# Patient Record
Sex: FEMALE | Race: WHITE | Employment: PART TIME | URBAN - NONMETROPOLITAN AREA
[De-identification: names, ages, dates, MRNs, and addresses within clinical notes are randomized per-mention and may not be internally consistent; named-entity substitution may affect disease eponyms.]

---

## 2017-08-01 ENCOUNTER — HOSPITAL ENCOUNTER (OUTPATIENT)
Dept: WOMENS IMAGING | Age: 82
Discharge: HOME | End: 2017-08-01
Payer: MEDICARE

## 2017-08-01 DIAGNOSIS — Z12.31 ENCOUNTER FOR SCREENING MAMMOGRAM FOR HIGH-RISK PATIENT: ICD-10-CM

## 2017-08-01 PROCEDURE — 77063 BREAST TOMOSYNTHESIS BI: CPT

## 2017-08-15 ENCOUNTER — HOSPITAL ENCOUNTER (OUTPATIENT)
Dept: WOMENS IMAGING | Age: 82
Discharge: HOME | End: 2017-08-15
Payer: MEDICARE

## 2017-08-15 DIAGNOSIS — R92.8 ABNORMAL MAMMOGRAM: ICD-10-CM

## 2017-08-15 PROCEDURE — G0279 TOMOSYNTHESIS, MAMMO: HCPCS

## 2018-01-30 ENCOUNTER — TRANSCRIBE ORDERS (OUTPATIENT)
Dept: ADMINISTRATIVE | Facility: HOSPITAL | Age: 83
End: 2018-01-30

## 2018-01-30 DIAGNOSIS — Z78.0 ASYMPTOMATIC MENOPAUSAL STATE: Primary | ICD-10-CM

## 2018-03-26 ENCOUNTER — HOSPITAL ENCOUNTER (OUTPATIENT)
Dept: BONE DENSITY | Facility: HOSPITAL | Age: 83
Discharge: HOME | End: 2018-03-26
Admitting: PHYSICIAN ASSISTANT

## 2018-03-26 DIAGNOSIS — Z78.0 ASYMPTOMATIC MENOPAUSAL STATE: ICD-10-CM

## 2018-03-26 PROCEDURE — 77080 DXA BONE DENSITY AXIAL: CPT

## 2018-04-27 ENCOUNTER — TRANSCRIBE ORDERS (OUTPATIENT)
Dept: ADMINISTRATIVE | Facility: HOSPITAL | Age: 83
End: 2018-04-27

## 2018-04-27 DIAGNOSIS — R22.1 LOCALIZED SWELLING, MASS AND LUMP, NECK: Primary | ICD-10-CM

## 2018-05-07 ENCOUNTER — HOSPITAL ENCOUNTER (OUTPATIENT)
Dept: CT IMAGING | Facility: HOSPITAL | Age: 83
Discharge: HOME | End: 2018-05-07
Attending: INTERNAL MEDICINE | Admitting: INTERNAL MEDICINE

## 2018-05-07 DIAGNOSIS — R22.1 LOCALIZED SWELLING, MASS AND LUMP, NECK: ICD-10-CM

## 2018-05-07 LAB — CREAT BLDA-MCNC: 0.9 MG/DL (ref 0.6–1.3)

## 2018-05-07 PROCEDURE — 70491 CT SOFT TISSUE NECK W/DYE: CPT

## 2018-05-07 PROCEDURE — 25010000002 IOPAMIDOL 61 % SOLUTION: Performed by: INTERNAL MEDICINE

## 2018-05-07 PROCEDURE — 82565 ASSAY OF CREATININE: CPT

## 2018-05-07 RX ADMIN — IOPAMIDOL 100 ML: 612 INJECTION, SOLUTION INTRAVENOUS at 11:30

## 2018-05-24 NOTE — PROGRESS NOTES
Mirza Felix MD     Chief Complaint   Patient presents with   • Neck Mass     right side of neck       HPI   Kami Villanueva is a  84 y.o. female who complains of a right inferior neck mass and right neck pain. The patient has had tolerable symptoms. The symptoms have been on and off for the last several months. There have been no identified factors that aggravate the symptoms. There have been no factors that have improved the symptoms. She has had left jaw pain that she attributes to dental problems. She feels there is a difference when she puts her finger back in the mouth/ throat on the left and right side. She has shoulder pain intermittently. 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 She is s/p a medialization procedure in [place].     Review of Systems:  Reviewed per patient intake note    Past History:  Past Medical History:   Diagnosis Date   • Allergic rhinitis    • Disease of thyroid gland    • History of breast cancer    • Hypertension      Past Surgical History:   Procedure Laterality Date   • BREAST SURGERY Right     mastectomy   • COLON RESECTION     • HYSTERECTOMY     • THROAT SURGERY      vocal cord implant   • THYROID SURGERY       Family History   Problem Relation Age of Onset   • No Known Problems Mother    • No Known Problems Father      Social History   Substance Use Topics   • Smoking status: Never Smoker   • Smokeless tobacco: Never Used   • Alcohol use No     Outpatient Prescriptions Marked as Taking for the 5/30/18 encounter (Office Visit) with Mirza Felix MD   Medication Sig Dispense Refill   • amLODIPine (NORVASC) 5 MG tablet Take 5 mg by mouth Daily.     • Coenzyme Q10 (COQ-10 PO) qd     • Cyanocobalamin (VITAMIN B-12) 5000 MCG lozenge Take  by mouth.     • estradiol (YUVAFEM) 10 MCG tablet vaginal tablet Yuvafem 10 mcg vaginal tablet     • fluticasone (FLONASE) 50 MCG/ACT nasal spray 2 sprays into each nostril Daily.     •  Fluticasone Furoate (ARNUITY ELLIPTA) 200 MCG/ACT aerosol powder  Inhale Daily.     • GuaiFENesin (MUCINEX PO) Take  by mouth.     • hydrochlorothiazide (HYDRODIURIL) 25 MG tablet hydrochlorothiazide 25 mg tablet     • levothyroxine (SYNTHROID) 125 MCG tablet TAKE ONE TABLET BY MOUTH ONCE DAILY     • Magnesium 100 MG capsule qd     • Mirabegron (MYRBETRIQ PO) Take  by mouth.     • Multiple Vitamins-Minerals (MULTIVITAMIN ADULT PO) 1 tablet daily     • Probiotic Product (PROBIOTIC-10 PO) bid     • simethicone (GAS-X) 80 MG chewable tablet tid     • [DISCONTINUED] HYDROCHLOROTHIAZIDE PO Take 25 mg by mouth Daily. Half a tab daily       Allergies:  Alendronate; Lisinopril; Statins; Sulfa antibiotics; and Teriparatide (recombinant)    Vital Signs:   Temp:  [97.6 °F (36.4 °C)] 97.6 °F (36.4 °C)  BP: (152)/(86) 152/86    Physical Exam   CONSTITUTIONAL: well nourished, well-developed, alert, oriented, in no acute distress   COMMUNICATION AND VOICE: able to communicate normally, normal voice quality  HEAD: normocephalic, no lesions, atraumatic, no tenderness, no masses   FACE: appearance normal, no lesions, no tenderness, no deformities, facial motion symmetric  SALIVARY GLANDS: parotid glands with no tenderness, no swelling, no masses, submandibular glands with normal size, nontender  EYES: ocular motility normal, eyelids normal, orbits normal, no proptosis, conjunctiva normal , pupils equal, round  HEARING: response to conversational voice normal bilaterally   EXTERNAL EARS: auricles without lesions  EXTERNAL EAR CANALS: normal ear canals without stenosis or significant cerumen  TYMPANIC MEMBRANES: tympanic membrane appearance normal, no lesions, no perforation, normal mobility, no fluid  EXTERNAL NOSE: structure normal, no tenderness on palpation, no nasal discharge, no lesions, no evidence of trauma, nostrils patent  INTRANASAL EXAM: nasal mucosa normal, vestibule within normal limits, inferior turbinate normal,  nasal  septum without overt anterior deviation  NASOPHARYNX: nasopharyngeal mucosa, adenoids within normal limits  LIPS: structure normal, no tenderness on palpation, no lesions, no evidence of trauma  TEETH: dentition within normal limits for age  GUMS: gingivae healthy  ORAL MUCOSA: oral mucosa normal, no mucosal lesions  FLOOR OF MOUTH: Warthin's duct patent, mucosa normal  TONGUE: lingual mucosa normal without lesions, normal tongue mobility  PALATE: soft and hard palates with normal mucosa and structure  OROPHARYNX: mucosa normal, tonsil fossa normal, 1+ bilateral tonsils no structural abnormality  HYPOPHARYNX: hypopharyngeal mucosa normal  LARYNX: epiglottis and arytenoid cartilage within normal limits, vocal cord mucosa normal with right true vocal cord paralysis  NECK: There is right> left clavicular asymmetry consistent with arthritic changes, no neck mass is noted  THYROID: well healed scar without mass.   LYMPH NODES: no lymphadenopathy  CHEST/RESPIRATORY: respiratory effort normal, normal breath sounds  CARDIOVASCULAR: rate and rhythm normal, extremities without cyanosis or edema, no overt jugulovenous distension present  NEUROLOGIC/PSYCHIATRIC: oriented appropriately for age, mood normal, affect appropriate, cranial nerves intact grossly unless specifically mentioned above     RESULTS REVIEW:    I have personally reviewed the patient's ct scan of the neck with contrast images.       Assessment   1. Degenerative joint disease of sternoclavicular joint, right    2. Complete paralysis of vocal cord        Plan    The calcific area on cat scan scanning most likely represents the vocal cord medialization material.  Conservative management.    Return if symptoms worsen or fail to improve.    Mirza Felix MD  05/30/18  6:06 PM

## 2018-05-30 ENCOUNTER — OFFICE VISIT (OUTPATIENT)
Dept: OTOLARYNGOLOGY | Facility: CLINIC | Age: 83
End: 2018-05-30

## 2018-05-30 VITALS
DIASTOLIC BLOOD PRESSURE: 86 MMHG | WEIGHT: 168 LBS | BODY MASS INDEX: 26.37 KG/M2 | HEIGHT: 67 IN | SYSTOLIC BLOOD PRESSURE: 152 MMHG | TEMPERATURE: 97.6 F

## 2018-05-30 DIAGNOSIS — J38.00 COMPLETE PARALYSIS OF VOCAL CORD: ICD-10-CM

## 2018-05-30 DIAGNOSIS — M19.011 DEGENERATIVE JOINT DISEASE OF STERNOCLAVICULAR JOINT, RIGHT: Primary | ICD-10-CM

## 2018-05-30 PROCEDURE — 99202 OFFICE O/P NEW SF 15 MIN: CPT | Performed by: OTOLARYNGOLOGY

## 2018-05-30 RX ORDER — HYDROCHLOROTHIAZIDE 25 MG/1
TABLET ORAL
Status: ON HOLD | COMMUNITY
End: 2018-10-22

## 2018-05-30 NOTE — PROGRESS NOTES
Yahaira Gibbons   Patient Intake Note    Review of Systems  Review of Systems   Constitutional: Negative for chills, fatigue and fever.   HENT:        See HPI   Respiratory: Positive for choking and shortness of breath. Negative for cough and wheezing.    Cardiovascular: Negative.    Gastrointestinal: Negative for constipation, diarrhea, nausea and vomiting.   Allergic/Immunologic: Positive for environmental allergies. Negative for food allergies.   Neurological: Negative for dizziness, light-headedness and headaches.   Hematological: Does not bruise/bleed easily.   Psychiatric/Behavioral: Negative for sleep disturbance.       QUALITY MEASURES    Body Mass Index Screening and Follow-Up Plan  Body mass index is 26.31 kg/m².  Patient's Body mass index is 26.31 kg/m². BMI is above normal parameters. Recommendations include: exercise counseling.    Tobacco Use: Screening and Cessation Intervention  Smoking status: Never Smoker                                                              Smokeless tobacco: Never Used                            Yahaira Gibbons  5/30/2018  2:06 PM

## 2018-08-16 ENCOUNTER — HOSPITAL ENCOUNTER (OUTPATIENT)
Dept: GENERAL RADIOLOGY | Facility: HOSPITAL | Age: 83
Discharge: HOME | End: 2018-08-16
Admitting: NURSE PRACTITIONER

## 2018-08-16 ENCOUNTER — HOSPITAL ENCOUNTER (OUTPATIENT)
Dept: CT IMAGING | Facility: HOSPITAL | Age: 83
Discharge: HOME | End: 2018-08-16

## 2018-08-16 LAB — CREAT BLDA-MCNC: 0.9 MG/DL (ref 0.6–1.3)

## 2018-08-16 PROCEDURE — 25010000002 IOPAMIDOL 61 % SOLUTION: Performed by: NURSE PRACTITIONER

## 2018-08-16 PROCEDURE — 82565 ASSAY OF CREATININE: CPT

## 2018-08-16 PROCEDURE — 70150 X-RAY EXAM OF FACIAL BONES: CPT

## 2018-08-16 PROCEDURE — 70488 CT MAXILLOFACIAL W/O & W/DYE: CPT

## 2018-08-16 RX ADMIN — IOPAMIDOL 100 ML: 612 INJECTION, SOLUTION INTRAVENOUS at 14:25

## 2018-08-17 ENCOUNTER — HOSPITAL ENCOUNTER (OUTPATIENT)
Dept: WOMENS IMAGING | Age: 83
Discharge: HOME | End: 2018-08-17
Payer: MEDICARE

## 2018-08-17 ENCOUNTER — TELEPHONE (OUTPATIENT)
Dept: URGENT CARE | Facility: CLINIC | Age: 83
End: 2018-08-17

## 2018-08-17 DIAGNOSIS — Z12.31 ENCOUNTER FOR SCREENING MAMMOGRAM FOR BREAST CANCER: ICD-10-CM

## 2018-08-17 PROCEDURE — 77063 BREAST TOMOSYNTHESIS BI: CPT

## 2018-08-17 NOTE — TELEPHONE ENCOUNTER
Notified patient of ct results. Advised to start antibiotics. Advised of appt with ENT. Patient stated she could go to this appt. Patient also stated she took blood pressure medication today and blood pressure is doing much better. Advised patient to notify clinic if anything else is needed, and we will defer care to ENT and Dr. Strong (PCP)  from here. We will fax results to [facility].

## 2018-08-20 ENCOUNTER — OFFICE VISIT (OUTPATIENT)
Dept: OTOLARYNGOLOGY | Facility: CLINIC | Age: 83
End: 2018-08-20

## 2018-08-20 VITALS
TEMPERATURE: 97.6 F | WEIGHT: 164 LBS | HEIGHT: 67 IN | DIASTOLIC BLOOD PRESSURE: 80 MMHG | BODY MASS INDEX: 25.74 KG/M2 | SYSTOLIC BLOOD PRESSURE: 140 MMHG

## 2018-08-20 DIAGNOSIS — J34.89 MAXILLARY SINUS MASS: ICD-10-CM

## 2018-08-20 DIAGNOSIS — D49.9 NEOPLASM: ICD-10-CM

## 2018-08-20 DIAGNOSIS — R22.0 RIGHT FACIAL SWELLING: Primary | ICD-10-CM

## 2018-08-20 DIAGNOSIS — J32.0 CHRONIC MAXILLARY SINUSITIS: ICD-10-CM

## 2018-08-20 DIAGNOSIS — J38.00 COMPLETE PARALYSIS OF VOCAL CORD: ICD-10-CM

## 2018-08-20 DIAGNOSIS — K08.409 HISTORY OF TOOTH EXTRACTION, UNSPECIFIED EDENTULISM CLASS: ICD-10-CM

## 2018-08-20 PROCEDURE — 99214 OFFICE O/P EST MOD 30 MIN: CPT | Performed by: PHYSICIAN ASSISTANT

## 2018-08-20 RX ORDER — OXYMETAZOLINE HYDROCHLORIDE 0.05 G/100ML
2 SPRAY NASAL
Status: CANCELLED | OUTPATIENT
Start: 2018-08-20 | End: 2018-08-20

## 2018-08-20 NOTE — PROGRESS NOTES
YOB: 1934  Location: [facility]  Location Address: [address] [place] [address], [facility], [address] [place] [address]  Location Phone: [phone]    Chief Complaint   Patient presents with   • Abnormal CT       History of Present Illness  Kami Villanueva is a 84 y.o. female.  Kami Villanueva is here for follow up of ENT complaints. The patient has had problems with right facial swelling, right jaw pain, right maxilla ridge/swelling, abnormality in right maxillary sinus on CT with interval change noted.  The symptoms are localized to the right side. The patient has had moderate symptoms. The symptoms have been present for the last several months The symptoms are aggravated by  no identifiable factors. The symptoms are improved by no identifiable factors.    The patient has been on 5 rounds of antibiotics since 2018, she has had a right upper tooth extraction and reports no improvement in symptoms. Radiology report show interval change between CT in May and CT in August. Concern for infection vs neoplastic process.    Patient has a know right vocal cord paralysis with history of medialization surgery.           Past Medical History:   Diagnosis Date   • Allergic rhinitis    • Disease of thyroid gland    • History of breast cancer    • Hypertension        Past Surgical History:   Procedure Laterality Date   • BREAST SURGERY Right     mastectomy   • COLON RESECTION     • HYSTERECTOMY     • THROAT SURGERY      vocal cord implant   • THYROID SURGERY         Outpatient Prescriptions Marked as Taking for the 18 encounter (Office Visit) with Kenny Mar PA   Medication Sig Dispense Refill   • amLODIPine (NORVASC) 5 MG tablet Take 5 mg by mouth Daily.     • amoxicillin-clavulanate (AUGMENTIN) 875-125 MG per tablet Take 1 tablet by mouth 2 (Two) Times a Day for 10 days. 20 tablet 0   • Coenzyme Q10 (COQ-10 PO) qd     • Cyanocobalamin (VITAMIN B-12) 5000 MCG lozenge Take  by mouth.     • estradiol  (YUVAFEM) 10 MCG tablet vaginal tablet Yuvafem 10 mcg vaginal tablet     • fluticasone (FLONASE) 50 MCG/ACT nasal spray 2 sprays into each nostril Daily.     • Fluticasone Furoate (ARNUITY ELLIPTA) 200 MCG/ACT aerosol powder  Inhale Daily.     • GuaiFENesin (MUCINEX PO) Take  by mouth.     • hydrochlorothiazide (HYDRODIURIL) 25 MG tablet hydrochlorothiazide 25 mg tablet     • levothyroxine (SYNTHROID) 125 MCG tablet TAKE ONE TABLET BY MOUTH ONCE DAILY     • Magnesium 100 MG capsule qd     • Mirabegron (MYRBETRIQ PO) Take  by mouth.     • Multiple Vitamins-Minerals (MULTIVITAMIN ADULT PO) 1 tablet daily     • Probiotic Product (PROBIOTIC-10 PO) bid     • simethicone (GAS-X) 80 MG chewable tablet tid         Alendronate; Lisinopril; Statins; Sulfa antibiotics; and Teriparatide (recombinant)    Family History   Problem Relation Age of Onset   • No Known Problems Mother    • No Known Problems Father        Social History     Social History   • Marital status:      Spouse name: N/A   • Number of children: N/A   • Years of education: N/A     Occupational History   • Not on file.     Social History Main Topics   • Smoking status: Never Smoker   • Smokeless tobacco: Never Used   • Alcohol use No   • Drug use: No   • Sexual activity: Not on file     Other Topics Concern   • Not on file     Social History Narrative   • No narrative on file       Review of Systems   Constitutional: Negative for activity change, appetite change, chills, diaphoresis, fatigue, fever and unexpected weight change.   HENT: Positive for facial swelling and voice change (stable). Negative for congestion, dental problem, drooling, ear discharge, ear pain, hearing loss, mouth sores, nosebleeds, postnasal drip, rhinorrhea, sinus pressure, sneezing, sore throat, tinnitus and trouble swallowing.         Right jaw pain   Eyes: Negative.    Respiratory: Negative.    Cardiovascular: Negative.    Gastrointestinal: Negative.    Endocrine: Negative.     Skin: Negative.    Allergic/Immunologic: Negative for environmental allergies, food allergies and immunocompromised state.   Neurological: Negative.    Hematological: Negative.    Psychiatric/Behavioral: Negative.        Vitals:    08/20/18 1508   BP: 140/80   Temp: 97.6 °F (36.4 °C)       Body mass index is 25.69 kg/m².    Objective     Physical Exam  CONSTITUTIONAL: well nourished, alert, oriented, in no acute distress     COMMUNICATION AND VOICE: able to communicate normally, normal voice quality    HEAD: normocephalic, no lesions, atraumatic, right TMJ tenderness with mild trismus and maxillary tenderness, no masses     FACE: right facial swelling in the maxillary area moderate, no lesions, no tenderness, no deformities, facial motion symmetric    SALIVARY GLANDS: parotid glands with no tenderness, no swelling, no masses, submandibular glands with normal size, nontender    EYES: ocular motility normal, eyelids normal, orbits normal, no proptosis, conjunctiva normal , pupils equal, round     EARS:  Hearing: response to conversational voice normal bilaterally   External Ears: auricles without lesions  Otoscopic: tympanic membrane appearance normal, no lesions, no perforation, normal mobility, no fluid    NOSE:  External Nose: structure normal, no tenderness on palpation, no nasal discharge, no lesions, no evidence of trauma, nostrils patent   Intranasal Exam: nasal mucosa edema and erythema, vestibule within normal limits, inferior turbinate normal, nasal septum midline   Nasopharynx:     ORAL:  Lips: upper and lower lips without lesion   Teeth: dentition within normal limits for age   Gums: gingivae healthy   Oral Mucosa: palpable ridge along the right maxilla  Floor of Mouth: Warthin’s duct patent, mucosa normal  Tongue: lingual mucosa normal without lesions, normal tongue mobility   Palate: soft and hard palates with normal mucosa and structure  Oropharynx: oropharyngeal mucosa normal    NECK: neck appearance  normal, no mass,  noted without erythema or tenderness    THYROID: no overt thyromegaly, no tenderness, nodules or mass present on palpation, position midline     LYMPH NODES: no lymphadenopathy    CHEST/RESPIRATORY: respiratory effort normal, normal breath sounds     CARDIOVASCULAR: rate and rhythm normal, extremities without cyanosis or edema      NEUROLOGIC/PSYCHIATRIC: oriented to time, place and person, mood normal, affect appropriate, CN II-XII intact grossly    Assessment/Plan   Problems Addressed this Visit        Respiratory    Complete paralysis of vocal cord    Chronic maxillary sinusitis    Relevant Orders    Case Request (Completed)    Comprehensive Metabolic Panel    CBC (No Diff)    Protime-INR    APTT    ECG 12 Lead    XR Chest 2 View    Maxillary sinus mass    Relevant Orders    Case Request (Completed)    Comprehensive Metabolic Panel    CBC (No Diff)    Protime-INR    APTT    ECG 12 Lead    XR Chest 2 View       Other    History of tooth extraction    Relevant Orders    Case Request (Completed)    Comprehensive Metabolic Panel    CBC (No Diff)    Protime-INR    APTT    ECG 12 Lead    XR Chest 2 View    Right facial swelling - Primary    Relevant Orders    Case Request (Completed)    Comprehensive Metabolic Panel    CBC (No Diff)    Protime-INR    APTT    ECG 12 Lead    XR Chest 2 View    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    Relevant Orders    Protime-INR    APTT        ENDOSCOPIC FUNCTIONAL SINUS SURGERY W TRACKING OF RIGHT MAXILLARY SINUS WITH BIOPSY (Right)  Orders Placed This Encounter   Procedures   • XR Chest 2 View     Standing Status:   Future     Standing Expiration Date:   8/20/2019     Order Specific Question:   Reason for Exam:     Answer:   ENDOSCOPIC FUNCTIONAL SINUS SURGERY W TRACKING OF RIGHT MAXILLARY SINUS WITH BIOPSY   • Comprehensive Metabolic Panel     Standing Status:   Future     Standing Expiration Date:   8/20/2019   • CBC (No Diff)     Standing  Status:   Future     Standing Expiration Date:   8/20/2019   • Protime-INR     Standing Status:   Future     Standing Expiration Date:   8/20/2019   • APTT     Standing Status:   Future     Standing Expiration Date:   8/20/2019   • Follow Anesthesia Guidelines / Standing Orders     Standing Status:   Future   • Obtain Informed Consent     Order Specific Question:   Informed Consent Given For     Answer:   ENDOSCOPIC FUNCTIONAL SINUS SURGERY W TRACKING OF RIGHT MAXILLARY SINUS WITH BIOPSY   • Provide Patient With Instructions on NPO Status     Standing Status:   Future   • ECG 12 Lead     Standing Status:   Future     Standing Expiration Date:   8/20/2019     Order Specific Question:   Reason for Exam:     Answer:   ENDOSCOPIC FUNCTIONAL SINUS SURGERY W TRACKING OF RIGHT MAXILLARY SINUS WITH BIOPSY     Return for Follow-up post-operatively as directed.       Patient Instructions   FUNCTIONAL ENDOSCOPIC SINUS SURGERY WITH BIOPSY OF RIGHT MAXILLARY SINUS LESION: A functional endoscopic sinus surgery was recommended. The risks and benefits were explained including but not limited to pain, bleeding (with the possible need for nasal packing), infection, risks of the general anesthesia, orbital injury with blurred vision or visual loss, cerebrospinal fluid leak, persistent disease, scarring, synichiae and the possibility for the need of reoperation. Possibilities of additional sinus work or less sinus work depending on the status of the nose at the time of the operation was discussed. Alternatives were discussed. No guarantees were made or implied. Questions were asked appropriately answered.      ###### BMI  #####   MyPlate from USDA  The general, healthful diet is based on the 2010 Dietary Guidelines for Americans. The amount of food you need to eat from each food group depends on your age, sex, and level of physical activity and can be individualized by a dietitian. Go to ChooseMyPlate.gov for more information.  What do  I need to know about the MyPlate plan?  · Enjoy your food, but eat less.  · Avoid oversized portions.  ? ½ of your plate should include fruits and vegetables.  ? ¼ of your plate should be grains.  ? ¼ of your plate should be protein.  Grains  · Make at least half of your grains whole grains.  · For a 2,000 calorie daily food plan, eat 6 oz every day.  · 1 oz is about 1 slice bread, 1 cup cereal, or ½ cup cooked rice, cereal, or pasta.  Vegetables  · Make half your plate fruits and vegetables.  · For a 2,000 calorie daily food plan, eat 2½ cups every day.  · 1 cup is about 1 cup raw or cooked vegetables or vegetable juice or 2 cups raw leafy greens.  Fruits  · Make half your plate fruits and vegetables.  · For a 2,000 calorie daily food plan, eat 2 cups every day.  · 1 cup is about 1 cup fruit or 100% fruit juice or ½ cup dried fruit.  Protein  · For a 2,000 calorie daily food plan, eat 5½ oz every day.  · 1 oz is about 1 oz meat, poultry, or fish, ¼ cup cooked beans, 1 egg, 1 Tbsp peanut butter, or ½ oz nuts or seeds.  Dairy  · Switch to fat-free or low-fat (1%) milk.  · For a 2,000 calorie daily food plan, eat 3 cups every day.  · 1 cup is about 1 cup milk or yogurt or soy milk (soy beverage), 1½ oz natural cheese, or 2 oz processed cheese.  Fats, Oils, and Empty Calories  · Only small amounts of oils are recommended.  · Empty calories are calories from solid fats or added sugars.  · Compare sodium in foods like soup, bread, and frozen meals. Choose the foods with lower numbers.  · Drink water instead of sugary drinks.  What foods can I eat?  Grains  Whole grains such as whole wheat, quinoa, millet, and bulgur. Bread, rolls, and pasta made from whole grains. Brown or wild rice. Hot or cold cereals made from whole grains and without added sugar.  Vegetables  All fresh vegetables, especially fresh red, dark green, or orange vegetables. Peas and beans. Low-sodium frozen or canned vegetables prepared without added  salt. Low-sodium vegetable juices.  Fruits  All fresh, frozen, and dried fruits. Canned fruit packed in water or fruit juice without added sugar. Fruit juices without added sugar.  Meats and Other Protein Sources  Boiled, baked, or grilled lean meat trimmed of fat. Skinless poultry. Fresh seafood and shellfish. Canned seafood packed in water. Unsalted nuts and unsalted nut butters. Tofu. Dried beans and pea. Eggs.  Dairy  Low-fat or fat-free milk, yogurt, and cheeses.  Sweets and Desserts  Frozen desserts made from low-fat milk.  Fats and Oils  Olive, peanut, and canola oils and margarine. Salad dressing and mayonnaise made from these oils.  Other  Soups and casseroles made from allowed ingredients and without added fat or salt.  The items listed above may not be a complete list of recommended foods or beverages. Contact your dietitian for more options.  What foods are not recommended?  Grains  Sweetened, low-fiber cereals. Packaged baked goods. Snack crackers and chips. Cheese crackers, butter crackers, and biscuits. Frozen waffles, sweet breads, doughnuts, pastries, packaged baking mixes, pancakes, cakes, and cookies.  Vegetables  Regular canned or frozen vegetables or vegetables prepared with salt. Canned tomatoes. Canned tomato sauce. Fried vegetables. Vegetables in cream sauce or cheese sauce.  Fruits  Fruits packed in syrup or made with added sugar.  Meats and Other Protein Sources  Marbled or fatty meats such as ribs. Poultry with skin. Fried meats, poultry, eggs, or fish. Sausages, hot dogs, and deli meats such as pastrami, bologna, or salami.  Dairy  Whole milk, cream, cheeses made from whole milk, sour cream. Ice cream or yogurt made from whole milk or with added sugar.  Beverages  For adults, no more than one alcoholic drink per day. Regular soft drinks or other sugary beverages. Juice drinks.  Sweets and Desserts  Sugary or fatty desserts, candy, and other sweets.  Fats and Oils  Solid shortening or  partially hydrogenated oils. Solid margarine. Margarine that contains trans fats. Butter.  The items listed above may not be a complete list of foods and beverages to avoid. Contact your dietitian for more information.  This information is not intended to replace advice given to you by your health care provider. Make sure you discuss any questions you have with your health care provider.  Document Released: 01/06/2009 Document Revised: 05/25/2017 Document Reviewed: 11/26/2014  [employer] Patient Education © 2018 [employer]     Calorie Counting for Weight Loss  Calories are units of energy. Your body needs a certain amount of calories from food to keep you going throughout the day. When you eat more calories than your body needs, your body stores the extra calories as fat. When you eat fewer calories than your body needs, your body burns fat to get the energy it needs.  Calorie counting means keeping track of how many calories you eat and drink each day. Calorie counting can be helpful if you need to lose weight. If you make sure to eat fewer calories than your body needs, you should lose weight. Ask your health care provider what a healthy weight is for you.  For calorie counting to work, you will need to eat the right number of calories in a day in order to lose a healthy amount of weight per week. A dietitian can help you determine how many calories you need in a day and will give you suggestions on how to reach your calorie goal.  · A healthy amount of weight to lose per week is usually 1-2 lb (0.5-0.9 kg). This usually means that your daily calorie intake should be reduced by 500-750 calories.  · Eating 1,200 - 1,500 calories per day can help most women lose weight.  · Eating 1,500 - 1,800 calories per day can help most men lose weight.    What do I need to know about calorie counting?  In order to meet your daily calorie goal, you will need to:  · Find out how many calories are in each food you  would like to eat. Try to do this before you eat.  · Decide how much of the food you plan to eat.  · Write down what you ate and how many calories it had. Doing this is called keeping a food log.    To successfully lose weight, it is important to balance calorie counting with a healthy lifestyle that includes regular activity. Aim for 150 minutes of moderate exercise (such as walking) or 75 minutes of vigorous exercise (such as running) each week.  Where do I find calorie information?    The number of calories in a food can be found on a Nutrition Facts label. If a food does not have a Nutrition Facts label, try to look up the calories online or ask your dietitian for help.  Remember that calories are listed per serving. If you choose to have more than one serving of a food, you will have to multiply the calories per serving by the amount of servings you plan to eat. For example, the label on a package of bread might say that a serving size is 1 slice and that there are 90 calories in a serving. If you eat 1 slice, you will have eaten 90 calories. If you eat 2 slices, you will have eaten 180 calories.  How do I keep a food log?  Immediately after each meal, record the following information in your food log:  · What you ate. Don't forget to include toppings, sauces, and other extras on the food.  · How much you ate. This can be measured in cups, ounces, or number of items.  · How many calories each food and drink had.  · The total number of calories in the meal.    Keep your food log near you, such as in a small notebook in your pocket, or use a mobile avril or website. Some programs will calculate calories for you and show you how many calories you have left for the day to meet your goal.  What are some calorie counting tips?  · Use your calories on foods and drinks that will fill you up and not leave you hungry:  ? Some examples of foods that fill you up are nuts and nut butters, vegetables, lean proteins, and  "high-fiber foods like whole grains. High-fiber foods are foods with more than 5 g fiber per serving.  ? Drinks such as sodas, specialty coffee drinks, alcohol, and juices have a lot of calories, yet do not fill you up.  · Eat nutritious foods and avoid empty calories. Empty calories are calories you get from foods or beverages that do not have many vitamins or protein, such as candy, sweets, and soda. It is better to have a nutritious high-calorie food (such as an avocado) than a food with few nutrients (such as a bag of chips).  · Know how many calories are in the foods you eat most often. This will help you calculate calorie counts faster.  · Pay attention to calories in drinks. Low-calorie drinks include water and unsweetened drinks.  · Pay attention to nutrition labels for \"low fat\" or \"fat free\" foods. These foods sometimes have the same amount of calories or more calories than the full fat versions. They also often have added sugar, starch, or salt, to make up for flavor that was removed with the fat.  · Find a way of tracking calories that works for you. Get creative. Try different apps or programs if writing down calories does not work for you.  What are some portion control tips?  · Know how many calories are in a serving. This will help you know how many servings of a certain food you can have.  · Use a measuring cup to measure serving sizes. You could also try weighing out portions on a kitchen scale. With time, you will be able to estimate serving sizes for some foods.  · Take some time to put servings of different foods on your favorite plates, bowls, and cups so you know what a serving looks like.  · Try not to eat straight from a bag or box. Doing this can lead to overeating. Put the amount you would like to eat in a cup or on a plate to make sure you are eating the right portion.  · Use smaller plates, glasses, and bowls to prevent overeating.  · Try not to multitask (for example, watch TV or use " your computer) while eating. If it is time to eat, sit down at a table and enjoy your food. This will help you to know when you are full. It will also help you to be aware of what you are eating and how much you are eating.  What are tips for following this plan?  Reading food labels  · Check the calorie count compared to the serving size. The serving size may be smaller than what you are used to eating.  · Check the source of the calories. Make sure the food you are eating is high in vitamins and protein and low in saturated and trans fats.  Shopping  · Read nutrition labels while you shop. This will help you make healthy decisions before you decide to purchase your food.  · Make a grocery list and stick to it.  Cooking  · Try to cook your favorite foods in a healthier way. For example, try baking instead of frying.  · Use low-fat dairy products.  Meal planning  · Use more fruits and vegetables. Half of your plate should be fruits and vegetables.  · Include lean proteins like poultry and fish.  How do I count calories when eating out?  · Ask for smaller portion sizes.  · Consider sharing an entree and sides instead of getting your own entree.  · If you get your own entree, eat only half. Ask for a box at the beginning of your meal and put the rest of your entree in it so you are not tempted to eat it.  · If calories are listed on the menu, choose the lower calorie options.  · Choose dishes that include vegetables, fruits, whole grains, low-fat dairy products, and lean protein.  · Choose items that are boiled, broiled, grilled, or steamed. Stay away from items that are buttered, battered, fried, or served with cream sauce. Items labeled “crispy” are usually fried, unless stated otherwise.  · Choose water, low-fat milk, unsweetened iced tea, or other drinks without added sugar. If you want an alcoholic beverage, choose a lower calorie option such as a glass of wine or light beer.  · Ask for dressings, sauces, and  syrups on the side. These are usually high in calories, so you should limit the amount you eat.  · If you want a salad, choose a garden salad and ask for grilled meats. Avoid extra toppings like velasquez, cheese, or fried items. Ask for the dressing on the side, or ask for olive oil and vinegar or lemon to use as dressing.  · Estimate how many servings of a food you are given. For example, a serving of cooked rice is ½ cup or about the size of half a baseball. Knowing serving sizes will help you be aware of how much food you are eating at restaurants. The list below tells you how big or small some common portion sizes are based on everyday objects:  ? 1 oz--4 stacked dice.  ? 3 oz--1 deck of cards.  ? 1 tsp--1 die.  ? 1 Tbsp--½ a ping-pong ball.  ? 2 Tbsp--1 ping-pong ball.  ? ½ cup--½ baseball.  ? 1 cup--1 baseball.  Summary  · Calorie counting means keeping track of how many calories you eat and drink each day. If you eat fewer calories than your body needs, you should lose weight.  · A healthy amount of weight to lose per week is usually 1-2 lb (0.5-0.9 kg). This usually means reducing your daily calorie intake by 500-750 calories.  · The number of calories in a food can be found on a Nutrition Facts label. If a food does not have a Nutrition Facts label, try to look up the calories online or ask your dietitian for help.  · Use your calories on foods and drinks that will fill you up, and not on foods and drinks that will leave you hungry.  · Use smaller plates, glasses, and bowls to prevent overeating.  This information is not intended to replace advice given to you by your health care provider. Make sure you discuss any questions you have with your health care provider.  Document Released: 12/18/2006 Document Revised: 11/17/2017 Document Reviewed: 11/17/2017  [employer] Patient Education © 2018 [employer]     Exercising to Lose Weight  Exercising can help you to lose weight. In order to lose weight  through exercise, you need to do vigorous-intensity exercise. You can tell that you are exercising with vigorous intensity if you are breathing very hard and fast and cannot hold a conversation while exercising.  Moderate-intensity exercise helps to maintain your current weight. You can tell that you are exercising at a moderate level if you have a higher heart rate and faster breathing, but you are still able to hold a conversation.  How often should I exercise?  Choose an activity that you enjoy and set realistic goals. Your health care provider can help you to make an activity plan that works for you. Exercise regularly as directed by your health care provider. This may include:  · Doing resistance training twice each week, such as:  ? Push-ups.  ? Sit-ups.  ? Lifting weights.  ? Using resistance bands.  · Doing a given intensity of exercise for a given amount of time. Choose from these options:  ? 150 minutes of moderate-intensity exercise every week.  ? 75 minutes of vigorous-intensity exercise every week.  ? A mix of moderate-intensity and vigorous-intensity exercise every week.    Children, pregnant women, people who are out of shape, people who are overweight, and older adults may need to consult a health care provider for individual recommendations. If you have any sort of medical condition, be sure to consult your health care provider before starting a new exercise program.  What are some activities that can help me to lose weight?  · Walking at a rate of at least 4.5 miles an hour.  · Jogging or running at a rate of 5 miles per hour.  · Biking at a rate of at least 10 miles per hour.  · Lap swimming.  · Roller-skating or in-line skating.  · Cross-country skiing.  · Vigorous competitive sports, such as football, basketball, and soccer.  · Jumping rope.  · Aerobic dancing.  How can I be more active in my day-to-day activities?  · Use the stairs instead of the elevator.  · Take a walk during your lunch  break.  · If you drive, park your car farther away from work or school.  · If you take public transportation, get off one stop early and walk the rest of the way.  · Make all of your phone calls while standing up and walking around.  · Get up, stretch, and walk around every 30 minutes throughout the day.  What guidelines should I follow while exercising?  · Do not exercise so much that you hurt yourself, feel dizzy, or get very short of breath.  · Consult your health care provider prior to starting a new exercise program.  · Wear comfortable clothes and shoes with good support.  · Drink plenty of water while you exercise to prevent dehydration or heat stroke. Body water is lost during exercise and must be replaced.  · Work out until you breathe faster and your heart beats faster.  This information is not intended to replace advice given to you by your health care provider. Make sure you discuss any questions you have with your health care provider.  Document Released: 01/20/2012 Document Revised: 05/25/2017 Document Reviewed: 05/21/2015  [employer] Interactive Patient Education © 2018 [employer]

## 2018-08-20 NOTE — PATIENT INSTRUCTIONS
FUNCTIONAL ENDOSCOPIC SINUS SURGERY WITH BIOPSY OF RIGHT MAXILLARY SINUS LESION: A functional endoscopic sinus surgery was recommended. The risks and benefits were explained including but not limited to pain, bleeding (with the possible need for nasal packing), infection, risks of the general anesthesia, orbital injury with blurred vision or visual loss, cerebrospinal fluid leak, persistent disease, scarring, synichiae and the possibility for the need of reoperation. Possibilities of additional sinus work or less sinus work depending on the status of the nose at the time of the operation was discussed. Alternatives were discussed. No guarantees were made or implied. Questions were asked appropriately answered.      ###### BMI  #####   MyPlate from [employer]  The general, healthful diet is based on the 2010 Dietary Guidelines for Americans. The amount of food you need to eat from each food group depends on your age, sex, and level of physical activity and can be individualized by a dietitian. Go to ChooseMyPlate.gov for more information.  What do I need to know about the MyPlate plan?  · Enjoy your food, but eat less.  · Avoid oversized portions.  ? ½ of your plate should include fruits and vegetables.  ? ¼ of your plate should be grains.  ? ¼ of your plate should be protein.  Grains  · Make at least half of your grains whole grains.  · For a 2,000 calorie daily food plan, eat 6 oz every day.  · 1 oz is about 1 slice bread, 1 cup cereal, or ½ cup cooked rice, cereal, or pasta.  Vegetables  · Make half your plate fruits and vegetables.  · For a 2,000 calorie daily food plan, eat 2½ cups every day.  · 1 cup is about 1 cup raw or cooked vegetables or vegetable juice or 2 cups raw leafy greens.  Fruits  · Make half your plate fruits and vegetables.  · For a 2,000 calorie daily food plan, eat 2 cups every day.  · 1 cup is about 1 cup fruit or 100% fruit juice or ½ cup dried fruit.  Protein  · For a 2,000 calorie daily food  plan, eat 5½ oz every day.  · 1 oz is about 1 oz meat, poultry, or fish, ¼ cup cooked beans, 1 egg, 1 Tbsp peanut butter, or ½ oz nuts or seeds.  Dairy  · Switch to fat-free or low-fat (1%) milk.  · For a 2,000 calorie daily food plan, eat 3 cups every day.  · 1 cup is about 1 cup milk or yogurt or soy milk (soy beverage), 1½ oz natural cheese, or 2 oz processed cheese.  Fats, Oils, and Empty Calories  · Only small amounts of oils are recommended.  · Empty calories are calories from solid fats or added sugars.  · Compare sodium in foods like soup, bread, and frozen meals. Choose the foods with lower numbers.  · Drink water instead of sugary drinks.  What foods can I eat?  Grains  Whole grains such as whole wheat, quinoa, millet, and bulgur. Bread, rolls, and pasta made from whole grains. Brown or wild rice. Hot or cold cereals made from whole grains and without added sugar.  Vegetables  All fresh vegetables, especially fresh red, dark green, or orange vegetables. Peas and beans. Low-sodium frozen or canned vegetables prepared without added salt. Low-sodium vegetable juices.  Fruits  All fresh, frozen, and dried fruits. Canned fruit packed in water or fruit juice without added sugar. Fruit juices without added sugar.  Meats and Other Protein Sources  Boiled, baked, or grilled lean meat trimmed of fat. Skinless poultry. Fresh seafood and shellfish. Canned seafood packed in water. Unsalted nuts and unsalted nut butters. Tofu. Dried beans and pea. Eggs.  Dairy  Low-fat or fat-free milk, yogurt, and cheeses.  Sweets and Desserts  Frozen desserts made from low-fat milk.  Fats and Oils  Olive, peanut, and canola oils and margarine. Salad dressing and mayonnaise made from these oils.  Other  Soups and casseroles made from allowed ingredients and without added fat or salt.  The items listed above may not be a complete list of recommended foods or beverages. Contact your dietitian for more options.  What foods are not  recommended?  Grains  Sweetened, low-fiber cereals. Packaged baked goods. Snack crackers and chips. Cheese crackers, butter crackers, and biscuits. Frozen waffles, sweet breads, doughnuts, pastries, packaged baking mixes, pancakes, cakes, and cookies.  Vegetables  Regular canned or frozen vegetables or vegetables prepared with salt. Canned tomatoes. Canned tomato sauce. Fried vegetables. Vegetables in cream sauce or cheese sauce.  Fruits  Fruits packed in syrup or made with added sugar.  Meats and Other Protein Sources  Marbled or fatty meats such as ribs. Poultry with skin. Fried meats, poultry, eggs, or fish. Sausages, hot dogs, and deli meats such as pastrami, bologna, or salami.  Dairy  Whole milk, cream, cheeses made from whole milk, sour cream. Ice cream or yogurt made from whole milk or with added sugar.  Beverages  For adults, no more than one alcoholic drink per day. Regular soft drinks or other sugary beverages. Juice drinks.  Sweets and Desserts  Sugary or fatty desserts, candy, and other sweets.  Fats and Oils  Solid shortening or partially hydrogenated oils. Solid margarine. Margarine that contains trans fats. Butter.  The items listed above may not be a complete list of foods and beverages to avoid. Contact your dietitian for more information.  This information is not intended to replace advice given to you by your health care provider. Make sure you discuss any questions you have with your health care provider.  Document Released: 01/06/2009 Document Revised: 05/25/2017 Document Reviewed: 11/26/2014  [employer] Interactive Patient Education © 2018 [employer]     Calorie Counting for Weight Loss  Calories are units of energy. Your body needs a certain amount of calories from food to keep you going throughout the day. When you eat more calories than your body needs, your body stores the extra calories as fat. When you eat fewer calories than your body needs, your body burns fat to get the energy it  needs.  Calorie counting means keeping track of how many calories you eat and drink each day. Calorie counting can be helpful if you need to lose weight. If you make sure to eat fewer calories than your body needs, you should lose weight. Ask your health care provider what a healthy weight is for you.  For calorie counting to work, you will need to eat the right number of calories in a day in order to lose a healthy amount of weight per week. A dietitian can help you determine how many calories you need in a day and will give you suggestions on how to reach your calorie goal.  · A healthy amount of weight to lose per week is usually 1-2 lb (0.5-0.9 kg). This usually means that your daily calorie intake should be reduced by 500-750 calories.  · Eating 1,200 - 1,500 calories per day can help most women lose weight.  · Eating 1,500 - 1,800 calories per day can help most men lose weight.    What do I need to know about calorie counting?  In order to meet your daily calorie goal, you will need to:  · Find out how many calories are in each food you would like to eat. Try to do this before you eat.  · Decide how much of the food you plan to eat.  · Write down what you ate and how many calories it had. Doing this is called keeping a food log.    To successfully lose weight, it is important to balance calorie counting with a healthy lifestyle that includes regular activity. Aim for 150 minutes of moderate exercise (such as walking) or 75 minutes of vigorous exercise (such as running) each week.  Where do I find calorie information?    The number of calories in a food can be found on a Nutrition Facts label. If a food does not have a Nutrition Facts label, try to look up the calories online or ask your dietitian for help.  Remember that calories are listed per serving. If you choose to have more than one serving of a food, you will have to multiply the calories per serving by the amount of servings you plan to eat. For  "example, the label on a package of bread might say that a serving size is 1 slice and that there are 90 calories in a serving. If you eat 1 slice, you will have eaten 90 calories. If you eat 2 slices, you will have eaten 180 calories.  How do I keep a food log?  Immediately after each meal, record the following information in your food log:  · What you ate. Don't forget to include toppings, sauces, and other extras on the food.  · How much you ate. This can be measured in cups, ounces, or number of items.  · How many calories each food and drink had.  · The total number of calories in the meal.    Keep your food log near you, such as in a small notebook in your pocket, or use a mobile avril or website. Some programs will calculate calories for you and show you how many calories you have left for the day to meet your goal.  What are some calorie counting tips?  · Use your calories on foods and drinks that will fill you up and not leave you hungry:  ? Some examples of foods that fill you up are nuts and nut butters, vegetables, lean proteins, and high-fiber foods like whole grains. High-fiber foods are foods with more than 5 g fiber per serving.  ? Drinks such as sodas, specialty coffee drinks, alcohol, and juices have a lot of calories, yet do not fill you up.  · Eat nutritious foods and avoid empty calories. Empty calories are calories you get from foods or beverages that do not have many vitamins or protein, such as candy, sweets, and soda. It is better to have a nutritious high-calorie food (such as an avocado) than a food with few nutrients (such as a bag of chips).  · Know how many calories are in the foods you eat most often. This will help you calculate calorie counts faster.  · Pay attention to calories in drinks. Low-calorie drinks include water and unsweetened drinks.  · Pay attention to nutrition labels for \"low fat\" or \"fat free\" foods. These foods sometimes have the same amount of calories or more calories " than the full fat versions. They also often have added sugar, starch, or salt, to make up for flavor that was removed with the fat.  · Find a way of tracking calories that works for you. Get creative. Try different apps or programs if writing down calories does not work for you.  What are some portion control tips?  · Know how many calories are in a serving. This will help you know how many servings of a certain food you can have.  · Use a measuring cup to measure serving sizes. You could also try weighing out portions on a kitchen scale. With time, you will be able to estimate serving sizes for some foods.  · Take some time to put servings of different foods on your favorite plates, bowls, and cups so you know what a serving looks like.  · Try not to eat straight from a bag or box. Doing this can lead to overeating. Put the amount you would like to eat in a cup or on a plate to make sure you are eating the right portion.  · Use smaller plates, glasses, and bowls to prevent overeating.  · Try not to multitask (for example, watch TV or use your computer) while eating. If it is time to eat, sit down at a table and enjoy your food. This will help you to know when you are full. It will also help you to be aware of what you are eating and how much you are eating.  What are tips for following this plan?  Reading food labels  · Check the calorie count compared to the serving size. The serving size may be smaller than what you are used to eating.  · Check the source of the calories. Make sure the food you are eating is high in vitamins and protein and low in saturated and trans fats.  Shopping  · Read nutrition labels while you shop. This will help you make healthy decisions before you decide to purchase your food.  · Make a grocery list and stick to it.  Cooking  · Try to cook your favorite foods in a healthier way. For example, try baking instead of frying.  · Use low-fat dairy products.  Meal planning  · Use more fruits  and vegetables. Half of your plate should be fruits and vegetables.  · Include lean proteins like poultry and fish.  How do I count calories when eating out?  · Ask for smaller portion sizes.  · Consider sharing an entree and sides instead of getting your own entree.  · If you get your own entree, eat only half. Ask for a box at the beginning of your meal and put the rest of your entree in it so you are not tempted to eat it.  · If calories are listed on the menu, choose the lower calorie options.  · Choose dishes that include vegetables, fruits, whole grains, low-fat dairy products, and lean protein.  · Choose items that are boiled, broiled, grilled, or steamed. Stay away from items that are buttered, battered, fried, or served with cream sauce. Items labeled “crispy” are usually fried, unless stated otherwise.  · Choose water, low-fat milk, unsweetened iced tea, or other drinks without added sugar. If you want an alcoholic beverage, choose a lower calorie option such as a glass of wine or light beer.  · Ask for dressings, sauces, and syrups on the side. These are usually high in calories, so you should limit the amount you eat.  · If you want a salad, choose a garden salad and ask for grilled meats. Avoid extra toppings like velasquez, cheese, or fried items. Ask for the dressing on the side, or ask for olive oil and vinegar or lemon to use as dressing.  · Estimate how many servings of a food you are given. For example, a serving of cooked rice is ½ cup or about the size of half a baseball. Knowing serving sizes will help you be aware of how much food you are eating at restaurants. The list below tells you how big or small some common portion sizes are based on everyday objects:  ? 1 oz--4 stacked dice.  ? 3 oz--1 deck of cards.  ? 1 tsp--1 die.  ? 1 Tbsp--½ a ping-pong ball.  ? 2 Tbsp--1 ping-pong ball.  ? ½ cup--½ baseball.  ? 1 cup--1 baseball.  Summary  · Calorie counting means keeping track of how many calories  you eat and drink each day. If you eat fewer calories than your body needs, you should lose weight.  · A healthy amount of weight to lose per week is usually 1-2 lb (0.5-0.9 kg). This usually means reducing your daily calorie intake by 500-750 calories.  · The number of calories in a food can be found on a Nutrition Facts label. If a food does not have a Nutrition Facts label, try to look up the calories online or ask your dietitian for help.  · Use your calories on foods and drinks that will fill you up, and not on foods and drinks that will leave you hungry.  · Use smaller plates, glasses, and bowls to prevent overeating.  This information is not intended to replace advice given to you by your health care provider. Make sure you discuss any questions you have with your health care provider.  Document Released: 12/18/2006 Document Revised: 11/17/2017 Document Reviewed: 11/17/2017  [employer] Interactive Patient Education © 2018 [employer]     Exercising to Lose Weight  Exercising can help you to lose weight. In order to lose weight through exercise, you need to do vigorous-intensity exercise. You can tell that you are exercising with vigorous intensity if you are breathing very hard and fast and cannot hold a conversation while exercising.  Moderate-intensity exercise helps to maintain your current weight. You can tell that you are exercising at a moderate level if you have a higher heart rate and faster breathing, but you are still able to hold a conversation.  How often should I exercise?  Choose an activity that you enjoy and set realistic goals. Your health care provider can help you to make an activity plan that works for you. Exercise regularly as directed by your health care provider. This may include:  · Doing resistance training twice each week, such as:  ? Push-ups.  ? Sit-ups.  ? Lifting weights.  ? Using resistance bands.  · Doing a given intensity of exercise for a given amount of time. Choose from  these options:  ? 150 minutes of moderate-intensity exercise every week.  ? 75 minutes of vigorous-intensity exercise every week.  ? A mix of moderate-intensity and vigorous-intensity exercise every week.    Children, pregnant women, people who are out of shape, people who are overweight, and older adults may need to consult a health care provider for individual recommendations. If you have any sort of medical condition, be sure to consult your health care provider before starting a new exercise program.  What are some activities that can help me to lose weight?  · Walking at a rate of at least 4.5 miles an hour.  · Jogging or running at a rate of 5 miles per hour.  · Biking at a rate of at least 10 miles per hour.  · Lap swimming.  · Roller-skating or in-line skating.  · Cross-country skiing.  · Vigorous competitive sports, such as football, basketball, and soccer.  · Jumping rope.  · Aerobic dancing.  How can I be more active in my day-to-day activities?  · Use the stairs instead of the elevator.  · Take a walk during your lunch break.  · If you drive, park your car farther away from work or school.  · If you take public transportation, get off one stop early and walk the rest of the way.  · Make all of your phone calls while standing up and walking around.  · Get up, stretch, and walk around every 30 minutes throughout the day.  What guidelines should I follow while exercising?  · Do not exercise so much that you hurt yourself, feel dizzy, or get very short of breath.  · Consult your health care provider prior to starting a new exercise program.  · Wear comfortable clothes and shoes with good support.  · Drink plenty of water while you exercise to prevent dehydration or heat stroke. Body water is lost during exercise and must be replaced.  · Work out until you breathe faster and your heart beats faster.  This information is not intended to replace advice given to you by your health care provider. Make sure you  discuss any questions you have with your health care provider.  Document Released: 01/20/2012 Document Revised: 05/25/2017 Document Reviewed: 05/21/2015  [employer] Interactive Patient Education © 2018 [employer]

## 2018-08-22 ENCOUNTER — PREP FOR SURGERY (OUTPATIENT)
Dept: OTHER | Facility: HOSPITAL | Age: 83
End: 2018-08-22

## 2018-09-10 ENCOUNTER — HOSPITAL ENCOUNTER (OUTPATIENT)
Dept: GENERAL RADIOLOGY | Facility: HOSPITAL | Age: 83
Discharge: HOME | End: 2018-09-10
Admitting: PHYSICIAN ASSISTANT

## 2018-09-10 ENCOUNTER — APPOINTMENT (OUTPATIENT)
Dept: PREADMISSION TESTING | Facility: HOSPITAL | Age: 83
End: 2018-09-10

## 2018-09-10 VITALS
SYSTOLIC BLOOD PRESSURE: 159 MMHG | DIASTOLIC BLOOD PRESSURE: 89 MMHG | BODY MASS INDEX: 25.43 KG/M2 | OXYGEN SATURATION: 98 % | RESPIRATION RATE: 18 BRPM | HEART RATE: 76 BPM | WEIGHT: 162.04 LBS | HEIGHT: 67 IN

## 2018-09-10 DIAGNOSIS — J32.0 CHRONIC MAXILLARY SINUSITIS: ICD-10-CM

## 2018-09-10 DIAGNOSIS — D49.9 NEOPLASM: ICD-10-CM

## 2018-09-10 DIAGNOSIS — J34.89 MAXILLARY SINUS MASS: ICD-10-CM

## 2018-09-10 DIAGNOSIS — K08.409 HISTORY OF TOOTH EXTRACTION, UNSPECIFIED EDENTULISM CLASS: ICD-10-CM

## 2018-09-10 DIAGNOSIS — R22.0 RIGHT FACIAL SWELLING: ICD-10-CM

## 2018-09-10 LAB
ALBUMIN SERPL-MCNC: 4.3 G/DL (ref 3.5–5)
ALBUMIN/GLOB SERPL: 1.3 G/DL (ref 1.1–2.5)
ALP SERPL-CCNC: 62 U/L (ref 24–120)
ALT SERPL W P-5'-P-CCNC: 26 U/L (ref 0–54)
ANION GAP SERPL CALCULATED.3IONS-SCNC: 8 MMOL/L (ref 4–13)
APTT PPP: 29.7 SECONDS (ref 24.1–34.8)
AST SERPL-CCNC: 32 U/L (ref 7–45)
BILIRUB SERPL-MCNC: 0.8 MG/DL (ref 0.1–1)
BUN BLD-MCNC: 26 MG/DL (ref 5–21)
BUN/CREAT SERPL: 30.6 (ref 7–25)
CALCIUM SPEC-SCNC: 9.5 MG/DL (ref 8.4–10.4)
CHLORIDE SERPL-SCNC: 99 MMOL/L (ref 98–110)
CO2 SERPL-SCNC: 32 MMOL/L (ref 24–31)
CREAT BLD-MCNC: 0.85 MG/DL (ref 0.5–1.4)
DEPRECATED RDW RBC AUTO: 42.2 FL (ref 40–54)
ERYTHROCYTE [DISTWIDTH] IN BLOOD BY AUTOMATED COUNT: 13.8 % (ref 12–15)
GFR SERPL CREATININE-BSD FRML MDRD: 64 ML/MIN/1.73
GLOBULIN UR ELPH-MCNC: 3.2 GM/DL
GLUCOSE BLD-MCNC: 97 MG/DL (ref 70–100)
HCT VFR BLD AUTO: 43 % (ref 37–47)
HGB BLD-MCNC: 14.4 G/DL (ref 12–16)
INR PPP: 0.97 (ref 0.91–1.09)
MCH RBC QN AUTO: 28.1 PG (ref 28–32)
MCHC RBC AUTO-ENTMCNC: 33.5 G/DL (ref 33–36)
MCV RBC AUTO: 83.8 FL (ref 82–98)
PLATELET # BLD AUTO: 213 10*3/MM3 (ref 130–400)
PMV BLD AUTO: 11.3 FL (ref 6–12)
POTASSIUM BLD-SCNC: 3.5 MMOL/L (ref 3.5–5.3)
PROT SERPL-MCNC: 7.5 G/DL (ref 6.3–8.7)
PROTHROMBIN TIME: 13.2 SECONDS (ref 11.9–14.6)
RBC # BLD AUTO: 5.13 10*6/MM3 (ref 4.2–5.4)
SODIUM BLD-SCNC: 139 MMOL/L (ref 135–145)
WBC NRBC COR # BLD: 7.35 10*3/MM3 (ref 4.8–10.8)

## 2018-09-10 PROCEDURE — 85730 THROMBOPLASTIN TIME PARTIAL: CPT | Performed by: PHYSICIAN ASSISTANT

## 2018-09-10 PROCEDURE — 85610 PROTHROMBIN TIME: CPT | Performed by: PHYSICIAN ASSISTANT

## 2018-09-10 PROCEDURE — 93005 ELECTROCARDIOGRAM TRACING: CPT

## 2018-09-10 PROCEDURE — 36415 COLL VENOUS BLD VENIPUNCTURE: CPT

## 2018-09-10 PROCEDURE — 85027 COMPLETE CBC AUTOMATED: CPT | Performed by: PHYSICIAN ASSISTANT

## 2018-09-10 PROCEDURE — 80053 COMPREHEN METABOLIC PANEL: CPT | Performed by: PHYSICIAN ASSISTANT

## 2018-09-10 PROCEDURE — 71046 X-RAY EXAM CHEST 2 VIEWS: CPT

## 2018-09-10 PROCEDURE — 93010 ELECTROCARDIOGRAM REPORT: CPT | Performed by: INTERNAL MEDICINE

## 2018-09-10 NOTE — DISCHARGE INSTRUCTIONS
DAY OF SURGERY INSTRUCTIONS        YOUR SURGEON: dr aponte    PROCEDURE: ***ENDOSCOPIC FUNCTIONAL SINUS SURGERY W TRACKING OF RIGHT MAXILLARY SINUS WITH BIOPSY    DATE OF SURGERY: ***9/17/2018    ARRIVAL TIME: AS DIRECTED BY OFFICE    DAY OF SURGERY TAKE ONLY THESE MEDICATIONS UNLESS OTHERWISE INSTRUCTED BY YOUR PHYSICIAN: ***none          MANAGING PAIN AFTER SURGERY    We know you are probably wondering what your pain will be like after surgery.  Following surgery it is unrealistic to expect you will not have pain.   Pain is how our bodies let us know that something is wrong or cautions us to be careful.  That said, our goal is to make your pain tolerable.    Methods we may use to treat your pain include (oral or IV medications, PCAs, epidurals, nerve blocks, etc.)   While some procedures require IV pain medications for a short time after surgery, transitioning to pain medications by mouth allows for better management of pain.   Your nurse will encourage you to take oral pain medications whenever possible.  IV medications work almost immediately, but only last a short while.  Taking medications by mouth allows for a more constant level of medication in your blood stream for a longer period of time.      Once your pain is out of control it is harder to get back under control.  It is important you are aware when your next dose of pain medication is due.  If you are admitted, your nurse may write the time of your next dose on the white board in your room to help you remember.      We are interested in your pain and encourage you to inform us about aggravating factors during your visit.   Many times a simple repositioning every few hours can make a big difference.    If your physician says it is okay, do not let your pain prevent you from getting out of bed. Be sure to call your nurse for assistance prior to getting up so you do not fall.      Before surgery, please decide your tolerable pain goal.  These faces help  describe the pain ratings we use on a 0-10 scale.   Be prepared to tell us your goal and whether or not you take pain or anxiety medications at home.            BEFORE YOU COME TO THE HOSPITAL  (Pre-op instructions)  • Do not eat, drink, smoke or chew gum after midnight the night before surgery.  This also includes no mints.  • Morning of surgery take only the medicines you have been instructed with a sip of water unless otherwise instructed  by your physician.  • Do not shave, wear makeup or dark nail polish.  • Remove all jewelry including rings.  • Leave anything you consider valuable at home.  • Leave your suitcase in the car until after your surgery.  • Bring the following with you if applicable:  o Picture ID and insurance, Medicare or Medicaid cards  o Co-pay/deductible required by insurance (cash, check, credit card)  o Copy of advance directive, living will or power-of- documents if not brought to PAT  o CPAP or BIPAP mask and tubing  o Relaxation aids (MP3 player, book, magazine)  • On the day of surgery check in at registration located at the main entrance of the hospital.       Outpatient Surgery Guidelines, Adult  Outpatient procedures are those for which the person having the procedure is allowed to go home the same day as the procedure. Various procedures are done on an outpatient basis. You should follow some general guidelines if you will be having an outpatient procedure.  LET YOUR HEALTH CARE PROVIDER KNOW ABOUT:  · Any allergies you have.  · All medicines you are taking, including vitamins, herbs, eye drops, creams, and over-the-counter medicines.  · Previous problems you or members of your family have had with the use of anesthetics.  · Any blood disorders you have.  · Previous surgeries you have had.  · Medical conditions you have.  RISKS AND COMPLICATIONS  Your health care provider will discuss possible risks and complications with you before surgery. Common risks and complications  include:    · Problems due to the use of anesthetics.  · Blood loss and replacement (does not apply to minor surgical procedures).  · Temporary increase in pain due to surgery.  · Uncorrected pain or problems that the surgery was meant to correct.  · Infection.  · New damage.  BEFORE THE PROCEDURE  · Ask your health care provider about changing or stopping your regular medicines. You may need to stop taking certain medicines in the days or weeks before the procedure.  · Stop smoking at least 2 weeks before surgery. This lowers your risk for complications during and after surgery. Ask your health care provider for help with this if needed.  · Eat your usual meals and a light supper the day before surgery. Continue fluid intake. Do not drink alcohol.  · Do not eat or drink after midnight the night before your surgery.   · Arrange for someone to take you home and to stay with you for 24 hours after the procedure. Medicine given for your procedure may affect your ability to drive or to care for yourself.  · Call your health care provider's office if you develop an illness or problem that may prevent you from safely having your procedure.  AFTER THE PROCEDURE  After surgery, you will be taken to a recovery area, where your progress will be monitored. If there are no complications, you will be allowed to go home when you are awake, stable, and taking fluids well. You may have numbness around the surgical site. Healing will take some time. You will have tenderness at the surgical site and may have some swelling and bruising. You may also have some nausea.  HOME CARE INSTRUCTIONS  · Do not drive for 24 hours, or as directed by your health care provider. Do not drive while taking prescription pain medicines.  · Do not drink alcohol for 24 hours.  · Do not make important decisions or sign legal documents for 24 hours.  · You may resume a normal diet and activities as directed.  · Do not lift anything heavier than 10 pounds  (4.5 kg) or play contact sports until your health care provider says it is okay.  · Change your bandages (dressings) as directed.  · Only take over-the-counter or prescription medicines as directed by your health care provider.  · Follow up with your health care provider as directed.  SEEK MEDICAL CARE IF:  · You have increased bleeding (more than a small spot) from the surgical site.  · You have redness, swelling, or increasing pain in the wound.  · You see pus coming from the wound.  · You have a fever.  · You notice a bad smell coming from the wound or dressing.  · You feel lightheaded or faint.  · You develop a rash.  · You have trouble breathing.  · You develop allergies.  MAKE SURE YOU:  · Understand these instructions.  · Will watch your condition.  · Will get help right away if you are not doing well or get worse.     This information is not intended to replace advice given to you by your health care provider. Make sure you discuss any questions you have with your health care provider.     Document Released: 09/12/2002 Document Revised: 05/03/2016 Document Reviewed: 05/22/2014  [employer] Interactive Patient Education ©2016 [employer]       Fall Prevention in Hospitals, Adult  As a hospital patient, your condition and the treatments you receive can increase your risk for falls. Some additional risk factors for falls in a hospital include:  · Being in an unfamiliar environment.  · Being on bed rest.  · Your surgery.  · Taking certain medicines.  · Your tubing requirements, such as intravenous (IV) therapy or catheters.  It is important that you learn how to decrease fall risks while at the hospital. Below are important tips that can help prevent falls.  SAFETY TIPS FOR PREVENTING FALLS  Talk about your risk of falling.  · Ask your health care provider why you are at risk for falling. Is it your medicine, illness, tubing placement, or something else?  · Make a plan with your health care provider to keep you  safe from falls.  · Ask your health care provider or pharmacist about side effects of your medicines. Some medicines can make you dizzy or affect your coordination.  Ask for help.  · Ask for help before getting out of bed. You may need to press your call button.  · Ask for assistance in getting safely to the toilet.  · Ask for a walker or cane to be put at your bedside. Ask that most of the side rails on your bed be placed up before your health care provider leaves the room.  · Ask family or friends to sit with you.  · Ask for things that are out of your reach, such as your glasses, hearing aids, telephone, bedside table, or call button.  Follow these tips to avoid falling:  · Stay lying or seated, rather than standing, while waiting for help.  · Wear rubber-soled slippers or shoes whenever you walk in the hospital.  · Avoid quick, sudden movements.  ¨ Change positions slowly.  ¨ Sit on the side of your bed before standing.  ¨ Stand up slowly and wait before you start to walk.  · Let your health care provider know if there is a spill on the floor.  · Pay careful attention to the medical equipment, electrical cords, and tubes around you.  · When you need help, use your call button by your bed or in the bathroom. Wait for one of your health care providers to help you.  · If you feel dizzy or unsure of your footing, return to bed and wait for assistance.  · Avoid being distracted by the TV, telephone, or another person in your room.  · Do not lean or support yourself on rolling objects, such as IV poles or bedside tables.     This information is not intended to replace advice given to you by your health care provider. Make sure you discuss any questions you have with your health care provider.     Document Released: 12/15/2001 Document Revised: 01/08/2016 Document Reviewed: 08/25/2013  [employer] Interactive Patient Education ©2016 [employer]       Surgical Site Infections FAQs  What is a Surgical Site Infection  (SSI)?  A surgical site infection is an infection that occurs after surgery in the part of the body where the surgery took place. Most patients who have surgery do not develop an infection. However, infections develop in about 1 to 3 out of every 100 patients who have surgery.  Some of the common symptoms of a surgical site infection are:  · Redness and pain around the area where you had surgery  · Drainage of cloudy fluid from your surgical wound  · Fever  Can SSIs be treated?  Yes. Most surgical site infections can be treated with antibiotics. The antibiotic given to you depends on the bacteria (germs) causing the infection. Sometimes patients with SSIs also need another surgery to treat the infection.  What are some of the things that hospitals are doing to prevent SSIs?  To prevent SSIs, doctors, nurses, and other healthcare providers:  · Clean their hands and arms up to their elbows with an antiseptic agent just before the surgery.  · Clean their hands with soap and water or an alcohol-based hand rub before and after caring for each patient.  · May remove some of your hair immediately before your surgery using electric clippers if the hair is in the same area where the procedure will occur. They should not shave you with a razor.  · Wear special hair covers, masks, gowns, and gloves during surgery to keep the surgery area clean.  · Give you antibiotics before your surgery starts. In most cases, you should get antibiotics within 60 minutes before the surgery starts and the antibiotics should be stopped within 24 hours after surgery.  · Clean the skin at the site of your surgery with a special soap that kills germs.  What can I do to help prevent SSIs?  Before your surgery:  · Tell your doctor about other medical problems you may have. Health problems such as allergies, diabetes, and obesity could affect your surgery and your treatment.  · Quit smoking. Patients who smoke get more infections. Talk to your doctor  about how you can quit before your surgery.  · Do not shave near where you will have surgery. Shaving with a razor can irritate your skin and make it easier to develop an infection.  At the time of your surgery:  · Speak up if someone tries to shave you with a razor before surgery. Ask why you need to be shaved and talk with your surgeon if you have any concerns.  · Ask if you will get antibiotics before surgery.  After your surgery:  · Make sure that your healthcare providers clean their hands before examining you, either with soap and water or an alcohol-based hand rub.  · If you do not see your providers clean their hands, please ask them to do so.  · Family and friends who visit you should not touch the surgical wound or dressings.  · Family and friends should clean their hands with soap and water or an alcohol-based hand rub before and after visiting you. If you do not see them clean their hands, ask them to clean their hands.  What do I need to do when I go home from the hospital?  · Before you go home, your doctor or nurse should explain everything you need to know about taking care of your wound. Make sure you understand how to care for your wound before you leave the hospital.  · Always clean your hands before and after caring for your wound.  · Before you go home, make sure you know who to contact if you have questions or problems after you get home.  · If you have any symptoms of an infection, such as redness and pain at the surgery site, drainage, or fever, call your doctor immediately.  If you have additional questions, please ask your doctor or nurse.  Developed and co-sponsored by The Society for Healthcare Epidemiology of [place] (SHEA); Infectious Diseases Society of [place] (IDSA); American Hospital Association; Association for Professionals in Infection Control and Epidemiology (APIC); Centers for Disease Control and Prevention (CDC); and The Joint Commission.     This information is not intended  to replace advice given to you by your health care provider. Make sure you discuss any questions you have with your health care provider.     Document Released: 12/23/2014 Document Revised: 01/08/2016 Document Reviewed: 03/02/2016  [employer] Interactive Patient Education ©2016 [employer]     PATIENT/FAMILY/RESPONSIBLE PARTY VERBALIZES UNDERSTANDING OF ABOVE EDUCATION.  COPY OF PAIN SCALE GIVEN AND REVIEWED WITH VERBALIZED UNDERSTANDING.

## 2018-09-17 ENCOUNTER — ANESTHESIA (OUTPATIENT)
Dept: PERIOP | Facility: HOSPITAL | Age: 83
End: 2018-09-17

## 2018-09-17 ENCOUNTER — HOSPITAL ENCOUNTER (OUTPATIENT)
Facility: HOSPITAL | Age: 83
Setting detail: HOSPITAL OUTPATIENT SURGERY
Discharge: HOME | End: 2018-09-17
Attending: OTOLARYNGOLOGY | Admitting: OTOLARYNGOLOGY

## 2018-09-17 ENCOUNTER — ANESTHESIA EVENT (OUTPATIENT)
Dept: PERIOP | Facility: HOSPITAL | Age: 83
End: 2018-09-17

## 2018-09-17 VITALS
TEMPERATURE: 99 F | DIASTOLIC BLOOD PRESSURE: 62 MMHG | HEART RATE: 80 BPM | OXYGEN SATURATION: 94 % | SYSTOLIC BLOOD PRESSURE: 129 MMHG | RESPIRATION RATE: 16 BRPM

## 2018-09-17 DIAGNOSIS — R22.0 RIGHT FACIAL SWELLING: ICD-10-CM

## 2018-09-17 DIAGNOSIS — J34.89 MAXILLARY SINUS MASS: ICD-10-CM

## 2018-09-17 DIAGNOSIS — K08.409 HISTORY OF TOOTH EXTRACTION, UNSPECIFIED EDENTULISM CLASS: ICD-10-CM

## 2018-09-17 DIAGNOSIS — J32.0 CHRONIC MAXILLARY SINUSITIS: ICD-10-CM

## 2018-09-17 PROCEDURE — 25010000002 PROPOFOL 10 MG/ML EMULSION: Performed by: NURSE ANESTHETIST, CERTIFIED REGISTERED

## 2018-09-17 PROCEDURE — 25010000002 DEXAMETHASONE PER 1 MG: Performed by: NURSE ANESTHETIST, CERTIFIED REGISTERED

## 2018-09-17 PROCEDURE — 25010000002 SUCCINYLCHOLINE PER 20 MG: Performed by: NURSE ANESTHETIST, CERTIFIED REGISTERED

## 2018-09-17 PROCEDURE — 25010000002 FENTANYL CITRATE (PF) 250 MCG/5ML SOLUTION: Performed by: NURSE ANESTHETIST, CERTIFIED REGISTERED

## 2018-09-17 PROCEDURE — 88341 IMHCHEM/IMCYTCHM EA ADD ANTB: CPT

## 2018-09-17 PROCEDURE — 88185 FLOWCYTOMETRY/TC ADD-ON: CPT

## 2018-09-17 PROCEDURE — 31237 NSL/SINS NDSC SURG BX POLYPC: CPT | Performed by: OTOLARYNGOLOGY

## 2018-09-17 PROCEDURE — 40808 BIOPSY OF MOUTH LESION: CPT | Performed by: OTOLARYNGOLOGY

## 2018-09-17 PROCEDURE — 25010000002 ONDANSETRON PER 1 MG: Performed by: NURSE ANESTHETIST, CERTIFIED REGISTERED

## 2018-09-17 PROCEDURE — S0260 H&P FOR SURGERY: HCPCS | Performed by: OTOLARYNGOLOGY

## 2018-09-17 PROCEDURE — 25010000002 DEXAMETHASONE PER 1 MG: Performed by: ANESTHESIOLOGY

## 2018-09-17 PROCEDURE — 88312 SPECIAL STAINS GROUP 1: CPT | Performed by: OTOLARYNGOLOGY

## 2018-09-17 PROCEDURE — 88184 FLOWCYTOMETRY/ TC 1 MARKER: CPT | Performed by: OTOLARYNGOLOGY

## 2018-09-17 PROCEDURE — 88331 PATH CONSLTJ SURG 1 BLK 1SPC: CPT | Performed by: PATHOLOGY

## 2018-09-17 PROCEDURE — 88185 FLOWCYTOMETRY/TC ADD-ON: CPT | Performed by: OTOLARYNGOLOGY

## 2018-09-17 PROCEDURE — 25010000002 MIDAZOLAM PER 1 MG: Performed by: ANESTHESIOLOGY

## 2018-09-17 PROCEDURE — 88342 IMHCHEM/IMCYTCHM 1ST ANTB: CPT

## 2018-09-17 PROCEDURE — 88305 TISSUE EXAM BY PATHOLOGIST: CPT | Performed by: OTOLARYNGOLOGY

## 2018-09-17 RX ORDER — HYDROCODONE BITARTRATE AND ACETAMINOPHEN 5; 325 MG/1; MG/1
1 TABLET ORAL ONCE AS NEEDED
Status: DISCONTINUED | OUTPATIENT
Start: 2018-09-17 | End: 2018-09-17 | Stop reason: HOSPADM

## 2018-09-17 RX ORDER — PHENYLEPHRINE HCL IN 0.9% NACL 0.8MG/10ML
SYRINGE (ML) INTRAVENOUS AS NEEDED
Status: DISCONTINUED | OUTPATIENT
Start: 2018-09-17 | End: 2018-09-17 | Stop reason: SURG

## 2018-09-17 RX ORDER — MIDAZOLAM HYDROCHLORIDE 1 MG/ML
2 INJECTION INTRAMUSCULAR; INTRAVENOUS
Status: DISCONTINUED | OUTPATIENT
Start: 2018-09-17 | End: 2018-09-17 | Stop reason: HOSPADM

## 2018-09-17 RX ORDER — ACETAMINOPHEN 500 MG
1000 TABLET ORAL ONCE
Status: COMPLETED | OUTPATIENT
Start: 2018-09-17 | End: 2018-09-17

## 2018-09-17 RX ORDER — OXYMETAZOLINE HYDROCHLORIDE 0.05 G/100ML
2 SPRAY NASAL
Status: COMPLETED | OUTPATIENT
Start: 2018-09-17 | End: 2018-09-17

## 2018-09-17 RX ORDER — SODIUM CHLORIDE, SODIUM LACTATE, POTASSIUM CHLORIDE, CALCIUM CHLORIDE 600; 310; 30; 20 MG/100ML; MG/100ML; MG/100ML; MG/100ML
1000 INJECTION, SOLUTION INTRAVENOUS CONTINUOUS
Status: DISCONTINUED | OUTPATIENT
Start: 2018-09-17 | End: 2018-09-17 | Stop reason: HOSPADM

## 2018-09-17 RX ORDER — SODIUM CHLORIDE 0.9 % (FLUSH) 0.9 %
3 SYRINGE (ML) INJECTION AS NEEDED
Status: DISCONTINUED | OUTPATIENT
Start: 2018-09-17 | End: 2018-09-17 | Stop reason: HOSPADM

## 2018-09-17 RX ORDER — MIDAZOLAM HYDROCHLORIDE 1 MG/ML
1 INJECTION INTRAMUSCULAR; INTRAVENOUS
Status: DISCONTINUED | OUTPATIENT
Start: 2018-09-17 | End: 2018-09-17 | Stop reason: HOSPADM

## 2018-09-17 RX ORDER — FENTANYL CITRATE 50 UG/ML
25 INJECTION, SOLUTION INTRAMUSCULAR; INTRAVENOUS AS NEEDED
Status: DISCONTINUED | OUTPATIENT
Start: 2018-09-17 | End: 2018-09-17 | Stop reason: HOSPADM

## 2018-09-17 RX ORDER — DEXAMETHASONE SODIUM PHOSPHATE 4 MG/ML
4 INJECTION, SOLUTION INTRA-ARTICULAR; INTRALESIONAL; INTRAMUSCULAR; INTRAVENOUS; SOFT TISSUE ONCE AS NEEDED
Status: COMPLETED | OUTPATIENT
Start: 2018-09-17 | End: 2018-09-17

## 2018-09-17 RX ORDER — LIDOCAINE HYDROCHLORIDE 40 MG/ML
SOLUTION TOPICAL AS NEEDED
Status: DISCONTINUED | OUTPATIENT
Start: 2018-09-17 | End: 2018-09-17 | Stop reason: SURG

## 2018-09-17 RX ORDER — NALOXONE HCL 0.4 MG/ML
0.4 VIAL (ML) INJECTION AS NEEDED
Status: DISCONTINUED | OUTPATIENT
Start: 2018-09-17 | End: 2018-09-17 | Stop reason: HOSPADM

## 2018-09-17 RX ORDER — LIDOCAINE HYDROCHLORIDE 20 MG/ML
INJECTION, SOLUTION INFILTRATION; PERINEURAL AS NEEDED
Status: DISCONTINUED | OUTPATIENT
Start: 2018-09-17 | End: 2018-09-17 | Stop reason: SURG

## 2018-09-17 RX ORDER — DEXAMETHASONE SODIUM PHOSPHATE 4 MG/ML
INJECTION, SOLUTION INTRA-ARTICULAR; INTRALESIONAL; INTRAMUSCULAR; INTRAVENOUS; SOFT TISSUE AS NEEDED
Status: DISCONTINUED | OUTPATIENT
Start: 2018-09-17 | End: 2018-09-17 | Stop reason: SURG

## 2018-09-17 RX ORDER — OXYCODONE AND ACETAMINOPHEN 10; 325 MG/1; MG/1
1 TABLET ORAL ONCE AS NEEDED
Status: DISCONTINUED | OUTPATIENT
Start: 2018-09-17 | End: 2018-09-17 | Stop reason: HOSPADM

## 2018-09-17 RX ORDER — ONDANSETRON 2 MG/ML
4 INJECTION INTRAMUSCULAR; INTRAVENOUS ONCE AS NEEDED
Status: DISCONTINUED | OUTPATIENT
Start: 2018-09-17 | End: 2018-09-17 | Stop reason: HOSPADM

## 2018-09-17 RX ORDER — FENTANYL CITRATE 50 UG/ML
INJECTION, SOLUTION INTRAMUSCULAR; INTRAVENOUS AS NEEDED
Status: DISCONTINUED | OUTPATIENT
Start: 2018-09-17 | End: 2018-09-17 | Stop reason: SURG

## 2018-09-17 RX ORDER — IPRATROPIUM BROMIDE AND ALBUTEROL SULFATE 2.5; .5 MG/3ML; MG/3ML
3 SOLUTION RESPIRATORY (INHALATION) ONCE AS NEEDED
Status: DISCONTINUED | OUTPATIENT
Start: 2018-09-17 | End: 2018-09-17 | Stop reason: HOSPADM

## 2018-09-17 RX ORDER — METOCLOPRAMIDE HYDROCHLORIDE 5 MG/ML
5 INJECTION INTRAMUSCULAR; INTRAVENOUS
Status: DISCONTINUED | OUTPATIENT
Start: 2018-09-17 | End: 2018-09-17 | Stop reason: HOSPADM

## 2018-09-17 RX ORDER — ONDANSETRON 2 MG/ML
INJECTION INTRAMUSCULAR; INTRAVENOUS AS NEEDED
Status: DISCONTINUED | OUTPATIENT
Start: 2018-09-17 | End: 2018-09-17 | Stop reason: SURG

## 2018-09-17 RX ORDER — LIDOCAINE HYDROCHLORIDE AND EPINEPHRINE 10; 10 MG/ML; UG/ML
INJECTION, SOLUTION INFILTRATION; PERINEURAL AS NEEDED
Status: DISCONTINUED | OUTPATIENT
Start: 2018-09-17 | End: 2018-09-17 | Stop reason: HOSPADM

## 2018-09-17 RX ORDER — SODIUM CHLORIDE 0.9 % (FLUSH) 0.9 %
1-10 SYRINGE (ML) INJECTION AS NEEDED
Status: DISCONTINUED | OUTPATIENT
Start: 2018-09-17 | End: 2018-09-17 | Stop reason: HOSPADM

## 2018-09-17 RX ORDER — PROPOFOL 10 MG/ML
VIAL (ML) INTRAVENOUS AS NEEDED
Status: DISCONTINUED | OUTPATIENT
Start: 2018-09-17 | End: 2018-09-17 | Stop reason: SURG

## 2018-09-17 RX ORDER — SODIUM CHLORIDE, SODIUM LACTATE, POTASSIUM CHLORIDE, CALCIUM CHLORIDE 600; 310; 30; 20 MG/100ML; MG/100ML; MG/100ML; MG/100ML
100 INJECTION, SOLUTION INTRAVENOUS CONTINUOUS
Status: DISCONTINUED | OUTPATIENT
Start: 2018-09-17 | End: 2018-09-17 | Stop reason: HOSPADM

## 2018-09-17 RX ORDER — LABETALOL HYDROCHLORIDE 5 MG/ML
5 INJECTION, SOLUTION INTRAVENOUS
Status: DISCONTINUED | OUTPATIENT
Start: 2018-09-17 | End: 2018-09-17 | Stop reason: HOSPADM

## 2018-09-17 RX ORDER — MEPERIDINE HYDROCHLORIDE 25 MG/ML
12.5 INJECTION INTRAMUSCULAR; INTRAVENOUS; SUBCUTANEOUS
Status: DISCONTINUED | OUTPATIENT
Start: 2018-09-17 | End: 2018-09-17 | Stop reason: HOSPADM

## 2018-09-17 RX ORDER — OXYMETAZOLINE HYDROCHLORIDE 0.05 G/100ML
SPRAY NASAL AS NEEDED
Status: DISCONTINUED | OUTPATIENT
Start: 2018-09-17 | End: 2018-09-17 | Stop reason: HOSPADM

## 2018-09-17 RX ORDER — ROCURONIUM BROMIDE 10 MG/ML
INJECTION, SOLUTION INTRAVENOUS AS NEEDED
Status: DISCONTINUED | OUTPATIENT
Start: 2018-09-17 | End: 2018-09-17 | Stop reason: SURG

## 2018-09-17 RX ORDER — CHLORHEXIDINE GLUCONATE 0.12 MG/ML
15 RINSE ORAL
Qty: 473 ML | Refills: 0 | Status: ON HOLD | OUTPATIENT
Start: 2018-09-17 | End: 2018-10-22

## 2018-09-17 RX ORDER — HYDROCODONE BITARTRATE AND ACETAMINOPHEN 5; 325 MG/1; MG/1
1-2 TABLET ORAL EVERY 4 HOURS PRN
Qty: 15 TABLET | Refills: 0 | Status: SHIPPED | OUTPATIENT
Start: 2018-09-17 | End: 2018-09-24

## 2018-09-17 RX ORDER — OXYCODONE AND ACETAMINOPHEN 7.5; 325 MG/1; MG/1
2 TABLET ORAL ONCE AS NEEDED
Status: DISCONTINUED | OUTPATIENT
Start: 2018-09-17 | End: 2018-09-17 | Stop reason: HOSPADM

## 2018-09-17 RX ORDER — IBUPROFEN 600 MG/1
600 TABLET ORAL ONCE AS NEEDED
Status: DISCONTINUED | OUTPATIENT
Start: 2018-09-17 | End: 2018-09-17 | Stop reason: HOSPADM

## 2018-09-17 RX ORDER — SUCCINYLCHOLINE CHLORIDE 20 MG/ML
INJECTION INTRAMUSCULAR; INTRAVENOUS AS NEEDED
Status: DISCONTINUED | OUTPATIENT
Start: 2018-09-17 | End: 2018-09-17 | Stop reason: SURG

## 2018-09-17 RX ADMIN — ACETAMINOPHEN 1000 MG: 500 TABLET, FILM COATED ORAL at 08:37

## 2018-09-17 RX ADMIN — SUCCINYLCHOLINE CHLORIDE 120 MG: 20 INJECTION, SOLUTION INTRAMUSCULAR; INTRAVENOUS at 09:30

## 2018-09-17 RX ADMIN — Medication 80 MCG: at 09:38

## 2018-09-17 RX ADMIN — OXYMETAZOLINE HYDROCHLORIDE 2 SPRAY: 5 SPRAY NASAL at 08:22

## 2018-09-17 RX ADMIN — ROCURONIUM BROMIDE 5 MG: 10 INJECTION INTRAVENOUS at 09:30

## 2018-09-17 RX ADMIN — Medication 80 MCG: at 10:17

## 2018-09-17 RX ADMIN — LIDOCAINE HYDROCHLORIDE 0.5 ML: 10 INJECTION, SOLUTION EPIDURAL; INFILTRATION; INTRACAUDAL; PERINEURAL at 07:15

## 2018-09-17 RX ADMIN — DEXAMETHASONE SODIUM PHOSPHATE 4 MG: 4 INJECTION, SOLUTION INTRA-ARTICULAR; INTRALESIONAL; INTRAMUSCULAR; INTRAVENOUS; SOFT TISSUE at 08:37

## 2018-09-17 RX ADMIN — LIDOCAINE HYDROCHLORIDE 60 MG: 20 INJECTION, SOLUTION INFILTRATION; PERINEURAL at 09:30

## 2018-09-17 RX ADMIN — OXYMETAZOLINE HYDROCHLORIDE 2 SPRAY: 5 SPRAY NASAL at 08:12

## 2018-09-17 RX ADMIN — Medication 80 MCG: at 09:46

## 2018-09-17 RX ADMIN — LIDOCAINE HYDROCHLORIDE 1 EACH: 40 SOLUTION TOPICAL at 09:30

## 2018-09-17 RX ADMIN — PROPOFOL 140 MG: 10 INJECTION, EMULSION INTRAVENOUS at 09:30

## 2018-09-17 RX ADMIN — MIDAZOLAM HYDROCHLORIDE 1 MG: 1 INJECTION, SOLUTION INTRAMUSCULAR; INTRAVENOUS at 08:37

## 2018-09-17 RX ADMIN — FENTANYL CITRATE 100 MCG: 50 INJECTION INTRAMUSCULAR; INTRAVENOUS at 09:30

## 2018-09-17 RX ADMIN — SODIUM CHLORIDE, POTASSIUM CHLORIDE, SODIUM LACTATE AND CALCIUM CHLORIDE 1000 ML: 600; 310; 30; 20 INJECTION, SOLUTION INTRAVENOUS at 07:15

## 2018-09-17 RX ADMIN — DEXAMETHASONE SODIUM PHOSPHATE 4 MG: 4 INJECTION, SOLUTION INTRAMUSCULAR; INTRAVENOUS at 09:53

## 2018-09-17 RX ADMIN — OXYMETAZOLINE HYDROCHLORIDE 2 SPRAY: 5 SPRAY NASAL at 08:17

## 2018-09-17 RX ADMIN — ONDANSETRON HYDROCHLORIDE 4 MG: 2 SOLUTION INTRAMUSCULAR; INTRAVENOUS at 09:54

## 2018-09-17 NOTE — ANESTHESIA PREPROCEDURE EVALUATION
" Anesthesia Evaluation     Patient summary reviewed   history of anesthetic complications (patient explains was told \"to use a smaller tube\" secondary to vocal cord paralysis, limited opening): difficult airway               Airway   Mallampati: II  TM distance: >3 FB  Neck ROM: full  Comment: Opening limited 2/2 pain. 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      "

## 2018-09-17 NOTE — ANESTHESIA PROCEDURE NOTES
Airway  Urgency: elective    Date/Time: 9/17/2018 9:34 AM  Airway not difficult    General Information and Staff    Patient location during procedure: OR  CRNA: PRINCE HOLM    Indications and Patient Condition  Indications for airway management: airway protection    Preoxygenated: yes  Mask difficulty assessment: 1 - vent by mask    Final Airway Details  Final airway type: endotracheal airway      Successful airway: ETT    Successful intubation technique: video laryngoscopy  Facilitating devices/methods: intubating stylet  Endotracheal tube insertion site: oral  Blade: Sedrick  Blade size: 3  ETT size: 6.5 mm  Cormack-Lehane Classification: grade I - full view of glottis  Placement verified by: chest auscultation and capnometry   Measured from: lips  ETT to lips (cm): 19  Number of attempts at approach: 1

## 2018-09-17 NOTE — OP NOTE
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  Nasal endoscopy with biopsy of right maxillary sinus mass    Surgeon(s):  Mirza Felix MD    Anesthesia: General    Staff:   Circulator: Briseida Shoemaker RN; Davide Reed RN  Scrub Person: Mirza Onofre; Thi Galeano; Fredy Nicole    Estimated Blood Loss:   minimal    Specimens:                ID Type Source Tests Collected by Time   A : Right PreMaxillary Mass Tissue Oral Cavity TISSUE PATHOLOGY EXAM Mirza Felix MD 9/17/2018 0942   B : Right Maxillary Roof Tissue Oral Cavity TISSUE PATHOLOGY EXAM Mirza Felix MD 9/17/2018 0947         Drains:   none    Findings:   Large submucosal mass in premaxillary/ superior gingivobuccal sulcus.  Intraorally, this mass was focused around the Stensen's duct area and extended anteriorly to the lateral aspect of the gingivobuccal sulcus area submucosally.  Submucosal mass along roof of maxillary sinus    Complications:   none    Reason for the Operation:  Kami Villanueva is a 84 y.o. female with a history of a maxillary sinus mass. She has had growth of a premaxillary soft tissue mass over the last few months. . It was felt the patient would benefit from biopsy. After a discussion of the risks, benefits and alternatives, she  consented to the procedure.     Procedure Description:  The patient was taken back to the operating room, placed supine on the operating table and placed under anesthesia by the anesthesia staff. Once this was done a time out was performed to confirm the patient and the proper procedure. The nose was prepared topical oxymetazoline soaked on neural pledgets. The right gingivobuccal sulcus was exposed and injected with 1% lidocaine with 1:100,000 epinephrine.  The mass appeared to be fairly large and submucosal in nature.  I palpated the parotid and saw flow of saliva from Stensen's duct which appeared to be surrounded by the mass.  For my incisional biopsy, I went more anterior to stay away from the duct.  I made an incision with a 15 blade and carried it to the submucosal tissue.  Bipolar cautery was used for hemostasis.  I then used a Amado forceps to biopsy the mass.  This was sent as a frozen section to ensure good tissue biopsy.  I then went into the nose with the 0° and 30° endoscope.  The patient had had previous functional endoscopic sinus surgery.  I gently medialized the middle turbinate and use a 30° scope to visualize the maxillary sinus.  The antrostomy was wide open.  There is no polyps.  There was a submucosal mass along the roof of the maxillary sinus.  This was biopsied with a forcep and sent for permanent section.  The frozen section showed inflammatory type changes but not an obvious neoplasm.  Therefore, I reopened the wound transorally into the deeper bites of the tissue this time using a scissors and a forcep to try to prevent crush injury.  No significant bleeding was noted.  I reclose the wound with 3-0 chromic sutures.  The patient was then turned over to the anesthesia team and allowed to wake from anesthesia. The patient was transported to the recovery room in a stable condition.       Mirza Felix MD     Date: 9/17/2018  Time: 1:37 PM

## 2018-09-17 NOTE — H&P
PRIMARY CARE PROVIDER: Kvng Strong MD  REFERRING PROVIDER: Mirza Felix MD    CHIEF COMPLAINT:  Preoperative evaluation for surgery    Subjective   History of Present Illness:  Kami Villanueva is a  84 y.o.  female who who is here for follow up. She is scheduled for ENDOSCOPIC FUNCTIONAL SINUS SURGERY W TRACKING OF RIGHT MAXILLARY SINUS WITH BIOPSY (Right).  She has had a history of progressive right-sided facial swelling focused at the premaxillary area of her cheek.  She reports over the last several months she has had an increasing fullness in the superior gingival buccal sulcus.  She had a lot of initial soreness in the nasolabial area on the right-hand side but this has resolved.  She denies overt numbness in theV2 area but does have a strange sensation on her upper lip.  She has had no visual complaints.    Review of Systems:  CONSTITUTIONAL: no fever or chills  PULMONARY: no cough or shortness of breath  GI: no nausea or vomiting    Past History:  Past Medical History:   Diagnosis Date   • Allergic rhinitis    • Disease of thyroid gland    • Diverticulitis    • Frequent urination    • Hard to intubate     vocal cord injury,  padded vocal cord   • Heartburn    • History of breast cancer    • Hypertension    • Incontinence      Past Surgical History:   Procedure Laterality Date   • BREAST SURGERY Right     mastectomy   • CATARACT EXTRACTION WITH INTRAOCULAR LENS IMPLANT     • COLON RESECTION     • HYSTERECTOMY     • THROAT SURGERY      vocal cord implant   • THYROID SURGERY       Family History   Problem Relation Age of Onset   • No Known Problems Mother    • No Known Problems Father      Social History   Substance Use Topics   • Smoking status: Never Smoker   • Smokeless tobacco: Never Used   • Alcohol use No       Current Facility-Administered Medications:   •  buffered lidocaine syringe 0.5 mL, 0.5 mL, Injection, Once PRN, Gianna Jarrett MD  •  lactated ringers infusion 1,000  mL, 1,000 mL, Intravenous, Continuous, Mirza Felix MD, Last Rate: 25 mL/hr at 09/17/18 0715, 1,000 mL at 09/17/18 0715  •  lactated ringers infusion, 100 mL/hr, Intravenous, Continuous, Gianna Jarrett MD  •  lidocaine-EPINEPHrine (XYLOCAINE W/EPI) 1 %-1:297199 injection, , , PRN, Mirza Felix MD, 30 mL at 09/17/18 0835  •  midazolam (VERSED) injection 1 mg, 1 mg, Intravenous, Q5 Min PRN, 1 mg at 09/17/18 0837 **OR** midazolam (VERSED) injection 2 mg, 2 mg, Intravenous, Q5 Min PRN, Gianna Jarrett MD  •  oxymetazoline (AFRIN) nasal spray, , , PRN, Mirza Felix MD, 2 spray at 09/17/18 0841  •  sodium chloride 0.9 % flush 1-10 mL, 1-10 mL, Intravenous, PRN, Gianna Jarrett MD  •  sodium chloride 0.9 % flush 3 mL, 3 mL, Intravenous, PRN, Mirza Felix MD  •  sodium chloride 1,000 mL with bacitracin 50,000 Units irrigation, , , PRN, Mirza Felix MD, 1,000 mL at 09/17/18 0836  Allergies:  Lisinopril; Statins; Alendronate; Sulfa antibiotics; and Teriparatide (recombinant)    Objective     Vital Signs:  Temp:  [98 °F (36.7 °C)] 98 °F (36.7 °C)  Heart Rate:  [79-83] 83  Resp:  [16] 16  BP: (186)/(85) 186/85    Physical Exam:  CONSTITUTIONAL: well nourished, well-developed, alert, oriented, in no acute distress   COMMUNICATION AND VOICE: able to communicate normally, normal voice quality  HEAD: normocephalic, no lesions, atraumatic, no tenderness, no masses   FACE: There is fullness of the right cheek suggestive of a mass in the premaxillary area  EYES: ocular motility normal, eyelids normal, orbits normal, no proptosis, conjunctiva normal , pupils equal, round   EARS:  Hearing: hearing to conversational voice intact bilaterally   External Ears: normal bilaterally, no lesions  NOSE:  External Nose: external nasal structure normal, no tenderness on palpation, no nasal discharge, no lesions, no evidence of trauma, nostrils patent   ORAL: There is a mass in  the right premaxilla palpated in the gingivobuccal sulcus on the right-hand side.  Lips: upper and lower lips without lesion  NECK:  Inspection and Palpation: neck appearance normal, no masses or tenderness  CHEST/RESPIRATORY: normal respiratory effort   CARDIOVASCULAR: no cyanosis or edema   NEUROLOGICAL/PSYCHIATRIC: oriented to time, place and person, mood normal, affect appropriate, CN II-XII intact grossly      Assessment   ASSESSMENT:  Right maxillary sinus and premaxillary mass    Plan   PLAN:  She was initially scheduled for endoscopic sinus surgery to biopsy what was seen on the scan in August and the superior aspect of the maxillary sinus.  Now, I can palpate the mass in the gingivobuccal sulcus and will do an open biopsy through a transoral route first.  I discussed with her that I still may need to do the endoscopic sinus for biopsy if this fails to yield a result.  The risks and benefits have been re-discussed and questions answered    Mirza Felix MD  09/17/18  9:10 AM

## 2018-09-17 NOTE — DISCHARGE INSTRUCTIONS

## 2018-09-24 LAB
CYTO UR: NORMAL
LAB AP CASE REPORT: NORMAL
Lab: NORMAL
PATH REPORT.FINAL DX SPEC: NORMAL
PATH REPORT.GROSS SPEC: NORMAL

## 2018-09-26 ENCOUNTER — OFFICE VISIT (OUTPATIENT)
Dept: OTOLARYNGOLOGY | Facility: CLINIC | Age: 83
End: 2018-09-26

## 2018-09-26 VITALS
BODY MASS INDEX: 25.24 KG/M2 | SYSTOLIC BLOOD PRESSURE: 128 MMHG | HEIGHT: 67 IN | DIASTOLIC BLOOD PRESSURE: 84 MMHG | TEMPERATURE: 97.5 F | WEIGHT: 160.8 LBS

## 2018-09-26 DIAGNOSIS — D49.9 NEOPLASM: Primary | ICD-10-CM

## 2018-09-26 DIAGNOSIS — C85.91 LYMPHOMA OF LYMPH NODES OF HEAD, UNSPECIFIED LYMPHOMA TYPE (HCC): Primary | ICD-10-CM

## 2018-09-26 PROBLEM — C85.90 LYMPHOMA (HCC): Status: ACTIVE | Noted: 2018-09-26

## 2018-09-26 PROCEDURE — 99024 POSTOP FOLLOW-UP VISIT: CPT | Performed by: OTOLARYNGOLOGY

## 2018-09-26 NOTE — PROGRESS NOTES
Caren Whitney MA   Patient Intake Note    Review of Systems  Review of Systems   Constitutional: Negative for chills and fever.   HENT:        See hpi   Respiratory: Negative for cough, choking, shortness of breath and wheezing.    Gastrointestinal: Negative for nausea and vomiting.   Neurological: Positive for headaches. Negative for dizziness and light-headedness.   Hematological: Does not bruise/bleed easily.   Psychiatric/Behavioral: Positive for sleep disturbance.   All other systems reviewed and are negative.      QUALITY MEASURES    Body Mass Index Screening and Follow-Up Plan  Body mass index is 25.18 kg/m².  Patient's Body mass index is 25.18 kg/m². BMI is above normal parameters. Recommendations include: referral to primary care.    Tobacco Use: Screening and Cessation Intervention  Smoking status: Never Smoker                                                              Smokeless tobacco: Never Used                            Caren Whitney MA  9/26/2018  9:45 AM

## 2018-09-26 NOTE — PROGRESS NOTES
Mirza Felix MD     Chief Complaint   Patient presents with   • Follow-up       HPI   Kami Villanueva is a  84 y.o.  female who presents for follow up s/p biopsy of right maxillary sinus and premaxillary mass approximately 1 week ago. The patient has had a relatively normal postoperative course and currently has no related complaints.    Review of Systems   As per nursing intake note    Past History:  Past medical and surgical history, family history and social history reviewed and updated when appropriate.  Current medications and allergies reviewed and updated when appropriate.  Allergies:  Lisinopril; Statins; Alendronate; Sulfa antibiotics; and Teriparatide (recombinant)    Vital Signs:  Temp:  [97.5 °F (36.4 °C)] 97.5 °F (36.4 °C)  BP: (128)/(84) 128/84    Physical Exam    CONSTITUTIONAL: well nourished, well-developed, alert, oriented, in no acute distress   COMMUNICATION AND VOICE: able to communicate normally, normal voice quality  HEAD: normocephalic, no lesions, atraumatic, no tenderness, no masses   FACE: appearance normal, no lesions, no tenderness, no deformities, facial motion symmetric  EYES: ocular motility normal, eyelids normal, orbits normal, no proptosis, conjunctiva normal , pupils equal, round  HEARING: response to conversational voice normal bilaterally   EXTERNAL EARS: auricles without lesions  EXTERNAL NOSE: structure normal, no tenderness on palpation, no nasal discharge, no lesions, no evidence of trauma, nostrils patent  LIPS: structure normal, no tenderness on palpation, no lesions, no evidence of trauma  ORAL MUCOSA: There is a right-sided submucosal premaxillary mass in the right gingival buccal sulcus over towards the Stensen's duct.  There is a going incision site.  NECK: neck appearance normal  LYMPH NODES: no lymphadenopathy  CHEST/RESPIRATORY: respiratory effort normal  CARDIOVASCULAR: extremities without cyanosis or edema, no overt jugulovenous distension  present  NEUROLOGIC/PSYCHIATRIC: oriented appropriately for age, mood normal, affect appropriate, cranial nerves intact grossly unless specifically mentioned above     RESULTS REVIEW:    Lab Results   Component Value Date    FINALDX  09/17/2018     1-3.  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  Consultant's diagnosis, see attached report for full details.         Assessment   1. Lymphoma of lymph nodes of head, unspecified lymphoma type (CMS/HCC)        Plan   Referral to Dr. Dmitriy Osborn who is treated her for breast cancer in the past.    Return in about 2 months (around 11/26/2018).    Mirza Felix MD  09/26/18  10:29 AM

## 2018-10-03 ENCOUNTER — HOSPITAL ENCOUNTER (OUTPATIENT)
Dept: CT IMAGING | Facility: HOSPITAL | Age: 83
Discharge: HOME | End: 2018-10-03
Attending: OTOLARYNGOLOGY | Admitting: OTOLARYNGOLOGY

## 2018-10-03 DIAGNOSIS — D49.9 NEOPLASM: ICD-10-CM

## 2018-10-03 PROCEDURE — 0 IOHEXOL 300 MG/ML SOLUTION: Performed by: OTOLARYNGOLOGY

## 2018-10-03 PROCEDURE — 71270 CT THORAX DX C-/C+: CPT

## 2018-10-03 PROCEDURE — 25010000002 IOPAMIDOL 61 % SOLUTION: Performed by: OTOLARYNGOLOGY

## 2018-10-03 PROCEDURE — 82565 ASSAY OF CREATININE: CPT

## 2018-10-03 PROCEDURE — 70491 CT SOFT TISSUE NECK W/DYE: CPT

## 2018-10-03 PROCEDURE — 74177 CT ABD & PELVIS W/CONTRAST: CPT

## 2018-10-03 RX ADMIN — IOHEXOL 50 ML: 300 INJECTION, SOLUTION INTRAVENOUS at 09:30

## 2018-10-03 RX ADMIN — IOPAMIDOL 100 ML: 612 INJECTION, SOLUTION INTRAVENOUS at 09:30

## 2018-10-04 LAB — CREAT BLDA-MCNC: 0.9 MG/DL (ref 0.6–1.3)

## 2018-10-10 ENCOUNTER — TRANSCRIBE ORDERS (OUTPATIENT)
Dept: ADMINISTRATIVE | Facility: HOSPITAL | Age: 83
End: 2018-10-10

## 2018-10-10 DIAGNOSIS — C83.31 DIFFUSE LARGE B-CELL LYMPHOMA, LYMPH NODES OF HEAD, FACE, AND NECK (HCC): Primary | ICD-10-CM

## 2018-10-10 DIAGNOSIS — J98.4 CHEMOTHERAPY INDUCED PULMONARY TOXICITY: Primary | ICD-10-CM

## 2018-10-10 DIAGNOSIS — T45.1X5A CHEMOTHERAPY INDUCED PULMONARY TOXICITY: Primary | ICD-10-CM

## 2018-10-11 ENCOUNTER — HOSPITAL ENCOUNTER (OUTPATIENT)
Dept: MRI IMAGING | Facility: HOSPITAL | Age: 83
Discharge: HOME | End: 2018-10-11
Attending: INTERNAL MEDICINE | Admitting: INTERNAL MEDICINE

## 2018-10-11 ENCOUNTER — HOSPITAL ENCOUNTER (OUTPATIENT)
Dept: MRI IMAGING | Facility: HOSPITAL | Age: 83
Discharge: HOME | End: 2018-10-11
Attending: INTERNAL MEDICINE

## 2018-10-11 DIAGNOSIS — C83.31 DIFFUSE LARGE B-CELL LYMPHOMA, LYMPH NODES OF HEAD, FACE, AND NECK (HCC): ICD-10-CM

## 2018-10-11 PROCEDURE — A9577 INJ MULTIHANCE: HCPCS | Performed by: INTERNAL MEDICINE

## 2018-10-11 PROCEDURE — 70553 MRI BRAIN STEM W/O & W/DYE: CPT

## 2018-10-11 PROCEDURE — 0 GADOBENATE DIMEGLUMINE 529 MG/ML SOLUTION: Performed by: INTERNAL MEDICINE

## 2018-10-11 RX ADMIN — GADOBENATE DIMEGLUMINE 10 ML: 529 INJECTION, SOLUTION INTRAVENOUS at 14:04

## 2018-10-12 ENCOUNTER — HOSPITAL ENCOUNTER (OUTPATIENT)
Dept: CARDIOLOGY | Facility: HOSPITAL | Age: 83
Discharge: HOME | End: 2018-10-12
Attending: INTERNAL MEDICINE

## 2018-10-12 ENCOUNTER — HOSPITAL ENCOUNTER (OUTPATIENT)
Dept: CT IMAGING | Facility: HOSPITAL | Age: 83
Discharge: HOME | End: 2018-10-12
Attending: INTERNAL MEDICINE | Admitting: INTERNAL MEDICINE

## 2018-10-12 ENCOUNTER — HOSPITAL ENCOUNTER (OUTPATIENT)
Dept: CT IMAGING | Facility: HOSPITAL | Age: 83
Discharge: HOME | End: 2018-10-12
Attending: INTERNAL MEDICINE

## 2018-10-12 VITALS
BODY MASS INDEX: 25.11 KG/M2 | SYSTOLIC BLOOD PRESSURE: 122 MMHG | WEIGHT: 160 LBS | DIASTOLIC BLOOD PRESSURE: 70 MMHG | HEIGHT: 67 IN

## 2018-10-12 DIAGNOSIS — T45.1X5A CHEMOTHERAPY INDUCED PULMONARY TOXICITY: ICD-10-CM

## 2018-10-12 DIAGNOSIS — J98.4 CHEMOTHERAPY INDUCED PULMONARY TOXICITY: ICD-10-CM

## 2018-10-12 DIAGNOSIS — C83.31 DIFFUSE LARGE B-CELL LYMPHOMA, LYMPH NODES OF HEAD, FACE, AND NECK (HCC): ICD-10-CM

## 2018-10-12 PROCEDURE — 93306 TTE W/DOPPLER COMPLETE: CPT

## 2018-10-12 PROCEDURE — A9552 F18 FDG: HCPCS | Performed by: INTERNAL MEDICINE

## 2018-10-12 PROCEDURE — 93306 TTE W/DOPPLER COMPLETE: CPT | Performed by: INTERNAL MEDICINE

## 2018-10-12 PROCEDURE — 0 FLUDEOXYGLUCOSE F18 SOLUTION: Performed by: INTERNAL MEDICINE

## 2018-10-12 PROCEDURE — 78815 PET IMAGE W/CT SKULL-THIGH: CPT

## 2018-10-12 PROCEDURE — 25010000002 PERFLUTREN 6.52 MG/ML SUSPENSION: Performed by: INTERNAL MEDICINE

## 2018-10-12 RX ADMIN — FLUDEOXYGLUCOSE F18 1 DOSE: 300 INJECTION INTRAVENOUS at 11:19

## 2018-10-12 RX ADMIN — PERFLUTREN 9.78 MG: 6.52 INJECTION, SUSPENSION INTRAVENOUS at 14:12

## 2018-10-13 LAB
BH CV ECHO MEAS - AO MAX PG (FULL): 1.4 MMHG
BH CV ECHO MEAS - AO MAX PG: 7.6 MMHG
BH CV ECHO MEAS - AO MEAN PG (FULL): 1 MMHG
BH CV ECHO MEAS - AO MEAN PG: 5 MMHG
BH CV ECHO MEAS - AO ROOT AREA (BSA CORRECTED): 1.6
BH CV ECHO MEAS - AO ROOT AREA: 7.1 CM^2
BH CV ECHO MEAS - AO ROOT DIAM: 3 CM
BH CV ECHO MEAS - AO V2 MAX: 138 CM/SEC
BH CV ECHO MEAS - AO V2 MEAN: 112 CM/SEC
BH CV ECHO MEAS - AO V2 VTI: 31.9 CM
BH CV ECHO MEAS - AVA(I,A): 3 CM^2
BH CV ECHO MEAS - AVA(I,D): 3 CM^2
BH CV ECHO MEAS - AVA(V,A): 2.8 CM^2
BH CV ECHO MEAS - AVA(V,D): 2.8 CM^2
BH CV ECHO MEAS - BSA(HAYCOCK): 1.9 M^2
BH CV ECHO MEAS - BSA: 1.8 M^2
BH CV ECHO MEAS - BZI_BMI: 25.1 KILOGRAMS/M^2
BH CV ECHO MEAS - BZI_METRIC_HEIGHT: 170.2 CM
BH CV ECHO MEAS - BZI_METRIC_WEIGHT: 72.6 KG
BH CV ECHO MEAS - EDV(CUBED): 54.9 ML
BH CV ECHO MEAS - EDV(MOD-SP4): 80.2 ML
BH CV ECHO MEAS - EDV(TEICH): 62 ML
BH CV ECHO MEAS - EF(CUBED): 80.3 %
BH CV ECHO MEAS - EF(MOD-SP4): 58.6 %
BH CV ECHO MEAS - EF(TEICH): 73.5 %
BH CV ECHO MEAS - ESV(CUBED): 10.8 ML
BH CV ECHO MEAS - ESV(MOD-SP4): 33.2 ML
BH CV ECHO MEAS - ESV(TEICH): 16.4 ML
BH CV ECHO MEAS - FS: 41.8 %
BH CV ECHO MEAS - IVS/LVPW: 1.2
BH CV ECHO MEAS - IVSD: 1.2 CM
BH CV ECHO MEAS - LA DIMENSION: 3.1 CM
BH CV ECHO MEAS - LA/AO: 1
BH CV ECHO MEAS - LAT PEAK E' VEL: 4.7 CM/SEC
BH CV ECHO MEAS - LV DIASTOLIC VOL/BSA (35-75): 43.6 ML/M^2
BH CV ECHO MEAS - LV MASS(C)D: 133.5 GRAMS
BH CV ECHO MEAS - LV MASS(C)DI: 72.6 GRAMS/M^2
BH CV ECHO MEAS - LV MAX PG: 6.3 MMHG
BH CV ECHO MEAS - LV MEAN PG: 4 MMHG
BH CV ECHO MEAS - LV SYSTOLIC VOL/BSA (12-30): 18.1 ML/M^2
BH CV ECHO MEAS - LV V1 MAX: 125 CM/SEC
BH CV ECHO MEAS - LV V1 MEAN: 97.9 CM/SEC
BH CV ECHO MEAS - LV V1 VTI: 30.2 CM
BH CV ECHO MEAS - LVIDD: 3.8 CM
BH CV ECHO MEAS - LVIDS: 2.2 CM
BH CV ECHO MEAS - LVLD AP4: 8.1 CM
BH CV ECHO MEAS - LVLS AP4: 6.2 CM
BH CV ECHO MEAS - LVOT AREA (M): 3.1 CM^2
BH CV ECHO MEAS - LVOT AREA: 3.1 CM^2
BH CV ECHO MEAS - LVOT DIAM: 2 CM
BH CV ECHO MEAS - LVPWD: 0.98 CM
BH CV ECHO MEAS - MED PEAK E' VEL: 3.7 CM/SEC
BH CV ECHO MEAS - MV A MAX VEL: 132 CM/SEC
BH CV ECHO MEAS - MV DEC TIME: 0.23 SEC
BH CV ECHO MEAS - MV E MAX VEL: 85.4 CM/SEC
BH CV ECHO MEAS - MV E/A: 0.65
BH CV ECHO MEAS - PA MAX PG: 3.1 MMHG
BH CV ECHO MEAS - PA V2 MAX: 87.8 CM/SEC
BH CV ECHO MEAS - RAP SYSTOLE: 5 MMHG
BH CV ECHO MEAS - RVSP: 36.6 MMHG
BH CV ECHO MEAS - SI(AO): 122.6 ML/M^2
BH CV ECHO MEAS - SI(CUBED): 24 ML/M^2
BH CV ECHO MEAS - SI(LVOT): 51.6 ML/M^2
BH CV ECHO MEAS - SI(MOD-SP4): 25.6 ML/M^2
BH CV ECHO MEAS - SI(TEICH): 24.8 ML/M^2
BH CV ECHO MEAS - SV(AO): 225.5 ML
BH CV ECHO MEAS - SV(CUBED): 44.1 ML
BH CV ECHO MEAS - SV(LVOT): 94.9 ML
BH CV ECHO MEAS - SV(MOD-SP4): 47 ML
BH CV ECHO MEAS - SV(TEICH): 45.6 ML
BH CV ECHO MEAS - TR MAX VEL: 281 CM/SEC
BH CV ECHO MEASUREMENTS AVERAGE E/E' RATIO: 20.33
LEFT ATRIUM VOLUME INDEX: 15.5 ML/M2
LEFT ATRIUM VOLUME: 28.6 CM3
LV EF 2D ECHO EST: 60 %
MAXIMAL PREDICTED HEART RATE: 136 BPM
STRESS TARGET HR: 116 BPM

## 2018-10-16 ENCOUNTER — APPOINTMENT (OUTPATIENT)
Dept: CARDIOLOGY | Facility: HOSPITAL | Age: 83
End: 2018-10-16
Attending: INTERNAL MEDICINE

## 2018-10-16 ENCOUNTER — HOSPITAL ENCOUNTER (OUTPATIENT)
Dept: NON INVASIVE DIAGNOSTICS | Age: 83
Discharge: HOME | End: 2018-10-16
Payer: MEDICARE

## 2018-10-16 ENCOUNTER — HOSPITAL ENCOUNTER (OUTPATIENT)
Dept: PREADMISSION TESTING | Age: 83
Setting detail: OUTPATIENT SURGERY
Discharge: HOME | End: 2018-10-20

## 2018-10-16 ENCOUNTER — HOSPITAL ENCOUNTER (OUTPATIENT)
Dept: LAB | Age: 83
Discharge: HOME | End: 2018-10-16
Payer: MEDICARE

## 2018-10-16 ENCOUNTER — HOSPITAL ENCOUNTER (OUTPATIENT)
Dept: GENERAL RADIOLOGY | Age: 83
Discharge: HOME | End: 2018-10-16
Payer: MEDICARE

## 2018-10-16 ENCOUNTER — ANESTHESIA EVENT (OUTPATIENT)
Dept: OPERATING ROOM | Age: 83
End: 2018-10-16

## 2018-10-16 VITALS — WEIGHT: 157.8 LBS | HEIGHT: 67 IN | BODY MASS INDEX: 24.77 KG/M2

## 2018-10-16 LAB
ALBUMIN SERPL-MCNC: 4.3 G/DL (ref 3.5–5.2)
ALP BLD-CCNC: 64 U/L (ref 35–104)
ALT SERPL-CCNC: 19 U/L (ref 5–33)
ANION GAP SERPL CALCULATED.3IONS-SCNC: 14 MMOL/L (ref 7–19)
AST SERPL-CCNC: 25 U/L (ref 5–32)
BASOPHILS ABSOLUTE: 0 K/UL (ref 0–0.2)
BASOPHILS RELATIVE PERCENT: 0.6 % (ref 0–1)
BILIRUB SERPL-MCNC: 0.4 MG/DL (ref 0.2–1.2)
BUN BLDV-MCNC: 27 MG/DL (ref 8–23)
CALCIUM SERPL-MCNC: 10.3 MG/DL (ref 8.8–10.2)
CHLORIDE BLD-SCNC: 100 MMOL/L (ref 98–111)
CO2: 28 MMOL/L (ref 22–29)
CREAT SERPL-MCNC: 0.7 MG/DL (ref 0.5–0.9)
EOSINOPHILS ABSOLUTE: 0.1 K/UL (ref 0–0.6)
EOSINOPHILS RELATIVE PERCENT: 1.3 % (ref 0–5)
GFR NON-AFRICAN AMERICAN: >60
GLUCOSE BLD-MCNC: 93 MG/DL (ref 74–109)
HCT VFR BLD CALC: 43.7 % (ref 37–47)
HEMOGLOBIN: 13.5 G/DL (ref 12–16)
LYMPHOCYTES ABSOLUTE: 1.7 K/UL (ref 1.1–4.5)
LYMPHOCYTES RELATIVE PERCENT: 26.6 % (ref 20–40)
MCH RBC QN AUTO: 27.3 PG (ref 27–31)
MCHC RBC AUTO-ENTMCNC: 30.9 G/DL (ref 33–37)
MCV RBC AUTO: 88.3 FL (ref 81–99)
MONOCYTES ABSOLUTE: 0.7 K/UL (ref 0–0.9)
MONOCYTES RELATIVE PERCENT: 11 % (ref 0–10)
NEUTROPHILS ABSOLUTE: 3.8 K/UL (ref 1.5–7.5)
NEUTROPHILS RELATIVE PERCENT: 59.9 % (ref 50–65)
PDW BLD-RTO: 13.6 % (ref 11.5–14.5)
PLATELET # BLD: 208 K/UL (ref 130–400)
PMV BLD AUTO: 11.3 FL (ref 9.4–12.3)
POTASSIUM SERPL-SCNC: 3.7 MMOL/L (ref 3.5–5)
RBC # BLD: 4.95 M/UL (ref 4.2–5.4)
SODIUM BLD-SCNC: 142 MMOL/L (ref 136–145)
TOTAL PROTEIN: 7.2 G/DL (ref 6.6–8.7)
WBC # BLD: 6.3 K/UL (ref 4.8–10.8)

## 2018-10-16 PROCEDURE — 93005 ELECTROCARDIOGRAM TRACING: CPT

## 2018-10-16 PROCEDURE — 71045 X-RAY EXAM CHEST 1 VIEW: CPT

## 2018-10-16 RX ORDER — LEVOTHYROXINE SODIUM 0.1 MG/1
125 TABLET ORAL DAILY
COMMUNITY

## 2018-10-16 RX ORDER — AMLODIPINE BESYLATE 5 MG/1
5 TABLET ORAL
COMMUNITY
End: 2020-03-11 | Stop reason: ALTCHOICE

## 2018-10-16 RX ORDER — CHLORHEXIDINE GLUCONATE 0.12 MG/ML
15 RINSE ORAL
COMMUNITY
Start: 2018-09-17 | End: 2018-10-16

## 2018-10-16 RX ORDER — M-VIT,TX,IRON,MINS/CALC/FOLIC 27MG-0.4MG
1 TABLET ORAL DAILY
COMMUNITY

## 2018-10-16 RX ORDER — UBIDECARENONE 10 MG
CAPSULE ORAL
COMMUNITY

## 2018-10-16 RX ORDER — HYDROCHLOROTHIAZIDE 25 MG/1
TABLET ORAL
COMMUNITY
End: 2020-07-08

## 2018-10-16 RX ORDER — ESTRADIOL 10 UG/1
INSERT VAGINAL
COMMUNITY
End: 2020-03-11 | Stop reason: ALTCHOICE

## 2018-10-16 RX ORDER — MAGNESIUM 30 MG
150 TABLET ORAL 2 TIMES DAILY
COMMUNITY

## 2018-10-16 RX ORDER — OMEPRAZOLE 20 MG/1
20 CAPSULE, DELAYED RELEASE ORAL DAILY
COMMUNITY

## 2018-10-16 RX ORDER — GUAIFENESIN 400 MG/1
400 TABLET ORAL 4 TIMES DAILY PRN
COMMUNITY
End: 2020-03-11 | Stop reason: ALTCHOICE

## 2018-10-17 LAB
EKG P AXIS: 38 DEGREES
EKG P-R INTERVAL: 142 MS
EKG Q-T INTERVAL: 436 MS
EKG QRS DURATION: 72 MS
EKG QTC CALCULATION (BAZETT): 457 MS
EKG T AXIS: 61 DEGREES

## 2018-10-18 ENCOUNTER — OFFICE VISIT (OUTPATIENT)
Dept: NEUROSURGERY | Facility: CLINIC | Age: 83
End: 2018-10-18

## 2018-10-18 ENCOUNTER — ANESTHESIA (OUTPATIENT)
Dept: OPERATING ROOM | Age: 83
End: 2018-10-18

## 2018-10-18 ENCOUNTER — HOSPITAL ENCOUNTER (OUTPATIENT)
Dept: GENERAL RADIOLOGY | Age: 83
Discharge: HOME | End: 2018-10-18
Payer: MEDICARE

## 2018-10-18 ENCOUNTER — HOSPITAL ENCOUNTER (OUTPATIENT)
Age: 83
Setting detail: OUTPATIENT SURGERY
Discharge: HOME | End: 2018-10-18
Attending: SPECIALIST | Admitting: SPECIALIST

## 2018-10-18 VITALS
BODY MASS INDEX: 24.1 KG/M2 | HEIGHT: 68 IN | TEMPERATURE: 98.4 F | SYSTOLIC BLOOD PRESSURE: 151 MMHG | OXYGEN SATURATION: 98 % | HEART RATE: 82 BPM | RESPIRATION RATE: 20 BRPM | DIASTOLIC BLOOD PRESSURE: 99 MMHG | WEIGHT: 159 LBS

## 2018-10-18 VITALS — DIASTOLIC BLOOD PRESSURE: 77 MMHG | SYSTOLIC BLOOD PRESSURE: 134 MMHG | OXYGEN SATURATION: 98 %

## 2018-10-18 VITALS
DIASTOLIC BLOOD PRESSURE: 88 MMHG | WEIGHT: 158.6 LBS | SYSTOLIC BLOOD PRESSURE: 158 MMHG | BODY MASS INDEX: 24.89 KG/M2 | HEIGHT: 67 IN

## 2018-10-18 DIAGNOSIS — C83.31 DIFFUSE LARGE B-CELL LYMPHOMA OF LYMPH NODES OF HEAD (HCC): Primary | ICD-10-CM

## 2018-10-18 DIAGNOSIS — C76.0 HEAD AND NECK CANCER (HCC): Primary | ICD-10-CM

## 2018-10-18 DIAGNOSIS — C76.0 HEAD AND NECK CANCER (HCC): ICD-10-CM

## 2018-10-18 DIAGNOSIS — R22.0 RIGHT FACIAL SWELLING: ICD-10-CM

## 2018-10-18 DIAGNOSIS — Z78.9 NON-SMOKER: ICD-10-CM

## 2018-10-18 PROCEDURE — C1788 PORT, INDWELLING, IMP: HCPCS | Performed by: SPECIALIST

## 2018-10-18 PROCEDURE — 71045 X-RAY EXAM CHEST 1 VIEW: CPT

## 2018-10-18 PROCEDURE — G8907 PT DOC NO EVENTS ON DISCHARG: HCPCS

## 2018-10-18 PROCEDURE — 99204 OFFICE O/P NEW MOD 45 MIN: CPT | Performed by: NEUROLOGICAL SURGERY

## 2018-10-18 PROCEDURE — 36561 INSERT TUNNELED CV CATH: CPT

## 2018-10-18 PROCEDURE — G8916 PT W IV AB GIVEN ON TIME: HCPCS

## 2018-10-18 DEVICE — IMPLANTABLE DEVICE: Type: IMPLANTABLE DEVICE | Site: CHEST | Status: FUNCTIONAL

## 2018-10-18 RX ORDER — FENTANYL CITRATE 50 UG/ML
INJECTION, SOLUTION INTRAMUSCULAR; INTRAVENOUS PRN
Status: DISCONTINUED | OUTPATIENT
Start: 2018-10-18 | End: 2018-10-18 | Stop reason: SDUPTHER

## 2018-10-18 RX ORDER — PROPOFOL 10 MG/ML
INJECTION, EMULSION INTRAVENOUS PRN
Status: DISCONTINUED | OUTPATIENT
Start: 2018-10-18 | End: 2018-10-18 | Stop reason: SDUPTHER

## 2018-10-18 RX ORDER — SODIUM CHLORIDE, SODIUM LACTATE, POTASSIUM CHLORIDE, CALCIUM CHLORIDE 600; 310; 30; 20 MG/100ML; MG/100ML; MG/100ML; MG/100ML
INJECTION, SOLUTION INTRAVENOUS CONTINUOUS
Status: DISCONTINUED | OUTPATIENT
Start: 2018-10-18 | End: 2018-10-18 | Stop reason: HOSPADM

## 2018-10-18 RX ORDER — LIDOCAINE HYDROCHLORIDE 10 MG/ML
1 INJECTION, SOLUTION EPIDURAL; INFILTRATION; INTRACAUDAL; PERINEURAL
Status: COMPLETED | OUTPATIENT
Start: 2018-10-18 | End: 2018-10-18

## 2018-10-18 RX ORDER — CEFAZOLIN SODIUM 1 G/3ML
INJECTION, POWDER, FOR SOLUTION INTRAMUSCULAR; INTRAVENOUS PRN
Status: DISCONTINUED | OUTPATIENT
Start: 2018-10-18 | End: 2018-10-18 | Stop reason: SDUPTHER

## 2018-10-18 RX ADMIN — SODIUM CHLORIDE, SODIUM LACTATE, POTASSIUM CHLORIDE, CALCIUM CHLORIDE: 600; 310; 30; 20 INJECTION, SOLUTION INTRAVENOUS at 13:03

## 2018-10-18 RX ADMIN — PROPOFOL 150 MG: 10 INJECTION, EMULSION INTRAVENOUS at 14:16

## 2018-10-18 RX ADMIN — CEFAZOLIN SODIUM 1000 MG: 1 INJECTION, POWDER, FOR SOLUTION INTRAMUSCULAR; INTRAVENOUS at 14:12

## 2018-10-18 RX ADMIN — FENTANYL CITRATE 50 MCG: 50 INJECTION, SOLUTION INTRAMUSCULAR; INTRAVENOUS at 14:14

## 2018-10-18 ASSESSMENT — PAIN - FUNCTIONAL ASSESSMENT: PAIN_FUNCTIONAL_ASSESSMENT: 0-10

## 2018-10-18 NOTE — PATIENT INSTRUCTIONS
PATIENT TO CONTINUE TO FOLLOW UP WITH HER PRIMARY CARE PROVIDER FOR YEARLY PHYSICAL EXAMS TO ENSURE COMPLETE HEALTH MAINTENANCE

## 2018-10-18 NOTE — H&P
Patient: Kami Villanueva  : 1934    Primary Care Provider: Kvng Strong MD    Requesting Provider: No ref. provider found        History    Chief Complaint: Lymphoma  Chief Complaint   Patient presents with   • Lymphoma     patient is here to discuss having an Omaya reservoir placed for treatment (requested by Dr Osborn).  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  Her oncologist is requesting Ommaya reservoir placement prior to initiating chemotherapy.  She has difficulty with severe facial swelling.  She denies any headaches nausea or vomiting.    Review of Systems   Neurological: Positive for facial asymmetry.   All other systems reviewed and are negative.      Past Medical History:   Past Medical History:   Diagnosis Date   • Allergic rhinitis    • Cancer (CMS/HCC)     breast ca and B cell lymphoma   • Disease of thyroid gland    • Diverticulitis    • Frequent urination    • Hard to intubate     vocal cord injury,  padded vocal cord   • Heartburn    • History of breast cancer    • Hypertension    • Incontinence        Past Surgical History:   Past Surgical History:   Procedure Laterality Date   • BREAST SURGERY Right     mastectomy   • CATARACT EXTRACTION WITH INTRAOCULAR LENS IMPLANT     • COLON RESECTION     • ENDOSCOPIC FUNCTIONAL SINUS SURGERY (FESS) Right 2018    Procedure: ENDOSCOPIC FUNCTIONAL SINUS SURGERY W TRACKING OF RIGHT MAXILLARY SINUS WITH BIOPSY.;  Surgeon: Mirza Felix MD;  Location: [facility] OR;  Service: ENT   • HYSTERECTOMY     • THROAT SURGERY      vocal cord implant   • THYROID SURGERY         Family History: family history includes No Known Problems in her father and mother.    Social History:  reports that she has never smoked. She has never used smokeless tobacco. She reports that she does not drink alcohol or use  drugs.    Medications:    (Not in a hospital admission)    Allergies:  Lisinopril; Statins; Alendronate; Sulfa antibiotics; and Teriparatide (recombinant)    Physical Exam:     Physical Exam   Constitutional: She is oriented to person, place, and time. She appears well-developed and well-nourished.   Eyes: Pupils are equal, round, and reactive to light. EOM are normal.   Cardiovascular: Normal rate and regular rhythm.    Pulmonary/Chest: Effort normal. No respiratory distress. She has no wheezes.   Abdominal: Soft. She exhibits no distension. There is no tenderness.   Neurological: She is oriented to person, place, and time. She has normal strength. She has a normal Finger-Nose-Finger Test. Gait normal.   Psychiatric: Her speech is normal.       Neurologic Exam     Mental Status   Oriented to person, place, and time.   Attention: normal.   Speech: speech is normal   Level of consciousness: alert  Knowledge: good.     Cranial Nerves     CN II   Visual fields full to confrontation.     CN III, IV, VI   Pupils are equal, round, and reactive to light.  Extraocular motions are normal.     CN V   Facial sensation intact.     CN VII   Right facial weakness: peripheral    CN VIII   CN VIII normal.     CN IX, X   CN IX normal.   CN X normal.     CN XI   CN XI normal.     CN XII   CN XII normal.     Motor Exam   Muscle bulk: normal  Overall muscle tone: normal  Right arm pronator drift: absent  Left arm pronator drift: absent    Strength   Strength 5/5 throughout.     Sensory Exam   Light touch normal.   Pinprick normal.     Gait, Coordination, and Reflexes     Gait  Gait: normal    Coordination   Finger to nose coordination: normal    Tremor   Resting tremor: absent  Intention tremor: absent  Action tremor: absent    Reflexes   Reflexes 2+ except as noted.         Independent Review of Radiographic Studies:   MRI of the brain with and without contrast shows an extensive amount of tumor involvement of the right facial  structures muscles and sinuses.  There is no obvious intracranial abnormality or tumor that I can appreciate.    ASSESSMENT/PLAN: The patient requires Ommaya reservoir to optimize treatment of her lymphoma.  I think this is reasonable.  We are going to get her preop and scheduled for a right Ommaya reservoir placement with stereotactic navigation.  The risks and benefits of the procedure were discussed at length which included but were not limited to infection, bleeding, paralysis, speech and memory problems, stroke, coma, and death.  She acknowledged understanding this.  Her questions concerns were addressed.  1. 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 Right facial swelling    3. BMI 24.0-24.9, adult    4. Non-smoker          Return for postoperatively.      Graham Greer MD

## 2018-10-19 ENCOUNTER — ANESTHESIA EVENT (OUTPATIENT)
Dept: PERIOP | Facility: HOSPITAL | Age: 83
End: 2018-10-19

## 2018-10-19 RX ORDER — OMEPRAZOLE 20 MG/1
20 CAPSULE, DELAYED RELEASE ORAL DAILY
Status: ON HOLD | COMMUNITY
End: 2019-02-28

## 2018-10-19 NOTE — PAT
Was noted on pt's medical Hx, & w/ PAT call w/ pt's daughter Teresa Menendez, that due to past surgery on pt's vocal cords she is a difficult intubation.  Dr. Rush Edwards, Anesthesia was notified of above 10/19/18 @ 2802.  He requested pt's name & DOS.  Waleska Alfaro RN

## 2018-10-22 ENCOUNTER — ANESTHESIA (OUTPATIENT)
Dept: PERIOP | Facility: HOSPITAL | Age: 83
End: 2018-10-22

## 2018-10-22 ENCOUNTER — HOSPITAL ENCOUNTER (INPATIENT)
Facility: HOSPITAL | Age: 83
LOS: 1 days | Discharge: HOME | End: 2018-10-22
Attending: NEUROLOGICAL SURGERY | Admitting: NEUROLOGICAL SURGERY

## 2018-10-22 ENCOUNTER — APPOINTMENT (OUTPATIENT)
Dept: CT IMAGING | Facility: HOSPITAL | Age: 83
End: 2018-10-22

## 2018-10-22 VITALS
DIASTOLIC BLOOD PRESSURE: 67 MMHG | TEMPERATURE: 97 F | SYSTOLIC BLOOD PRESSURE: 147 MMHG | OXYGEN SATURATION: 93 % | HEIGHT: 66 IN | RESPIRATION RATE: 20 BRPM | BODY MASS INDEX: 25.19 KG/M2 | HEART RATE: 93 BPM | WEIGHT: 156.75 LBS

## 2018-10-22 DIAGNOSIS — C83.31 DIFFUSE LARGE B-CELL LYMPHOMA OF LYMPH NODES OF HEAD (HCC): ICD-10-CM

## 2018-10-22 LAB
ABO GROUP BLD: NORMAL
APPEARANCE CSF: CLEAR
BLD GP AB SCN SERPL QL: NEGATIVE
COLOR CSF: COLORLESS
GLUCOSE CSF-MCNC: 70 MG/DL (ref 40–70)
METHOD: ABNORMAL
NUC CELL # CSF MANUAL: 3 /MM3 (ref 0–8)
PROT CSF-MCNC: 15 MG/DL (ref 12–60)
RBC # CSF MANUAL: 69.3 /MM3 (ref 0–0)
RH BLD: POSITIVE
SPECIMEN VOL CSF: 4.8 ML
T&S EXPIRATION DATE: NORMAL
TUBE # CSF: 1

## 2018-10-22 PROCEDURE — 82945 GLUCOSE OTHER FLUID: CPT | Performed by: NEUROLOGICAL SURGERY

## 2018-10-22 PROCEDURE — 87205 SMEAR GRAM STAIN: CPT | Performed by: NEUROLOGICAL SURGERY

## 2018-10-22 PROCEDURE — 25010000002 SUCCINYLCHOLINE PER 20 MG: Performed by: NURSE ANESTHETIST, CERTIFIED REGISTERED

## 2018-10-22 PROCEDURE — 86850 RBC ANTIBODY SCREEN: CPT | Performed by: NEUROLOGICAL SURGERY

## 2018-10-22 PROCEDURE — 25010000003 CEFAZOLIN PER 500 MG: Performed by: NURSE ANESTHETIST, CERTIFIED REGISTERED

## 2018-10-22 PROCEDURE — 61215 INS SUBQ RSVR PMP/NFS SYS: CPT | Performed by: NEUROLOGICAL SURGERY

## 2018-10-22 PROCEDURE — C1729 CATH, DRAINAGE: HCPCS | Performed by: NEUROLOGICAL SURGERY

## 2018-10-22 PROCEDURE — 86900 BLOOD TYPING SEROLOGIC ABO: CPT | Performed by: NEUROLOGICAL SURGERY

## 2018-10-22 PROCEDURE — 70450 CT HEAD/BRAIN W/O DYE: CPT

## 2018-10-22 PROCEDURE — 25010000002 FENTANYL CITRATE (PF) 100 MCG/2ML SOLUTION: Performed by: ANESTHESIOLOGY

## 2018-10-22 PROCEDURE — 25010000002 PROPOFOL 10 MG/ML EMULSION: Performed by: NURSE ANESTHETIST, CERTIFIED REGISTERED

## 2018-10-22 PROCEDURE — 25010000002 DEXAMETHASONE SODIUM PHOSPHATE 10 MG/ML SOLUTION: Performed by: ANESTHESIOLOGY

## 2018-10-22 PROCEDURE — 86901 BLOOD TYPING SEROLOGIC RH(D): CPT | Performed by: NEUROLOGICAL SURGERY

## 2018-10-22 PROCEDURE — 87070 CULTURE OTHR SPECIMN AEROBIC: CPT | Performed by: NEUROLOGICAL SURGERY

## 2018-10-22 PROCEDURE — 89050 BODY FLUID CELL COUNT: CPT | Performed by: NEUROLOGICAL SURGERY

## 2018-10-22 PROCEDURE — 84157 ASSAY OF PROTEIN OTHER: CPT | Performed by: NEUROLOGICAL SURGERY

## 2018-10-22 PROCEDURE — 25010000002 NEOSTIGMINE PER 0.5 MG: Performed by: NURSE ANESTHETIST, CERTIFIED REGISTERED

## 2018-10-22 PROCEDURE — 0WH133Z INSERTION OF INFUSION DEVICE INTO CRANIAL CAVITY, PERCUTANEOUS APPROACH: ICD-10-PCS | Performed by: NEUROLOGICAL SURGERY

## 2018-10-22 PROCEDURE — 87015 SPECIMEN INFECT AGNT CONCNTJ: CPT | Performed by: NEUROLOGICAL SURGERY

## 2018-10-22 DEVICE — HEMO ABS GELFOAM SPNG PORCN SZ100: Type: IMPLANTABLE DEVICE | Status: FUNCTIONAL

## 2018-10-22 DEVICE — RESERVOIR 44104 VENT. INLET BOTTOM: Type: IMPLANTABLE DEVICE | Status: FUNCTIONAL

## 2018-10-22 DEVICE — HEMO ABS GELFOAM PWDR PORCN 1GM: Type: IMPLANTABLE DEVICE | Status: FUNCTIONAL

## 2018-10-22 RX ORDER — DEXAMETHASONE SODIUM PHOSPHATE 10 MG/ML
8 INJECTION, SOLUTION INTRAMUSCULAR; INTRAVENOUS ONCE AS NEEDED
Status: COMPLETED | OUTPATIENT
Start: 2018-10-22 | End: 2018-10-22

## 2018-10-22 RX ORDER — LIDOCAINE HYDROCHLORIDE 20 MG/ML
INJECTION, SOLUTION INFILTRATION; PERINEURAL AS NEEDED
Status: DISCONTINUED | OUTPATIENT
Start: 2018-10-22 | End: 2018-10-22 | Stop reason: SURG

## 2018-10-22 RX ORDER — GLYCOPYRROLATE 0.2 MG/ML
INJECTION INTRAMUSCULAR; INTRAVENOUS AS NEEDED
Status: DISCONTINUED | OUTPATIENT
Start: 2018-10-22 | End: 2018-10-22 | Stop reason: SURG

## 2018-10-22 RX ORDER — MIDAZOLAM HYDROCHLORIDE 1 MG/ML
1 INJECTION INTRAMUSCULAR; INTRAVENOUS
Status: DISCONTINUED | OUTPATIENT
Start: 2018-10-22 | End: 2018-10-22 | Stop reason: HOSPADM

## 2018-10-22 RX ORDER — HYDROCHLOROTHIAZIDE 12.5 MG/1
12.5 TABLET ORAL DAILY
COMMUNITY
End: 2018-12-24 | Stop reason: HOSPADM

## 2018-10-22 RX ORDER — BUPIVACAINE HCL/0.9 % NACL/PF 0.1 %
2 PLASTIC BAG, INJECTION (ML) EPIDURAL ONCE
Status: DISCONTINUED | OUTPATIENT
Start: 2018-10-22 | End: 2018-10-22 | Stop reason: HOSPADM

## 2018-10-22 RX ORDER — SUFENTANIL CITRATE 50 UG/ML
INJECTION EPIDURAL; INTRAVENOUS AS NEEDED
Status: DISCONTINUED | OUTPATIENT
Start: 2018-10-22 | End: 2018-10-22 | Stop reason: SURG

## 2018-10-22 RX ORDER — SODIUM CHLORIDE, SODIUM LACTATE, POTASSIUM CHLORIDE, CALCIUM CHLORIDE 600; 310; 30; 20 MG/100ML; MG/100ML; MG/100ML; MG/100ML
30 INJECTION, SOLUTION INTRAVENOUS CONTINUOUS
Status: DISCONTINUED | OUTPATIENT
Start: 2018-10-22 | End: 2018-10-22 | Stop reason: HOSPADM

## 2018-10-22 RX ORDER — SODIUM CHLORIDE, SODIUM LACTATE, POTASSIUM CHLORIDE, CALCIUM CHLORIDE 600; 310; 30; 20 MG/100ML; MG/100ML; MG/100ML; MG/100ML
INJECTION, SOLUTION INTRAVENOUS CONTINUOUS PRN
Status: DISCONTINUED | OUTPATIENT
Start: 2018-10-22 | End: 2018-10-22 | Stop reason: SURG

## 2018-10-22 RX ORDER — FENTANYL CITRATE 50 UG/ML
25 INJECTION, SOLUTION INTRAMUSCULAR; INTRAVENOUS
Status: DISCONTINUED | OUTPATIENT
Start: 2018-10-22 | End: 2018-10-22 | Stop reason: HOSPADM

## 2018-10-22 RX ORDER — METOCLOPRAMIDE HYDROCHLORIDE 5 MG/ML
5 INJECTION INTRAMUSCULAR; INTRAVENOUS
Status: DISCONTINUED | OUTPATIENT
Start: 2018-10-22 | End: 2018-10-22 | Stop reason: HOSPADM

## 2018-10-22 RX ORDER — VASOPRESSIN 20 U/ML
INJECTION PARENTERAL AS NEEDED
Status: DISCONTINUED | OUTPATIENT
Start: 2018-10-22 | End: 2018-10-22 | Stop reason: SURG

## 2018-10-22 RX ORDER — PROPOFOL 10 MG/ML
VIAL (ML) INTRAVENOUS AS NEEDED
Status: DISCONTINUED | OUTPATIENT
Start: 2018-10-22 | End: 2018-10-22 | Stop reason: SURG

## 2018-10-22 RX ORDER — IPRATROPIUM BROMIDE AND ALBUTEROL SULFATE 2.5; .5 MG/3ML; MG/3ML
3 SOLUTION RESPIRATORY (INHALATION) ONCE AS NEEDED
Status: DISCONTINUED | OUTPATIENT
Start: 2018-10-22 | End: 2018-10-22 | Stop reason: HOSPADM

## 2018-10-22 RX ORDER — ONDANSETRON 2 MG/ML
4 INJECTION INTRAMUSCULAR; INTRAVENOUS ONCE AS NEEDED
Status: DISCONTINUED | OUTPATIENT
Start: 2018-10-22 | End: 2018-10-22 | Stop reason: HOSPADM

## 2018-10-22 RX ORDER — SUCCINYLCHOLINE CHLORIDE 20 MG/ML
INJECTION INTRAMUSCULAR; INTRAVENOUS AS NEEDED
Status: DISCONTINUED | OUTPATIENT
Start: 2018-10-22 | End: 2018-10-22 | Stop reason: SURG

## 2018-10-22 RX ORDER — LIDOCAINE HYDROCHLORIDE 40 MG/ML
SOLUTION TOPICAL AS NEEDED
Status: DISCONTINUED | OUTPATIENT
Start: 2018-10-22 | End: 2018-10-22 | Stop reason: SURG

## 2018-10-22 RX ORDER — IBUPROFEN 600 MG/1
600 TABLET ORAL ONCE AS NEEDED
Status: DISCONTINUED | OUTPATIENT
Start: 2018-10-22 | End: 2018-10-22 | Stop reason: HOSPADM

## 2018-10-22 RX ORDER — DEXTROSE MONOHYDRATE 25 G/50ML
12.5 INJECTION, SOLUTION INTRAVENOUS AS NEEDED
Status: DISCONTINUED | OUTPATIENT
Start: 2018-10-22 | End: 2018-10-22 | Stop reason: HOSPADM

## 2018-10-22 RX ORDER — NALOXONE HCL 0.4 MG/ML
0.4 VIAL (ML) INJECTION AS NEEDED
Status: DISCONTINUED | OUTPATIENT
Start: 2018-10-22 | End: 2018-10-22 | Stop reason: HOSPADM

## 2018-10-22 RX ORDER — SODIUM CHLORIDE 9 MG/ML
INJECTION, SOLUTION INTRAVENOUS AS NEEDED
Status: DISCONTINUED | OUTPATIENT
Start: 2018-10-22 | End: 2018-10-22 | Stop reason: HOSPADM

## 2018-10-22 RX ORDER — LABETALOL HYDROCHLORIDE 5 MG/ML
5 INJECTION, SOLUTION INTRAVENOUS
Status: DISCONTINUED | OUTPATIENT
Start: 2018-10-22 | End: 2018-10-22 | Stop reason: HOSPADM

## 2018-10-22 RX ORDER — OXYCODONE AND ACETAMINOPHEN 7.5; 325 MG/1; MG/1
2 TABLET ORAL ONCE AS NEEDED
Status: DISCONTINUED | OUTPATIENT
Start: 2018-10-22 | End: 2018-10-22 | Stop reason: HOSPADM

## 2018-10-22 RX ORDER — SODIUM CHLORIDE 0.9 % (FLUSH) 0.9 %
3-10 SYRINGE (ML) INJECTION AS NEEDED
Status: DISCONTINUED | OUTPATIENT
Start: 2018-10-22 | End: 2018-10-22 | Stop reason: HOSPADM

## 2018-10-22 RX ORDER — FENTANYL CITRATE 50 UG/ML
25 INJECTION, SOLUTION INTRAMUSCULAR; INTRAVENOUS AS NEEDED
Status: DISCONTINUED | OUTPATIENT
Start: 2018-10-22 | End: 2018-10-22 | Stop reason: HOSPADM

## 2018-10-22 RX ORDER — ONDANSETRON HYDROCHLORIDE 8 MG/1
8 TABLET, FILM COATED ORAL EVERY 8 HOURS PRN
COMMUNITY
End: 2019-08-01

## 2018-10-22 RX ORDER — MIDAZOLAM HYDROCHLORIDE 1 MG/ML
2 INJECTION INTRAMUSCULAR; INTRAVENOUS
Status: DISCONTINUED | OUTPATIENT
Start: 2018-10-22 | End: 2018-10-22 | Stop reason: HOSPADM

## 2018-10-22 RX ORDER — MEPERIDINE HYDROCHLORIDE 25 MG/ML
12.5 INJECTION INTRAMUSCULAR; INTRAVENOUS; SUBCUTANEOUS
Status: DISCONTINUED | OUTPATIENT
Start: 2018-10-22 | End: 2018-10-22 | Stop reason: HOSPADM

## 2018-10-22 RX ORDER — SODIUM CHLORIDE, SODIUM LACTATE, POTASSIUM CHLORIDE, CALCIUM CHLORIDE 600; 310; 30; 20 MG/100ML; MG/100ML; MG/100ML; MG/100ML
9 INJECTION, SOLUTION INTRAVENOUS CONTINUOUS
Status: DISCONTINUED | OUTPATIENT
Start: 2018-10-22 | End: 2018-10-22 | Stop reason: HOSPADM

## 2018-10-22 RX ORDER — SODIUM CHLORIDE 0.9 % (FLUSH) 0.9 %
1-10 SYRINGE (ML) INJECTION AS NEEDED
Status: DISCONTINUED | OUTPATIENT
Start: 2018-10-22 | End: 2018-10-22 | Stop reason: HOSPADM

## 2018-10-22 RX ORDER — SODIUM CHLORIDE 0.9 % (FLUSH) 0.9 %
3 SYRINGE (ML) INJECTION EVERY 12 HOURS SCHEDULED
Status: DISCONTINUED | OUTPATIENT
Start: 2018-10-22 | End: 2018-10-22 | Stop reason: HOSPADM

## 2018-10-22 RX ORDER — ROCURONIUM BROMIDE 10 MG/ML
INJECTION, SOLUTION INTRAVENOUS AS NEEDED
Status: DISCONTINUED | OUTPATIENT
Start: 2018-10-22 | End: 2018-10-22 | Stop reason: SURG

## 2018-10-22 RX ORDER — CEFAZOLIN SODIUM 1 G/3ML
INJECTION, POWDER, FOR SOLUTION INTRAMUSCULAR; INTRAVENOUS AS NEEDED
Status: DISCONTINUED | OUTPATIENT
Start: 2018-10-22 | End: 2018-10-22 | Stop reason: SURG

## 2018-10-22 RX ORDER — MAGNESIUM HYDROXIDE 1200 MG/15ML
LIQUID ORAL AS NEEDED
Status: DISCONTINUED | OUTPATIENT
Start: 2018-10-22 | End: 2018-10-22 | Stop reason: HOSPADM

## 2018-10-22 RX ORDER — OXYCODONE AND ACETAMINOPHEN 10; 325 MG/1; MG/1
1 TABLET ORAL ONCE AS NEEDED
Status: COMPLETED | OUTPATIENT
Start: 2018-10-22 | End: 2018-10-22

## 2018-10-22 RX ADMIN — CEFAZOLIN 2 G: 1 INJECTION, POWDER, FOR SOLUTION INTRAVENOUS at 14:34

## 2018-10-22 RX ADMIN — FENTANYL CITRATE 25 MCG: 50 INJECTION, SOLUTION INTRAMUSCULAR; INTRAVENOUS at 16:07

## 2018-10-22 RX ADMIN — EPHEDRINE SULFATE 20 MG: 50 INJECTION INTRAMUSCULAR; INTRAVENOUS; SUBCUTANEOUS at 14:53

## 2018-10-22 RX ADMIN — FENTANYL CITRATE 25 MCG: 50 INJECTION, SOLUTION INTRAMUSCULAR; INTRAVENOUS at 16:10

## 2018-10-22 RX ADMIN — SODIUM CHLORIDE, POTASSIUM CHLORIDE, SODIUM LACTATE AND CALCIUM CHLORIDE: 600; 310; 30; 20 INJECTION, SOLUTION INTRAVENOUS at 14:21

## 2018-10-22 RX ADMIN — LIDOCAINE HYDROCHLORIDE 1 EACH: 40 SOLUTION TOPICAL at 14:27

## 2018-10-22 RX ADMIN — GLYCOPYRROLATE 0.2 MG: 0.2 INJECTION, SOLUTION INTRAMUSCULAR; INTRAVENOUS at 15:18

## 2018-10-22 RX ADMIN — Medication 3 MG: at 15:25

## 2018-10-22 RX ADMIN — SUCCINYLCHOLINE CHLORIDE 100 MG: 20 INJECTION, SOLUTION INTRAMUSCULAR; INTRAVENOUS at 14:27

## 2018-10-22 RX ADMIN — SUFENTANIL CITRATE 20 MCG: 50 INJECTION, SOLUTION EPIDURAL; INTRAVENOUS at 14:35

## 2018-10-22 RX ADMIN — ROCURONIUM BROMIDE 15 MG: 10 INJECTION INTRAVENOUS at 14:35

## 2018-10-22 RX ADMIN — SODIUM CHLORIDE, POTASSIUM CHLORIDE, SODIUM LACTATE AND CALCIUM CHLORIDE 30 ML/HR: 600; 310; 30; 20 INJECTION, SOLUTION INTRAVENOUS at 11:05

## 2018-10-22 RX ADMIN — LIDOCAINE HYDROCHLORIDE 0.5 ML: 10 INJECTION, SOLUTION EPIDURAL; INFILTRATION; INTRACAUDAL; PERINEURAL at 11:03

## 2018-10-22 RX ADMIN — PROPOFOL 100 MG: 10 INJECTION, EMULSION INTRAVENOUS at 14:27

## 2018-10-22 RX ADMIN — LIDOCAINE HYDROCHLORIDE 100 MG: 20 INJECTION, SOLUTION INFILTRATION; PERINEURAL at 14:27

## 2018-10-22 RX ADMIN — VASOPRESSIN 1 UNITS: 20 INJECTION INTRAVENOUS at 14:43

## 2018-10-22 RX ADMIN — ROCURONIUM BROMIDE 5 MG: 10 INJECTION INTRAVENOUS at 14:27

## 2018-10-22 RX ADMIN — EPHEDRINE SULFATE 10 MG: 50 INJECTION INTRAMUSCULAR; INTRAVENOUS; SUBCUTANEOUS at 14:59

## 2018-10-22 RX ADMIN — OXYCODONE HYDROCHLORIDE AND ACETAMINOPHEN 1 TABLET: 10; 325 TABLET ORAL at 16:03

## 2018-10-22 RX ADMIN — DEXAMETHASONE SODIUM PHOSPHATE 8 MG: 10 INJECTION, SOLUTION INTRAMUSCULAR; INTRAVENOUS at 13:39

## 2018-10-22 RX ADMIN — SUFENTANIL CITRATE 10 MCG: 50 INJECTION, SOLUTION EPIDURAL; INTRAVENOUS at 14:27

## 2018-10-22 RX ADMIN — GLYCOPYRROLATE 0.2 MG: 0.2 INJECTION, SOLUTION INTRAMUSCULAR; INTRAVENOUS at 15:25

## 2018-10-22 NOTE — H&P (VIEW-ONLY)
Patient: Kami Villanueva  : 1934    Primary Care Provider: Kvng Strong MD    Requesting Provider: No ref. provider found        History    Chief Complaint: Lymphoma  Chief Complaint   Patient presents with   • Lymphoma     patient is here to discuss having an Omaya reservoir placed for treatment (requested by Dr Osborn).  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  Her oncologist is requesting Ommaya reservoir placement prior to initiating chemotherapy.  She has difficulty with severe facial swelling.  She denies any headaches nausea or vomiting.    Review of Systems   Neurological: Positive for facial asymmetry.   All other systems reviewed and are negative.      Past Medical History:   Past Medical History:   Diagnosis Date   • Allergic rhinitis    • Cancer (CMS/HCC)     breast ca and B cell lymphoma   • Disease of thyroid gland    • Diverticulitis    • Frequent urination    • Hard to intubate     vocal cord injury,  padded vocal cord   • Heartburn    • History of breast cancer    • Hypertension    • Incontinence        Past Surgical History:   Past Surgical History:   Procedure Laterality Date   • BREAST SURGERY Right     mastectomy   • CATARACT EXTRACTION WITH INTRAOCULAR LENS IMPLANT     • COLON RESECTION     • ENDOSCOPIC FUNCTIONAL SINUS SURGERY (FESS) Right 2018    Procedure: ENDOSCOPIC FUNCTIONAL SINUS SURGERY W TRACKING OF RIGHT MAXILLARY SINUS WITH BIOPSY.;  Surgeon: Mirza Felix MD;  Location: [facility] OR;  Service: ENT   • HYSTERECTOMY     • THROAT SURGERY      vocal cord implant   • THYROID SURGERY         Family History: family history includes No Known Problems in her father and mother.    Social History:  reports that she has never smoked. She has never used smokeless tobacco. She reports that she does not drink alcohol or use  drugs.    Medications:    (Not in a hospital admission)    Allergies:  Lisinopril; Statins; Alendronate; Sulfa antibiotics; and Teriparatide (recombinant)    Physical Exam:     Physical Exam   Constitutional: She is oriented to person, place, and time. She appears well-developed and well-nourished.   Eyes: Pupils are equal, round, and reactive to light. EOM are normal.   Cardiovascular: Normal rate and regular rhythm.    Pulmonary/Chest: Effort normal. No respiratory distress. She has no wheezes.   Abdominal: Soft. She exhibits no distension. There is no tenderness.   Neurological: She is oriented to person, place, and time. She has normal strength. She has a normal Finger-Nose-Finger Test. Gait normal.   Psychiatric: Her speech is normal.       Neurologic Exam     Mental Status   Oriented to person, place, and time.   Attention: normal.   Speech: speech is normal   Level of consciousness: alert  Knowledge: good.     Cranial Nerves     CN II   Visual fields full to confrontation.     CN III, IV, VI   Pupils are equal, round, and reactive to light.  Extraocular motions are normal.     CN V   Facial sensation intact.     CN VII   Right facial weakness: peripheral    CN VIII   CN VIII normal.     CN IX, X   CN IX normal.   CN X normal.     CN XI   CN XI normal.     CN XII   CN XII normal.     Motor Exam   Muscle bulk: normal  Overall muscle tone: normal  Right arm pronator drift: absent  Left arm pronator drift: absent    Strength   Strength 5/5 throughout.     Sensory Exam   Light touch normal.   Pinprick normal.     Gait, Coordination, and Reflexes     Gait  Gait: normal    Coordination   Finger to nose coordination: normal    Tremor   Resting tremor: absent  Intention tremor: absent  Action tremor: absent    Reflexes   Reflexes 2+ except as noted.         Independent Review of Radiographic Studies:   MRI of the brain with and without contrast shows an extensive amount of tumor involvement of the right facial  structures muscles and sinuses.  There is no obvious intracranial abnormality or tumor that I can appreciate.    ASSESSMENT/PLAN: The patient requires Ommaya reservoir to optimize treatment of her lymphoma.  I think this is reasonable.  We are going to get her preop and scheduled for a right Ommaya reservoir placement with stereotactic navigation.  The risks and benefits of the procedure were discussed at length which included but were not limited to infection, bleeding, paralysis, speech and memory problems, stroke, coma, and death.  She acknowledged understanding this.  Her questions concerns were addressed.  1. 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 Right facial swelling    3. BMI 24.0-24.9, adult    4. Non-smoker          Return for postoperatively.      Graham Greer MD

## 2018-10-22 NOTE — OP NOTE
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 The patient now presents for Ommaya reservoir placement after discussing therapeutic alternatives.          Surgeon: Graham Greer MD     Assistants: None    Anesthesia: General endotracheal anesthesia    ASA Class: 3    Procedure Details   After obtaining informed consent, having the risks and benefits of the procedure explained including but not limited to infection, bleeding, paralysis, speech and memory problems, stroke, coma, and death.  The patient was brought to the operating.  She was given general anesthesia via an endotracheal tube.  She was laid supine on operating room table.  Her head was placed in 3 point Tovar head clamp and turned to the left.  The patient was then registered with stereotactic navigation.  A preplanned incision at Surya's point was marked with indelible marker.  A small amount of hair was removed using electric clippers.  The patient then prepped and draped in the standard sterile fashion.  A horseshoe-shaped incision was then marked with indelible marker.  The preplanned incision was infiltrated Marcaine and epinephrine.  A 10 blade scalpel used to make an incision to varus epidermis.  Bovie cautery was used to extend the incision down to the subjacent soft tissues to level the pericranium.  A small self-retaining retractor was placed in the wound.  A acorn bit was then used to drill a anthony hole.  The dura was then bipolared for coagulation.  It was incised using 11 blade scalpel.  The dural edges were then bipolared.  The surface of the alecia was then incised using 11 blade scalpel .   The surface of the alecia was then bipolared.  Then using image guided navigation a ventricular catheter was placed in the right frontal lateral ventricle.  There is a spontaneous flow spinal fluid.  Several cc were collected for pathologic analysis.  The ventricular catheter was then trimmed it was attached to an Ommaya reservoir and held in place using a 2-0 silk suture.  The wound was then copiously irrigated with antibiotic solution.  It was inspected for hemostasis.  The Ommaya reservoir was tapped with good flow spinal fluid.  The galea was reapproximated using a series of inverted interrupted 2-0 Vicryl sutures.  The skin was closed using a running 4-0 Monocryl.  All sponge needle and instrument counts were correct at the end of the procedure.  The patient was extubated in stable condition returned recovery with about 5 mL of blood loss.    Findings:  CNS lymphoma]    Estimated Blood Loss:  5           Drains: None           Total IV Fluids: ml           Specimens:   ID Type Source Tests Collected by Time   1 : flow cytometry for cns lymphoma Cerebrospinal Fluid Brain GLUCOSE, CSF, PROTEIN, CSF, CELL COUNT WITH DIFFERENTIAL, CSF, GRAM STAIN, CULTURE, CSF Graham Greer MD 10/22/2018 1456              Implants:   Implant Name Type Inv. Item Serial No.  Lot No. LRB No. Used   RESVR VENT CFS 28MM - [id number] Implant RESVR VENT CFS 28MM  [employer] [id number] Right 1   GELFOAM PWDR 1GM - [id number] Implant GELFOAM PWDR 1GM  [employer]  Right 1   SPNG HEMO GELFOAM COLLGN  - [id number] Implant SPNG HEMO GELFOAM COLLGN    [employer]   Right 1              Complications:  None           Disposition: PACU - hemodynamically stable.           Condition: stable        Graham Greer MD

## 2018-10-22 NOTE — ANESTHESIA POSTPROCEDURE EVALUATION
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 (CMS/HCC) [C83.31])    Surgeon:  Graham Greer MD Provider:  Gregory Turcios CRNA    Anesthesia Type:  general ASA Status:  3          Anesthesia Type: general  Last vitals  BP   145/67 (10/22/18 1700)   Temp   97 °F (36.1 °C) (10/22/18 1630)   Pulse   90 (10/22/18 1700)   Resp   16 (10/22/18 1700)     SpO2   93 % (10/22/18 1700)     Post Anesthesia Care and Evaluation    Patient location during evaluation: PACU  Patient participation: complete - patient participated  Level of consciousness: awake and alert  Pain management: adequate  Airway patency: patent  Anesthetic complications: No anesthetic complications  PONV Status: none  Cardiovascular status: acceptable  Respiratory status: acceptable  Hydration status: acceptable    Comments: Blood pressure 145/67, pulse 90, temperature 97 °F (36.1 °C), temperature source Temporal Artery , resp. rate 16, height 167 cm (65.75\"), weight 71.1 kg (156 lb 12 oz), SpO2 93 %.    Pt discharged from PACU based on ramakrishna score >8  No anesthesia care post op    "

## 2018-10-22 NOTE — DISCHARGE INSTRUCTIONS

## 2018-10-22 NOTE — ANESTHESIA PREPROCEDURE EVALUATION
Anesthesia Evaluation     Patient summary reviewed   history of anesthetic complications (Video DL 9/2018 without difficulty): difficult airway  NPO Solid Status: > 8 hours             Airway   TM distance: >3 FB  Neck ROM: limited  Small opening  Dental      Pulmonary    (+) asthma,   (-) sleep apnea, not a smoker  Cardiovascular     ECG reviewed    (+) hypertension,   (-) pacemaker, past MI, angina, cardiac stents      Neuro/Psych  (-) seizures, TIA, CVA  GI/Hepatic/Renal/Endo    (+)  GERD,  hypothyroidism,   (-) liver disease, no renal disease, diabetes    Musculoskeletal     Abdominal    Substance History      OB/GYN          Other      history of cancer    ROS/Med Hx Other: Vocal cord paralysis                  Anesthesia Plan    ASA 3     general     intravenous induction   Anesthetic plan, all risks, benefits, and alternatives have been provided, discussed and informed consent has been obtained with: patient.

## 2018-10-22 NOTE — NURSING NOTE
Spoke with Dr Greer by phone. He has viewed CT and stated that pt can go to [facility] and be discharged. Awaiting orders to be placed in computer.Pt in [facility] 17 with PACU nurse until orders received.

## 2018-10-23 ENCOUNTER — HOSPITAL ENCOUNTER (EMERGENCY)
Facility: HOSPITAL | Age: 83
Discharge: HOME | End: 2018-10-23
Attending: NEUROLOGICAL SURGERY | Admitting: NEUROLOGICAL SURGERY

## 2018-10-23 ENCOUNTER — OFFICE VISIT (OUTPATIENT)
Dept: NEUROSURGERY | Facility: CLINIC | Age: 83
End: 2018-10-23

## 2018-10-23 VITALS
DIASTOLIC BLOOD PRESSURE: 61 MMHG | HEIGHT: 66 IN | HEART RATE: 77 BPM | OXYGEN SATURATION: 95 % | WEIGHT: 151.3 LBS | RESPIRATION RATE: 16 BRPM | TEMPERATURE: 98 F | BODY MASS INDEX: 24.32 KG/M2 | SYSTOLIC BLOOD PRESSURE: 145 MMHG

## 2018-10-23 DIAGNOSIS — Z78.9 NON-SMOKER: ICD-10-CM

## 2018-10-23 DIAGNOSIS — C83.30 DIFFUSE LARGE B-CELL LYMPHOMA, UNSPECIFIED BODY REGION (HCC): Primary | ICD-10-CM

## 2018-10-23 DIAGNOSIS — C83.31 DIFFUSE LARGE B-CELL LYMPHOMA OF LYMPH NODES OF HEAD (HCC): Primary | ICD-10-CM

## 2018-10-23 PROCEDURE — 99024 POSTOP FOLLOW-UP VISIT: CPT | Performed by: NEUROLOGICAL SURGERY

## 2018-10-23 PROCEDURE — 99283 EMERGENCY DEPT VISIT LOW MDM: CPT

## 2018-10-23 NOTE — CONSULTS
"Reason for consult  wound    Chief Complaint   Patient presents with   • BAHMAN TO SEE         HPI: 84-year-old female that had an Ommaya reservoir placed yesterday.  She presented to the office with the significant amount of bleeding from her wound.  She is sent to the ER for evaluation and more careful examination of the wound.    Review of Systems     Past Medical History:  has a past medical history of Allergic rhinitis; Arthritis; Cancer (CMS/HCC); Disease of thyroid gland; Diverticulitis; Frequent urination; Hard to intubate; Heartburn; History of breast cancer; Hypertension; Incontinence; and Osteoporosis.    Past Surgical History:  has a past surgical history that includes Thyroid surgery; Hysterectomy; Colectomy; Throat surgery; Breast surgery (Right); Cataract extraction w/  intraocular lens implant; Functional Endoscopic Sinus Surgery (FESS) (Right, 9/17/2018); and Omaya [place] (Right, 10/22/2018).    Family History: family history includes No Known Problems in her father and mother.    Social History:  reports that she has never smoked. She has never used smokeless tobacco. She reports that she does not drink alcohol or use drugs.    Allergies: Alendronate; Lisinopril; Statins; Sulfa antibiotics; and Teriparatide (recombinant)    Medications: Scheduled Meds:  Continuous Infusions:  No current facility-administered medications for this encounter.   PRN Meds:.     Objective:  Vital signs: (most recent): Blood pressure 155/60, pulse 78, temperature 98 °F (36.7 °C), resp. rate 16, height 167.6 cm (66\"), weight 68.6 kg (151 lb 4.8 oz), SpO2 96 %.    Lungs:  Normal effort.  She is not in respiratory distress.    Heart: Normal rate.  Regular rhythm.    Abdomen: Abdomen is soft and non-distended.        Neurologic Exam     Mental Status   Oriented to person, place, and time.   Attention: normal.   Speech: speech is normal   Level of consciousness: alert  Knowledge: good.     Cranial Nerves   Cranial nerves II " through XII intact.     Motor Exam   Muscle bulk: normal  Overall muscle tone: normal    Strength   Strength 5/5 throughout.     Sensory Exam   Light touch normal.   Pinprick normal.       Vital Signs  Temp:  [97 °F (36.1 °C)-98 °F (36.7 °C)] 98 °F (36.7 °C)  Heart Rate:  [73-98] 78  Resp:  [12-20] 16  BP: (142-176)/(60-94) 155/60    Physical Exam   Constitutional: She is oriented to person, place, and time. She appears well-developed and well-nourished.   Cardiovascular: Normal rate, regular rhythm and normal heart sounds.    Pulmonary/Chest: Effort normal. No respiratory distress.   Abdominal: Soft. She exhibits no distension.   Neurological: She is oriented to person, place, and time. She has normal strength.   Psychiatric: Her speech is normal.    wound with upper brisk bleeding from the medial edge.    Results Review:   I reviewed the patient's new clinical results.  I reviewed the patient's new imaging results and agree with the interpretation.  I reviewed the patient's other test results and agree with the interpretation          Lab Results (last 24 hours)     ** No results found for the last 24 hours. **          * No active hospital problems. *      Assessment/Plan:   There was a active area of bleeding from the edge of the wound.  A single figure-of-eight stitch was thrown with 4-0 nylon.  The bleeding stopped.  The wound was cleaned prepped and redressed.    I discussed the patients findings and my recommendations with patient, family and nursing staff    Graham Greer MD  10/23/18  3:43 PM    Time: More than 50% of time spent in counseling and coordination of care:  Total face-to-face/floor time 45 min.  Time spent in counseling 30 min. Counseling included the following topics: Pascale condition treatment and plan

## 2018-10-23 NOTE — PROGRESS NOTES
SUBJECTIVE:  Patient ID: Kami Villanueva is a 84 y.o. female is here today for follow-up.    Chief Complaint: Bleeding from a postoperative wound  Chief Complaint   Patient presents with   • Lymphoma     patient had Omaya reservoir placement yesterday @ [facility]; today she presents with bleeding from the surgical site.  Patient states she is complaining of N/V which is worse than it had been.       HPI  84-year-old female that underwent an Ommaya reservoir placement last night.  She was discharged home effort normal head CT.  She says that in the light she has had trouble vomiting and developed profuse bleeding from her scalp since then.  She denies any headaches no nausea no vomiting currently this morning    The following portions of the patient's history were reviewed and updated as appropriate: allergies, current medications, past family history, past medical history, past social history, past surgical history and problem list.    OBJECTIVE:    Review of Systems   All other systems reviewed and are negative.         Physical Exam   Constitutional: She is oriented to person, place, and time. She appears well-developed and well-nourished.   HENT:   Head: Normocephalic and atraumatic.   Right Ear: Hearing normal.   Left Ear: Hearing normal.   Eyes: Pupils are equal, round, and reactive to light. EOM are normal.   Neck: Normal range of motion.   Neurological: She is alert and oriented to person, place, and time. She has normal strength and normal reflexes. No cranial nerve deficit or sensory deficit. She displays a negative Romberg sign. GCS eye subscore is 4. GCS verbal subscore is 5. GCS motor subscore is 6. She displays no Babinski's sign on the right side. She displays no Babinski's sign on the left side.   Psychiatric: Her speech is normal. Judgment normal. Cognition and memory are normal.       Neurologic Exam     Mental Status   Oriented to person, place, and time.   Speech: speech is normal     Cranial Nerves      CN III, IV, VI   Pupils are equal, round, and reactive to light.  Extraocular motions are normal.     Motor Exam     Strength   Strength 5/5 throughout.    the Ommaya wound is surrounded by coagulated and matted hair I cannot inspect the wound and evaluated.    Independent Review of Radiographic Studies:       ASSESSMENT/PLAN:  Were going to send Kami to the emergency room so we can clean her scalp up and get a better look at this wound and decide how best to treat the bleeding she is having from it.      1. 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 Non-smoker    3. BMI 24.0-24.9, adult            No Follow-up on file.      Graham Greer MD

## 2018-10-24 ENCOUNTER — OFFICE VISIT (OUTPATIENT)
Dept: NEUROSURGERY | Facility: CLINIC | Age: 83
End: 2018-10-24

## 2018-10-24 ENCOUNTER — LAB REQUISITION (OUTPATIENT)
Dept: LAB | Facility: HOSPITAL | Age: 83
End: 2018-10-24

## 2018-10-24 DIAGNOSIS — C83.31 DIFFUSE LARGE B-CELL LYMPHOMA OF LYMPH NODES OF HEAD (HCC): Primary | ICD-10-CM

## 2018-10-24 DIAGNOSIS — Z00.00 ENCOUNTER FOR GENERAL ADULT MEDICAL EXAMINATION WITHOUT ABNORMAL FINDINGS: ICD-10-CM

## 2018-10-24 DIAGNOSIS — Z78.9 NON-SMOKER: ICD-10-CM

## 2018-10-24 PROCEDURE — 99024 POSTOP FOLLOW-UP VISIT: CPT | Performed by: NEUROLOGICAL SURGERY

## 2018-10-24 PROCEDURE — 88108 CYTOPATH CONCENTRATE TECH: CPT | Performed by: INTERNAL MEDICINE

## 2018-10-24 PROCEDURE — 88185 FLOWCYTOMETRY/TC ADD-ON: CPT | Performed by: INTERNAL MEDICINE

## 2018-10-24 PROCEDURE — 88184 FLOWCYTOMETRY/ TC 1 MARKER: CPT | Performed by: INTERNAL MEDICINE

## 2018-10-25 NOTE — PROGRESS NOTES
SUBJECTIVE:  Patient ID: Kami Villanueva is a 84 y.o. female is here today for follow-up.    Chief Complaint: Lymphoma  Chief Complaint   Patient presents with   • Wound Check     patient here for a wound check        HPI  84-year-old female with a Ommaya reservoir placed a few days ago.  She is here for wound check after she was seen in the emergency room for some bleeding from the wound edge.  She has no complaints.  Her dressing overnight remained intact.  She says there is no drainage that she can detect.    The following portions of the patient's history were reviewed and updated as appropriate: allergies, current medications, past family history, past medical history, past social history, past surgical history and problem list.    OBJECTIVE:    Review of Systems       Physical Exam   Constitutional: She is oriented to person, place, and time. She appears well-developed and well-nourished.   HENT:   Head: Normocephalic and atraumatic.   Right Ear: Hearing normal.   Left Ear: Hearing normal.   Eyes: Pupils are equal, round, and reactive to light. EOM are normal.   Neck: Normal range of motion.   Neurological: She is alert and oriented to person, place, and time. She has normal strength and normal reflexes. No cranial nerve deficit or sensory deficit. She displays a negative Romberg sign. GCS eye subscore is 4. GCS verbal subscore is 5. GCS motor subscore is 6. She displays no Babinski's sign on the right side. She displays no Babinski's sign on the left side.   Psychiatric: Her speech is normal. Judgment normal. Cognition and memory are normal.       Neurologic Exam     Mental Status   Oriented to person, place, and time.   Speech: speech is normal     Cranial Nerves     CN III, IV, VI   Pupils are equal, round, and reactive to light.  Extraocular motions are normal.     Motor Exam     Strength   Strength 5/5 throughout.       Independent Review of Radiographic Studies:       ASSESSMENT/PLAN:  84-year-old  female status post wound check her wound demonstrates no drainage.  It were a take the dressing off today.  She can wash her hair tomorrow.  We will see her in follow-up in 3 weeks      1. 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 Non-smoker    3. BMI 24.0-24.9, adult            Return in about 4 weeks (around 11/21/2018) for follow up w/KAYE.      Graham Greer MD

## 2018-10-26 LAB
CYTO UR: NORMAL
LAB AP CASE REPORT: NORMAL
PATH REPORT.FINAL DX SPEC: NORMAL
PATH REPORT.GROSS SPEC: NORMAL

## 2018-10-27 LAB
BACTERIA SPEC AEROBE CULT: NORMAL
GRAM STN SPEC: NORMAL

## 2018-12-05 ENCOUNTER — TRANSCRIBE ORDERS (OUTPATIENT)
Dept: ADMINISTRATIVE | Facility: HOSPITAL | Age: 83
End: 2018-12-05

## 2018-12-05 ENCOUNTER — HOSPITAL ENCOUNTER (OUTPATIENT)
Dept: CARDIOLOGY | Facility: HOSPITAL | Age: 83
Discharge: HOME | End: 2018-12-05
Attending: INTERNAL MEDICINE | Admitting: INTERNAL MEDICINE

## 2018-12-05 VITALS
WEIGHT: 150 LBS | SYSTOLIC BLOOD PRESSURE: 120 MMHG | DIASTOLIC BLOOD PRESSURE: 80 MMHG | BODY MASS INDEX: 24.11 KG/M2 | HEIGHT: 66 IN

## 2018-12-05 DIAGNOSIS — D05.11 INTRADUCTAL CARCINOMA IN SITU OF RIGHT BREAST: ICD-10-CM

## 2018-12-05 DIAGNOSIS — C83.31 DIFFUSE LARGE B-CELL LYMPHOMA, LYMPH NODES OF HEAD, FACE, AND NECK (HCC): ICD-10-CM

## 2018-12-05 DIAGNOSIS — R60.1 GENERALIZED EDEMA: Primary | ICD-10-CM

## 2018-12-05 DIAGNOSIS — R60.1 GENERALIZED EDEMA: ICD-10-CM

## 2018-12-05 LAB
BH CV ECHO MEAS - AO MAX PG (FULL): 1.1 MMHG
BH CV ECHO MEAS - AO MAX PG: 13.1 MMHG
BH CV ECHO MEAS - AO MEAN PG (FULL): 1 MMHG
BH CV ECHO MEAS - AO MEAN PG: 9 MMHG
BH CV ECHO MEAS - AO ROOT AREA (BSA CORRECTED): 1.7
BH CV ECHO MEAS - AO ROOT AREA: 7.5 CM^2
BH CV ECHO MEAS - AO ROOT DIAM: 3.1 CM
BH CV ECHO MEAS - AO V2 MAX: 181 CM/SEC
BH CV ECHO MEAS - AO V2 MEAN: 142 CM/SEC
BH CV ECHO MEAS - AO V2 VTI: 42.1 CM
BH CV ECHO MEAS - AVA(I,A): 3.6 CM^2
BH CV ECHO MEAS - AVA(I,D): 3.6 CM^2
BH CV ECHO MEAS - AVA(V,A): 3.6 CM^2
BH CV ECHO MEAS - AVA(V,D): 3.6 CM^2
BH CV ECHO MEAS - BSA(HAYCOCK): 1.8 M^2
BH CV ECHO MEAS - BSA: 1.8 M^2
BH CV ECHO MEAS - BZI_BMI: 23.5 KILOGRAMS/M^2
BH CV ECHO MEAS - BZI_METRIC_HEIGHT: 170.2 CM
BH CV ECHO MEAS - BZI_METRIC_WEIGHT: 68 KG
BH CV ECHO MEAS - EDV(CUBED): 61.6 ML
BH CV ECHO MEAS - EDV(MOD-SP4): 78.4 ML
BH CV ECHO MEAS - EDV(TEICH): 67.9 ML
BH CV ECHO MEAS - EF(CUBED): 64 %
BH CV ECHO MEAS - EF(MOD-SP4): 56.5 %
BH CV ECHO MEAS - EF(TEICH): 56.1 %
BH CV ECHO MEAS - ESV(CUBED): 22.2 ML
BH CV ECHO MEAS - ESV(MOD-SP4): 34.1 ML
BH CV ECHO MEAS - ESV(TEICH): 29.8 ML
BH CV ECHO MEAS - FS: 28.9 %
BH CV ECHO MEAS - IVS/LVPW: 0.89
BH CV ECHO MEAS - IVSD: 0.89 CM
BH CV ECHO MEAS - LA DIMENSION: 2.6 CM
BH CV ECHO MEAS - LA/AO: 0.84
BH CV ECHO MEAS - LAT PEAK E' VEL: 7.8 CM/SEC
BH CV ECHO MEAS - LV DIASTOLIC VOL/BSA (35-75): 43.8 ML/M^2
BH CV ECHO MEAS - LV MASS(C)D: 114.6 GRAMS
BH CV ECHO MEAS - LV MASS(C)DI: 64.1 GRAMS/M^2
BH CV ECHO MEAS - LV MAX PG: 12 MMHG
BH CV ECHO MEAS - LV MEAN PG: 8 MMHG
BH CV ECHO MEAS - LV SYSTOLIC VOL/BSA (12-30): 19.1 ML/M^2
BH CV ECHO MEAS - LV V1 MAX: 173 CM/SEC
BH CV ECHO MEAS - LV V1 MEAN: 136 CM/SEC
BH CV ECHO MEAS - LV V1 VTI: 39.6 CM
BH CV ECHO MEAS - LVIDD: 4 CM
BH CV ECHO MEAS - LVIDS: 2.8 CM
BH CV ECHO MEAS - LVLD AP4: 7.6 CM
BH CV ECHO MEAS - LVLS AP4: 6.5 CM
BH CV ECHO MEAS - LVOT AREA (M): 3.8 CM^2
BH CV ECHO MEAS - LVOT AREA: 3.8 CM^2
BH CV ECHO MEAS - LVOT DIAM: 2.2 CM
BH CV ECHO MEAS - LVPWD: 1 CM
BH CV ECHO MEAS - MED PEAK E' VEL: 6.6 CM/SEC
BH CV ECHO MEAS - MV A MAX VEL: 154 CM/SEC
BH CV ECHO MEAS - MV DEC TIME: 0.2 SEC
BH CV ECHO MEAS - MV E MAX VEL: 96.1 CM/SEC
BH CV ECHO MEAS - MV E/A: 0.62
BH CV ECHO MEAS - RAP SYSTOLE: 10 MMHG
BH CV ECHO MEAS - RVSP: 35.4 MMHG
BH CV ECHO MEAS - SI(AO): 177.6 ML/M^2
BH CV ECHO MEAS - SI(CUBED): 22 ML/M^2
BH CV ECHO MEAS - SI(LVOT): 84.1 ML/M^2
BH CV ECHO MEAS - SI(MOD-SP4): 24.8 ML/M^2
BH CV ECHO MEAS - SI(TEICH): 21.3 ML/M^2
BH CV ECHO MEAS - SV(AO): 317.8 ML
BH CV ECHO MEAS - SV(CUBED): 39.4 ML
BH CV ECHO MEAS - SV(LVOT): 150.5 ML
BH CV ECHO MEAS - SV(MOD-SP4): 44.3 ML
BH CV ECHO MEAS - SV(TEICH): 38.1 ML
BH CV ECHO MEAS - TR MAX VEL: 252 CM/SEC
BH CV ECHO MEASUREMENTS AVERAGE E/E' RATIO: 13.35
LEFT ATRIUM VOLUME INDEX: 22.9 ML/M2
LEFT ATRIUM VOLUME: 41 CM3
MAXIMAL PREDICTED HEART RATE: 136 BPM
STRESS TARGET HR: 116 BPM

## 2018-12-05 PROCEDURE — 93306 TTE W/DOPPLER COMPLETE: CPT | Performed by: INTERNAL MEDICINE

## 2018-12-05 PROCEDURE — 25010000002 PERFLUTREN 6.52 MG/ML SUSPENSION: Performed by: INTERNAL MEDICINE

## 2018-12-05 PROCEDURE — 93306 TTE W/DOPPLER COMPLETE: CPT

## 2018-12-05 RX ADMIN — PERFLUTREN 8.48 MG: 6.52 INJECTION, SUSPENSION INTRAVENOUS at 14:05

## 2018-12-19 ENCOUNTER — TELEPHONE (OUTPATIENT)
Dept: NEUROSURGERY | Facility: CLINIC | Age: 83
End: 2018-12-19

## 2018-12-19 NOTE — TELEPHONE ENCOUNTER
The family of Kami has called the office asking if the appointment for a follow up on 12/27/18 could any way be worked in on 12/26/18, she will be in [place] at Dr. Osborn's office for a chemo treatment on the 26th around 1:00, they live an hour and a half away from [place] and they cannot bring her to [place] 2 days in a row because she is very sick.  I checked the schedule and there is nothing.... But I understand.  Also they said if this could not be worked out could they just put this follow up out for some other time, they were just not sure how important this follow up is.    Please call the daughter of Kami at  [phone] to let her know.

## 2018-12-20 NOTE — TELEPHONE ENCOUNTER
Spoke with patient's daughter and explained we will cancel her return appointment, but they can call with any problems or issues and we will be glad to see her again.  They understood, so appointment will be canceled.

## 2018-12-21 ENCOUNTER — HOSPITAL ENCOUNTER (INPATIENT)
Facility: HOSPITAL | Age: 83
LOS: 3 days | Discharge: HOME | End: 2018-12-24
Attending: EMERGENCY MEDICINE | Admitting: INTERNAL MEDICINE

## 2018-12-21 ENCOUNTER — APPOINTMENT (OUTPATIENT)
Dept: GENERAL RADIOLOGY | Facility: HOSPITAL | Age: 83
End: 2018-12-21

## 2018-12-21 DIAGNOSIS — R79.89 BLOOD CULTURE POSITIVE FOR MICROORGANISM: Primary | ICD-10-CM

## 2018-12-21 PROBLEM — T45.1X5A PANCYTOPENIA DUE TO ANTINEOPLASTIC CHEMOTHERAPY (HCC): Status: ACTIVE | Noted: 2018-12-21

## 2018-12-21 PROBLEM — D61.810 PANCYTOPENIA DUE TO ANTINEOPLASTIC CHEMOTHERAPY: Status: ACTIVE | Noted: 2018-12-21

## 2018-12-21 PROBLEM — D70.9 NEUTROPENIA (HCC): Status: ACTIVE | Noted: 2018-12-21

## 2018-12-21 PROBLEM — R78.81 GRAM-NEGATIVE BACTEREMIA: Status: ACTIVE | Noted: 2018-12-21

## 2018-12-21 PROBLEM — E87.6 HYPOKALEMIA: Status: ACTIVE | Noted: 2018-12-21

## 2018-12-21 LAB
ABO GROUP BLD: NORMAL
ALBUMIN SERPL-MCNC: 3.3 G/DL (ref 3.5–5)
ALBUMIN/GLOB SERPL: 1.3 G/DL (ref 1.1–2.5)
ALP SERPL-CCNC: 50 U/L (ref 24–120)
ALT SERPL W P-5'-P-CCNC: 46 U/L (ref 0–54)
ANION GAP SERPL CALCULATED.3IONS-SCNC: 8 MMOL/L (ref 4–13)
ANISOCYTOSIS BLD QL: ABNORMAL
AST SERPL-CCNC: 23 U/L (ref 7–45)
BACTERIA UR QL AUTO: ABNORMAL /HPF
BASOPHILS # BLD MANUAL: 0.02 10*3/MM3 (ref 0–0.2)
BASOPHILS NFR BLD AUTO: 3.2 % (ref 0–2)
BILIRUB SERPL-MCNC: 1 MG/DL (ref 0.1–1)
BILIRUB UR QL STRIP: NEGATIVE
BLD GP AB SCN SERPL QL: NEGATIVE
BUN BLD-MCNC: 19 MG/DL (ref 5–21)
BUN/CREAT SERPL: 31.7 (ref 7–25)
CALCIUM SPEC-SCNC: 8.7 MG/DL (ref 8.4–10.4)
CHLORIDE SERPL-SCNC: 89 MMOL/L (ref 98–110)
CLARITY UR: ABNORMAL
CO2 SERPL-SCNC: 36 MMOL/L (ref 24–31)
COLOR UR: ABNORMAL
CREAT BLD-MCNC: 0.6 MG/DL (ref 0.5–1.4)
D-LACTATE SERPL-SCNC: 1.1 MMOL/L (ref 0.5–2)
DEPRECATED RDW RBC AUTO: 45.3 FL (ref 40–54)
EOSINOPHIL # BLD MANUAL: 0.08 10*3/MM3 (ref 0–0.7)
EOSINOPHIL NFR BLD MANUAL: 14 % (ref 0–4)
ERYTHROCYTE [DISTWIDTH] IN BLOOD BY AUTOMATED COUNT: 15.7 % (ref 12–15)
GFR SERPL CREATININE-BSD FRML MDRD: 95 ML/MIN/1.73
GIANT PLATELETS: ABNORMAL
GLOBULIN UR ELPH-MCNC: 2.5 GM/DL
GLUCOSE BLD-MCNC: 106 MG/DL (ref 70–100)
GLUCOSE UR STRIP-MCNC: NEGATIVE MG/DL
HCT VFR BLD AUTO: 23.1 % (ref 37–47)
HGB BLD-MCNC: 7.8 G/DL (ref 12–16)
HGB UR QL STRIP.AUTO: NEGATIVE
HYALINE CASTS UR QL AUTO: ABNORMAL /LPF
KETONES UR QL STRIP: NEGATIVE
LEUKOCYTE ESTERASE UR QL STRIP.AUTO: ABNORMAL
LYMPHOCYTES # BLD MANUAL: 0.16 10*3/MM3 (ref 0.8–7)
LYMPHOCYTES NFR BLD MANUAL: 11.8 % (ref 4–12)
LYMPHOCYTES NFR BLD MANUAL: 29 % (ref 15–45)
MCH RBC QN AUTO: 27 PG (ref 28–32)
MCHC RBC AUTO-ENTMCNC: 33.8 G/DL (ref 33–36)
MCV RBC AUTO: 79.9 FL (ref 82–98)
MONOCYTES # BLD AUTO: 0.06 10*3/MM3 (ref 0–1)
NEUTROPHILS # BLD AUTO: 0.19 10*3/MM3 (ref 1–11)
NEUTROPHILS NFR BLD MANUAL: 33.3 % (ref 39–78)
NEUTS BAND NFR BLD MANUAL: 2.2 % (ref 0–10)
NITRITE UR QL STRIP: POSITIVE
PH UR STRIP.AUTO: 6.5 [PH] (ref 5–8)
PLATELET # BLD AUTO: 54 10*3/MM3 (ref 130–400)
PMV BLD AUTO: 12.3 FL (ref 6–12)
POIKILOCYTOSIS BLD QL SMEAR: ABNORMAL
POTASSIUM BLD-SCNC: 2.5 MMOL/L (ref 3.5–5.3)
PROT SERPL-MCNC: 5.8 G/DL (ref 6.3–8.7)
PROT UR QL STRIP: ABNORMAL
RBC # BLD AUTO: 2.89 10*6/MM3 (ref 4.2–5.4)
RBC # UR: ABNORMAL /HPF
REF LAB TEST METHOD: ABNORMAL
RH BLD: POSITIVE
SMALL PLATELETS BLD QL SMEAR: ABNORMAL
SODIUM BLD-SCNC: 133 MMOL/L (ref 135–145)
SP GR UR STRIP: 1.01 (ref 1–1.03)
SQUAMOUS #/AREA URNS HPF: ABNORMAL /HPF
T&S EXPIRATION DATE: NORMAL
TOXIC GRANULATION: ABNORMAL
UROBILINOGEN UR QL STRIP: ABNORMAL
VARIANT LYMPHS NFR BLD MANUAL: 6.5 % (ref 0–5)
WBC NRBC COR # BLD: 0.54 10*3/MM3 (ref 4.8–10.8)
WBC UR QL AUTO: ABNORMAL /HPF

## 2018-12-21 PROCEDURE — 85007 BL SMEAR W/DIFF WBC COUNT: CPT | Performed by: EMERGENCY MEDICINE

## 2018-12-21 PROCEDURE — 25010000002 LEVOFLOXACIN PER 250 MG: Performed by: INTERNAL MEDICINE

## 2018-12-21 PROCEDURE — 25010000002 HYDROCORTISONE SODIUM SUCCINATE 100 MG RECONSTITUTED SOLUTION: Performed by: PHYSICIAN ASSISTANT

## 2018-12-21 PROCEDURE — 36430 TRANSFUSION BLD/BLD COMPNT: CPT

## 2018-12-21 PROCEDURE — 25010000002 FILGRASTIM PER 480 MCG: Performed by: PHYSICIAN ASSISTANT

## 2018-12-21 PROCEDURE — 87040 BLOOD CULTURE FOR BACTERIA: CPT | Performed by: EMERGENCY MEDICINE

## 2018-12-21 PROCEDURE — 83605 ASSAY OF LACTIC ACID: CPT | Performed by: EMERGENCY MEDICINE

## 2018-12-21 PROCEDURE — 81001 URINALYSIS AUTO W/SCOPE: CPT | Performed by: EMERGENCY MEDICINE

## 2018-12-21 PROCEDURE — 86900 BLOOD TYPING SEROLOGIC ABO: CPT | Performed by: PHYSICIAN ASSISTANT

## 2018-12-21 PROCEDURE — P9016 RBC LEUKOCYTES REDUCED: HCPCS

## 2018-12-21 PROCEDURE — 87186 SC STD MICRODIL/AGAR DIL: CPT | Performed by: EMERGENCY MEDICINE

## 2018-12-21 PROCEDURE — 85025 COMPLETE CBC W/AUTO DIFF WBC: CPT | Performed by: EMERGENCY MEDICINE

## 2018-12-21 PROCEDURE — 87086 URINE CULTURE/COLONY COUNT: CPT | Performed by: EMERGENCY MEDICINE

## 2018-12-21 PROCEDURE — 71045 X-RAY EXAM CHEST 1 VIEW: CPT

## 2018-12-21 PROCEDURE — 63710000001 DIPHENHYDRAMINE PER 50 MG: Performed by: PHYSICIAN ASSISTANT

## 2018-12-21 PROCEDURE — 87077 CULTURE AEROBIC IDENTIFY: CPT | Performed by: EMERGENCY MEDICINE

## 2018-12-21 PROCEDURE — 25010000002 POTASSIUM CHLORIDE PER 2 MEQ: Performed by: EMERGENCY MEDICINE

## 2018-12-21 PROCEDURE — 86901 BLOOD TYPING SEROLOGIC RH(D): CPT | Performed by: PHYSICIAN ASSISTANT

## 2018-12-21 PROCEDURE — 86923 COMPATIBILITY TEST ELECTRIC: CPT

## 2018-12-21 PROCEDURE — 99284 EMERGENCY DEPT VISIT MOD MDM: CPT

## 2018-12-21 PROCEDURE — 25010000002 FUROSEMIDE PER 20 MG: Performed by: PHYSICIAN ASSISTANT

## 2018-12-21 PROCEDURE — 25810000003 SODIUM CHLORIDE 0.9 % WITH KCL 20 MEQ 20-0.9 MEQ/L-% SOLUTION: Performed by: INTERNAL MEDICINE

## 2018-12-21 PROCEDURE — 86900 BLOOD TYPING SEROLOGIC ABO: CPT

## 2018-12-21 PROCEDURE — 86850 RBC ANTIBODY SCREEN: CPT | Performed by: PHYSICIAN ASSISTANT

## 2018-12-21 PROCEDURE — 25010000002 PIPERACILLIN SOD-TAZOBACTAM PER 1 G: Performed by: PHYSICIAN ASSISTANT

## 2018-12-21 PROCEDURE — 80053 COMPREHEN METABOLIC PANEL: CPT | Performed by: EMERGENCY MEDICINE

## 2018-12-21 RX ORDER — ONDANSETRON 2 MG/ML
4 INJECTION INTRAMUSCULAR; INTRAVENOUS EVERY 6 HOURS PRN
Status: DISCONTINUED | OUTPATIENT
Start: 2018-12-21 | End: 2018-12-24 | Stop reason: HOSPADM

## 2018-12-21 RX ORDER — ACETAMINOPHEN 325 MG/1
650 TABLET ORAL ONCE
Status: COMPLETED | OUTPATIENT
Start: 2018-12-21 | End: 2018-12-21

## 2018-12-21 RX ORDER — POTASSIUM CHLORIDE 14.9 MG/ML
20 INJECTION INTRAVENOUS ONCE
Status: COMPLETED | OUTPATIENT
Start: 2018-12-21 | End: 2018-12-21

## 2018-12-21 RX ORDER — LEVOTHYROXINE SODIUM 0.12 MG/1
125 TABLET ORAL
Status: DISCONTINUED | OUTPATIENT
Start: 2018-12-22 | End: 2018-12-24 | Stop reason: HOSPADM

## 2018-12-21 RX ORDER — PANTOPRAZOLE SODIUM 40 MG/1
40 TABLET, DELAYED RELEASE ORAL EVERY MORNING
Status: DISCONTINUED | OUTPATIENT
Start: 2018-12-22 | End: 2018-12-24 | Stop reason: HOSPADM

## 2018-12-21 RX ORDER — FUROSEMIDE 10 MG/ML
20 INJECTION INTRAMUSCULAR; INTRAVENOUS ONCE
Status: COMPLETED | OUTPATIENT
Start: 2018-12-21 | End: 2018-12-21

## 2018-12-21 RX ORDER — SODIUM CHLORIDE 0.9 % (FLUSH) 0.9 %
10 SYRINGE (ML) INJECTION AS NEEDED
Status: DISCONTINUED | OUTPATIENT
Start: 2018-12-21 | End: 2018-12-24 | Stop reason: HOSPADM

## 2018-12-21 RX ORDER — MULTIVIT,CALC,MINS/IRON/FOLIC 9MG-400MCG
1 TABLET ORAL DAILY
Status: DISCONTINUED | OUTPATIENT
Start: 2018-12-21 | End: 2018-12-24 | Stop reason: HOSPADM

## 2018-12-21 RX ORDER — DIPHENHYDRAMINE HCL 25 MG
25 CAPSULE ORAL ONCE
Status: COMPLETED | OUTPATIENT
Start: 2018-12-21 | End: 2018-12-21

## 2018-12-21 RX ORDER — ONDANSETRON 4 MG/1
8 TABLET, FILM COATED ORAL AS NEEDED
Status: DISCONTINUED | OUTPATIENT
Start: 2018-12-21 | End: 2018-12-24 | Stop reason: HOSPADM

## 2018-12-21 RX ORDER — SODIUM CHLORIDE AND POTASSIUM CHLORIDE 150; 900 MG/100ML; MG/100ML
75 INJECTION, SOLUTION INTRAVENOUS CONTINUOUS
Status: DISCONTINUED | OUTPATIENT
Start: 2018-12-21 | End: 2018-12-24 | Stop reason: HOSPADM

## 2018-12-21 RX ORDER — LEVOFLOXACIN 5 MG/ML
500 INJECTION, SOLUTION INTRAVENOUS EVERY 24 HOURS
Status: DISCONTINUED | OUTPATIENT
Start: 2018-12-21 | End: 2018-12-23

## 2018-12-21 RX ORDER — POTASSIUM CHLORIDE 750 MG/1
40 CAPSULE, EXTENDED RELEASE ORAL
Status: COMPLETED | OUTPATIENT
Start: 2018-12-21 | End: 2018-12-21

## 2018-12-21 RX ORDER — LEVOFLOXACIN 5 MG/ML
500 INJECTION, SOLUTION INTRAVENOUS ONCE
Status: DISCONTINUED | OUTPATIENT
Start: 2018-12-21 | End: 2018-12-21 | Stop reason: ALTCHOICE

## 2018-12-21 RX ADMIN — FUROSEMIDE 20 MG: 10 INJECTION, SOLUTION INTRAVENOUS at 23:12

## 2018-12-21 RX ADMIN — POTASSIUM CHLORIDE 40 MEQ: 750 CAPSULE, EXTENDED RELEASE ORAL at 17:37

## 2018-12-21 RX ADMIN — POTASSIUM CHLORIDE 40 MEQ: 750 CAPSULE, EXTENDED RELEASE ORAL at 23:12

## 2018-12-21 RX ADMIN — ACETAMINOPHEN 650 MG: 325 TABLET, FILM COATED ORAL at 20:01

## 2018-12-21 RX ADMIN — POTASSIUM CHLORIDE 20 MEQ: 14.9 INJECTION, SOLUTION INTRAVENOUS at 16:45

## 2018-12-21 RX ADMIN — DIPHENHYDRAMINE HYDROCHLORIDE 25 MG: 25 CAPSULE ORAL at 20:01

## 2018-12-21 RX ADMIN — LEVOFLOXACIN 500 MG: 5 INJECTION, SOLUTION INTRAVENOUS at 17:33

## 2018-12-21 RX ADMIN — Medication 1 TABLET: at 17:34

## 2018-12-21 RX ADMIN — TAZOBACTAM SODIUM AND PIPERACILLIN SODIUM 3.38 G: 375; 3 INJECTION, SOLUTION INTRAVENOUS at 23:13

## 2018-12-21 RX ADMIN — POTASSIUM CHLORIDE AND SODIUM CHLORIDE 75 ML/HR: 900; 150 INJECTION, SOLUTION INTRAVENOUS at 17:33

## 2018-12-21 RX ADMIN — TAZOBACTAM SODIUM AND PIPERACILLIN SODIUM 3.38 G: 375; 3 INJECTION, SOLUTION INTRAVENOUS at 16:44

## 2018-12-21 RX ADMIN — FILGRASTIM 480 MCG: 480 INJECTION, SOLUTION INTRAVENOUS; SUBCUTANEOUS at 16:45

## 2018-12-21 RX ADMIN — HYDROCORTISONE SODIUM SUCCINATE 100 MG: 100 INJECTION, POWDER, FOR SOLUTION INTRAMUSCULAR; INTRAVENOUS at 20:01

## 2018-12-22 LAB
ALBUMIN SERPL-MCNC: 2.8 G/DL (ref 3.5–5)
ALBUMIN/GLOB SERPL: 1.3 G/DL (ref 1.1–2.5)
ALP SERPL-CCNC: 45 U/L (ref 24–120)
ALT SERPL W P-5'-P-CCNC: 37 U/L (ref 0–54)
ANION GAP SERPL CALCULATED.3IONS-SCNC: 8 MMOL/L (ref 4–13)
ANISOCYTOSIS BLD QL: ABNORMAL
AST SERPL-CCNC: 15 U/L (ref 7–45)
BILIRUB SERPL-MCNC: 1.1 MG/DL (ref 0.1–1)
BUN BLD-MCNC: 16 MG/DL (ref 5–21)
BUN/CREAT SERPL: 23.9 (ref 7–25)
CALCIUM SPEC-SCNC: 8.2 MG/DL (ref 8.4–10.4)
CHLORIDE SERPL-SCNC: 93 MMOL/L (ref 98–110)
CO2 SERPL-SCNC: 34 MMOL/L (ref 24–31)
CREAT BLD-MCNC: 0.67 MG/DL (ref 0.5–1.4)
DEPRECATED RDW RBC AUTO: 44.2 FL (ref 40–54)
EOSINOPHIL # BLD MANUAL: 0.07 10*3/MM3 (ref 0–0.7)
EOSINOPHIL NFR BLD MANUAL: 3 % (ref 0–4)
ERYTHROCYTE [DISTWIDTH] IN BLOOD BY AUTOMATED COUNT: 14.9 % (ref 12–15)
GFR SERPL CREATININE-BSD FRML MDRD: 84 ML/MIN/1.73
GIANT PLATELETS: ABNORMAL
GLOBULIN UR ELPH-MCNC: 2.1 GM/DL
GLUCOSE BLD-MCNC: 112 MG/DL (ref 70–100)
HCT VFR BLD AUTO: 29.2 % (ref 37–47)
HGB BLD-MCNC: 10.2 G/DL (ref 12–16)
HYPOCHROMIA BLD QL: ABNORMAL
LYMPHOCYTES # BLD MANUAL: 0.15 10*3/MM3 (ref 0.72–4.86)
LYMPHOCYTES NFR BLD MANUAL: 7 % (ref 15–45)
LYMPHOCYTES NFR BLD MANUAL: 8 % (ref 4–12)
MAGNESIUM SERPL-MCNC: 1.6 MG/DL (ref 1.4–2.2)
MCH RBC QN AUTO: 28.8 PG (ref 28–32)
MCHC RBC AUTO-ENTMCNC: 34.9 G/DL (ref 33–36)
MCV RBC AUTO: 82.5 FL (ref 82–98)
MONOCYTES # BLD AUTO: 0.18 10*3/MM3 (ref 0.19–1.3)
NEUTROPHILS # BLD AUTO: 1.78 10*3/MM3 (ref 1.87–8.4)
NEUTROPHILS NFR BLD MANUAL: 64 % (ref 39–78)
NEUTS BAND NFR BLD MANUAL: 17 % (ref 0–10)
PLATELET # BLD AUTO: 49 10*3/MM3 (ref 130–400)
PMV BLD AUTO: 12.1 FL (ref 6–12)
POIKILOCYTOSIS BLD QL SMEAR: ABNORMAL
POTASSIUM BLD-SCNC: 3.3 MMOL/L (ref 3.5–5.3)
PROT SERPL-MCNC: 4.9 G/DL (ref 6.3–8.7)
RBC # BLD AUTO: 3.54 10*6/MM3 (ref 4.2–5.4)
SMALL PLATELETS BLD QL SMEAR: ABNORMAL
SODIUM BLD-SCNC: 135 MMOL/L (ref 135–145)
VARIANT LYMPHS NFR BLD MANUAL: 1 % (ref 0–5)
WBC MORPH BLD: NORMAL
WBC NRBC COR # BLD: 2.2 10*3/MM3 (ref 4.8–10.8)

## 2018-12-22 PROCEDURE — 36430 TRANSFUSION BLD/BLD COMPNT: CPT

## 2018-12-22 PROCEDURE — 99232 SBSQ HOSP IP/OBS MODERATE 35: CPT | Performed by: INTERNAL MEDICINE

## 2018-12-22 PROCEDURE — 85025 COMPLETE CBC W/AUTO DIFF WBC: CPT | Performed by: PHYSICIAN ASSISTANT

## 2018-12-22 PROCEDURE — 25010000002 PIPERACILLIN SOD-TAZOBACTAM PER 1 G: Performed by: PHYSICIAN ASSISTANT

## 2018-12-22 PROCEDURE — 80053 COMPREHEN METABOLIC PANEL: CPT | Performed by: INTERNAL MEDICINE

## 2018-12-22 PROCEDURE — P9016 RBC LEUKOCYTES REDUCED: HCPCS

## 2018-12-22 PROCEDURE — 25010000002 LEVOFLOXACIN PER 250 MG: Performed by: INTERNAL MEDICINE

## 2018-12-22 PROCEDURE — 25810000003 SODIUM CHLORIDE 0.9 % WITH KCL 20 MEQ 20-0.9 MEQ/L-% SOLUTION: Performed by: INTERNAL MEDICINE

## 2018-12-22 PROCEDURE — 86900 BLOOD TYPING SEROLOGIC ABO: CPT

## 2018-12-22 PROCEDURE — 25010000002 FILGRASTIM PER 480 MCG: Performed by: PHYSICIAN ASSISTANT

## 2018-12-22 PROCEDURE — 83735 ASSAY OF MAGNESIUM: CPT | Performed by: INTERNAL MEDICINE

## 2018-12-22 PROCEDURE — 85007 BL SMEAR W/DIFF WBC COUNT: CPT | Performed by: PHYSICIAN ASSISTANT

## 2018-12-22 RX ORDER — POTASSIUM CHLORIDE 750 MG/1
20 CAPSULE, EXTENDED RELEASE ORAL 2 TIMES DAILY WITH MEALS
Status: DISCONTINUED | OUTPATIENT
Start: 2018-12-22 | End: 2018-12-22

## 2018-12-22 RX ORDER — POTASSIUM CHLORIDE 750 MG/1
20 CAPSULE, EXTENDED RELEASE ORAL 2 TIMES DAILY WITH MEALS
Status: DISCONTINUED | OUTPATIENT
Start: 2018-12-22 | End: 2018-12-24 | Stop reason: HOSPADM

## 2018-12-22 RX ADMIN — TAZOBACTAM SODIUM AND PIPERACILLIN SODIUM 3.38 G: 375; 3 INJECTION, SOLUTION INTRAVENOUS at 05:59

## 2018-12-22 RX ADMIN — PANTOPRAZOLE SODIUM 40 MG: 40 TABLET, DELAYED RELEASE ORAL at 06:07

## 2018-12-22 RX ADMIN — FILGRASTIM 480 MCG: 480 INJECTION, SOLUTION INTRAVENOUS; SUBCUTANEOUS at 08:58

## 2018-12-22 RX ADMIN — LEVOTHYROXINE SODIUM 125 MCG: 0.12 TABLET ORAL at 05:59

## 2018-12-22 RX ADMIN — TAZOBACTAM SODIUM AND PIPERACILLIN SODIUM 3.38 G: 375; 3 INJECTION, SOLUTION INTRAVENOUS at 23:11

## 2018-12-22 RX ADMIN — TAZOBACTAM SODIUM AND PIPERACILLIN SODIUM 3.38 G: 375; 3 INJECTION, SOLUTION INTRAVENOUS at 13:48

## 2018-12-22 RX ADMIN — POTASSIUM CHLORIDE 20 MEQ: 750 CAPSULE, EXTENDED RELEASE ORAL at 14:34

## 2018-12-22 RX ADMIN — LEVOFLOXACIN 500 MG: 5 INJECTION, SOLUTION INTRAVENOUS at 16:48

## 2018-12-22 RX ADMIN — POTASSIUM CHLORIDE AND SODIUM CHLORIDE 75 ML/HR: 900; 150 INJECTION, SOLUTION INTRAVENOUS at 16:47

## 2018-12-23 LAB
ABO + RH BLD: NORMAL
ABO + RH BLD: NORMAL
ANION GAP SERPL CALCULATED.3IONS-SCNC: 9 MMOL/L (ref 4–13)
ANISOCYTOSIS BLD QL: ABNORMAL
BACTERIA SPEC AEROBE CULT: ABNORMAL
BH BB BLOOD EXPIRATION DATE: NORMAL
BH BB BLOOD EXPIRATION DATE: NORMAL
BH BB BLOOD TYPE BARCODE: 6200
BH BB BLOOD TYPE BARCODE: 6200
BH BB DISPENSE STATUS: NORMAL
BH BB DISPENSE STATUS: NORMAL
BH BB PRODUCT CODE: NORMAL
BH BB PRODUCT CODE: NORMAL
BH BB UNIT NUMBER: NORMAL
BH BB UNIT NUMBER: NORMAL
BUN BLD-MCNC: 12 MG/DL (ref 5–21)
BUN/CREAT SERPL: 18.8 (ref 7–25)
CALCIUM SPEC-SCNC: 8 MG/DL (ref 8.4–10.4)
CHLORIDE SERPL-SCNC: 101 MMOL/L (ref 98–110)
CO2 SERPL-SCNC: 27 MMOL/L (ref 24–31)
CREAT BLD-MCNC: 0.64 MG/DL (ref 0.5–1.4)
DEPRECATED RDW RBC AUTO: 48.1 FL (ref 40–54)
EOSINOPHIL # BLD MANUAL: 0.29 10*3/MM3 (ref 0–0.7)
EOSINOPHIL NFR BLD MANUAL: 3 % (ref 0–4)
ERYTHROCYTE [DISTWIDTH] IN BLOOD BY AUTOMATED COUNT: 16.2 % (ref 12–15)
GFR SERPL CREATININE-BSD FRML MDRD: 88 ML/MIN/1.73
GIANT PLATELETS: ABNORMAL
GLUCOSE BLD-MCNC: 94 MG/DL (ref 70–100)
HCT VFR BLD AUTO: 32.7 % (ref 37–47)
HGB BLD-MCNC: 10.9 G/DL (ref 12–16)
LYMPHOCYTES # BLD MANUAL: 0.2 10*3/MM3 (ref 0.72–4.86)
LYMPHOCYTES NFR BLD MANUAL: 2 % (ref 15–45)
LYMPHOCYTES NFR BLD MANUAL: 6.1 % (ref 4–12)
MAGNESIUM SERPL-MCNC: 1.4 MG/DL (ref 1.4–2.2)
MCH RBC QN AUTO: 28.4 PG (ref 28–32)
MCHC RBC AUTO-ENTMCNC: 33.3 G/DL (ref 33–36)
MCV RBC AUTO: 85.2 FL (ref 82–98)
METAMYELOCYTES NFR BLD MANUAL: 3 % (ref 0–0)
MONOCYTES # BLD AUTO: 0.6 10*3/MM3 (ref 0.19–1.3)
MYELOCYTES NFR BLD MANUAL: 2 % (ref 0–0)
NEUTROPHILS # BLD AUTO: 7.92 10*3/MM3 (ref 1.87–8.4)
NEUTROPHILS NFR BLD MANUAL: 66.7 % (ref 39–78)
NEUTS BAND NFR BLD MANUAL: 14.1 % (ref 0–10)
PLATELET # BLD AUTO: 68 10*3/MM3 (ref 130–400)
PMV BLD AUTO: 12.2 FL (ref 6–12)
POIKILOCYTOSIS BLD QL SMEAR: ABNORMAL
POLYCHROMASIA BLD QL SMEAR: ABNORMAL
POTASSIUM BLD-SCNC: 4.1 MMOL/L (ref 3.5–5.3)
PROMYELOCYTES NFR BLD MANUAL: 3 % (ref 0–0)
RBC # BLD AUTO: 3.84 10*6/MM3 (ref 4.2–5.4)
SMALL PLATELETS BLD QL SMEAR: ABNORMAL
SODIUM BLD-SCNC: 137 MMOL/L (ref 135–145)
UNIT  ABO: NORMAL
UNIT  ABO: NORMAL
UNIT  RH: NORMAL
UNIT  RH: NORMAL
WBC MORPH BLD: NORMAL
WBC NRBC COR # BLD: 9.8 10*3/MM3 (ref 4.8–10.8)

## 2018-12-23 PROCEDURE — 85025 COMPLETE CBC W/AUTO DIFF WBC: CPT | Performed by: PHYSICIAN ASSISTANT

## 2018-12-23 PROCEDURE — 99232 SBSQ HOSP IP/OBS MODERATE 35: CPT | Performed by: INTERNAL MEDICINE

## 2018-12-23 PROCEDURE — 25810000003 SODIUM CHLORIDE 0.9 % WITH KCL 20 MEQ 20-0.9 MEQ/L-% SOLUTION: Performed by: INTERNAL MEDICINE

## 2018-12-23 PROCEDURE — 25010000002 CEFTRIAXONE PER 250 MG: Performed by: FAMILY MEDICINE

## 2018-12-23 PROCEDURE — 25010000002 PIPERACILLIN SOD-TAZOBACTAM PER 1 G: Performed by: PHYSICIAN ASSISTANT

## 2018-12-23 PROCEDURE — 85007 BL SMEAR W/DIFF WBC COUNT: CPT | Performed by: PHYSICIAN ASSISTANT

## 2018-12-23 PROCEDURE — 87040 BLOOD CULTURE FOR BACTERIA: CPT | Performed by: INTERNAL MEDICINE

## 2018-12-23 PROCEDURE — 83735 ASSAY OF MAGNESIUM: CPT | Performed by: FAMILY MEDICINE

## 2018-12-23 PROCEDURE — 99223 1ST HOSP IP/OBS HIGH 75: CPT | Performed by: INTERNAL MEDICINE

## 2018-12-23 PROCEDURE — 80048 BASIC METABOLIC PNL TOTAL CA: CPT | Performed by: FAMILY MEDICINE

## 2018-12-23 RX ORDER — NITROFURANTOIN 25; 75 MG/1; MG/1
100 CAPSULE ORAL 2 TIMES DAILY
Qty: 14 CAPSULE | Refills: 0 | Status: ON HOLD | OUTPATIENT
Start: 2018-12-23 | End: 2019-02-28

## 2018-12-23 RX ORDER — CARVEDILOL 3.12 MG/1
3.12 TABLET ORAL 2 TIMES DAILY
Qty: 60 TABLET | Refills: 11 | Status: SHIPPED | OUTPATIENT
Start: 2018-12-23

## 2018-12-23 RX ADMIN — TAZOBACTAM SODIUM AND PIPERACILLIN SODIUM 3.38 G: 375; 3 INJECTION, SOLUTION INTRAVENOUS at 06:48

## 2018-12-23 RX ADMIN — POTASSIUM CHLORIDE 20 MEQ: 750 CAPSULE, EXTENDED RELEASE ORAL at 09:33

## 2018-12-23 RX ADMIN — Medication 1 TABLET: at 09:33

## 2018-12-23 RX ADMIN — LEVOTHYROXINE SODIUM 125 MCG: 0.12 TABLET ORAL at 06:48

## 2018-12-23 RX ADMIN — POTASSIUM CHLORIDE 20 MEQ: 750 CAPSULE, EXTENDED RELEASE ORAL at 17:25

## 2018-12-23 RX ADMIN — CEFTRIAXONE SODIUM 1 G: 1 INJECTION, POWDER, FOR SOLUTION INTRAMUSCULAR; INTRAVENOUS at 14:17

## 2018-12-23 RX ADMIN — PANTOPRAZOLE SODIUM 40 MG: 40 TABLET, DELAYED RELEASE ORAL at 06:48

## 2018-12-23 RX ADMIN — POTASSIUM CHLORIDE AND SODIUM CHLORIDE 75 ML/HR: 900; 150 INJECTION, SOLUTION INTRAVENOUS at 12:17

## 2018-12-24 VITALS
TEMPERATURE: 97.8 F | WEIGHT: 150 LBS | OXYGEN SATURATION: 96 % | BODY MASS INDEX: 23.54 KG/M2 | RESPIRATION RATE: 18 BRPM | DIASTOLIC BLOOD PRESSURE: 74 MMHG | SYSTOLIC BLOOD PRESSURE: 148 MMHG | HEART RATE: 87 BPM | HEIGHT: 67 IN

## 2018-12-24 LAB
DEPRECATED RDW RBC AUTO: 50.8 FL (ref 40–54)
EOSINOPHIL # BLD MANUAL: 0.17 10*3/MM3 (ref 0–0.7)
EOSINOPHIL NFR BLD MANUAL: 1 % (ref 0–4)
ERYTHROCYTE [DISTWIDTH] IN BLOOD BY AUTOMATED COUNT: 16.9 % (ref 12–15)
HCT VFR BLD AUTO: 32.9 % (ref 37–47)
HGB BLD-MCNC: 10.9 G/DL (ref 12–16)
LYMPHOCYTES # BLD MANUAL: 0.84 10*3/MM3 (ref 0.72–4.86)
LYMPHOCYTES NFR BLD MANUAL: 5 % (ref 15–45)
LYMPHOCYTES NFR BLD MANUAL: 9 % (ref 4–12)
MCH RBC QN AUTO: 28.4 PG (ref 28–32)
MCHC RBC AUTO-ENTMCNC: 33.1 G/DL (ref 33–36)
MCV RBC AUTO: 85.7 FL (ref 82–98)
MONOCYTES # BLD AUTO: 1.51 10*3/MM3 (ref 0.19–1.3)
MYELOCYTES NFR BLD MANUAL: 2 % (ref 0–0)
NEUTROPHILS # BLD AUTO: 13.4 10*3/MM3 (ref 1.87–8.4)
NEUTROPHILS NFR BLD MANUAL: 77 % (ref 39–78)
NEUTS BAND NFR BLD MANUAL: 3 % (ref 0–10)
PLATELET # BLD AUTO: 94 10*3/MM3 (ref 130–400)
PMV BLD AUTO: 12.1 FL (ref 6–12)
POIKILOCYTOSIS BLD QL SMEAR: ABNORMAL
RBC # BLD AUTO: 3.84 10*6/MM3 (ref 4.2–5.4)
SMALL PLATELETS BLD QL SMEAR: ABNORMAL
VARIANT LYMPHS NFR BLD MANUAL: 3 % (ref 0–5)
WBC MORPH BLD: NORMAL
WBC NRBC COR # BLD: 16.75 10*3/MM3 (ref 4.8–10.8)

## 2018-12-24 PROCEDURE — 25810000003 SODIUM CHLORIDE 0.9 % WITH KCL 20 MEQ 20-0.9 MEQ/L-% SOLUTION: Performed by: INTERNAL MEDICINE

## 2018-12-24 PROCEDURE — 85007 BL SMEAR W/DIFF WBC COUNT: CPT | Performed by: PHYSICIAN ASSISTANT

## 2018-12-24 PROCEDURE — 85025 COMPLETE CBC W/AUTO DIFF WBC: CPT | Performed by: PHYSICIAN ASSISTANT

## 2018-12-24 PROCEDURE — 25010000002 CEFTRIAXONE PER 250 MG: Performed by: FAMILY MEDICINE

## 2018-12-24 RX ADMIN — POTASSIUM CHLORIDE AND SODIUM CHLORIDE 75 ML/HR: 900; 150 INJECTION, SOLUTION INTRAVENOUS at 00:50

## 2018-12-24 RX ADMIN — LEVOTHYROXINE SODIUM 125 MCG: 0.12 TABLET ORAL at 06:47

## 2018-12-24 RX ADMIN — POTASSIUM CHLORIDE 20 MEQ: 750 CAPSULE, EXTENDED RELEASE ORAL at 08:33

## 2018-12-24 RX ADMIN — CEFTRIAXONE SODIUM 1 G: 1 INJECTION, POWDER, FOR SOLUTION INTRAMUSCULAR; INTRAVENOUS at 11:15

## 2018-12-24 RX ADMIN — PANTOPRAZOLE SODIUM 40 MG: 40 TABLET, DELAYED RELEASE ORAL at 06:47

## 2018-12-24 RX ADMIN — Medication 1 TABLET: at 08:33

## 2018-12-25 ENCOUNTER — READMISSION MANAGEMENT (OUTPATIENT)
Dept: CALL CENTER | Facility: HOSPITAL | Age: 83
End: 2018-12-25

## 2018-12-26 LAB
BACTERIA SPEC AEROBE CULT: NORMAL
BACTERIA SPEC AEROBE CULT: NORMAL

## 2018-12-28 ENCOUNTER — READMISSION MANAGEMENT (OUTPATIENT)
Dept: CALL CENTER | Facility: HOSPITAL | Age: 83
End: 2018-12-28

## 2018-12-28 LAB — BACTERIA SPEC AEROBE CULT: NORMAL

## 2018-12-28 NOTE — OUTREACH NOTE
Medical Week 1 Survey      Responses   Facility patient discharged from?  [facility]   Does the patient have one of the following disease processes/diagnoses(primary or secondary)?  Other   Is there a successful TCM telephone encounter documented?  No   Week 1 attempt successful?  Yes   Call start time  1428   Call end time  1433   Discharge diagnosis  positive blood cultures,   pancytopenic, neutropenic, and have severe hypokalemia.   Is patient permission given to speak with other caregiver?  Yes   Person spoke with today (if not patient) and relationship  Spouse/Patient   Meds reviewed with patient/caregiver?  Yes   Is the patient having any side effects they believe may be caused by any medication additions or changes?  No   Does the patient have all medications ordered at discharge?  Yes   Is the patient taking all medications as directed (includes completed medication regime)?  Yes   Does the patient have a primary care provider?   Yes   Does the patient have an appointment with their PCP within 7 days of discharge?  Yes   Has the patient kept scheduled appointments due by today?  Yes   Comments  [facility] @ 1115  and Dr Day at 155.   Has home health visited the patient within 72 hours of discharge?  N/A   Psychosocial issues?  No   Did the patient receive a copy of their discharge instructions?  Yes   Nursing interventions  Reviewed instructions with patient   What is the patient's perception of their health status since discharge?  Improving   Is the patient/caregiver able to teach back signs and symptoms related to disease process for when to call PCP?  Yes   Is the patient/caregiver able to teach back signs and symptoms related to disease process for when to call 911?  Yes   Is the patient/caregiver able to teach back the hierarchy of who to call/visit for symptoms/problems? PCP, Specialist, Home health nurse, Urgent Care, ED, 911  Yes   Additional teach back comments  She is doing a bit better every day,  walking much better.   Week 1 call completed?  Yes          Yissel Caal RN

## 2019-01-04 ENCOUNTER — READMISSION MANAGEMENT (OUTPATIENT)
Dept: CALL CENTER | Facility: HOSPITAL | Age: 84
End: 2019-01-04

## 2019-01-04 NOTE — OUTREACH NOTE
Medical Week 2 Survey      Responses   Facility patient discharged from?  [facility]   Does the patient have one of the following disease processes/diagnoses(primary or secondary)?  Other   Week 2 attempt successful?  No   Unsuccessful attempts  Attempt 1   Call end time  1530   Meds reviewed with patient/caregiver?  Yes   Is the patient having any side effects they believe may be caused by any medication additions or changes?  Yes   Is the patient taking all medications as directed (includes completed medication regime)?  Yes   Comments regarding appointments  office to call   Does the patient have a primary care provider?   Yes   Does the patient have an appointment with their PCP within 7 days of discharge?  Yes   Has the patient kept scheduled appointments due by today?  Yes   Comments  [facility] @ 1115  and Dr Day at 155.   Did the patient receive a copy of their discharge instructions?  Yes   Nursing interventions  Reviewed instructions with patient   What is the patient's perception of their health status since discharge?  Improving   Is the patient/caregiver able to teach back signs and symptoms related to disease process for when to call PCP?  Yes   Is the patient/caregiver able to teach back the hierarchy of who to call/visit for symptoms/problems? PCP, Specialist, Home health nurse, Urgent Care, ED, 911  Yes   Additional teach back comments  She is doing a bit better every day, walking much better.   Week 2 Call Completed?  Yes   Graduated  Yes [She says she has all she needs, adviised to call [facility] if need assist. ]   Did the patient feel the follow up calls were helpful during their recovery period?  Yes   Was the number of calls appropriate?  Yes   Does the patient have an Advance Directive or Living Will?  Yes   Is the patient/caregiver familiar with Advance Care Planning?  No   Would the patient like more information on Advance Care Planning?  No          Noreen Balbuena RN

## 2019-02-13 ENCOUNTER — HOSPITAL ENCOUNTER (OUTPATIENT)
Dept: GENERAL RADIOLOGY | Age: 84
Discharge: HOME | End: 2019-02-13
Payer: MEDICARE

## 2019-02-13 DIAGNOSIS — M25.551 RIGHT HIP PAIN: ICD-10-CM

## 2019-02-13 DIAGNOSIS — M79.89 SWELLING OF LIMB: ICD-10-CM

## 2019-02-13 PROCEDURE — 73552 X-RAY EXAM OF FEMUR 2/>: CPT

## 2019-02-13 PROCEDURE — 73502 X-RAY EXAM HIP UNI 2-3 VIEWS: CPT

## 2019-02-27 ENCOUNTER — LAB REQUISITION (OUTPATIENT)
Dept: LAB | Facility: HOSPITAL | Age: 84
End: 2019-02-27

## 2019-02-27 ENCOUNTER — APPOINTMENT (OUTPATIENT)
Dept: GENERAL RADIOLOGY | Facility: HOSPITAL | Age: 84
End: 2019-02-27

## 2019-02-27 ENCOUNTER — HOSPITAL ENCOUNTER (INPATIENT)
Facility: HOSPITAL | Age: 84
LOS: 13 days | Discharge: NURSING, REHAB OR LONG-TERM CARE | End: 2019-03-12
Attending: INTERNAL MEDICINE | Admitting: INTERNAL MEDICINE

## 2019-02-27 DIAGNOSIS — Z74.09 IMPAIRED MOBILITY: Primary | ICD-10-CM

## 2019-02-27 DIAGNOSIS — Z00.00 ENCOUNTER FOR GENERAL ADULT MEDICAL EXAMINATION WITHOUT ABNORMAL FINDINGS: ICD-10-CM

## 2019-02-27 PROBLEM — R50.81 NEUTROPENIC FEVER (HCC): Status: ACTIVE | Noted: 2019-02-27

## 2019-02-27 PROBLEM — D70.9 NEUTROPENIC FEVER (HCC): Status: ACTIVE | Noted: 2019-02-27

## 2019-02-27 LAB
ALBUMIN SERPL-MCNC: 3.1 G/DL (ref 3.5–5)
ALBUMIN SERPL-MCNC: 3.6 G/DL (ref 3.5–5)
ALBUMIN/GLOB SERPL: 1.5 G/DL (ref 1.1–2.5)
ALBUMIN/GLOB SERPL: 1.5 G/DL (ref 1.1–2.5)
ALP SERPL-CCNC: 53 U/L (ref 24–120)
ALP SERPL-CCNC: 56 U/L (ref 24–120)
ALT SERPL W P-5'-P-CCNC: 24 U/L (ref 0–54)
ALT SERPL W P-5'-P-CCNC: 26 U/L (ref 0–54)
ANION GAP SERPL CALCULATED.3IONS-SCNC: 10 MMOL/L (ref 4–13)
ANION GAP SERPL CALCULATED.3IONS-SCNC: 5 MMOL/L (ref 4–13)
ANISOCYTOSIS BLD QL: ABNORMAL
APPEARANCE CSF: CLEAR
AST SERPL-CCNC: 21 U/L (ref 7–45)
AST SERPL-CCNC: 25 U/L (ref 7–45)
BACTERIA UR QL AUTO: ABNORMAL /HPF
BILIRUB SERPL-MCNC: 0.4 MG/DL (ref 0.1–1)
BILIRUB SERPL-MCNC: 0.4 MG/DL (ref 0.1–1)
BILIRUB UR QL STRIP: NEGATIVE
BUN BLD-MCNC: 19 MG/DL (ref 5–21)
BUN BLD-MCNC: 19 MG/DL (ref 5–21)
BUN/CREAT SERPL: 38 (ref 7–25)
BUN/CREAT SERPL: 41.3 (ref 7–25)
CALCIUM SPEC-SCNC: 8.1 MG/DL (ref 8.4–10.4)
CALCIUM SPEC-SCNC: 8.5 MG/DL (ref 8.4–10.4)
CHLORIDE SERPL-SCNC: 90 MMOL/L (ref 98–110)
CHLORIDE SERPL-SCNC: 91 MMOL/L (ref 98–110)
CLARITY UR: ABNORMAL
CO2 SERPL-SCNC: 30 MMOL/L (ref 24–31)
CO2 SERPL-SCNC: 32 MMOL/L (ref 24–31)
COLOR CSF: COLORLESS
COLOR SPUN CSF: COLORLESS
COLOR UR: YELLOW
CREAT BLD-MCNC: 0.46 MG/DL (ref 0.5–1.4)
CREAT BLD-MCNC: 0.5 MG/DL (ref 0.5–1.4)
DEPRECATED RDW RBC AUTO: 52.5 FL (ref 40–54)
EOSINOPHIL # BLD MANUAL: 0.02 10*3/MM3 (ref 0–0.7)
EOSINOPHIL NFR BLD MANUAL: 10 % (ref 0–4)
ERYTHROCYTE [DISTWIDTH] IN BLOOD BY AUTOMATED COUNT: 16.3 % (ref 12–15)
GFR SERPL CREATININE-BSD FRML MDRD: 118 ML/MIN/1.73
GFR SERPL CREATININE-BSD FRML MDRD: 129 ML/MIN/1.73
GLOBULIN UR ELPH-MCNC: 2.1 GM/DL
GLOBULIN UR ELPH-MCNC: 2.4 GM/DL
GLUCOSE BLD-MCNC: 106 MG/DL (ref 70–100)
GLUCOSE BLD-MCNC: 115 MG/DL (ref 70–100)
GLUCOSE CSF-MCNC: 75 MG/DL (ref 40–70)
GLUCOSE UR STRIP-MCNC: NEGATIVE MG/DL
HCT VFR BLD AUTO: 27.8 % (ref 37–47)
HGB BLD-MCNC: 9.2 G/DL (ref 12–16)
HGB UR QL STRIP.AUTO: NEGATIVE
HYALINE CASTS UR QL AUTO: ABNORMAL /LPF
HYPOCHROMIA BLD QL: ABNORMAL
KETONES UR QL STRIP: NEGATIVE
LARGE PLATELETS: ABNORMAL
LEUKOCYTE ESTERASE UR QL STRIP.AUTO: ABNORMAL
LYMPHOCYTES # BLD MANUAL: 0.11 10*3/MM3 (ref 0.72–4.86)
LYMPHOCYTES NFR BLD MANUAL: 65 % (ref 15–45)
LYMPHOCYTES NFR BLD MANUAL: 8 % (ref 4–12)
MCH RBC QN AUTO: 29.4 PG (ref 28–32)
MCHC RBC AUTO-ENTMCNC: 33.1 G/DL (ref 33–36)
MCV RBC AUTO: 88.8 FL (ref 82–98)
METHOD: NORMAL
MONOCYTES # BLD AUTO: 0.01 10*3/MM3 (ref 0.19–1.3)
NEUTROPHILS # BLD AUTO: 0.02 10*3/MM3 (ref 1.87–8.4)
NEUTROPHILS NFR BLD MANUAL: 10 % (ref 39–78)
NITRITE UR QL STRIP: NEGATIVE
NRBC BLD AUTO-RTO: 0 /100 WBC (ref 0–0)
NUC CELL # CSF MANUAL: 0 /MM3 (ref 0–8)
PH UR STRIP.AUTO: 6.5 [PH] (ref 5–8)
PLATELET # BLD AUTO: 115 10*3/MM3 (ref 130–400)
PMV BLD AUTO: 11.3 FL (ref 6–12)
POIKILOCYTOSIS BLD QL SMEAR: ABNORMAL
POTASSIUM BLD-SCNC: 3.3 MMOL/L (ref 3.5–5.3)
POTASSIUM BLD-SCNC: 3.3 MMOL/L (ref 3.5–5.3)
PROT CSF-MCNC: 16 MG/DL (ref 12–60)
PROT SERPL-MCNC: 5.2 G/DL (ref 6.3–8.7)
PROT SERPL-MCNC: 6 G/DL (ref 6.3–8.7)
PROT UR QL STRIP: ABNORMAL
RBC # BLD AUTO: 3.13 10*6/MM3 (ref 4.2–5.4)
RBC # CSF MANUAL: 0 /MM3 (ref 0–0)
RBC # UR: ABNORMAL /HPF
REF LAB TEST METHOD: ABNORMAL
SMALL PLATELETS BLD QL SMEAR: ADEQUATE
SODIUM BLD-SCNC: 128 MMOL/L (ref 135–145)
SODIUM BLD-SCNC: 130 MMOL/L (ref 135–145)
SP GR UR STRIP: 1.02 (ref 1–1.03)
SPECIMEN VOL CSF: 14 ML
SQUAMOUS #/AREA URNS HPF: ABNORMAL /HPF
TUBE # CSF: 4
UROBILINOGEN UR QL STRIP: ABNORMAL
VARIANT LYMPHS NFR BLD MANUAL: 7 % (ref 0–5)
WBC MORPH BLD: NORMAL
WBC NRBC COR # BLD: 0.17 10*3/MM3 (ref 4.8–10.8)
WBC UR QL AUTO: ABNORMAL /HPF

## 2019-02-27 PROCEDURE — 87077 CULTURE AEROBIC IDENTIFY: CPT | Performed by: INTERNAL MEDICINE

## 2019-02-27 PROCEDURE — 82945 GLUCOSE OTHER FLUID: CPT | Performed by: INTERNAL MEDICINE

## 2019-02-27 PROCEDURE — 71046 X-RAY EXAM CHEST 2 VIEWS: CPT

## 2019-02-27 PROCEDURE — 85025 COMPLETE CBC W/AUTO DIFF WBC: CPT | Performed by: PHYSICIAN ASSISTANT

## 2019-02-27 PROCEDURE — 81001 URINALYSIS AUTO W/SCOPE: CPT | Performed by: INTERNAL MEDICINE

## 2019-02-27 PROCEDURE — 25010000002 PIPERACILLIN SOD-TAZOBACTAM PER 1 G: Performed by: INTERNAL MEDICINE

## 2019-02-27 PROCEDURE — 94760 N-INVAS EAR/PLS OXIMETRY 1: CPT

## 2019-02-27 PROCEDURE — 89050 BODY FLUID CELL COUNT: CPT | Performed by: INTERNAL MEDICINE

## 2019-02-27 PROCEDURE — 80053 COMPREHEN METABOLIC PANEL: CPT | Performed by: INTERNAL MEDICINE

## 2019-02-27 PROCEDURE — 87186 SC STD MICRODIL/AGAR DIL: CPT | Performed by: INTERNAL MEDICINE

## 2019-02-27 PROCEDURE — 25010000002 ENOXAPARIN PER 10 MG: Performed by: PHYSICIAN ASSISTANT

## 2019-02-27 PROCEDURE — 85009 MANUAL DIFF WBC COUNT B-COAT: CPT | Performed by: PHYSICIAN ASSISTANT

## 2019-02-27 PROCEDURE — 87086 URINE CULTURE/COLONY COUNT: CPT | Performed by: INTERNAL MEDICINE

## 2019-02-27 PROCEDURE — 87040 BLOOD CULTURE FOR BACTERIA: CPT | Performed by: INTERNAL MEDICINE

## 2019-02-27 PROCEDURE — 87070 CULTURE OTHR SPECIMN AEROBIC: CPT | Performed by: INTERNAL MEDICINE

## 2019-02-27 PROCEDURE — 85007 BL SMEAR W/DIFF WBC COUNT: CPT | Performed by: PHYSICIAN ASSISTANT

## 2019-02-27 PROCEDURE — 87015 SPECIMEN INFECT AGNT CONCNTJ: CPT | Performed by: INTERNAL MEDICINE

## 2019-02-27 PROCEDURE — 84157 ASSAY OF PROTEIN OTHER: CPT | Performed by: INTERNAL MEDICINE

## 2019-02-27 PROCEDURE — 87040 BLOOD CULTURE FOR BACTERIA: CPT | Performed by: PHYSICIAN ASSISTANT

## 2019-02-27 PROCEDURE — 94799 UNLISTED PULMONARY SVC/PX: CPT

## 2019-02-27 PROCEDURE — 25010000002 FILGRASTIM PER 480 MCG: Performed by: PHYSICIAN ASSISTANT

## 2019-02-27 PROCEDURE — 87205 SMEAR GRAM STAIN: CPT | Performed by: INTERNAL MEDICINE

## 2019-02-27 PROCEDURE — 80053 COMPREHEN METABOLIC PANEL: CPT | Performed by: PHYSICIAN ASSISTANT

## 2019-02-27 RX ORDER — SODIUM CHLORIDE 0.9 % (FLUSH) 0.9 %
3 SYRINGE (ML) INJECTION EVERY 12 HOURS SCHEDULED
Status: DISCONTINUED | OUTPATIENT
Start: 2019-02-27 | End: 2019-03-12 | Stop reason: HOSPADM

## 2019-02-27 RX ORDER — SODIUM CHLORIDE 0.9 % (FLUSH) 0.9 %
3-10 SYRINGE (ML) INJECTION AS NEEDED
Status: DISCONTINUED | OUTPATIENT
Start: 2019-02-27 | End: 2019-03-12 | Stop reason: HOSPADM

## 2019-02-27 RX ORDER — ONDANSETRON 4 MG/1
8 TABLET, FILM COATED ORAL EVERY 8 HOURS PRN
Status: DISCONTINUED | OUTPATIENT
Start: 2019-02-27 | End: 2019-03-12 | Stop reason: HOSPADM

## 2019-02-27 RX ORDER — UREA 10 %
27 LOTION (ML) TOPICAL DAILY
Status: DISCONTINUED | OUTPATIENT
Start: 2019-02-27 | End: 2019-02-28

## 2019-02-27 RX ORDER — PANTOPRAZOLE SODIUM 40 MG/1
40 TABLET, DELAYED RELEASE ORAL
Status: DISCONTINUED | OUTPATIENT
Start: 2019-02-28 | End: 2019-02-28

## 2019-02-27 RX ORDER — LEVOTHYROXINE SODIUM 0.12 MG/1
125 TABLET ORAL
Status: DISCONTINUED | OUTPATIENT
Start: 2019-02-28 | End: 2019-03-12 | Stop reason: HOSPADM

## 2019-02-27 RX ORDER — ACETAMINOPHEN 325 MG/1
650 TABLET ORAL EVERY 6 HOURS PRN
Status: DISCONTINUED | OUTPATIENT
Start: 2019-02-27 | End: 2019-03-12 | Stop reason: HOSPADM

## 2019-02-27 RX ORDER — OXYBUTYNIN CHLORIDE 5 MG/1
5 TABLET, EXTENDED RELEASE ORAL DAILY
Status: DISCONTINUED | OUTPATIENT
Start: 2019-02-27 | End: 2019-03-12 | Stop reason: HOSPADM

## 2019-02-27 RX ORDER — CARVEDILOL 3.12 MG/1
3.12 TABLET ORAL 2 TIMES DAILY WITH MEALS
Status: DISCONTINUED | OUTPATIENT
Start: 2019-02-27 | End: 2019-03-12 | Stop reason: HOSPADM

## 2019-02-27 RX ORDER — SODIUM CHLORIDE 9 MG/ML
85 INJECTION, SOLUTION INTRAVENOUS CONTINUOUS
Status: DISCONTINUED | OUTPATIENT
Start: 2019-02-27 | End: 2019-02-28

## 2019-02-27 RX ORDER — GUAIFENESIN 600 MG/1
600 TABLET, EXTENDED RELEASE ORAL 2 TIMES DAILY PRN
Status: DISCONTINUED | OUTPATIENT
Start: 2019-02-27 | End: 2019-02-28

## 2019-02-27 RX ADMIN — ENOXAPARIN SODIUM 40 MG: 40 INJECTION SUBCUTANEOUS at 15:39

## 2019-02-27 RX ADMIN — FILGRASTIM 480 MCG: 480 INJECTION, SOLUTION INTRAVENOUS; SUBCUTANEOUS at 19:01

## 2019-02-27 RX ADMIN — SODIUM CHLORIDE 85 ML/HR: 9 INJECTION, SOLUTION INTRAVENOUS at 15:39

## 2019-02-27 RX ADMIN — CARVEDILOL 3.12 MG: 3.12 TABLET, FILM COATED ORAL at 19:00

## 2019-02-27 RX ADMIN — ACETAMINOPHEN 650 MG: 325 TABLET, FILM COATED ORAL at 21:43

## 2019-02-27 RX ADMIN — TAZOBACTAM SODIUM AND PIPERACILLIN SODIUM 4.5 G: 500; 4 INJECTION, SOLUTION INTRAVENOUS at 15:39

## 2019-02-27 RX ADMIN — SODIUM CHLORIDE, PRESERVATIVE FREE 3 ML: 5 INJECTION INTRAVENOUS at 15:40

## 2019-02-27 RX ADMIN — Medication 27 MG: at 19:01

## 2019-02-28 LAB
ABO GROUP BLD: NORMAL
BLD GP AB SCN SERPL QL: NEGATIVE
DEPRECATED RDW RBC AUTO: 50 FL (ref 40–54)
ERYTHROCYTE [DISTWIDTH] IN BLOOD BY AUTOMATED COUNT: 16.1 % (ref 12–15)
HCT VFR BLD AUTO: 22.7 % (ref 37–47)
HGB BLD-MCNC: 7.6 G/DL (ref 12–16)
MCH RBC QN AUTO: 28.8 PG (ref 28–32)
MCHC RBC AUTO-ENTMCNC: 33.5 G/DL (ref 33–36)
MCV RBC AUTO: 86 FL (ref 82–98)
PLATELET # BLD AUTO: 81 10*3/MM3 (ref 130–400)
PMV BLD AUTO: 10.7 FL (ref 6–12)
RBC # BLD AUTO: 2.64 10*6/MM3 (ref 4.2–5.4)
RH BLD: POSITIVE
T&S EXPIRATION DATE: NORMAL
WBC NRBC COR # BLD: 0.22 10*3/MM3 (ref 4.8–10.8)

## 2019-02-28 PROCEDURE — 25010000002 DIPHENHYDRAMINE PER 50 MG: Performed by: PHYSICIAN ASSISTANT

## 2019-02-28 PROCEDURE — P9016 RBC LEUKOCYTES REDUCED: HCPCS

## 2019-02-28 PROCEDURE — 86901 BLOOD TYPING SEROLOGIC RH(D): CPT | Performed by: PHYSICIAN ASSISTANT

## 2019-02-28 PROCEDURE — 25810000003 SODIUM CHLORIDE 0.9 % WITH KCL 20 MEQ 20-0.9 MEQ/L-% SOLUTION: Performed by: PHYSICIAN ASSISTANT

## 2019-02-28 PROCEDURE — 36430 TRANSFUSION BLD/BLD COMPNT: CPT

## 2019-02-28 PROCEDURE — 94760 N-INVAS EAR/PLS OXIMETRY 1: CPT

## 2019-02-28 PROCEDURE — 25010000002 PIPERACILLIN SOD-TAZOBACTAM PER 1 G: Performed by: INTERNAL MEDICINE

## 2019-02-28 PROCEDURE — 25010000002 LEVOFLOXACIN PER 250 MG: Performed by: PHYSICIAN ASSISTANT

## 2019-02-28 PROCEDURE — 86900 BLOOD TYPING SEROLOGIC ABO: CPT

## 2019-02-28 PROCEDURE — 86923 COMPATIBILITY TEST ELECTRIC: CPT

## 2019-02-28 PROCEDURE — 86900 BLOOD TYPING SEROLOGIC ABO: CPT | Performed by: PHYSICIAN ASSISTANT

## 2019-02-28 PROCEDURE — 86850 RBC ANTIBODY SCREEN: CPT | Performed by: PHYSICIAN ASSISTANT

## 2019-02-28 PROCEDURE — 85027 COMPLETE CBC AUTOMATED: CPT | Performed by: PHYSICIAN ASSISTANT

## 2019-02-28 PROCEDURE — 25010000002 FUROSEMIDE PER 20 MG: Performed by: PHYSICIAN ASSISTANT

## 2019-02-28 PROCEDURE — 25010000002 HYDROCORTISONE SODIUM SUCCINATE 100 MG RECONSTITUTED SOLUTION: Performed by: PHYSICIAN ASSISTANT

## 2019-02-28 PROCEDURE — 94799 UNLISTED PULMONARY SVC/PX: CPT

## 2019-02-28 PROCEDURE — 25010000002 FILGRASTIM PER 480 MCG: Performed by: PHYSICIAN ASSISTANT

## 2019-02-28 PROCEDURE — 25010000002 ENOXAPARIN PER 10 MG: Performed by: PHYSICIAN ASSISTANT

## 2019-02-28 RX ORDER — FUROSEMIDE 10 MG/ML
20 INJECTION INTRAMUSCULAR; INTRAVENOUS EVERY 4 HOURS
Status: DISPENSED | OUTPATIENT
Start: 2019-02-28 | End: 2019-02-28

## 2019-02-28 RX ORDER — DIPHENHYDRAMINE HYDROCHLORIDE 50 MG/ML
25 INJECTION INTRAMUSCULAR; INTRAVENOUS ONCE
Status: COMPLETED | OUTPATIENT
Start: 2019-02-28 | End: 2019-02-28

## 2019-02-28 RX ORDER — ACETAMINOPHEN 325 MG/1
650 TABLET ORAL ONCE
Status: COMPLETED | OUTPATIENT
Start: 2019-02-28 | End: 2019-02-28

## 2019-02-28 RX ORDER — LEVOFLOXACIN 5 MG/ML
500 INJECTION, SOLUTION INTRAVENOUS EVERY 24 HOURS
Status: DISCONTINUED | OUTPATIENT
Start: 2019-02-28 | End: 2019-03-01

## 2019-02-28 RX ORDER — LEVOTHYROXINE SODIUM 0.12 MG/1
125 TABLET ORAL DAILY
COMMUNITY
End: 2019-05-08 | Stop reason: SDUPTHER

## 2019-02-28 RX ORDER — SODIUM CHLORIDE AND POTASSIUM CHLORIDE 150; 900 MG/100ML; MG/100ML
40 INJECTION, SOLUTION INTRAVENOUS CONTINUOUS
Status: DISCONTINUED | OUTPATIENT
Start: 2019-02-28 | End: 2019-03-12 | Stop reason: HOSPADM

## 2019-02-28 RX ADMIN — SODIUM CHLORIDE, PRESERVATIVE FREE 3 ML: 5 INJECTION INTRAVENOUS at 10:21

## 2019-02-28 RX ADMIN — FILGRASTIM 480 MCG: 480 INJECTION, SOLUTION INTRAVENOUS; SUBCUTANEOUS at 18:03

## 2019-02-28 RX ADMIN — DIPHENHYDRAMINE HYDROCHLORIDE 25 MG: 50 INJECTION, SOLUTION INTRAMUSCULAR; INTRAVENOUS at 11:38

## 2019-02-28 RX ADMIN — CARVEDILOL 3.12 MG: 3.12 TABLET, FILM COATED ORAL at 18:03

## 2019-02-28 RX ADMIN — PANTOPRAZOLE SODIUM 40 MG: 40 TABLET, DELAYED RELEASE ORAL at 06:23

## 2019-02-28 RX ADMIN — POTASSIUM CHLORIDE AND SODIUM CHLORIDE 50 ML/HR: 900; 150 INJECTION, SOLUTION INTRAVENOUS at 10:11

## 2019-02-28 RX ADMIN — ENOXAPARIN SODIUM 40 MG: 40 INJECTION SUBCUTANEOUS at 18:03

## 2019-02-28 RX ADMIN — OXYBUTYNIN CHLORIDE 5 MG: 5 TABLET, EXTENDED RELEASE ORAL at 10:06

## 2019-02-28 RX ADMIN — Medication 27 MG: at 10:06

## 2019-02-28 RX ADMIN — TAZOBACTAM SODIUM AND PIPERACILLIN SODIUM 4.5 G: 500; 4 INJECTION, SOLUTION INTRAVENOUS at 21:59

## 2019-02-28 RX ADMIN — TAZOBACTAM SODIUM AND PIPERACILLIN SODIUM 4.5 G: 500; 4 INJECTION, SOLUTION INTRAVENOUS at 16:04

## 2019-02-28 RX ADMIN — TAZOBACTAM SODIUM AND PIPERACILLIN SODIUM 4.5 G: 500; 4 INJECTION, SOLUTION INTRAVENOUS at 00:16

## 2019-02-28 RX ADMIN — ACETAMINOPHEN 650 MG: 325 TABLET, FILM COATED ORAL at 11:39

## 2019-02-28 RX ADMIN — FUROSEMIDE 20 MG: 10 INJECTION, SOLUTION INTRAMUSCULAR; INTRAVENOUS at 16:04

## 2019-02-28 RX ADMIN — HYDROCORTISONE SODIUM SUCCINATE 100 MG: 100 INJECTION, POWDER, FOR SOLUTION INTRAMUSCULAR; INTRAVENOUS at 11:38

## 2019-02-28 RX ADMIN — CARVEDILOL 3.12 MG: 3.12 TABLET, FILM COATED ORAL at 10:06

## 2019-02-28 RX ADMIN — LEVOTHYROXINE SODIUM 125 MCG: 125 TABLET ORAL at 06:23

## 2019-02-28 RX ADMIN — LEVOFLOXACIN 500 MG: 5 INJECTION, SOLUTION INTRAVENOUS at 10:11

## 2019-03-01 LAB
ABO + RH BLD: NORMAL
ANION GAP SERPL CALCULATED.3IONS-SCNC: 3 MMOL/L (ref 4–13)
BACTERIA SPEC AEROBE CULT: ABNORMAL
BH BB BLOOD EXPIRATION DATE: NORMAL
BH BB BLOOD TYPE BARCODE: 6200
BH BB DISPENSE STATUS: NORMAL
BH BB PRODUCT CODE: NORMAL
BH BB UNIT NUMBER: NORMAL
BUN BLD-MCNC: 14 MG/DL (ref 5–21)
BUN/CREAT SERPL: 26.4 (ref 7–25)
CALCIUM SPEC-SCNC: 7.9 MG/DL (ref 8.4–10.4)
CHLORIDE SERPL-SCNC: 96 MMOL/L (ref 98–110)
CO2 SERPL-SCNC: 32 MMOL/L (ref 24–31)
CREAT BLD-MCNC: 0.53 MG/DL (ref 0.5–1.4)
DEPRECATED RDW RBC AUTO: 46.6 FL (ref 40–54)
ERYTHROCYTE [DISTWIDTH] IN BLOOD BY AUTOMATED COUNT: 15.2 % (ref 12–15)
GFR SERPL CREATININE-BSD FRML MDRD: 110 ML/MIN/1.73
GLUCOSE BLD-MCNC: 76 MG/DL (ref 70–100)
HCT VFR BLD AUTO: 24.4 % (ref 37–47)
HGB BLD-MCNC: 8.1 G/DL (ref 12–16)
MCH RBC QN AUTO: 28.1 PG (ref 28–32)
MCHC RBC AUTO-ENTMCNC: 33.2 G/DL (ref 33–36)
MCV RBC AUTO: 84.7 FL (ref 82–98)
PLATELET # BLD AUTO: 71 10*3/MM3 (ref 130–400)
PMV BLD AUTO: 11.3 FL (ref 6–12)
POTASSIUM BLD-SCNC: 2.5 MMOL/L (ref 3.5–5.3)
POTASSIUM BLD-SCNC: 3.5 MMOL/L (ref 3.5–5.3)
RBC # BLD AUTO: 2.88 10*6/MM3 (ref 4.2–5.4)
SODIUM BLD-SCNC: 131 MMOL/L (ref 135–145)
UNIT  ABO: NORMAL
UNIT  RH: NORMAL
WBC NRBC COR # BLD: 0.42 10*3/MM3 (ref 4.8–10.8)

## 2019-03-01 PROCEDURE — 94799 UNLISTED PULMONARY SVC/PX: CPT

## 2019-03-01 PROCEDURE — 25010000002 FILGRASTIM PER 480 MCG: Performed by: PHYSICIAN ASSISTANT

## 2019-03-01 PROCEDURE — 25010000002 PIPERACILLIN SOD-TAZOBACTAM PER 1 G: Performed by: INTERNAL MEDICINE

## 2019-03-01 PROCEDURE — 94760 N-INVAS EAR/PLS OXIMETRY 1: CPT

## 2019-03-01 PROCEDURE — 63710000001 DIPHENHYDRAMINE PER 50 MG: Performed by: PHYSICIAN ASSISTANT

## 2019-03-01 PROCEDURE — 80048 BASIC METABOLIC PNL TOTAL CA: CPT | Performed by: PHYSICIAN ASSISTANT

## 2019-03-01 PROCEDURE — 84132 ASSAY OF SERUM POTASSIUM: CPT | Performed by: INTERNAL MEDICINE

## 2019-03-01 PROCEDURE — 25010000002 ENOXAPARIN PER 10 MG: Performed by: PHYSICIAN ASSISTANT

## 2019-03-01 PROCEDURE — 85027 COMPLETE CBC AUTOMATED: CPT | Performed by: PHYSICIAN ASSISTANT

## 2019-03-01 PROCEDURE — 25010000002 LEVOFLOXACIN PER 250 MG: Performed by: PHYSICIAN ASSISTANT

## 2019-03-01 PROCEDURE — 25810000003 SODIUM CHLORIDE 0.9 % WITH KCL 20 MEQ 20-0.9 MEQ/L-% SOLUTION: Performed by: PHYSICIAN ASSISTANT

## 2019-03-01 PROCEDURE — 25010000003 POTASSIUM CHLORIDE PER 2 MEQ: Performed by: INTERNAL MEDICINE

## 2019-03-01 RX ORDER — VALACYCLOVIR HYDROCHLORIDE 500 MG/1
500 TABLET, FILM COATED ORAL EVERY 8 HOURS SCHEDULED
Status: COMPLETED | OUTPATIENT
Start: 2019-03-01 | End: 2019-03-08

## 2019-03-01 RX ORDER — POTASSIUM CHLORIDE 29.8 MG/ML
20 INJECTION INTRAVENOUS
Status: COMPLETED | OUTPATIENT
Start: 2019-03-01 | End: 2019-03-01

## 2019-03-01 RX ORDER — DIPHENHYDRAMINE HCL 25 MG
25 CAPSULE ORAL ONCE
Status: COMPLETED | OUTPATIENT
Start: 2019-03-01 | End: 2019-03-02

## 2019-03-01 RX ORDER — CLOTRIMAZOLE 10 MG/1
10 LOZENGE ORAL; TOPICAL
Status: DISCONTINUED | OUTPATIENT
Start: 2019-03-01 | End: 2019-03-12 | Stop reason: HOSPADM

## 2019-03-01 RX ORDER — POTASSIUM CHLORIDE 29.8 MG/ML
20 INJECTION INTRAVENOUS
Status: DISCONTINUED | OUTPATIENT
Start: 2019-03-01 | End: 2019-03-12 | Stop reason: HOSPADM

## 2019-03-01 RX ORDER — DIPHENHYDRAMINE HCL 25 MG
25 CAPSULE ORAL NIGHTLY PRN
Status: DISCONTINUED | OUTPATIENT
Start: 2019-03-01 | End: 2019-03-12 | Stop reason: HOSPADM

## 2019-03-01 RX ADMIN — POTASSIUM CHLORIDE 20 MEQ: 29.8 INJECTION, SOLUTION INTRAVENOUS at 09:59

## 2019-03-01 RX ADMIN — POTASSIUM CHLORIDE 20 MEQ: 29.8 INJECTION, SOLUTION INTRAVENOUS at 12:14

## 2019-03-01 RX ADMIN — CARVEDILOL 3.12 MG: 3.12 TABLET, FILM COATED ORAL at 17:15

## 2019-03-01 RX ADMIN — VALACYCLOVIR 500 MG: 500 TABLET, FILM COATED ORAL at 21:15

## 2019-03-01 RX ADMIN — LEVOFLOXACIN 500 MG: 5 INJECTION, SOLUTION INTRAVENOUS at 07:45

## 2019-03-01 RX ADMIN — VALACYCLOVIR 500 MG: 500 TABLET, FILM COATED ORAL at 15:46

## 2019-03-01 RX ADMIN — CLOTRIMAZOLE 10 MG: 10 LOZENGE ORAL; TOPICAL at 17:15

## 2019-03-01 RX ADMIN — CARVEDILOL 3.12 MG: 3.12 TABLET, FILM COATED ORAL at 07:45

## 2019-03-01 RX ADMIN — FILGRASTIM 480 MCG: 480 INJECTION, SOLUTION INTRAVENOUS; SUBCUTANEOUS at 18:41

## 2019-03-01 RX ADMIN — TAZOBACTAM SODIUM AND PIPERACILLIN SODIUM 4.5 G: 500; 4 INJECTION, SOLUTION INTRAVENOUS at 21:12

## 2019-03-01 RX ADMIN — LEVOTHYROXINE SODIUM 125 MCG: 125 TABLET ORAL at 05:21

## 2019-03-01 RX ADMIN — TAZOBACTAM SODIUM AND PIPERACILLIN SODIUM 4.5 G: 500; 4 INJECTION, SOLUTION INTRAVENOUS at 14:24

## 2019-03-01 RX ADMIN — TAZOBACTAM SODIUM AND PIPERACILLIN SODIUM 4.5 G: 500; 4 INJECTION, SOLUTION INTRAVENOUS at 05:22

## 2019-03-01 RX ADMIN — ENOXAPARIN SODIUM 40 MG: 40 INJECTION SUBCUTANEOUS at 14:24

## 2019-03-01 RX ADMIN — SODIUM CHLORIDE, PRESERVATIVE FREE 3 ML: 5 INJECTION INTRAVENOUS at 21:15

## 2019-03-01 RX ADMIN — POTASSIUM CHLORIDE 20 MEQ: 29.8 INJECTION, SOLUTION INTRAVENOUS at 11:07

## 2019-03-01 RX ADMIN — DIPHENHYDRAMINE HYDROCHLORIDE 25 MG: 25 CAPSULE ORAL at 22:01

## 2019-03-01 RX ADMIN — OXYBUTYNIN CHLORIDE 5 MG: 5 TABLET, EXTENDED RELEASE ORAL at 09:59

## 2019-03-01 RX ADMIN — CLOTRIMAZOLE 10 MG: 10 LOZENGE ORAL; TOPICAL at 21:15

## 2019-03-01 RX ADMIN — POTASSIUM CHLORIDE AND SODIUM CHLORIDE 50 ML/HR: 900; 150 INJECTION, SOLUTION INTRAVENOUS at 17:15

## 2019-03-01 RX ADMIN — CLOTRIMAZOLE 10 MG: 10 LOZENGE ORAL; TOPICAL at 15:46

## 2019-03-01 NOTE — PLAN OF CARE
Problem: Patient Care Overview  Goal: Plan of Care Review  Outcome: Ongoing (interventions implemented as appropriate)   02/28/19 0226   Coping/Psychosocial   Plan of Care Reviewed With patient;daughter   Plan of Care Review   Progress no change       Problem: Fall Risk (Adult)  Goal: Absence of Fall  Outcome: Ongoing (interventions implemented as appropriate)      Problem: Infection, Risk/Actual (Adult)  Goal: Infection Prevention/Resolution  Outcome: Ongoing (interventions implemented as appropriate)

## 2019-03-01 NOTE — PROGRESS NOTES
"INFECTIOUS DISEASES PROGRESS NOTE    Patient:  Kami Villanueva  YOB: 1934  MRN: [id number]   Admit date: 2/27/2019   Admitting Physician: Alexander Osborn*  Primary Care Physician: Kvng Strong MD    Chief Complaint: \"Sore mouth\"        Interval History: The patient reports that her mouth is sore today.  She reports she is getting some miracle mouthwash and use it after she ate.  She reports she is having some difficulty eating.    When asked directly if she is having trouble swallowing pills she did state that she was a little bit however the family member in the room stated she swallowed her pills without difficulty does not have that many.    The patient denies any dysuria but does have urinary urgency.    She denies any diarrhea.    Potassium was quite low today and she is received IV runs of potassium.    Allergies:   Allergies   Allergen Reactions   • Alendronate Other (See Comments)     Pt does not know  FOSAMAX   • Lisinopril Cough   • Statins Other (See Comments) and Myalgia     Other     • Sulfa Antibiotics Other (See Comments)     Pt does not remember   • Teriparatide (Recombinant) Other (See Comments)     Pt does not know  FORTEO       Current Meds:     Current Facility-Administered Medications:   •  acetaminophen (TYLENOL) tablet 650 mg, 650 mg, Oral, Q6H PRN, Prashanth Chambers PA, 650 mg at 02/27/19 2143  •  carvedilol (COREG) tablet 3.125 mg, 3.125 mg, Oral, BID With Meals, Prashanth Chambers PA, 3.125 mg at 03/01/19 0745  •  enoxaparin (LOVENOX) syringe 40 mg, 40 mg, Subcutaneous, Q24H, Prashanth Chambers PA, 40 mg at 02/28/19 1803  •  filgrastim (NEUPOGEN) injection 480 mcg, 480 mcg, Subcutaneous, Daily, Prashanth Chambers PA, 480 mcg at 02/28/19 1803  •  levoFLOXacin (LEVAQUIN) 500 mg/100 mL D5W (premix) (LEVAQUIN) 500 mg, 500 mg, Intravenous, Q24H, Prashanth Chambers PA, 500 mg at 03/01/19 0745  •  levothyroxine (SYNTHROID, LEVOTHROID) tablet 125 mcg, 125 mcg, Oral, Q AM, Reyes, " "ELLA Alford, 125 mcg at 19 0521  •  lidocaine-Maalox-diphenhydramine (MIRACLE MOUTHWASH) oral suspension 30 mL, 30 mL, Oral, 4x Daily PRN, Alexander Osborn MD  •  ondansetron (ZOFRAN) tablet 8 mg, 8 mg, Oral, Q8H PRN, Prashanth Chambers PA  •  oxybutynin XL (DITROPAN-XL) 24 hr tablet 5 mg, 5 mg, Oral, Daily, Prashanth Chambers PA, 5 mg at 19 0959  •  piperacillin-tazobactam (ZOSYN) 4.5 g in iso-osmotic dextrose 100 mL IVPB (premix), 4.5 g, Intravenous, Q8H, Alexander Osborn MD, Last Rate: 0 mL/hr at 19 1020, 4.5 g at 19 0522  •  potassium chloride 20 mEq in 50 mL IVPB, 20 mEq, Intravenous, Q1H PRN, Prashanth Chambers PA, Stopped at 19 1322  •  sodium chloride 0.9 % flush 3 mL, 3 mL, Intravenous, Q12H, Prashanth Chambers PA, 3 mL at 19 1021  •  sodium chloride 0.9 % flush 3-10 mL, 3-10 mL, Intravenous, PRN, Prashanth Chambers PA  •  sodium chloride 0.9 % with KCl 20 mEq/L infusion, 50 mL/hr, Intravenous, Continuous, Prashanth Chambers PA, Last Rate: 50 mL/hr at 19 2353, 50 mL/hr at 19 2353      Review of Systems   Constitutional: Positive for fever (Low-grade). Negative for chills.   HENT: Positive for mouth sores.    Eyes: Negative for redness.   Respiratory: Negative for shortness of breath.    Cardiovascular: Negative for chest pain.   Gastrointestinal: Negative for diarrhea.   Genitourinary: Positive for urgency.   Skin: Negative for rash.   Allergic/Immunologic: Positive for immunocompromised state.   Neurological: Positive for weakness.            Objective     Vital Signs:  Temp (24hrs), Av.2 °F (36.8 °C), Min:97.5 °F (36.4 °C), Max:99.3 °F (37.4 °C)      /57 (BP Location: Left arm, Patient Position: Lying)   Pulse 78   Temp 98 °F (36.7 °C) (Oral)   Resp 16   Ht 170.2 cm (67.01\")   Wt 62.8 kg (138 lb 8 oz)   LMP  (LMP Unknown)   SpO2 95%   BMI 21.69 kg/m²         Physical Exam   General: The patient is elderly lying in bed in no acute distress  HEENT: " Sclera anicteric and noninjected, oropharynx no white plaque lesions.  She has some buccal mucosal erosions bilaterally and lip lesions with superficial vesicles.  Neck: Supple  Heart: S1-S2 rate is regular  Lungs: Faint crackles bilateral bases  Abdomen: Soft, nontender, nondistended, bowel sounds are positive  Extremities: No pitting edema  Psych: She is pleasant cooperative  Neuro: She appears to be somewhat hard of hearing.  She is able to answer questions and follow commands without much difficulty.      Line/IV site: CCsubclavian left, condition patent and no redness    Results Review:    I reviewed the patient's new clinical results.    Lab Results:  CBC:   Lab Results   Lab 02/27/19  1325 02/28/19  0500 03/01/19  0434   WBC 0.17* 0.22* 0.42*   HEMOGLOBIN 9.2* 7.6* 8.1*   HEMATOCRIT 27.8* 22.7* 24.4*   PLATELETS 115* 81* 71*   LYMPHOCYTE % 65.0*  --   --    MONOCYTES % 8.0  --   --          CMP:   Lab Results   Lab 02/27/19  1325 02/27/19  1622 03/01/19  0434   SODIUM 130* 128* 131*   POTASSIUM 3.3* 3.3* 2.5*   CHLORIDE 90* 91* 96*   CO2 30.0 32.0* 32.0*   BUN 19 19 14   CREATININE 0.50 0.46* 0.53   CALCIUM 8.5 8.1* 7.9*   BILIRUBIN 0.4 0.4  --    ALK PHOS 56 53  --    ALT (SGPT) 24 26  --    AST (SGOT) 25 21  --    GLUCOSE 115* 106* 76         Culture Results:    Blood Culture   Date Value Ref Range Status   02/27/2019 No growth at 24 hours  Preliminary   02/27/2019 No growth at 24 hours  Preliminary   02/27/2019 No growth at 2 days  Preliminary   02/27/2019 No growth at 2 days  Preliminary     Urine Culture   Date Value Ref Range Status   02/27/2019 >100,000 CFU/mL Escherichia coli (A)  Final       Microbiology Results Abnormal     Procedure Component Value - Date/Time    Blood Culture With REBECA - Blood, Arm, Left [[id number]] Collected:  02/27/19 2141    Lab Status:  Preliminary result Specimen:  Blood from Arm, Left Updated:  02/28/19 2200     Blood Culture No growth at 24 hours    Blood Culture With REBECA -  Blood, Blood, Venous Line [[id number]] Collected:  02/27/19 2142    Lab Status:  Preliminary result Specimen:  Blood, Venous Line Updated:  02/28/19 2200     Blood Culture No growth at 24 hours        02/27/2019 0900  03/01/2019 0849  Urine Culture - Urine, Urine, Clean Catch [[id number]]    (Abnormal)   Urine, Clean Catch     Final result  Urine Culture >100,000 CFU/mL Escherichia coli Abnormal        Susceptibility      Escherichia coli     ALFREDO     Ampicillin >=32  Resistant     Ampicillin + Sulbactam >=32  Resistant     Cefazolin <=4  Susceptible1     Cefepime <=1  Susceptible     Ceftriaxone <=1  Susceptible     Ertapenem <=0.5  Susceptible     ESBL Confirmation Test NEG  Negative     Gentamicin >=16  Resistant     Levofloxacin 1  Susceptible     Meropenem <=0.25  Susceptible     Nitrofurantoin 32  Susceptible     Piperacillin + Tazobactam <=4  Susceptible     Tobramycin 8  Intermediate     Trimethoprim + Sulfamethoxazole >=320  Resistant           1 Cefazolin results may be used to predict the potential effectiveness of oral cephalosporins for treating uncomplicated urinary tract infections.       Linear View                   Radiology:   Imaging Results (last 72 hours)     Procedure Component Value Units Date/Time    XR Chest PA & Lateral [[id number]] Collected:  02/27/19 1426     Updated:  02/27/19 1433    Narrative:       EXAM: XR CHEST PA AND LATERAL- - 2/27/2019 1:32 PM CST     HISTORY: neutropenic fever       COMPARISON: 12/21/2018.      TECHNIQUE:  2 images.  Frontal and lateral views of the chest.     FINDINGS:    Portable overlies the left chest with intact catheter, tip projects at  the lower SVC.     No pneumothorax. Diffuse interstitial opacities. No focal consolidation.  Cardiac and mediastinal silhouette within normal limits. Calcified  aortic atherosclerosis. No acute bony findings.          Impression:       1. Is interstitial opacities, could represent atypical infection or  edema.  This report  was finalized on 02/27/2019 14:30 by Dr Valentina Feldman MD.          Assessment/Plan     Active Hospital Problems    Diagnosis   • **Neutropenic fever (CMS/HCC)       IMPRESSION:  1. Neutropenic fever-patient hemodynamically stable  Review of systems fairly unremarkable with the exception of now she is complaining of some urinary frequency and sore mouth..  She does have E. coli in her urine which certainly may be a source of infection.  She does have some chronic cough associated with eating however this is not worsening.  Overall fever curve improved.  2. 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 Hypokalemia- replaced with IV rounds this morning  4. Oral mucositis with lip lesions possibly consistent with herpes simplex               RECOMMENDATION:   · Discontinue levofloxacin  · Continue Zosyn  · Continue to monitor closely and reculture with temperature spikes  · Consider broadening antibiotic therapy if there is any change in her clinical status  · Add valacyclovir  · Add nystatin  · Continue miracle mouthwash.  Discussed with the patient that she may benefit from utilizing it right before eating        Sarah Robles MD  03/01/19  1:58 PM

## 2019-03-01 NOTE — PLAN OF CARE
Problem: Patient Care Overview  Goal: Plan of Care Review  Outcome: Ongoing (interventions implemented as appropriate)   03/01/19 9090   Coping/Psychosocial   Plan of Care Reviewed With patient   Plan of Care Review   Progress improving   OTHER   Outcome Summary neutropenic precautions continue. VSS. No c/o pain. IV antibiotics continue. Miracle mouthwash/ nyastatin mouthwash for pt's c/o sore mouth and lesions present. Up with assistance to BSC. 3 runs of Potassium today per protocol.        Problem: Infection, Risk/Actual (Adult)  Goal: Infection Prevention/Resolution  Outcome: Ongoing (interventions implemented as appropriate)

## 2019-03-01 NOTE — PLAN OF CARE
Problem: Patient Care Overview  Goal: Plan of Care Review  Outcome: Ongoing (interventions implemented as appropriate)    Goal: Discharge Needs Assessment   02/28/19 1133   Discharge Needs Assessment   Patient/Family Anticipates Transition to home with family   Transportation Concerns car, none   Transportation Anticipated family or friend will provide   Anticipated Changes Related to Illness none   Equipment Needed After Discharge wheelchair, manual   Disability   Equipment Currently Used at Home walker, rolling;shower chair;wheelchair       Problem: Fall Risk (Adult)  Goal: Absence of Fall  Outcome: Outcome(s) achieved Date Met: 03/01/19 03/01/19 0320   Fall Risk (Adult)   Absence of Fall achieves outcome       Problem: Infection, Risk/Actual (Adult)  Goal: Infection Prevention/Resolution  Outcome: Ongoing (interventions implemented as appropriate)   03/01/19 0320   Infection, Risk/Actual (Adult)   Infection Prevention/Resolution making progress toward outcome

## 2019-03-01 NOTE — PROGRESS NOTES
Continued Stay Note   [facility]     Patient Name: Kami Villanueva  MRN: [id number]  Today's Date: 3/1/2019    Admit Date: 2/27/2019    Discharge Plan     Row Name 03/01/19 0836       Plan    Plan  Home    Patient/Family in Agreement with Plan  yes    Plan Comments   Pt will return home with spouse as before. Dtr says patient gets extremely weak after chemo but on up swing again. Pt does have RX coverage. They do not feel HH care needed. They would benefit from a light weight W/C being ordered for home use, hers is very old/heavy from past family members. Will follow.           Discharge Codes    No documentation.             AARON Rebolledo

## 2019-03-02 LAB
ANION GAP SERPL CALCULATED.3IONS-SCNC: 5 MMOL/L (ref 4–13)
ANISOCYTOSIS BLD QL: ABNORMAL
BUN BLD-MCNC: 11 MG/DL (ref 5–21)
BUN/CREAT SERPL: 18 (ref 7–25)
CALCIUM SPEC-SCNC: 7.9 MG/DL (ref 8.4–10.4)
CHLORIDE SERPL-SCNC: 96 MMOL/L (ref 98–110)
CO2 SERPL-SCNC: 28 MMOL/L (ref 24–31)
CREAT BLD-MCNC: 0.61 MG/DL (ref 0.5–1.4)
DEPRECATED RDW RBC AUTO: 49.4 FL (ref 40–54)
ERYTHROCYTE [DISTWIDTH] IN BLOOD BY AUTOMATED COUNT: 15.9 % (ref 12–15)
GFR SERPL CREATININE-BSD FRML MDRD: 93 ML/MIN/1.73
GLUCOSE BLD-MCNC: 73 MG/DL (ref 70–100)
HCT VFR BLD AUTO: 25.9 % (ref 37–47)
HGB BLD-MCNC: 8.5 G/DL (ref 12–16)
LYMPHOCYTES # BLD MANUAL: 0.19 10*3/MM3 (ref 0.72–4.86)
LYMPHOCYTES NFR BLD MANUAL: 6 % (ref 15–45)
LYMPHOCYTES NFR BLD MANUAL: 9 % (ref 4–12)
MCH RBC QN AUTO: 28.1 PG (ref 28–32)
MCHC RBC AUTO-ENTMCNC: 32.8 G/DL (ref 33–36)
MCV RBC AUTO: 85.8 FL (ref 82–98)
MONOCYTES # BLD AUTO: 0.29 10*3/MM3 (ref 0.19–1.3)
MYELOCYTES NFR BLD MANUAL: 2 % (ref 0–0)
NEUTROPHILS # BLD AUTO: 2.64 10*3/MM3 (ref 1.87–8.4)
NEUTROPHILS NFR BLD MANUAL: 77 % (ref 39–78)
NEUTS BAND NFR BLD MANUAL: 5 % (ref 0–10)
PLATELET # BLD AUTO: 74 10*3/MM3 (ref 130–400)
PMV BLD AUTO: 12.1 FL (ref 6–12)
POIKILOCYTOSIS BLD QL SMEAR: ABNORMAL
POTASSIUM BLD-SCNC: 3.4 MMOL/L (ref 3.5–5.3)
RBC # BLD AUTO: 3.02 10*6/MM3 (ref 4.2–5.4)
SMALL PLATELETS BLD QL SMEAR: ABNORMAL
SODIUM BLD-SCNC: 129 MMOL/L (ref 135–145)
VARIANT LYMPHS NFR BLD MANUAL: 1 % (ref 0–5)
WBC MORPH BLD: NORMAL
WBC NRBC COR # BLD: 3.22 10*3/MM3 (ref 4.8–10.8)

## 2019-03-02 PROCEDURE — 80048 BASIC METABOLIC PNL TOTAL CA: CPT | Performed by: PHYSICIAN ASSISTANT

## 2019-03-02 PROCEDURE — 94799 UNLISTED PULMONARY SVC/PX: CPT

## 2019-03-02 PROCEDURE — 25010000003 CEFAZOLIN 1-4 GM/50ML-% SOLUTION: Performed by: INTERNAL MEDICINE

## 2019-03-02 PROCEDURE — 63710000001 DIPHENHYDRAMINE PER 50 MG: Performed by: PHYSICIAN ASSISTANT

## 2019-03-02 PROCEDURE — 85007 BL SMEAR W/DIFF WBC COUNT: CPT | Performed by: PHYSICIAN ASSISTANT

## 2019-03-02 PROCEDURE — 25010000002 PIPERACILLIN SOD-TAZOBACTAM PER 1 G: Performed by: INTERNAL MEDICINE

## 2019-03-02 PROCEDURE — 94760 N-INVAS EAR/PLS OXIMETRY 1: CPT

## 2019-03-02 PROCEDURE — 25010000003 POTASSIUM CHLORIDE PER 2 MEQ: Performed by: PHYSICIAN ASSISTANT

## 2019-03-02 PROCEDURE — 25810000003 SODIUM CHLORIDE 0.9 % WITH KCL 20 MEQ 20-0.9 MEQ/L-% SOLUTION: Performed by: PHYSICIAN ASSISTANT

## 2019-03-02 PROCEDURE — 85025 COMPLETE CBC W/AUTO DIFF WBC: CPT | Performed by: PHYSICIAN ASSISTANT

## 2019-03-02 PROCEDURE — 25010000002 ENOXAPARIN PER 10 MG: Performed by: PHYSICIAN ASSISTANT

## 2019-03-02 PROCEDURE — 25010000002 FILGRASTIM PER 480 MCG: Performed by: PHYSICIAN ASSISTANT

## 2019-03-02 RX ORDER — DIPHENHYDRAMINE HCL 25 MG
50 CAPSULE ORAL NIGHTLY PRN
Status: DISCONTINUED | OUTPATIENT
Start: 2019-03-02 | End: 2019-03-12 | Stop reason: HOSPADM

## 2019-03-02 RX ORDER — CEFAZOLIN SODIUM 1 G/50ML
1 INJECTION, SOLUTION INTRAVENOUS EVERY 8 HOURS
Status: DISCONTINUED | OUTPATIENT
Start: 2019-03-02 | End: 2019-03-03

## 2019-03-02 RX ADMIN — LIDOCAINE HYDROCHLORIDE 30 ML: 20 SOLUTION ORAL; TOPICAL at 03:04

## 2019-03-02 RX ADMIN — VALACYCLOVIR 500 MG: 500 TABLET, FILM COATED ORAL at 13:14

## 2019-03-02 RX ADMIN — CEFAZOLIN SODIUM 1 G: 1 INJECTION, SOLUTION INTRAVENOUS at 22:04

## 2019-03-02 RX ADMIN — CLOTRIMAZOLE 10 MG: 10 LOZENGE ORAL; TOPICAL at 10:28

## 2019-03-02 RX ADMIN — ENOXAPARIN SODIUM 40 MG: 40 INJECTION SUBCUTANEOUS at 16:05

## 2019-03-02 RX ADMIN — CEFAZOLIN SODIUM 1 G: 1 INJECTION, SOLUTION INTRAVENOUS at 13:13

## 2019-03-02 RX ADMIN — DIPHENHYDRAMINE HYDROCHLORIDE 50 MG: 25 CAPSULE ORAL at 21:55

## 2019-03-02 RX ADMIN — LEVOTHYROXINE SODIUM 125 MCG: 125 TABLET ORAL at 05:56

## 2019-03-02 RX ADMIN — CLOTRIMAZOLE 10 MG: 10 LOZENGE ORAL; TOPICAL at 17:25

## 2019-03-02 RX ADMIN — CLOTRIMAZOLE 10 MG: 10 LOZENGE ORAL; TOPICAL at 13:14

## 2019-03-02 RX ADMIN — CLOTRIMAZOLE 10 MG: 10 LOZENGE ORAL; TOPICAL at 21:55

## 2019-03-02 RX ADMIN — CLOTRIMAZOLE 10 MG: 10 LOZENGE ORAL; TOPICAL at 06:00

## 2019-03-02 RX ADMIN — POTASSIUM CHLORIDE 20 MEQ: 29.8 INJECTION, SOLUTION INTRAVENOUS at 10:27

## 2019-03-02 RX ADMIN — ACETAMINOPHEN 650 MG: 325 TABLET, FILM COATED ORAL at 21:55

## 2019-03-02 RX ADMIN — CARVEDILOL 3.12 MG: 3.12 TABLET, FILM COATED ORAL at 17:25

## 2019-03-02 RX ADMIN — VALACYCLOVIR 500 MG: 500 TABLET, FILM COATED ORAL at 21:55

## 2019-03-02 RX ADMIN — FILGRASTIM 480 MCG: 480 INJECTION, SOLUTION INTRAVENOUS; SUBCUTANEOUS at 18:04

## 2019-03-02 RX ADMIN — POTASSIUM CHLORIDE 20 MEQ: 29.8 INJECTION, SOLUTION INTRAVENOUS at 09:30

## 2019-03-02 RX ADMIN — POTASSIUM CHLORIDE AND SODIUM CHLORIDE 50 ML/HR: 900; 150 INJECTION, SOLUTION INTRAVENOUS at 22:04

## 2019-03-02 RX ADMIN — CARVEDILOL 3.12 MG: 3.12 TABLET, FILM COATED ORAL at 09:22

## 2019-03-02 RX ADMIN — OXYBUTYNIN CHLORIDE 5 MG: 5 TABLET, EXTENDED RELEASE ORAL at 09:21

## 2019-03-02 RX ADMIN — TAZOBACTAM SODIUM AND PIPERACILLIN SODIUM 4.5 G: 500; 4 INJECTION, SOLUTION INTRAVENOUS at 05:56

## 2019-03-02 RX ADMIN — VALACYCLOVIR 500 MG: 500 TABLET, FILM COATED ORAL at 05:56

## 2019-03-02 RX ADMIN — SODIUM CHLORIDE, PRESERVATIVE FREE 3 ML: 5 INJECTION INTRAVENOUS at 09:21

## 2019-03-02 RX ADMIN — DIPHENHYDRAMINE HYDROCHLORIDE 25 MG: 25 CAPSULE ORAL at 00:06

## 2019-03-02 NOTE — PROGRESS NOTES
INFECTIOUS DISEASES PROGRESS NOTE    Patient:  Kami Villanueva  YOB: 1934  MRN: [id number]   Admit date: 2/27/2019   Admitting Physician: Alexander Osborn*  Primary Care Physician: Kvng Strong MD    Chief Complaint: Still with sore mouth        Interval History: The patient reports that her mouth continues to be sore.  She does not think it is any worse but not sure if it is any better.        The patient denies any dysuria but does have continued urinary urgency.    She denies any diarrhea.      Allergies:   Allergies   Allergen Reactions   • Alendronate Other (See Comments)     Pt does not know  FOSAMAX   • Lisinopril Cough   • Statins Other (See Comments) and Myalgia     Other     • Sulfa Antibiotics Other (See Comments)     Pt does not remember   • Teriparatide (Recombinant) Other (See Comments)     Pt does not know  FORTEO       Current Meds:     Current Facility-Administered Medications:   •  acetaminophen (TYLENOL) tablet 650 mg, 650 mg, Oral, Q6H PRN, Prashanth Chambers PA, 650 mg at 02/27/19 2143  •  carvedilol (COREG) tablet 3.125 mg, 3.125 mg, Oral, BID With Meals, Prashanth Chambers PA, 3.125 mg at 03/02/19 0922  •  clotrimazole (MYCELEX) colton 10 mg, 10 mg, Oral, 5x Daily, Sarah Robles MD, 10 mg at 03/02/19 1028  •  diphenhydrAMINE (BENADRYL) capsule 25 mg, 25 mg, Oral, Nightly PRN, Prashanth Chambers PA, 25 mg at 03/01/19 2201  •  diphenhydrAMINE (BENADRYL) capsule 50 mg, 50 mg, Oral, Nightly PRN, Prashanth Chambers PA  •  enoxaparin (LOVENOX) syringe 40 mg, 40 mg, Subcutaneous, Q24H, Prashanth Chambers PA, 40 mg at 03/01/19 1424  •  filgrastim (NEUPOGEN) injection 480 mcg, 480 mcg, Subcutaneous, Daily, Prashanth Chambers PA, 480 mcg at 03/01/19 1841  •  levothyroxine (SYNTHROID, LEVOTHROID) tablet 125 mcg, 125 mcg, Oral, Q AM, Prashanth Chambers PA, 125 mcg at 03/02/19 0556  •  lidocaine-Maalox-diphenhydramine (MIRACLE MOUTHWASH) oral suspension 30 mL, 30 mL, Oral, 4x Daily AC & at  "Bedtime PRN, Sarah Robles MD, 30 mL at 19 0304  •  ondansetron (ZOFRAN) tablet 8 mg, 8 mg, Oral, Q8H PRN, Prashanth Chambers PA  •  oxybutynin XL (DITROPAN-XL) 24 hr tablet 5 mg, 5 mg, Oral, Daily, Prashanth Chambers PA, 5 mg at 19 0921  •  piperacillin-tazobactam (ZOSYN) 4.5 g in iso-osmotic dextrose 100 mL IVPB (premix), 4.5 g, Intravenous, Q8H, Alexander Osborn MD, Last Rate: 0 mL/hr at 19 1020, 4.5 g at 19 0556  •  potassium chloride 20 mEq in 50 mL IVPB, 20 mEq, Intravenous, Q1H PRN, Prashanth Chambers PA, Last Rate: 50 mL/hr at 19 1027, 20 mEq at 19 1027  •  sodium chloride 0.9 % flush 3 mL, 3 mL, Intravenous, Q12H, Prashanth Chambers PA, 3 mL at 19 0921  •  sodium chloride 0.9 % flush 3-10 mL, 3-10 mL, Intravenous, PRN, Prashanth Chambers PA  •  sodium chloride 0.9 % with KCl 20 mEq/L infusion, 50 mL/hr, Intravenous, Continuous, Prashanth Chambers PA, Last Rate: 50 mL/hr at 19 1715, 50 mL/hr at 19 1715  •  valACYclovir (VALTREX) tablet 500 mg, 500 mg, Oral, Q8H, Sarah Robles MD, 500 mg at 19 0556      Review of Systems   Constitutional: Positive for fever (Low-grade). Negative for chills.   HENT: Positive for mouth sores.    Eyes: Negative for redness.   Respiratory: Negative for shortness of breath.    Cardiovascular: Negative for chest pain.   Gastrointestinal: Negative for diarrhea.   Genitourinary: Positive for urgency.   Skin: Negative for rash.   Allergic/Immunologic: Positive for immunocompromised state.   Hematological: Bruises/bleeds easily.            Objective     Vital Signs:  Temp (24hrs), Av.2 °F (37.3 °C), Min:98.8 °F (37.1 °C), Max:99.6 °F (37.6 °C)      /58 (BP Location: Left arm, Patient Position: Lying)   Pulse 82   Temp 99 °F (37.2 °C)   Resp 16   Ht 170.2 cm (67.01\")   Wt 64.1 kg (141 lb 6.4 oz)   LMP  (LMP Unknown)   SpO2 93%   BMI 22.14 kg/m²         Physical Exam   General: The patient is elderly lying " in bed in no acute distress  HEENT: Sclera anicteric and noninjected, oropharynx n without classic lesions of thrush.  She has a Mycelex lozenge on her tongue..  She has some buccal mucosal erosions bilaterally and lip lesions with superficial vesicles lip lesions have improved somewhat there does not appear to be any progression.  Neck: Supple  Heart: S1-S2 rate is regular  Respiratory: Effort even and unlabored abdomen: Soft, nontender, nondistended, bowel sounds are positive  Extremities: No pitting edema  Psych: She is pleasant cooperative  Neuro: Alert and oriented, answers questions appropriately.  Line/IV site: CCsubclavian left, condition patent and no redness    Results Review:    I reviewed the patient's new clinical results.    Lab Results:  CBC:   Lab Results   Lab 02/27/19  1325 02/28/19  0500 03/01/19  0434 03/02/19  0701   WBC 0.17* 0.22* 0.42* 3.22*   HEMOGLOBIN 9.2* 7.6* 8.1* 8.5*   HEMATOCRIT 27.8* 22.7* 24.4* 25.9*   PLATELETS 115* 81* 71* 74*   LYMPHOCYTE % 65.0*  --   --  6.0*   MONOCYTES % 8.0  --   --  9.0         CMP:   Lab Results   Lab 02/27/19  1325 02/27/19  1622 03/01/19  0434 03/01/19  1659 03/02/19  0701   SODIUM 130* 128* 131*  --  129*   POTASSIUM 3.3* 3.3* 2.5* 3.5 3.4*   CHLORIDE 90* 91* 96*  --  96*   CO2 30.0 32.0* 32.0*  --  28.0   BUN 19 19 14  --  11   CREATININE 0.50 0.46* 0.53  --  0.61   CALCIUM 8.5 8.1* 7.9*  --  7.9*   BILIRUBIN 0.4 0.4  --   --   --    ALK PHOS 56 53  --   --   --    ALT (SGPT) 24 26  --   --   --    AST (SGOT) 25 21  --   --   --    GLUCOSE 115* 106* 76  --  73         Culture Results:      Urine Culture   Date Value Ref Range Status   02/27/2019 >100,000 CFU/mL Escherichia coli (A)  Final       Microbiology Results Abnormal     Procedure Component Value - Date/Time    Blood Culture With REBECA - Blood, Arm, Left [[id number]] Collected:  02/27/19 2141    Lab Status:  Preliminary result Specimen:  Blood from Arm, Left Updated:  03/01/19 2200     Blood  Culture No growth at 2 days    Blood Culture With REBECA - Blood, Blood, Venous Line [[id number]] Collected:  02/27/19 2142    Lab Status:  Preliminary result Specimen:  Blood, Venous Line Updated:  03/01/19 2200     Blood Culture No growth at 2 days        02/27/2019 0900  03/01/2019 0849  Urine Culture - Urine, Urine, Clean Catch [[id number]]    (Abnormal)   Urine, Clean Catch     Final result  Urine Culture >100,000 CFU/mL Escherichia coli Abnormal        Susceptibility      Escherichia coli     ALFREDO     Ampicillin >=32  Resistant     Ampicillin + Sulbactam >=32  Resistant     Cefazolin <=4  Susceptible1     Cefepime <=1  Susceptible     Ceftriaxone <=1  Susceptible     Ertapenem <=0.5  Susceptible     ESBL Confirmation Test NEG  Negative     Gentamicin >=16  Resistant     Levofloxacin 1  Susceptible     Meropenem <=0.25  Susceptible     Nitrofurantoin 32  Susceptible     Piperacillin + Tazobactam <=4  Susceptible     Tobramycin 8  Intermediate     Trimethoprim + Sulfamethoxazole >=320  Resistant           1 Cefazolin results may be used to predict the potential effectiveness of oral cephalosporins for treating uncomplicated urinary tract infections.       Linear View                   Radiology:   Imaging Results (last 72 hours)     Procedure Component Value Units Date/Time    XR Chest PA & Lateral [[id number]] Collected:  02/27/19 1426     Updated:  02/27/19 1433    Narrative:       EXAM: XR CHEST PA AND LATERAL- - 2/27/2019 1:32 PM CST     HISTORY: neutropenic fever       COMPARISON: 12/21/2018.      TECHNIQUE:  2 images.  Frontal and lateral views of the chest.     FINDINGS:    Portable overlies the left chest with intact catheter, tip projects at  the lower SVC.     No pneumothorax. Diffuse interstitial opacities. No focal consolidation.  Cardiac and mediastinal silhouette within normal limits. Calcified  aortic atherosclerosis. No acute bony findings.          Impression:       1. Is interstitial opacities, could  represent atypical infection or  edema.  This report was finalized on 02/27/2019 14:30 by Dr Valentina Feldman MD.          Assessment/Plan     Active Hospital Problems    Diagnosis   • **Neutropenic fever (CMS/HCC)       IMPRESSION:  1. Neutropenic fever-patient hemodynamically stable  overall fever curve improved.  2. E. coli urinary tract infection  3. 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 Hypokalemia- improved after intravenous replacement   5. oral mucositis with lip lesions possibly consistent with herpes simplex-no progression         RECOMMENDATION:   · discontinue Zosyn  · Narrow to cefazolin for urinary tract infection-should be able to transition to oral Keflex for discharge-noted possible potential discharge tomorrow if patient continues to do well.  · Continue to monitor closely and reculture with temperature spikes  · continue valacyclovir  · Continue miracle mouthwash.  Discussed with the patient that she may benefit from utilizing it right before eating        Sarah Robles MD  03/02/19  11:51 AM

## 2019-03-02 NOTE — PLAN OF CARE
Problem: Patient Care Overview  Goal: Plan of Care Review  Outcome: Ongoing (interventions implemented as appropriate)   03/02/19 0404   Coping/Psychosocial   Plan of Care Reviewed With patient;family   Plan of Care Review   Progress no change   OTHER   Outcome Summary VSS, bp slightly elevated, c/o sore mouth, miracle mouthwas given, chapstick to lips, IV abx continue, pt weak, up x 2 assist to BSC, had bm this shift, IVF infusing, continues on neutropenic precautions, sleep off/on last pm, had 25mg Benadryl then a one time additional 25mg Benadryl       Problem: Fall Risk (Adult)  Goal: Absence of Fall  Outcome: Ongoing (interventions implemented as appropriate)      Problem: Infection, Risk/Actual (Adult)  Goal: Infection Prevention/Resolution  Outcome: Ongoing (interventions implemented as appropriate)      Problem: Skin Injury Risk (Adult)  Goal: Skin Health and Integrity  Outcome: Ongoing (interventions implemented as appropriate)

## 2019-03-02 NOTE — PROGRESS NOTES
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   Kami received cycle #6 of CHOP/R and Dose #11 of prophylactic intrathecal methotrexate 2019.  Kami was brought in by family ( and 2 daughters) with a temperature elevation to 100.8°. Recheck temperature here is 101°.  CBC reveals a WBC of 0.19 with an ANC of 0.06  Hemoglobin of 8.7 and platelet count of 209,000.  On review of systems, she has only lost 1 pound in the last week, she feels about baseline other than being weak and tired and experienced nausea and vomiting on the one-hour drive from home to the clinic.  She does not have a headache or cough, or burning on urination or sputum production.  She is being evaluated in anticipation of admission to the hospital for neutropenia and fever.      TARGET LYMPHOMA SITES:  1. Right maxillary sinus with local invasion to the floor of the right orbit   2.  on 10/10/18   3. borderline spleen size, 13 x 4.8 x 11.8 cm  4. Bone marrow negative on 10/11/18   5. CSF specimen via Omaya port negative on 10/24/18  TUMOR HISTORY: Right maxillary sinus high-grade diffuse large B-cell lymphoma 2018  Kami was treated for hyperthyroidism with radioactive iodine at about age 35. About 45 years ago she underwent a thyroidectomy followed subsequently by a vocal cord medialization procedure by Dr. Crane in [facility].  Several years ago she had right maxillary sinus surgery by Dr. Garcia.  Kami developed upper respiratory and sinus symptoms in the spring of 2018.  She was seen and treated at [facility] urgent care on 3/13/2018 for bronchitis.   She was then seen in  followup by her PCP, Dr. Kvng Strong on 4/27/2018. He felt an abnormality in the neck and ordered a CT scan of the soft tissues of the neck and referred her to ENT for further evaluation of this.  A CT scan of the soft tissues of the neck with contrast on 5/7/2018 documented extensive mucosal thickening of the right maxillary sinus consistent with chronic sinusitis. There was also opacification of several of the ethmoid air cells and mucosal thickening in the nasal cavity.  No neck mass or adenopathy was appreciated.   Kami was evaluated by Dr. Bong Felix on 5/30/2018 for evaluation of right neck pain and swelling.  Full ENT exam including nasopharyngeal area was within normal limits.  Review of the CT scan from 5/7/2018 was consistent with chronic sinusitis. Kami had had a medialization procedure by ENT and [place] earlier in the year for a vocal cord paralysis issue. It was felt that some of the changes on the CT scan were consistent with that procedure.  On 8/3/2018 Kami had a right upper posterior tooth pulled by an oral surgeon, Dr. Ortega in [facility] for what was felt to possibly be an infected tooth.  Kami was again seen at [facility] urgent care on 8/16/2018 with swelling, redness and tenderness in the right maxillary area with associated headaches not responding to antibiotics.   Plain film imaging studies and a CT scan of the area was performed.   Plain film x-rays of the facial bones on 8/16/2018 did not reveal acute abnormalities   CT scan of facial bones within without contrast on 8/16/2018 documented interval development of an enhancing soft tissue along the right maxilla extending along the anterior right maxillary sinus and along the floor of the right orbit measuring 2.4 cm in width. New erosive bony changes were noted involving the bony structure of the right orbital floor. The findings were concerning for a neoplastic process with erosive changes of the right orbital  floor with ENT consultation recommended.   Kami was seen by Kenny Mar PA-C with ENT on 8/20/2018 where the above CT scanning clinical findings were evaluated. Arrangements were made for a biopsy with Dr. Bong Felix.  On 9/17/2018, Dr. Bong Felix performed a nasal/sinus endoscopy with biopsy.  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 The cells are polymorphic with maintaining medium sized to large cells with irregular nuclear contour.  On flow cytometry, the monoclonal kappa B cell population does not express CD5 or CD10, but are low viability.  The immunostains, show a diffuse B-cell infiltrate positive for CD20, PAX-5 and BCL 6, but negative for CD5, CD10, BCL-2, cyclin D1 and CD43.  <30% of the cells were positive for Mum-1. Ki-67 showed a high proliferative rate of about 80%.  All of the above is consistent with a diffuse large B-cell lymphoma of the GBC type.  A CT scan of the soft tissues of the neck with contrast on 10/3/2018 was compared to the CT scan of the face on 8/16/2018 and 5/7/2018.  There had been significant progression of disease with interval increase in the size of the right buccal space hyperdense mass measuring approximately at least 7 x 2 cm on axial cuts.   There is involvement of the medial and lateral pterigoid muscle. The right temporalis muscle was also inseparable from the mass with involvement as well of the pterygomaxillary fissure, pterygopalatine fossa and sphenopalatine foramen.  Local cortex erosive the structure and was also noted along the maxillary gingival surface.  Involvement of the roof of the right maxillary sinus with progression of the erosive disease along the floor of the right orbit was also again identified. An asymmetric soft tissue density was also noted at the location of the right infraorbital area.  There was no evidence of involvement of the extraocular muscles nor do the lacrimal glands  demonstrate a discrete mass.  The lacrimal glands do not demonstrate a discrete mass.  Disease extends along the right lateral nasal cavity and ethmoid air cells. There is now disease along the right sphenoid sinus inseparable from the right foramen rotundum with probable involvement of the maxillary branch of V5.  Evidence of prior instrumentation with prior middle turbinectomies, uncinate to measles, antrostomies, and partial ethmoidectomies is also suspected based on the radiographic imaging.  The cavernous sinuses are symmetric.  Meckel's caves are symmetric.   There is now a right level one be lymphadenopathy by size criteria, and abnormal clustering with lymph node measuring up to 1.5 cm.  Otherwise the rest of the oral cavity and head and neck area did not demonstrate evidence of discrete mass.  Apparent right vocal cord paralysis would medialization of the true vocal cord with anteromedial rotation of the right was also described.   CT scan of the chest with and without contrast on 10/3/2018 did not reveal evidence of thoracic lymphadenopathy.  Tiny 2 mm nodules were noted in the subpleural left lung, likely to be benign with a 1 year followup recommended   CT scan of the abdomen and pelvis with contrast on 10/3/2018 did not reveal evidence of metastatic disease nor abdominal lymphadenopathy. The spleen was of borderline size measuring 13 cm x 4.8 cm x 11.8 cm.  Physical examination on 10/10/2018 revealed a very enlarged and swollen right maxillary facial area with induration of soft tissues around the upper teeth on the right and face.  No palpable peripheral lymphadenopathy was able to be appreciated in the head and neck area, epitrochlear and axillary areas, inguinal areas. No evidence of palpable splenomegaly or other abdominal findings were encountered on exam.  Bone marrow aspirate and biopsy on 10/11/18 showed no evidence of involvement by diffuse large B-cell lymphoma. FLOW cytometry negative.  MRI  of the brain w/wo contrast 10/11/18 at [facility] was consistent with abnormal soft tissue mass involving the right  and buccal space extending along the anterior margin of the right maxillary sinus contiguous with the floor of the right orbit. There was no definite invasion into the orbit, nor displacement of the inferior rectus musculature.  PET scan 10/12/18 at [facility] showed hypermetabolic activity associated with the right buccal space and  space mass, SUV 18.5. There is no distant focus of hypermetabolic activity seen to suggest other sites of involvement.  Echocardiogram 10/12/18 at [facility] revealed an estimated LVEF of 60%.   Hepatitis B and C were negative.  Hepatitis A IgM was positive and discussed with Dr. Osborn.  Left chest mediport was placed 10/18/18 by Dr. ANSON Mcgrath.  Right ommaya port was placed 10/22/18 by Dr. ANURADHA Greer.  CSF specimen was obtained via ommaya port by Dr. Osborn, sent for cytology and FLOW prior to initiation of Cycle #1 CHOP-R initiated on 10/24/18 was negative.     TREATMENT SUMMARY:  1. Cycle #1 CHOP-R initiated 10/24/18,   2. Dose #1 prophylactic intrathecal methotrexate (FLOW cytometry on CSF negative) given 10/29/18. Dose #2 on 11/14/18.   3. Anticipate consolidative XRT.     TARGET BREAST CANCER SITES:  1. Right mastectomy 09/23/04 for DCIS     TUMOR HISTORY: Right DCIS 09/23/04  On 09/23/04, Kami had a right simple mastectomy by Dr. 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   The ER was 95%, NH was 13%.   No further specific therapy was indicated for this cancer other than close follow up of the area and the opposite breast.     TREATMENT SUMMARY:  1. Right mastectomy and 9/23/2004 for DCIS    REVIEW OF SYSTEMS:    Constitutional: positive for fever on admission but now afebrile  HEENT: no blurring of vision, no double vision; positive for mildly sore mouth                       Lungs: no hemoptysis, no cough  CVS: no palpitation, no chest pain  GI:  no abdominal pain, no constipation; no nausea  AMOS: no dysuria, frequency and urgency, no hematuria  Musculoskeletal: no joint pain, swelling , stiffness  Endocrine: no polyuria, polydypsia, no cold or heat intolerence; positive for chronic thyroid replacement  Hematology: positive for pancytopenia from chemotherapy  Dermatology: no skin rash, no eczema, no pruritis  Psychiatry: no suicide ideation  Neurology: no syncope, no seizures; positive for Ommaya with IT chemotherapy     OBJECTIVE:    Vitals:    03/02/19 0741   BP: 143/64   Pulse: 83   Resp: 16   Temp: 99.4 °F (37.4 °C)   SpO2: 93%       Intake/Output Summary (Last 24 hours) at 3/2/2019 0839  Last data filed at 3/1/2019 1700  Gross per 24 hour   Intake 371 ml   Output --   Net 371 ml     PHYSICAL EXAM:    CONSTITUTIONAL: NAD  EYES: Nonicteric  ENT: No overt thrush-minimal erythema  NECK: Supple, no masses   CHEST/LUNGS: mostly clear, normal respiratory effort   CARDIOVASCULAR: RRR, no murmurs  ABDOMEN: soft non-tender, active bowel sounds, no HSM  EXTREMITIES: warm, moves all fours  SKIN: warm, dry with no rashes or lesions.  port left chest wall without erythema, Ommaya right scalp without erythema  LYMPH: No cervical, clavicular, axillary, or inguinal lymphadenopathy  NEUROLOGIC: follows commands, non focal   PSYCH: mood and affect appropriate    CBC  Results from last 7 days   Lab Units 03/02/19  0701 03/01/19  0434 02/28/19  0500   WBC 10*3/mm3 3.22* 0.42* 0.22*   HEMOGLOBIN g/dL 8.5* 8.1* 7.6*   HEMATOCRIT % 25.9* 24.4* 22.7*   PLATELETS 10*3/mm3 74* 71* 81*         Lab Results   Component Value Date     (L) 03/02/2019    K 3.4 (L) 03/02/2019    CL 96 (L) 03/02/2019    CO2 28.0 03/02/2019    BUN 11 03/02/2019    CREATININE 0.61 03/02/2019    GLUCOSE 73 03/02/2019    CALCIUM 7.9 (L) 03/02/2019    BILITOT 0.4 02/27/2019    ALKPHOS 53 02/27/2019    AST 21 02/27/2019    ALT 26 02/27/2019    AGRATIO 1.5 02/27/2019    GLOB 2.1 02/27/2019       Lab  Results   Component Value Date    INR 0.97 09/10/2018    PROTIME 13.2 09/10/2018       Cultures:    Lab Results   Component Value Date    BLOODCX No growth at 2 days 02/27/2019     No components found for: URINCX    ASSESSMENT/PLAN:  1.  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   S/p cycle #6 of CHOP/R  2/20/19  (C#6 D#11 today)  S/p dose #11 of prophylactic intrathecal methotrexate 2/20/19      2. Neutropenic fever -neutropenia from chemotherapy - E Coli UTI    WBC - 3.22 with ANC pending   - neupogen 480 daily continues  Temp max recorded last 24 hrs 101.1 at 8:21 PM - afebrile since 1244 - 2/28/19    B/p  143/64 this am    Urine cx 2/27/19 - > 100,000 CFU/mL E. Coli  Blood culture x 2 2/27/19 -no growth at 2 days  Repeat blood culture port/peripheral 2/27/19 p.m. - no growth at 2 days  CSF 2/27/19 - gram stain negative-culture pending - no growth at 3 days    CXR 2/27/19 -  diffuse interstitial opacities    Zosyn 4.5 g IV every 8 hours    Valacyclovir 500 mg PO every 8 hrs    Appreciate Dr. Gruber Stout assistance      3.  Anemia from chemotherapy  HGB - 8.5 - stable - s/p 1 unit p RBC 2/28/19      4.  Thrombocytopenia from chemotherapy  PLT - 74,000 - starting to improve on their own    5.  DVT prophylaxis    - Lovenox 40 subq daily - monitor plt  - crt 0.61    5.  Hypokalemia    - K 3.4 after KCL runs -2 additional K runs planned for this morning recheck potassium in the morning      Discussed with daughter    Disposition  Anticipate discharge tomorrow itself Dr. Mendoza feels it will be okay and her CBC has continue to improve    ELLA Jiménez    03/02/19  8:39 AM     I personally saw and examined this patient, performing a face-to-face diagnostic evaluation with plan of care reviewed and developed with  Prashanth Chambers PA-C and nursing staff.   I have addended and/or modified the above history of present illness, physical  examination, and assessment and plan to reflect my findings and impressions.   Essential elements of the care plan were discussed with  Prashanth Chambers PA-C .   Agree with findings and assessment/plan as documented above.   Questions were encouraged, asked and answered to their understanding and satisfaction.    Alexander Osborn MD  3/2/2019 10:02 AM

## 2019-03-03 ENCOUNTER — APPOINTMENT (OUTPATIENT)
Dept: GENERAL RADIOLOGY | Facility: HOSPITAL | Age: 84
End: 2019-03-03

## 2019-03-03 LAB
ANION GAP SERPL CALCULATED.3IONS-SCNC: 5 MMOL/L (ref 4–13)
ANISOCYTOSIS BLD QL: ABNORMAL
BUN BLD-MCNC: 8 MG/DL (ref 5–21)
BUN/CREAT SERPL: 13.1 (ref 7–25)
CALCIUM SPEC-SCNC: 8.2 MG/DL (ref 8.4–10.4)
CHLORIDE SERPL-SCNC: 99 MMOL/L (ref 98–110)
CO2 SERPL-SCNC: 29 MMOL/L (ref 24–31)
CREAT BLD-MCNC: 0.61 MG/DL (ref 0.5–1.4)
DEPRECATED RDW RBC AUTO: 51.4 FL (ref 40–54)
ERYTHROCYTE [DISTWIDTH] IN BLOOD BY AUTOMATED COUNT: 16.2 % (ref 12–15)
GFR SERPL CREATININE-BSD FRML MDRD: 93 ML/MIN/1.73
GLUCOSE BLD-MCNC: 68 MG/DL (ref 70–100)
HCT VFR BLD AUTO: 29.5 % (ref 37–47)
HGB BLD-MCNC: 9.6 G/DL (ref 12–16)
LYMPHOCYTES # BLD MANUAL: 0.61 10*3/MM3 (ref 0.72–4.86)
LYMPHOCYTES NFR BLD MANUAL: 4 % (ref 15–45)
LYMPHOCYTES NFR BLD MANUAL: 7.1 % (ref 4–12)
MCH RBC QN AUTO: 28.6 PG (ref 28–32)
MCHC RBC AUTO-ENTMCNC: 32.5 G/DL (ref 33–36)
MCV RBC AUTO: 87.8 FL (ref 82–98)
METAMYELOCYTES NFR BLD MANUAL: 3 % (ref 0–0)
MONOCYTES # BLD AUTO: 1.08 10*3/MM3 (ref 0.19–1.3)
MYELOCYTES NFR BLD MANUAL: 6.1 % (ref 0–0)
NEUTROPHILS # BLD AUTO: 11.38 10*3/MM3 (ref 1.4–7)
NEUTROPHILS NFR BLD MANUAL: 60.6 % (ref 39–78)
NEUTS BAND NFR BLD MANUAL: 14.1 % (ref 0–10)
NEUTS VAC BLD QL SMEAR: ABNORMAL
NRBC SPEC MANUAL: 1 /100 WBC (ref 0–0)
PLATELET # BLD AUTO: 102 10*3/MM3 (ref 130–400)
PMV BLD AUTO: 12 FL (ref 6–12)
POIKILOCYTOSIS BLD QL SMEAR: ABNORMAL
POLYCHROMASIA BLD QL SMEAR: ABNORMAL
POTASSIUM BLD-SCNC: 3.5 MMOL/L (ref 3.5–5.3)
PROMYELOCYTES NFR BLD MANUAL: 5.1 % (ref 0–0)
RBC # BLD AUTO: 3.36 10*6/MM3 (ref 4.2–5.4)
SMALL PLATELETS BLD QL SMEAR: ABNORMAL
SODIUM BLD-SCNC: 133 MMOL/L (ref 135–145)
TOXIC GRANULATION: ABNORMAL
WBC NRBC COR # BLD: 15.22 10*3/MM3 (ref 4.8–10.8)

## 2019-03-03 PROCEDURE — 85007 BL SMEAR W/DIFF WBC COUNT: CPT | Performed by: PHYSICIAN ASSISTANT

## 2019-03-03 PROCEDURE — 25010000003 CEFAZOLIN 1-4 GM/50ML-% SOLUTION: Performed by: INTERNAL MEDICINE

## 2019-03-03 PROCEDURE — 94760 N-INVAS EAR/PLS OXIMETRY 1: CPT

## 2019-03-03 PROCEDURE — 85025 COMPLETE CBC W/AUTO DIFF WBC: CPT | Performed by: PHYSICIAN ASSISTANT

## 2019-03-03 PROCEDURE — 94799 UNLISTED PULMONARY SVC/PX: CPT

## 2019-03-03 PROCEDURE — 25010000003 POTASSIUM CHLORIDE PER 2 MEQ: Performed by: PHYSICIAN ASSISTANT

## 2019-03-03 PROCEDURE — 25010000002 CEFTRIAXONE PER 250 MG: Performed by: INTERNAL MEDICINE

## 2019-03-03 PROCEDURE — 25810000003 SODIUM CHLORIDE 0.9 % WITH KCL 20 MEQ 20-0.9 MEQ/L-% SOLUTION: Performed by: INTERNAL MEDICINE

## 2019-03-03 PROCEDURE — 80048 BASIC METABOLIC PNL TOTAL CA: CPT | Performed by: PHYSICIAN ASSISTANT

## 2019-03-03 PROCEDURE — 63710000001 DIPHENHYDRAMINE PER 50 MG: Performed by: PHYSICIAN ASSISTANT

## 2019-03-03 PROCEDURE — 71046 X-RAY EXAM CHEST 2 VIEWS: CPT

## 2019-03-03 PROCEDURE — 25010000002 ENOXAPARIN PER 10 MG: Performed by: PHYSICIAN ASSISTANT

## 2019-03-03 RX ADMIN — CARVEDILOL 3.12 MG: 3.12 TABLET, FILM COATED ORAL at 08:59

## 2019-03-03 RX ADMIN — POTASSIUM CHLORIDE 20 MEQ: 29.8 INJECTION, SOLUTION INTRAVENOUS at 14:49

## 2019-03-03 RX ADMIN — CLOTRIMAZOLE 10 MG: 10 LOZENGE ORAL; TOPICAL at 20:45

## 2019-03-03 RX ADMIN — POTASSIUM CHLORIDE 20 MEQ: 29.8 INJECTION, SOLUTION INTRAVENOUS at 16:25

## 2019-03-03 RX ADMIN — CLOTRIMAZOLE 10 MG: 10 LOZENGE ORAL; TOPICAL at 14:49

## 2019-03-03 RX ADMIN — CEFAZOLIN SODIUM 1 G: 1 INJECTION, SOLUTION INTRAVENOUS at 14:53

## 2019-03-03 RX ADMIN — LIDOCAINE HYDROCHLORIDE 30 ML: 20 SOLUTION ORAL; TOPICAL at 14:49

## 2019-03-03 RX ADMIN — CARVEDILOL 3.12 MG: 3.12 TABLET, FILM COATED ORAL at 18:08

## 2019-03-03 RX ADMIN — CLOTRIMAZOLE 10 MG: 10 LOZENGE ORAL; TOPICAL at 11:04

## 2019-03-03 RX ADMIN — VALACYCLOVIR 500 MG: 500 TABLET, FILM COATED ORAL at 14:49

## 2019-03-03 RX ADMIN — VALACYCLOVIR 500 MG: 500 TABLET, FILM COATED ORAL at 06:13

## 2019-03-03 RX ADMIN — OXYBUTYNIN CHLORIDE 5 MG: 5 TABLET, EXTENDED RELEASE ORAL at 08:59

## 2019-03-03 RX ADMIN — LEVOTHYROXINE SODIUM 125 MCG: 125 TABLET ORAL at 06:13

## 2019-03-03 RX ADMIN — VALACYCLOVIR 500 MG: 500 TABLET, FILM COATED ORAL at 20:45

## 2019-03-03 RX ADMIN — CEFAZOLIN SODIUM 1 G: 1 INJECTION, SOLUTION INTRAVENOUS at 06:13

## 2019-03-03 RX ADMIN — CLOTRIMAZOLE 10 MG: 10 LOZENGE ORAL; TOPICAL at 06:13

## 2019-03-03 RX ADMIN — CEFTRIAXONE SODIUM 1 G: 1 INJECTION, POWDER, FOR SOLUTION INTRAMUSCULAR; INTRAVENOUS at 16:25

## 2019-03-03 RX ADMIN — ENOXAPARIN SODIUM 40 MG: 40 INJECTION SUBCUTANEOUS at 14:50

## 2019-03-03 RX ADMIN — CLOTRIMAZOLE 10 MG: 10 LOZENGE ORAL; TOPICAL at 18:08

## 2019-03-03 RX ADMIN — DIPHENHYDRAMINE HYDROCHLORIDE 50 MG: 25 CAPSULE ORAL at 22:17

## 2019-03-03 RX ADMIN — POTASSIUM CHLORIDE AND SODIUM CHLORIDE 40 ML/HR: 900; 150 INJECTION, SOLUTION INTRAVENOUS at 22:18

## 2019-03-03 NOTE — PROGRESS NOTES
INFECTIOUS DISEASES PROGRESS NOTE    Patient:  Kami Villanueva  YOB: 1934  MRN: [id number]   Admit date: 2/27/2019   Admitting Physician: Alexander Osborn*  Primary Care Physician: Kvng Strong MD    Chief Complaint: Mouth is still sore but better        Interval History: The patient reports that her mouth continues to be sore.  Per family, she hasn't complained much about her mouth being sore.  Drinking fine. Doesn't want to eat much.    She states she is having some intermittent right side pain    Family states she has been coughing more.  She had chest x-ray obtained.    The patient denies any dysuria but does have continued urinary urgency.    She denies any diarrhea.    Had temperatures up to over 102 yesterday evening.  I was not called.  Spoke with Dr. Osborn and he was not called.    Allergies:   Allergies   Allergen Reactions   • Alendronate Other (See Comments)     Pt does not know  FOSAMAX   • Lisinopril Cough   • Statins Other (See Comments) and Myalgia     Other     • Sulfa Antibiotics Other (See Comments)     Pt does not remember   • Teriparatide (Recombinant) Other (See Comments)     Pt does not know  FORTEO       Current Meds:     Current Facility-Administered Medications:   •  acetaminophen (TYLENOL) tablet 650 mg, 650 mg, Oral, Q6H PRN, Prashanth Chambers PA, 650 mg at 03/02/19 2155  •  carvedilol (COREG) tablet 3.125 mg, 3.125 mg, Oral, BID With Meals, Prashanth Chambers PA, 3.125 mg at 03/03/19 0859  •  ceFAZolin (ANCEF) IVPB 1 g, 1 g, Intravenous, Q8H, Sarah Robles MD, 1 g at 03/03/19 0613  •  clotrimazole (MYCELEX) colton 10 mg, 10 mg, Oral, 5x Daily, Sarah Robles MD, 10 mg at 03/03/19 1104  •  diphenhydrAMINE (BENADRYL) capsule 25 mg, 25 mg, Oral, Nightly PRN, Prashanth Chambers PA, 25 mg at 03/01/19 2201  •  diphenhydrAMINE (BENADRYL) capsule 50 mg, 50 mg, Oral, Nightly PRN, Prashanth Chambers PA, 50 mg at 03/02/19 3790  •  enoxaparin (LOVENOX) syringe  "40 mg, 40 mg, Subcutaneous, Q24H, Prashanth Chambers PA, 40 mg at 19 1605  •  levothyroxine (SYNTHROID, LEVOTHROID) tablet 125 mcg, 125 mcg, Oral, Q AM, Prashanth Chambers PA, 125 mcg at 19 0613  •  lidocaine-Maalox-diphenhydramine (MIRACLE MOUTHWASH) oral suspension 30 mL, 30 mL, Oral, 4x Daily AC & at Bedtime PRN, Sarah Robles MD, 30 mL at 19 0304  •  ondansetron (ZOFRAN) tablet 8 mg, 8 mg, Oral, Q8H PRN, Prashanth Chambers PA  •  oxybutynin XL (DITROPAN-XL) 24 hr tablet 5 mg, 5 mg, Oral, Daily, Prashanth Chambers PA, 5 mg at 19 0859  •  potassium chloride 20 mEq in 50 mL IVPB, 20 mEq, Intravenous, Q1H PRN, Prashanth Chambers PA, Last Rate: 50 mL/hr at 19 1027, 20 mEq at 19 1027  •  sodium chloride 0.9 % flush 3 mL, 3 mL, Intravenous, Q12H, Prashanth Chambers PA, 3 mL at 19 0921  •  sodium chloride 0.9 % flush 3-10 mL, 3-10 mL, Intravenous, PRN, Prashanth Chambers PA  •  sodium chloride 0.9 % with KCl 20 mEq/L infusion, 40 mL/hr, Intravenous, Continuous, Alexander Osborn MD, Last Rate: 40 mL/hr at 19 1105, 40 mL/hr at 19 1105  •  valACYclovir (VALTREX) tablet 500 mg, 500 mg, Oral, Q8H, Sarah Robles MD, 500 mg at 19 0613      Review of Systems   Constitutional: Positive for fever. Negative for chills.   HENT: Positive for mouth sores.    Eyes: Negative for redness.   Respiratory: Positive for cough. Negative for shortness of breath.    Cardiovascular: Negative for chest pain.   Gastrointestinal: Negative for diarrhea.   Genitourinary: Positive for urgency.   Skin: Negative for rash.   Allergic/Immunologic: Positive for immunocompromised state.   Hematological: Bruises/bleeds easily.            Objective     Vital Signs:  Temp (24hrs), Av.6 °F (37.6 °C), Min:98.5 °F (36.9 °C), Max:102.2 °F (39 °C)      /62 (BP Location: Left arm, Patient Position: Lying)   Pulse 80   Temp 98.9 °F (37.2 °C) (Oral)   Resp 18   Ht 170.2 cm (67.01\")   Wt 64.5 " kg (142 lb 4 oz)   LMP  (LMP Unknown)   SpO2 94%   BMI 22.27 kg/m²         Physical Exam   General: The patient is elderly lying in bed in no acute distress  HEENT: Sclera anicteric and noninjected, oropharynx  without classic lesions of thrush.    She has some buccal mucosal erosions bilaterally that are healing and lip lesions with superficial vesicles - lip lesions have improved somewhat t but she is still quite tender when I move her lower lip.  Neck: Supple  Heart: S1-S2 rate is regular  Respiratory: Effort even and unlabored.  She has some faint crackles at the right base.  Abdomen soft, nontender, nondistended, bowel sounds are positive I did not appreciate any rebound or guarding  Psych: She is pleasant cooperative  Neuro: Alert and oriented, answers questions appropriately.    Line/IV site: CCsubclavian left, condition patent and no redness    Results Review:    I reviewed the patient's new clinical results.    Lab Results:  CBC:   Lab Results   Lab 02/27/19  1325 02/28/19  0500 03/01/19  0434 03/02/19  0701 03/03/19  0539   WBC 0.17* 0.22* 0.42* 3.22* 15.22*   HEMOGLOBIN 9.2* 7.6* 8.1* 8.5* 9.6*   HEMATOCRIT 27.8* 22.7* 24.4* 25.9* 29.5*   PLATELETS 115* 81* 71* 74* 102*   LYMPHOCYTE % 65.0*  --   --  6.0* 4.0*   MONOCYTES % 8.0  --   --  9.0 7.1         CMP:   Lab Results   Lab 02/27/19  1325 02/27/19  1622 03/01/19  0434 03/01/19  1659 03/02/19  0701 03/03/19  0539   SODIUM 130* 128* 131*  --  129* 133*   POTASSIUM 3.3* 3.3* 2.5* 3.5 3.4* 3.5   CHLORIDE 90* 91* 96*  --  96* 99   CO2 30.0 32.0* 32.0*  --  28.0 29.0   BUN 19 19 14  --  11 8   CREATININE 0.50 0.46* 0.53  --  0.61 0.61   CALCIUM 8.5 8.1* 7.9*  --  7.9* 8.2*   BILIRUBIN 0.4 0.4  --   --   --   --    ALK PHOS 56 53  --   --   --   --    ALT (SGPT) 24 26  --   --   --   --    AST (SGOT) 25 21  --   --   --   --    GLUCOSE 115* 106* 76  --  73 68*         Culture Results:      Urine Culture   Date Value Ref Range Status   02/27/2019 >100,000  CFU/mL Escherichia coli (A)  Final       Microbiology Results Abnormal     Procedure Component Value - Date/Time    Blood Culture With REBECA - Blood, Arm, Left [[id number]] Collected:  02/27/19 2141    Lab Status:  Preliminary result Specimen:  Blood from Arm, Left Updated:  03/02/19 2200     Blood Culture No growth at 3 days    Blood Culture With REBECA - Blood, Blood, Venous Line [[id number]] Collected:  02/27/19 2142    Lab Status:  Preliminary result Specimen:  Blood, Venous Line Updated:  03/02/19 2200     Blood Culture No growth at 3 days        02/27/2019 0900  03/01/2019 0849  Urine Culture - Urine, Urine, Clean Catch [[id number]]    (Abnormal)   Urine, Clean Catch     Final result  Urine Culture >100,000 CFU/mL Escherichia coli Abnormal        Susceptibility      Escherichia coli     ALFREDO     Ampicillin >=32  Resistant     Ampicillin + Sulbactam >=32  Resistant     Cefazolin <=4  Susceptible1     Cefepime <=1  Susceptible     Ceftriaxone <=1  Susceptible     Ertapenem <=0.5  Susceptible     ESBL Confirmation Test NEG  Negative     Gentamicin >=16  Resistant     Levofloxacin 1  Susceptible     Meropenem <=0.25  Susceptible     Nitrofurantoin 32  Susceptible     Piperacillin + Tazobactam <=4  Susceptible     Tobramycin 8  Intermediate     Trimethoprim + Sulfamethoxazole >=320  Resistant           1 Cefazolin results may be used to predict the potential effectiveness of oral cephalosporins for treating uncomplicated urinary tract infections.       Linear View                   Radiology:   XR Chest PA & Lateral [[id number]] Prashanth as Reviewed   Order Status: Completed Collected: 03/03/19 0948    Updated: 03/03/19 0952   Narrative:     EXAMINATION: XR CHEST PA AND LATERAL-     3/3/2019 9:39 AM CST     HISTORY: fever evaluate for pneumonia.     2 view chest x-ray compared with 2/27/2019.     Trace amount of pleural fluid.  New patchy infiltrate or atelectasis within the retrocardiac left lower  lobe.     The upper lobes are  clear.     There is diffuse underlying chronic interstitial disease.  Normal heart size.  Unchanged left subclavian port.     Summary:  1. Trace pleural fluid with patchy left lower lobe infiltrate or  atelectasis.  This report was finalized on 03/03/2019 09:49 by Dr. Jerrell Negrete MD.               Imaging Results (last 72 hours)     Procedure Component Value Units Date/Time    XR Chest PA & Lateral [[id number]] Collected:  02/27/19 1426     Updated:  02/27/19 1433    Narrative:       EXAM: XR CHEST PA AND LATERAL- - 2/27/2019 1:32 PM CST     HISTORY: neutropenic fever       COMPARISON: 12/21/2018.      TECHNIQUE:  2 images.  Frontal and lateral views of the chest.     FINDINGS:    Portable overlies the left chest with intact catheter, tip projects at  the lower SVC.     No pneumothorax. Diffuse interstitial opacities. No focal consolidation.  Cardiac and mediastinal silhouette within normal limits. Calcified  aortic atherosclerosis. No acute bony findings.          Impression:       1. Is interstitial opacities, could represent atypical infection or  edema.  This report was finalized on 02/27/2019 14:30 by Dr Valentina Feldman MD.          Assessment/Plan     Active Hospital Problems    Diagnosis   • **Neutropenic fever (CMS/HCC)       IMPRESSION:  1. Fever-patient hemodynamically stable  overall fever curve had improved however, spiked last night and patient's family reports she has some increased cough.  Chest x-ray fairly unremarkable however there may be some atelectasis or minimal infiltrate noted in the left lower lobe.  2. Neutropenia-resolved  3. E. coli urinary tract infection-undergoing treatment  4. 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 oral mucositis with lip lesions possibly consistent with herpes simplex-no progression         RECOMMENDATION:   · We will change cefazolin  to ceftriaxone for some broader coverage for possible community pathogens but would have low threshold with any progression from a respiratory standpoint or any additional fever to broaden antibiotic therapy again.  · Attempt to obtain sputum cultures  · continue valacyclovir  · Continue miracle mouthwash.  Discussed with the patient that she may benefit from utilizing it right before eating        Sarah Robles MD  03/03/19  12:54 PM

## 2019-03-03 NOTE — PLAN OF CARE
Problem: Patient Care Overview  Goal: Plan of Care Review  Outcome: Ongoing (interventions implemented as appropriate)   03/02/19 1429   Coping/Psychosocial   Plan of Care Reviewed With patient;family   Plan of Care Review   Progress improving   OTHER   Outcome Summary VSS. WBC improved. C/o continued discomfort to mouth. Up with assistance x2 to [facility]. IVF continue. Iv antibiotics continue.        Problem: Fall Risk (Adult)  Goal: Absence of Fall  Outcome: Ongoing (interventions implemented as appropriate)      Problem: Infection, Risk/Actual (Adult)  Goal: Infection Prevention/Resolution  Outcome: Ongoing (interventions implemented as appropriate)      Problem: Skin Injury Risk (Adult)  Goal: Identify Related Risk Factors and Signs and Symptoms  Outcome: Outcome(s) achieved Date Met: 03/02/19    Goal: Skin Health and Integrity  Outcome: Ongoing (interventions implemented as appropriate)

## 2019-03-03 NOTE — PLAN OF CARE
Problem: Patient Care Overview  Goal: Plan of Care Review  Outcome: Ongoing (interventions implemented as appropriate)   03/03/19 0501   Coping/Psychosocial   Plan of Care Reviewed With patient   Plan of Care Review   Progress no change   OTHER   Outcome Summary Pt up with assist of 1-2 to [facility]. Continue Neutropenic isolation. Pt spiked fever this shift, pt and family educated to turn thermostat to cool, blankets removed and prn Tylenol given with temp decrease noted. Continue to monitor        Problem: Fall Risk (Adult)  Goal: Absence of Fall  Outcome: Ongoing (interventions implemented as appropriate)      Problem: Infection, Risk/Actual (Adult)  Goal: Infection Prevention/Resolution  Outcome: Ongoing (interventions implemented as appropriate)      Problem: Skin Injury Risk (Adult)  Goal: Skin Health and Integrity  Outcome: Ongoing (interventions implemented as appropriate)

## 2019-03-03 NOTE — PROGRESS NOTES
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   Kami received cycle #6 of CHOP/R and Dose #11 of prophylactic intrathecal methotrexate 2019.  Kami was brought in by family ( and 2 daughters) with a temperature elevation to 100.8°. Recheck temperature here is 101°.  CBC reveals a WBC of 0.19 with an ANC of 0.06  Hemoglobin of 8.7 and platelet count of 209,000.  On review of systems, she has only lost 1 pound in the last week, she feels about baseline other than being weak and tired and experienced nausea and vomiting on the one-hour drive from home to the clinic.  She does not have a headache or cough, or burning on urination or sputum production.  She is being evaluated in anticipation of admission to the hospital for neutropenia and fever.      TARGET LYMPHOMA SITES:  1. Right maxillary sinus with local invasion to the floor of the right orbit   2.  on 10/10/18   3. borderline spleen size, 13 x 4.8 x 11.8 cm  4. Bone marrow negative on 10/11/18   5. CSF specimen via Omaya port negative on 10/24/18  TUMOR HISTORY: Right maxillary sinus high-grade diffuse large B-cell lymphoma 2018  Kami was treated for hyperthyroidism with radioactive iodine at about age 35. About 45 years ago she underwent a thyroidectomy followed subsequently by a vocal cord medialization procedure by Dr. Crane in [facility].  Several years ago she had right maxillary sinus surgery by Dr. Garcia.  Kami developed upper respiratory and sinus symptoms in the spring of 2018.  She was seen and treated at [facility] urgent care on 3/13/2018 for bronchitis.   She was then seen in followup  by her PCP, Dr. Kvng Strong on 4/27/2018. He felt an abnormality in the neck and ordered a CT scan of the soft tissues of the neck and referred her to ENT for further evaluation of this.  A CT scan of the soft tissues of the neck with contrast on 5/7/2018 documented extensive mucosal thickening of the right maxillary sinus consistent with chronic sinusitis. There was also opacification of several of the ethmoid air cells and mucosal thickening in the nasal cavity.  No neck mass or adenopathy was appreciated.   Kami was evaluated by Dr. Bong Felix on 5/30/2018 for evaluation of right neck pain and swelling.  Full ENT exam including nasopharyngeal area was within normal limits.  Review of the CT scan from 5/7/2018 was consistent with chronic sinusitis. Kami had had a medialization procedure by ENT and [place] earlier in the year for a vocal cord paralysis issue. It was felt that some of the changes on the CT scan were consistent with that procedure.  On 8/3/2018 Kami had a right upper posterior tooth pulled by an oral surgeon, Dr. Ortega in [facility] for what was felt to possibly be an infected tooth.  Kami was again seen at [facility] urgent care on 8/16/2018 with swelling, redness and tenderness in the right maxillary area with associated headaches not responding to antibiotics.   Plain film imaging studies and a CT scan of the area was performed.   Plain film x-rays of the facial bones on 8/16/2018 did not reveal acute abnormalities   CT scan of facial bones within without contrast on 8/16/2018 documented interval development of an enhancing soft tissue along the right maxilla extending along the anterior right maxillary sinus and along the floor of the right orbit measuring 2.4 cm in width. New erosive bony changes were noted involving the bony structure of the right orbital floor. The findings were concerning for a neoplastic process with erosive changes of the right orbital floor with  ENT consultation recommended.   Kami was seen by Kenny Mar PA-C with ENT on 8/20/2018 where the above CT scanning clinical findings were evaluated. Arrangements were made for a biopsy with Dr. Bong Felix.  On 9/17/2018, Dr. Bong Felix performed a nasal/sinus endoscopy with biopsy.  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 The cells are polymorphic with maintaining medium sized to large cells with irregular nuclear contour.  On flow cytometry, the monoclonal kappa B cell population does not express CD5 or CD10, but are low viability.  The immunostains, show a diffuse B-cell infiltrate positive for CD20, PAX-5 and BCL 6, but negative for CD5, CD10, BCL-2, cyclin D1 and CD43.  <30% of the cells were positive for Mum-1. Ki-67 showed a high proliferative rate of about 80%.  All of the above is consistent with a diffuse large B-cell lymphoma of the GBC type.  A CT scan of the soft tissues of the neck with contrast on 10/3/2018 was compared to the CT scan of the face on 8/16/2018 and 5/7/2018.  There had been significant progression of disease with interval increase in the size of the right buccal space hyperdense mass measuring approximately at least 7 x 2 cm on axial cuts.   There is involvement of the medial and lateral pterigoid muscle. The right temporalis muscle was also inseparable from the mass with involvement as well of the pterygomaxillary fissure, pterygopalatine fossa and sphenopalatine foramen.  Local cortex erosive the structure and was also noted along the maxillary gingival surface.  Involvement of the roof of the right maxillary sinus with progression of the erosive disease along the floor of the right orbit was also again identified. An asymmetric soft tissue density was also noted at the location of the right infraorbital area.  There was no evidence of involvement of the extraocular muscles nor do the lacrimal glands demonstrate  a discrete mass.  The lacrimal glands do not demonstrate a discrete mass.  Disease extends along the right lateral nasal cavity and ethmoid air cells. There is now disease along the right sphenoid sinus inseparable from the right foramen rotundum with probable involvement of the maxillary branch of V5.  Evidence of prior instrumentation with prior middle turbinectomies, uncinate to measles, antrostomies, and partial ethmoidectomies is also suspected based on the radiographic imaging.  The cavernous sinuses are symmetric.  Meckel's caves are symmetric.   There is now a right level one be lymphadenopathy by size criteria, and abnormal clustering with lymph node measuring up to 1.5 cm.  Otherwise the rest of the oral cavity and head and neck area did not demonstrate evidence of discrete mass.  Apparent right vocal cord paralysis would medialization of the true vocal cord with anteromedial rotation of the right was also described.   CT scan of the chest with and without contrast on 10/3/2018 did not reveal evidence of thoracic lymphadenopathy.  Tiny 2 mm nodules were noted in the subpleural left lung, likely to be benign with a 1 year followup recommended   CT scan of the abdomen and pelvis with contrast on 10/3/2018 did not reveal evidence of metastatic disease nor abdominal lymphadenopathy. The spleen was of borderline size measuring 13 cm x 4.8 cm x 11.8 cm.  Physical examination on 10/10/2018 revealed a very enlarged and swollen right maxillary facial area with induration of soft tissues around the upper teeth on the right and face.  No palpable peripheral lymphadenopathy was able to be appreciated in the head and neck area, epitrochlear and axillary areas, inguinal areas. No evidence of palpable splenomegaly or other abdominal findings were encountered on exam.  Bone marrow aspirate and biopsy on 10/11/18 showed no evidence of involvement by diffuse large B-cell lymphoma. FLOW cytometry negative.  MRI of the brain  w/wo contrast 10/11/18 at [facility] was consistent with abnormal soft tissue mass involving the right  and buccal space extending along the anterior margin of the right maxillary sinus contiguous with the floor of the right orbit. There was no definite invasion into the orbit, nor displacement of the inferior rectus musculature.  PET scan 10/12/18 at [facility] showed hypermetabolic activity associated with the right buccal space and  space mass, SUV 18.5. There is no distant focus of hypermetabolic activity seen to suggest other sites of involvement.  Echocardiogram 10/12/18 at [facility] revealed an estimated LVEF of 60%.   Hepatitis B and C were negative.  Hepatitis A IgM was positive and discussed with Dr. Osborn.  Left chest mediport was placed 10/18/18 by Dr. ANSON Mcgrath.  Right ommaya port was placed 10/22/18 by Dr. ANURADHA Greer.  CSF specimen was obtained via ommaya port by Dr. Osborn, sent for cytology and FLOW prior to initiation of Cycle #1 CHOP-R initiated on 10/24/18 was negative.     TREATMENT SUMMARY:  1. Cycle #1 CHOP-R initiated 10/24/18,   2. Dose #1 prophylactic intrathecal methotrexate (FLOW cytometry on CSF negative) given 10/29/18. Dose #2 on 11/14/18.   3. Anticipate consolidative XRT.     TARGET BREAST CANCER SITES:  1. Right mastectomy 09/23/04 for DCIS     TUMOR HISTORY: Right DCIS 09/23/04  On 09/23/04, Kami had a right simple mastectomy by Dr. 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   The ER was 95%, IN was 13%.   No further specific therapy was indicated for this cancer other than close follow up of the area and the opposite breast.     TREATMENT SUMMARY:  1. Right mastectomy and 9/23/2004 for DCIS    REVIEW OF SYSTEMS:    Constitutional: positive for fever on admission but now afebrile  HEENT: no blurring of vision, no double vision; positive for mildly sore mouth                       Lungs: no hemoptysis; positive for a progressive cough  CVS: no palpitation, no chest  pain  GI: no abdominal pain, no constipation; no nausea  AMOS: no dysuria, frequency and urgency, no hematuria  Musculoskeletal: no joint pain, swelling , stiffness  Endocrine: no polyuria, polydypsia, no cold or heat intolerence; positive for chronic thyroid replacement  Hematology: positive for pancytopenia from chemotherapy  Dermatology: no skin rash, no eczema, no pruritis  Psychiatry: no suicide ideation  Neurology: no syncope, no seizures; positive for Ommaya with IT chemotherapy     OBJECTIVE:    Vitals:    03/03/19 0853   BP:    Pulse:    Resp:    Temp:    SpO2: 93%       Intake/Output Summary (Last 24 hours) at 3/3/2019 0900  Last data filed at 3/2/2019 1645  Gross per 24 hour   Intake 360 ml   Output --   Net 360 ml     PHYSICAL EXAM:    CONSTITUTIONAL: NAD  EYES: Nonicteric  ENT: No overt thrush-minimal erythema  NECK: Supple, no masses   CHEST/LUNGS: crackles at the bases R>L  CARDIOVASCULAR: RRR, no murmurs  ABDOMEN: soft non-tender, active bowel sounds, no HSM  EXTREMITIES: warm, moves all fours  SKIN: warm, dry with no rashes or lesions.  port left chest wall without erythema, Ommaya right scalp without erythema  LYMPH: No cervical, clavicular, axillary, or inguinal lymphadenopathy  NEUROLOGIC: follows commands, non focal   PSYCH: mood and affect appropriate    CBC  Results from last 7 days   Lab Units 03/03/19  0539 03/02/19  0701 03/01/19  0434   WBC 10*3/mm3 15.22* 3.22* 0.42*   HEMOGLOBIN g/dL 9.6* 8.5* 8.1*   HEMATOCRIT % 29.5* 25.9* 24.4*   PLATELETS 10*3/mm3 102* 74* 71*         Lab Results   Component Value Date     (L) 03/03/2019    K 3.5 03/03/2019    CL 99 03/03/2019    CO2 29.0 03/03/2019    BUN 8 03/03/2019    CREATININE 0.61 03/03/2019    GLUCOSE 68 (L) 03/03/2019    CALCIUM 8.2 (L) 03/03/2019    BILITOT 0.4 02/27/2019    ALKPHOS 53 02/27/2019    AST 21 02/27/2019    ALT 26 02/27/2019    AGRATIO 1.5 02/27/2019    GLOB 2.1 02/27/2019       Lab Results   Component Value Date    INR  0.97 09/10/2018    PROTIME 13.2 09/10/2018       Cultures:    Lab Results   Component Value Date    BLOODCX No growth at 3 days 02/27/2019     No components found for: URINCX    ASSESSMENT/PLAN:  1.  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   S/p cycle #6 of CHOP/R  2/20/19  (C#6 D# 12 today)  S/p dose #11 of prophylactic intrathecal methotrexate 2/20/19      2. Neutropenic fever -neutropenia from chemotherapy - E Coli UTI    WBC - 15.22   Temp max recorded last 24 hrs 101.1 at 8:21 PM -unfortunately she developed fever of 102.2 at 2150 hrs. last p.m.    B/p  143/64 this am    Urine cx 2/27/19 - > 100,000 CFU/mL E. Coli  Blood culture x 2 2/27/19 -no growth at 3 days  Repeat blood culture port/peripheral 2/27/19 p.m. - no growth at 3 days  CSF 2/27/19 - gram stain negative-culture pending - no growth at 4 days    CXR 2/27/19 -  diffuse interstitial opacities    Cefazolin 1 g IV every 8 hrs    Valacyclovir 500 mg PO every 8 hrs    Appreciate Dr. Gruber [place] assistance     WBC has recovered so Neupogen will be discontinued.  However she developed fever last night of 102.2.  She has crackles at bases right > left so chest x-ray will be obtained.      3.  Anemia from chemotherapy  HGB - 9.6 - stable - s/p 1 unit p RBC 2/28/19      4.  Thrombocytopenia from chemotherapy  PLT - 102,000 - starting to improve on their own    5.  DVT prophylaxis    - Lovenox 40 subq daily - monitor plt  - crt 0.61    5.  Hypokalemia    - K 3.5 after KCL runs       Discussed with daughter    Hold on disposition with fever manifesting again last night of 102.2    ELLA Jiménez    03/03/19  9:00 AM     Extended conference undertaken with the patient and her daughters.  She will require prolongation of her hospitalization.  We will monitor closely daily.  Oral intake encouraged    I personally saw and examined this patient, performing a face-to-face diagnostic  evaluation with plan of care reviewed and developed with  Prashanth Chambers PA-C and nursing staff.   I have addended and/or modified the above history of present illness, physical examination, and assessment and plan to reflect my findings and impressions.   Essential elements of the care plan were discussed with  Prashanth Chambers PA-C .   Agree with findings and assessment/plan as documented above.   Questions were encouraged, asked and answered to their understanding and satisfaction.    Alexander Osborn MD  3/3/2019 10:06 AM

## 2019-03-04 LAB
BACTERIA SPEC AEROBE CULT: NORMAL
BASOPHILS # BLD MANUAL: 0.24 10*3/MM3 (ref 0–0.2)
BASOPHILS NFR BLD AUTO: 1 % (ref 0–2)
BILIRUB UR QL STRIP: NEGATIVE
CLARITY UR: CLEAR
COLOR UR: YELLOW
DEPRECATED RDW RBC AUTO: 53.3 FL (ref 40–54)
EOSINOPHIL # BLD MANUAL: 0.24 10*3/MM3 (ref 0–0.7)
EOSINOPHIL NFR BLD MANUAL: 1 % (ref 0–4)
ERYTHROCYTE [DISTWIDTH] IN BLOOD BY AUTOMATED COUNT: 16.4 % (ref 12–15)
GLUCOSE UR STRIP-MCNC: NEGATIVE MG/DL
GRAM STN SPEC: NORMAL
HCT VFR BLD AUTO: 29.3 % (ref 37–47)
HGB BLD-MCNC: 9.5 G/DL (ref 12–16)
HGB UR QL STRIP.AUTO: NEGATIVE
KETONES UR QL STRIP: NEGATIVE
LEUKOCYTE ESTERASE UR QL STRIP.AUTO: NEGATIVE
LYMPHOCYTES # BLD MANUAL: 0.98 10*3/MM3 (ref 0.72–4.86)
LYMPHOCYTES NFR BLD MANUAL: 2 % (ref 4–12)
LYMPHOCYTES NFR BLD MANUAL: 4 % (ref 15–45)
MCH RBC QN AUTO: 29.1 PG (ref 28–32)
MCHC RBC AUTO-ENTMCNC: 32.4 G/DL (ref 33–36)
MCV RBC AUTO: 89.6 FL (ref 82–98)
METAMYELOCYTES NFR BLD MANUAL: 8 % (ref 0–0)
MONOCYTES # BLD AUTO: 0.49 10*3/MM3 (ref 0.19–1.3)
MYELOCYTES NFR BLD MANUAL: 1 % (ref 0–0)
NEUTROPHILS # BLD AUTO: 19.53 10*3/MM3 (ref 1.87–8.4)
NEUTROPHILS NFR BLD MANUAL: 64 % (ref 39–78)
NEUTS BAND NFR BLD MANUAL: 16 % (ref 0–10)
NITRITE UR QL STRIP: NEGATIVE
NRBC BLD AUTO-RTO: 0 /100 WBC (ref 0–0)
PH UR STRIP.AUTO: 7 [PH] (ref 5–8)
PLAT MORPH BLD: NORMAL
PLATELET # BLD AUTO: 160 10*3/MM3 (ref 130–400)
PMV BLD AUTO: 11.2 FL (ref 6–12)
PROMYELOCYTES NFR BLD MANUAL: 3 % (ref 0–0)
PROT UR QL STRIP: NEGATIVE
RBC # BLD AUTO: 3.27 10*6/MM3 (ref 4.2–5.4)
RBC MORPH BLD: NORMAL
SP GR UR STRIP: 1.01 (ref 1–1.03)
UROBILINOGEN UR QL STRIP: NORMAL
WBC MORPH BLD: NORMAL
WBC NRBC COR # BLD: 24.41 10*3/MM3 (ref 4.8–10.8)

## 2019-03-04 PROCEDURE — 94799 UNLISTED PULMONARY SVC/PX: CPT

## 2019-03-04 PROCEDURE — 85025 COMPLETE CBC W/AUTO DIFF WBC: CPT | Performed by: PHYSICIAN ASSISTANT

## 2019-03-04 PROCEDURE — 85007 BL SMEAR W/DIFF WBC COUNT: CPT | Performed by: PHYSICIAN ASSISTANT

## 2019-03-04 PROCEDURE — 25010000002 CEFTRIAXONE PER 250 MG: Performed by: INTERNAL MEDICINE

## 2019-03-04 PROCEDURE — 87040 BLOOD CULTURE FOR BACTERIA: CPT | Performed by: INTERNAL MEDICINE

## 2019-03-04 PROCEDURE — 25810000003 SODIUM CHLORIDE 0.9 % WITH KCL 20 MEQ 20-0.9 MEQ/L-% SOLUTION: Performed by: INTERNAL MEDICINE

## 2019-03-04 PROCEDURE — 25010000002 ENOXAPARIN PER 10 MG: Performed by: PHYSICIAN ASSISTANT

## 2019-03-04 PROCEDURE — 94760 N-INVAS EAR/PLS OXIMETRY 1: CPT

## 2019-03-04 PROCEDURE — 81003 URINALYSIS AUTO W/O SCOPE: CPT | Performed by: INTERNAL MEDICINE

## 2019-03-04 RX ADMIN — VALACYCLOVIR 500 MG: 500 TABLET, FILM COATED ORAL at 20:58

## 2019-03-04 RX ADMIN — ENOXAPARIN SODIUM 40 MG: 40 INJECTION SUBCUTANEOUS at 14:17

## 2019-03-04 RX ADMIN — VALACYCLOVIR 500 MG: 500 TABLET, FILM COATED ORAL at 14:17

## 2019-03-04 RX ADMIN — CLOTRIMAZOLE 10 MG: 10 LOZENGE ORAL; TOPICAL at 06:43

## 2019-03-04 RX ADMIN — ACETAMINOPHEN 650 MG: 325 TABLET, FILM COATED ORAL at 16:21

## 2019-03-04 RX ADMIN — CEFTRIAXONE SODIUM 1 G: 1 INJECTION, POWDER, FOR SOLUTION INTRAMUSCULAR; INTRAVENOUS at 16:01

## 2019-03-04 RX ADMIN — CARVEDILOL 3.12 MG: 3.12 TABLET, FILM COATED ORAL at 08:10

## 2019-03-04 RX ADMIN — CLOTRIMAZOLE 10 MG: 10 LOZENGE ORAL; TOPICAL at 14:17

## 2019-03-04 RX ADMIN — VALACYCLOVIR 500 MG: 500 TABLET, FILM COATED ORAL at 08:10

## 2019-03-04 RX ADMIN — CLOTRIMAZOLE 10 MG: 10 LOZENGE ORAL; TOPICAL at 17:15

## 2019-03-04 RX ADMIN — POTASSIUM CHLORIDE AND SODIUM CHLORIDE 40 ML/HR: 900; 150 INJECTION, SOLUTION INTRAVENOUS at 17:14

## 2019-03-04 RX ADMIN — CARVEDILOL 3.12 MG: 3.12 TABLET, FILM COATED ORAL at 17:15

## 2019-03-04 RX ADMIN — OXYBUTYNIN CHLORIDE 5 MG: 5 TABLET, EXTENDED RELEASE ORAL at 08:10

## 2019-03-04 RX ADMIN — CLOTRIMAZOLE 10 MG: 10 LOZENGE ORAL; TOPICAL at 11:24

## 2019-03-04 RX ADMIN — SODIUM CHLORIDE, PRESERVATIVE FREE 3 ML: 5 INJECTION INTRAVENOUS at 08:10

## 2019-03-04 RX ADMIN — CLOTRIMAZOLE 10 MG: 10 LOZENGE ORAL; TOPICAL at 20:58

## 2019-03-04 RX ADMIN — LEVOTHYROXINE SODIUM 125 MCG: 125 TABLET ORAL at 06:43

## 2019-03-04 NOTE — PROGRESS NOTES
Malnutrition Severity Assessment    Patient Name:  Kami Villanueva  YOB: 1934  MRN: [id number]  Admit Date:  2/27/2019    Patient meets criteria for : Moderate malnutrition    Comments:  If in agreement with malnutrition assessment, please attest documentation. Thanks.     Malnutrition Type: Chronic Illness Malnutrition     Malnutrition Type (last 8 hours)      Malnutrition Severity Assessment     Row Name 03/04/19 1507       Malnutrition Severity Assessment    Malnutrition Type  Chronic Illness Malnutrition    Row Name 03/04/19 1507       Physical Signs of Malnutrition (Chronic)    Muscle Wasting  Mild depression of temporalis    Fat Loss  Mild depression/darkening of orbital fat pad    Fluid Accumulation  Mild    Secondary Physical Signs  Present (comment) generalized weakness; decreased skin turgor and pale appearance    Row Name 03/04/19 1507       Weight Status (Chronic)    Weight Loss  Mild (>7.5% / 6 mo) 8.6% (14 lbs)    Row Name 03/04/19 1507       Criteria Met (Must meet criteria for severity in at least 2 of these categories: M Wasting, Fat Loss, Fluid, Secondary Signs, Wt. Status, Intake)    Patient meets criteria for   Moderate malnutrition          Electronically signed by:  Ariana Herrera RDN, LD  03/04/19 3:28 PM

## 2019-03-04 NOTE — PROGRESS NOTES
Continued Stay Note  JASPREET Craig     Patient Name: Kami Villanueva  MRN: [id number]  Today's Date: 3/4/2019    Admit Date: 2/27/2019    Discharge Plan     Row Name 03/04/19 0835       Plan    Plan  Home Health depending on P.T. recommendations    Patient/Family in Agreement with Plan  yes    Plan Comments  Pt will return home with spouse as before. They have denied need for HH but will wait to see what P.T. recommends.  They do wish for a light weight W/C to be ordered for home. WIll follow.         Discharge Codes    No documentation.             AARON Rebolledo

## 2019-03-04 NOTE — PLAN OF CARE
Problem: Patient Care Overview  Goal: Plan of Care Review  Outcome: Ongoing (interventions implemented as appropriate)   03/03/19 1900   Coping/Psychosocial   Plan of Care Reviewed With patient   Plan of Care Review   Progress no change   OTHER   Outcome Summary Pt increased confusion. Up with 1-2 to [facility] chair for meals. Neutopenic precautions. Afebrile this shift.        Problem: Fall Risk (Adult)  Goal: Absence of Fall  Outcome: Ongoing (interventions implemented as appropriate)      Problem: Infection, Risk/Actual (Adult)  Goal: Infection Prevention/Resolution  Outcome: Ongoing (interventions implemented as appropriate)      Problem: Skin Injury Risk (Adult)  Goal: Skin Health and Integrity  Outcome: Ongoing (interventions implemented as appropriate)

## 2019-03-04 NOTE — PLAN OF CARE
Problem: Patient Care Overview  Goal: Plan of Care Review  Outcome: Ongoing (interventions implemented as appropriate)   03/04/19 1510   Coping/Psychosocial   Plan of Care Reviewed With patient   Plan of Care Review   Progress improving   OTHER   Outcome Summary No complaints of pain or discomfort. VSS. Low grade temp 99. WBC 24. Removed isolation precautions. Up with assist x1. IV fluids/abx continue. Family at bedside. Encouraged activity. Remained up in chair in AM. Safety maintained. Will continue to monitor.      Goal: Individualization and Mutuality  Outcome: Ongoing (interventions implemented as appropriate)    Goal: Discharge Needs Assessment  Outcome: Ongoing (interventions implemented as appropriate)    Goal: Interprofessional Rounds/Family Conf  Outcome: Ongoing (interventions implemented as appropriate)      Problem: Fall Risk (Adult)  Goal: Absence of Fall  Outcome: Ongoing (interventions implemented as appropriate)      Problem: Infection, Risk/Actual (Adult)  Goal: Infection Prevention/Resolution  Outcome: Ongoing (interventions implemented as appropriate)      Problem: Skin Injury Risk (Adult)  Goal: Skin Health and Integrity  Outcome: Ongoing (interventions implemented as appropriate)

## 2019-03-04 NOTE — PROGRESS NOTES
"INFECTIOUS DISEASES PROGRESS NOTE    Patient:  Kami Villanueva  YOB: 1934  MRN: [id number]   Admit date: 2/27/2019   Admitting Physician: Alexander Osborn*  Primary Care Physician: Kvng Strong MD    Chief Complaint: Mouth is definitely better today.      Interval History: Patient denies any abdominal pain today.  She reports her lower lip is still somewhat sore but is definitely better.    She remains somewhat cold it is not had any shaking chills.  She denies diarrhea.  She is staying hydrated with \"a lot of sweet tea\"    The patient denies any dysuria but does have continued urinary urgency.    She denies any diarrhea.    She had low-grade temp yesterday but does not appear she received any antipyretics.        Allergies:   Allergies   Allergen Reactions   • Alendronate Other (See Comments)     Pt does not know  FOSAMAX   • Lisinopril Cough   • Statins Other (See Comments) and Myalgia     Other     • Sulfa Antibiotics Other (See Comments)     Pt does not remember   • Teriparatide (Recombinant) Other (See Comments)     Pt does not know  FORTEO       Current Meds:     Current Facility-Administered Medications:   •  acetaminophen (TYLENOL) tablet 650 mg, 650 mg, Oral, Q6H PRN, Prashanth Chambers PA, 650 mg at 03/02/19 2155  •  carvedilol (COREG) tablet 3.125 mg, 3.125 mg, Oral, BID With Meals, Prashanth Chambers PA, 3.125 mg at 03/04/19 0810  •  cefTRIAXone (ROCEPHIN) 1 g/100 mL 0.9% NS (MBP), 1 g, Intravenous, Q24H, Sarah Robles MD, 1 g at 03/03/19 1625  •  clotrimazole (MYCELEX) colton 10 mg, 10 mg, Oral, 5x Daily, Sarah Robles MD, 10 mg at 03/04/19 0643  •  diphenhydrAMINE (BENADRYL) capsule 25 mg, 25 mg, Oral, Nightly PRN, Prashanth Chambers PA, 25 mg at 03/01/19 2201  •  diphenhydrAMINE (BENADRYL) capsule 50 mg, 50 mg, Oral, Nightly PRN, Prashanth Chambers PA, 50 mg at 03/03/19 2218  •  enoxaparin (LOVENOX) syringe 40 mg, 40 mg, Subcutaneous, Q24H, Prashanth Chambers PA, 40 mg " "at 19 1450  •  levothyroxine (SYNTHROID, LEVOTHROID) tablet 125 mcg, 125 mcg, Oral, Q AM, Prashanth Chambers PA, 125 mcg at 19 0643  •  lidocaine-Maalox-diphenhydramine (MIRACLE MOUTHWASH) oral suspension 30 mL, 30 mL, Oral, 4x Daily AC & at Bedtime PRN, Sarah Robles MD, 30 mL at 19 1449  •  ondansetron (ZOFRAN) tablet 8 mg, 8 mg, Oral, Q8H PRN, Prashanth Chambers PA  •  oxybutynin XL (DITROPAN-XL) 24 hr tablet 5 mg, 5 mg, Oral, Daily, Prashanth Chambers PA, 5 mg at 19 0810  •  potassium chloride 20 mEq in 50 mL IVPB, 20 mEq, Intravenous, Q1H PRN, Prashanth Chambers PA, Last Rate: 50 mL/hr at 19 1625, 20 mEq at 19 1625  •  sodium chloride 0.9 % flush 3 mL, 3 mL, Intravenous, Q12H, Prashanth Chambers PA, 3 mL at 19 0810  •  sodium chloride 0.9 % flush 3-10 mL, 3-10 mL, Intravenous, PRN, Prashanth Chambers PA  •  sodium chloride 0.9 % with KCl 20 mEq/L infusion, 40 mL/hr, Intravenous, Continuous, Alexander Osborn MD, Last Rate: 40 mL/hr at 19 2218, 40 mL/hr at 19 2218  •  valACYclovir (VALTREX) tablet 500 mg, 500 mg, Oral, Q8H, Sarah Robles MD, 500 mg at 19 0810      Review of Systems   Constitutional: Positive for fever (Improved). Negative for chills.   HENT: Positive for mouth sores (Improved).    Eyes: Negative for redness.   Respiratory: Negative for shortness of breath.    Cardiovascular: Negative for chest pain.   Gastrointestinal: Negative for diarrhea.   Genitourinary: Positive for urgency.   Allergic/Immunologic: Positive for immunocompromised state.            Objective     Vital Signs:  Temp (24hrs), Av °F (37.2 °C), Min:98.7 °F (37.1 °C), Max:100.2 °F (37.9 °C)      /64 (BP Location: Left arm, Patient Position: Lying)   Pulse 92   Temp 98.7 °F (37.1 °C) (Oral)   Resp 16   Ht 170.2 cm (67.01\")   Wt 67.3 kg (148 lb 7 oz)   LMP  (LMP Unknown)   SpO2 97%   BMI 23.24 kg/m²         Physical Exam   General: The patient is elderly " lying in bed in no acute distress  HEENT: Sclera anicteric and noninjected, oropharynx without thrush.  Buccal mucosal erosions are improved.  She only has a few healing erosive lesions on her lower lip and dry lips.  Neck: Supple  Heart: S1-S2 rate is regular  Respiratory: Effort even and unlabored.  No crackles or wheezing appreciated  Abdomen soft, nontender, nondistended, bowel sounds are positive I did not appreciate any rebound or guarding  Psych: She is pleasant cooperative  Neuro: Alert and oriented, answers questions appropriately.    Line/IV site: CCsubclavian left, condition patent and no redness    Results Review:    I reviewed the patient's new clinical results.    Lab Results:  CBC:   Lab Results   Lab 02/27/19  1325 02/28/19  0500 03/01/19  0434 03/02/19  0701 03/03/19  0539 03/04/19  0636   WBC 0.17* 0.22* 0.42* 3.22* 15.22* 24.41*   HEMOGLOBIN 9.2* 7.6* 8.1* 8.5* 9.6* 9.5*   HEMATOCRIT 27.8* 22.7* 24.4* 25.9* 29.5* 29.3*   PLATELETS 115* 81* 71* 74* 102* 160   LYMPHOCYTE % 65.0*  --   --  6.0* 4.0* 4.0*   MONOCYTES % 8.0  --   --  9.0 7.1 2.0*     >>>>>>>>>>>>>>> last dose of filgrastim was March 2  CMP:   Lab Results   Lab 02/27/19  1325 02/27/19  1622 03/01/19  0434 03/01/19  1659 03/02/19  0701 03/03/19  0539   SODIUM 130* 128* 131*  --  129* 133*   POTASSIUM 3.3* 3.3* 2.5* 3.5 3.4* 3.5   CHLORIDE 90* 91* 96*  --  96* 99   CO2 30.0 32.0* 32.0*  --  28.0 29.0   BUN 19 19 14  --  11 8   CREATININE 0.50 0.46* 0.53  --  0.61 0.61   CALCIUM 8.5 8.1* 7.9*  --  7.9* 8.2*   BILIRUBIN 0.4 0.4  --   --   --   --    ALK PHOS 56 53  --   --   --   --    ALT (SGPT) 24 26  --   --   --   --    AST (SGOT) 25 21  --   --   --   --    GLUCOSE 115* 106* 76  --  73 68*         Culture Results:      Urine Culture   Date Value Ref Range Status   02/27/2019 >100,000 CFU/mL Escherichia coli (A)  Final       Microbiology Results Abnormal     Procedure Component Value - Date/Time    Blood Culture With REBECA - Blood, Blood,  Venous Line [[id number]] Collected:  02/27/19 2142    Lab Status:  Preliminary result Specimen:  Blood, Venous Line Updated:  03/03/19 2200     Blood Culture No growth at 4 days    Blood Culture With REBECA - Blood, Arm, Left [[id number]] Collected:  02/27/19 2141    Lab Status:  Preliminary result Specimen:  Blood from Arm, Left Updated:  03/03/19 2200     Blood Culture No growth at 4 days        02/27/2019 0900  03/01/2019 0849  Urine Culture - Urine, Urine, Clean Catch [[id number]]    (Abnormal)   Urine, Clean Catch     Final result  Urine Culture >100,000 CFU/mL Escherichia coli Abnormal        Susceptibility      Escherichia coli     ALFREDO     Ampicillin >=32  Resistant     Ampicillin + Sulbactam >=32  Resistant     Cefazolin <=4  Susceptible1     Cefepime <=1  Susceptible     Ceftriaxone <=1  Susceptible     Ertapenem <=0.5  Susceptible     ESBL Confirmation Test NEG  Negative     Gentamicin >=16  Resistant     Levofloxacin 1  Susceptible     Meropenem <=0.25  Susceptible     Nitrofurantoin 32  Susceptible     Piperacillin + Tazobactam <=4  Susceptible     Tobramycin 8  Intermediate     Trimethoprim + Sulfamethoxazole >=320  Resistant           1 Cefazolin results may be used to predict the potential effectiveness of oral cephalosporins for treating uncomplicated urinary tract infections.       Linear View                   Radiology:   XR Chest PA & Lateral [[id number]] Prashanth as Reviewed   Order Status: Completed Collected: 03/03/19 0948    Updated: 03/03/19 0952   Narrative:     EXAMINATION: XR CHEST PA AND LATERAL-     3/3/2019 9:39 AM CST     HISTORY: fever evaluate for pneumonia.     2 view chest x-ray compared with 2/27/2019.     Trace amount of pleural fluid.  New patchy infiltrate or atelectasis within the retrocardiac left lower  lobe.     The upper lobes are clear.     There is diffuse underlying chronic interstitial disease.  Normal heart size.  Unchanged left subclavian port.     Summary:  1. Trace pleural  fluid with patchy left lower lobe infiltrate or  atelectasis.  This report was finalized on 03/03/2019 09:49 by Dr. Jerrell Negrete MD.               Imaging Results (last 72 hours)     Procedure Component Value Units Date/Time    XR Chest PA & Lateral [[id number]] Collected:  02/27/19 1426     Updated:  02/27/19 1433    Narrative:       EXAM: XR CHEST PA AND LATERAL- - 2/27/2019 1:32 PM CST     HISTORY: neutropenic fever       COMPARISON: 12/21/2018.      TECHNIQUE:  2 images.  Frontal and lateral views of the chest.     FINDINGS:    Portable overlies the left chest with intact catheter, tip projects at  the lower SVC.     No pneumothorax. Diffuse interstitial opacities. No focal consolidation.  Cardiac and mediastinal silhouette within normal limits. Calcified  aortic atherosclerosis. No acute bony findings.          Impression:       1. Is interstitial opacities, could represent atypical infection or  edema.  This report was finalized on 02/27/2019 14:30 by Dr Valentina Feldman MD.          Assessment/Plan     Active Hospital Problems    Diagnosis   • **Neutropenic fever (CMS/HCC)       IMPRESSION:  1. Fever-patient hemodynamically stable  overall fever curve improved again.  Chest x-ray fairly unremarkable however there may be some atelectasis or minimal infiltrate noted in the left lower lobe.  2. Neutropenia-resolved.  Leukocytosis now secondary to filgrastim  3. E. coli urinary tract infection-undergoing treatment  4. 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 oral mucositis with lip lesions -improved         RECOMMENDATION:   · Continue ceftriaxone for some broader coverage for possible community pathogens but would have low threshold with any progression from a respiratory standpoint or any additional fever to broaden antibiotic therapy again.  · continue valacyclovir  Continue miracle  mouthwash.      Sarah Robles MD  03/04/19  11:01 AM

## 2019-03-04 NOTE — PLAN OF CARE
Problem: Patient Care Overview  Goal: Plan of Care Review  Outcome: Ongoing (interventions implemented as appropriate)   03/04/19 0654   Coping/Psychosocial   Plan of Care Reviewed With patient   Plan of Care Review   Progress no change   OTHER   Outcome Summary Pt alert and oriented this shift. VSS. Continue Neutropenic Isolation. WBC's improving. Up with assist of 1 to BSC. Continue to monitor       Problem: Fall Risk (Adult)  Goal: Absence of Fall  Outcome: Ongoing (interventions implemented as appropriate)      Problem: Infection, Risk/Actual (Adult)  Goal: Infection Prevention/Resolution  Outcome: Ongoing (interventions implemented as appropriate)      Problem: Skin Injury Risk (Adult)  Goal: Skin Health and Integrity  Outcome: Ongoing (interventions implemented as appropriate)

## 2019-03-04 NOTE — PROGRESS NOTES
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   Kami received cycle #6 of CHOP/R and Dose #11 of prophylactic intrathecal methotrexate 2019.  Kami was brought in by family ( and 2 daughters) with a temperature elevation to 100.8°. Recheck temperature here is 101°.  CBC reveals a WBC of 0.19 with an ANC of 0.06  Hemoglobin of 8.7 and platelet count of 209,000.  On review of systems, she has only lost 1 pound in the last week, she feels about baseline other than being weak and tired and experienced nausea and vomiting on the one-hour drive from home to the clinic.  She does not have a headache or cough, or burning on urination or sputum production.  She is being evaluated in anticipation of admission to the hospital for neutropenia and fever.      TARGET LYMPHOMA SITES:  1. Right maxillary sinus with local invasion to the floor of the right orbit   2.  on 10/10/18   3. borderline spleen size, 13 x 4.8 x 11.8 cm  4. Bone marrow negative on 10/11/18   5. CSF specimen via Omaya port negative on 10/24/18    TUMOR HISTORY: Right maxillary sinus high-grade diffuse large B-cell lymphoma 2018  Kami was treated for hyperthyroidism with radioactive iodine at about age 35. About 45 years ago she underwent a thyroidectomy followed subsequently by a vocal cord medialization procedure by Dr. Crane in [facility].  Several years ago she had right maxillary sinus surgery by Dr. Garcia.  Kami developed upper respiratory and sinus symptoms in the spring of 2018.  She was seen and treated at [facility] [place] urgent care on 3/13/2018 for bronchitis.   She was then seen in followup by her PCP,  Dr. Kvng Strong on 4/27/2018. He felt an abnormality in the neck and ordered a CT scan of the soft tissues of the neck and referred her to ENT for further evaluation of this.  A CT scan of the soft tissues of the neck with contrast on 5/7/2018 documented extensive mucosal thickening of the right maxillary sinus consistent with chronic sinusitis. There was also opacification of several of the ethmoid air cells and mucosal thickening in the nasal cavity.  No neck mass or adenopathy was appreciated.   Kami was evaluated by Dr. Bong Felix on 5/30/2018 for evaluation of right neck pain and swelling.  Full ENT exam including nasopharyngeal area was within normal limits.  Review of the CT scan from 5/7/2018 was consistent with chronic sinusitis. Kami had had a medialization procedure by ENT and [place] earlier in the year for a vocal cord paralysis issue. It was felt that some of the changes on the CT scan were consistent with that procedure.  On 8/3/2018 Kami had a right upper posterior tooth pulled by an oral surgeon, Dr. Ortega in [facility] for what was felt to possibly be an infected tooth.  Kami was again seen at [facility] urgent care on 8/16/2018 with swelling, redness and tenderness in the right maxillary area with associated headaches not responding to antibiotics.   Plain film imaging studies and a CT scan of the area was performed.   Plain film x-rays of the facial bones on 8/16/2018 did not reveal acute abnormalities   CT scan of facial bones within without contrast on 8/16/2018 documented interval development of an enhancing soft tissue along the right maxilla extending along the anterior right maxillary sinus and along the floor of the right orbit measuring 2.4 cm in width. New erosive bony changes were noted involving the bony structure of the right orbital floor. The findings were concerning for a neoplastic process with erosive changes of the right orbital floor with ENT  consultation recommended.   Kami was seen by Kenny Mar PA-C with ENT on 8/20/2018 where the above CT scanning clinical findings were evaluated. Arrangements were made for a biopsy with Dr. Bong Felix.  On 9/17/2018, Dr. Bong Felix performed a nasal/sinus endoscopy with biopsy.  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 The cells are polymorphic with maintaining medium sized to large cells with irregular nuclear contour.  On flow cytometry, the monoclonal kappa B cell population does not express CD5 or CD10, but are low viability.  The immunostains, show a diffuse B-cell infiltrate positive for CD20, PAX-5 and BCL 6, but negative for CD5, CD10, BCL-2, cyclin D1 and CD43.  <30% of the cells were positive for Mum-1. Ki-67 showed a high proliferative rate of about 80%.  All of the above is consistent with a diffuse large B-cell lymphoma of the GBC type.  A CT scan of the soft tissues of the neck with contrast on 10/3/2018 was compared to the CT scan of the face on 8/16/2018 and 5/7/2018.  There had been significant progression of disease with interval increase in the size of the right buccal space hyperdense mass measuring approximately at least 7 x 2 cm on axial cuts.   There is involvement of the medial and lateral pterigoid muscle. The right temporalis muscle was also inseparable from the mass with involvement as well of the pterygomaxillary fissure, pterygopalatine fossa and sphenopalatine foramen.  Local cortex erosive the structure and was also noted along the maxillary gingival surface.  Involvement of the roof of the right maxillary sinus with progression of the erosive disease along the floor of the right orbit was also again identified. An asymmetric soft tissue density was also noted at the location of the right infraorbital area.  There was no evidence of involvement of the extraocular muscles nor do the lacrimal glands demonstrate a  discrete mass.  The lacrimal glands do not demonstrate a discrete mass.  Disease extends along the right lateral nasal cavity and ethmoid air cells. There is now disease along the right sphenoid sinus inseparable from the right foramen rotundum with probable involvement of the maxillary branch of V5.  Evidence of prior instrumentation with prior middle turbinectomies, uncinate to measles, antrostomies, and partial ethmoidectomies is also suspected based on the radiographic imaging.  The cavernous sinuses are symmetric.  Meckel's caves are symmetric.   There is now a right level one be lymphadenopathy by size criteria, and abnormal clustering with lymph node measuring up to 1.5 cm.  Otherwise the rest of the oral cavity and head and neck area did not demonstrate evidence of discrete mass.  Apparent right vocal cord paralysis would medialization of the true vocal cord with anteromedial rotation of the right was also described.   CT scan of the chest with and without contrast on 10/3/2018 did not reveal evidence of thoracic lymphadenopathy.  Tiny 2 mm nodules were noted in the subpleural left lung, likely to be benign with a 1 year followup recommended   CT scan of the abdomen and pelvis with contrast on 10/3/2018 did not reveal evidence of metastatic disease nor abdominal lymphadenopathy. The spleen was of borderline size measuring 13 cm x 4.8 cm x 11.8 cm.  Physical examination on 10/10/2018 revealed a very enlarged and swollen right maxillary facial area with induration of soft tissues around the upper teeth on the right and face.  No palpable peripheral lymphadenopathy was able to be appreciated in the head and neck area, epitrochlear and axillary areas, inguinal areas. No evidence of palpable splenomegaly or other abdominal findings were encountered on exam.  Bone marrow aspirate and biopsy on 10/11/18 showed no evidence of involvement by diffuse large B-cell lymphoma. FLOW cytometry negative.  MRI of the brain  w/wo contrast 10/11/18 at [facility] was consistent with abnormal soft tissue mass involving the right  and buccal space extending along the anterior margin of the right maxillary sinus contiguous with the floor of the right orbit. There was no definite invasion into the orbit, nor displacement of the inferior rectus musculature.  PET scan 10/12/18 at [facility] showed hypermetabolic activity associated with the right buccal space and  space mass, SUV 18.5. There is no distant focus of hypermetabolic activity seen to suggest other sites of involvement.  Echocardiogram 10/12/18 at [facility] revealed an estimated LVEF of 60%.   Hepatitis B and C were negative.  Hepatitis A IgM was positive and discussed with Dr. Osborn.  Left chest mediport was placed 10/18/18 by Dr. ANSON Mcgrath.  Right ommaya port was placed 10/22/18 by Dr. ANURADHA Greer.  CSF specimen was obtained via ommaya port by Dr. Osborn, sent for cytology and FLOW prior to initiation of Cycle #1 CHOP-R initiated on 10/24/18 was negative.     TREATMENT SUMMARY:  1. Cycle #1 CHOP-R initiated 10/24/18,   2. Dose #1 prophylactic intrathecal methotrexate (FLOW cytometry on CSF negative) given 10/29/18. Dose #2 on 11/14/18.   3. Anticipate consolidative XRT.     TARGET BREAST CANCER SITES:  1. Right mastectomy 09/23/04 for DCIS     TUMOR HISTORY: Right DCIS 09/23/04  On 09/23/04, Kami had a right simple mastectomy by Dr. 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   The ER was 95%, OH was 13%.   No further specific therapy was indicated for this cancer other than close follow up of the area and the opposite breast.     TREATMENT SUMMARY:  1. Right mastectomy and 9/23/2004 for DCIS    REVIEW OF SYSTEMS:    Constitutional: positive for fever   HEENT: no blurring of vision, no double vision; positive for mildly sore mouth                       Lungs: no hemoptysis; positive for cough  CVS: no palpitation, no chest pain  GI: no abdominal pain, no  constipation; no nausea  AMOS: no dysuria, frequency and urgency, no hematuria  Musculoskeletal: generalized weakness  Endocrine: no polyuria, polydypsia, no cold or heat intolerence; positive for chronic thyroid replacement  Hematology: positive for pancytopenia from chemotherapy, improved  Dermatology: no skin rash, no eczema, no pruritis  Psychiatry: no suicide ideation  Neurology: no syncope, no seizures; positive for Ommaya with IT chemotherapy     OBJECTIVE:    Vitals:    03/04/19 0437   BP: 135/55   Pulse: 85   Resp: 16   Temp: 98.8 °F (37.1 °C)   SpO2:        Intake/Output Summary (Last 24 hours) at 3/4/2019 0655  Last data filed at 3/4/2019 0437  Gross per 24 hour   Intake 840 ml   Output --   Net 840 ml     PHYSICAL EXAM:    CONSTITUTIONAL: NAD, family at bedside  EYES: Nonicteric  ENT: No overt thrush-minimal erythema  NECK: Supple, no masses   CHEST/LUNGS: bibasilar crackles   CARDIOVASCULAR: RRR, no murmurs  ABDOMEN: soft non-tender, active bowel sounds, no HSM  EXTREMITIES: warm, moves all extremities, weak  SKIN: warm, dry with no rashes or lesions.  port left chest wall without erythema, Ommaya right scalp without erythema  LYMPH: No cervical, clavicular, axillary, or inguinal lymphadenopathy  NEUROLOGIC: follows commands, non focal   PSYCH: mood and affect appropriate    CBC  Results from last 7 days   Lab Units 03/03/19  0539 03/02/19  0701 03/01/19  0434   WBC 10*3/mm3 15.22* 3.22* 0.42*   HEMOGLOBIN g/dL 9.6* 8.5* 8.1*   HEMATOCRIT % 29.5* 25.9* 24.4*   PLATELETS 10*3/mm3 102* 74* 71*         Lab Results   Component Value Date     (L) 03/03/2019    K 3.5 03/03/2019    CL 99 03/03/2019    CO2 29.0 03/03/2019    BUN 8 03/03/2019    CREATININE 0.61 03/03/2019    GLUCOSE 68 (L) 03/03/2019    CALCIUM 8.2 (L) 03/03/2019    BILITOT 0.4 02/27/2019    ALKPHOS 53 02/27/2019    AST 21 02/27/2019    ALT 26 02/27/2019    AGRATIO 1.5 02/27/2019    GLOB 2.1 02/27/2019       Lab Results   Component Value  Date    INR 0.97 09/10/2018    PROTIME 13.2 09/10/2018     Cultures:    Lab Results   Component Value Date    BLOODCX No growth at 4 days 02/27/2019     No components found for: URINCX    ASSESSMENT/PLAN:    1.  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   S/p cycle #6 of CHOP/R on 2/20/19    S/p dose #11 of prophylactic intrathecal methotrexate given 2/20/19      2. Neutropenic fever -neutropenia from chemotherapy (resolved) - E Coli UTI    WBC - 24.41   Temp max recorded last 24 hrs 102.2 at 2150 hrs. on 3/2/19    B/p  135/55, HR 85 this am    Urine cx 2/27/19 - > 100,000 CFU/mL E. Coli  Blood culture x 2 2/27/19 -no growth at 4 days  Repeat blood culture port/peripheral 2/27/19 p.m. - no growth at 4 days  CSF 2/27/19 - gram stain negative, culture no growth at 4 days    CXR 3/3/19 -  Trace pleural fluid with new patchy LLL infiltrate or atelectasis    Ceftriaxone 1 g every 24 hours  Valacyclovir 500 mg PO every 8 hrs    Appreciate Dr. Gruber [place] assistance       3.  Anemia from chemotherapy  HGB - 9.5 today, stable  (s/p 1 unit p RBC 2/28/19)      4.  Thrombocytopenia from chemotherapy  PLT - 160,000 on 3/3/19 - improving      5.  DVT prophylaxis  - Lovenox 40 subq daily - monitor plt  - crt 0.61      5.  Hypokalemia  - K 3.5 on 3/3/19 after KCL runs       6.  Generalized weakness  - consult PT      Discussed with patient and her daughter      Victorina Antonio, APRN    03/04/19  6:55 AM     Hopefully home in 1-2 days if okay with Dr. Gruber-ID    I personally saw and examined this patient, performing a face-to-face diagnostic evaluation with plan of care reviewed and developed with  Victorina RIVERA and nursing staff.   I have addended and/or modified the above history of present illness, physical examination, and assessment and plan to reflect my findings and impressions.   Essential elements of the care plan were discussed  with Victorina RIVERA .   Agree with findings and assessment/plan as documented above.   Questions were encouraged, asked and answered to their understanding and satisfaction.    Alexander Osborn MD  3/4/2019 8:18 AM

## 2019-03-04 NOTE — PLAN OF CARE
Problem: Patient Care Overview  Goal: Plan of Care Review  Outcome: Ongoing (interventions implemented as appropriate)   03/04/19 6028   Coping/Psychosocial   Plan of Care Reviewed With patient;daughter   Plan of Care Review   Progress no change   OTHER   Outcome Summary Pt continues on Regular diet with PO intake 67%/9 meals over past 3 days. Did encouraged intake and encouraged supplement use BID. Will continue to follow.

## 2019-03-05 ENCOUNTER — APPOINTMENT (OUTPATIENT)
Dept: GENERAL RADIOLOGY | Facility: HOSPITAL | Age: 84
End: 2019-03-05

## 2019-03-05 LAB
ANISOCYTOSIS BLD QL: ABNORMAL
DEPRECATED RDW RBC AUTO: 53.1 FL (ref 40–54)
ELLIPTOCYTES BLD QL SMEAR: ABNORMAL
ERYTHROCYTE [DISTWIDTH] IN BLOOD BY AUTOMATED COUNT: 16.3 % (ref 12–15)
HCT VFR BLD AUTO: 28.4 % (ref 37–47)
HGB BLD-MCNC: 9.3 G/DL (ref 12–16)
LYMPHOCYTES # BLD MANUAL: 0.39 10*3/MM3 (ref 0.72–4.86)
LYMPHOCYTES NFR BLD MANUAL: 2 % (ref 15–45)
LYMPHOCYTES NFR BLD MANUAL: 3 % (ref 4–12)
MCH RBC QN AUTO: 29.1 PG (ref 28–32)
MCHC RBC AUTO-ENTMCNC: 32.7 G/DL (ref 33–36)
MCV RBC AUTO: 88.8 FL (ref 82–98)
METAMYELOCYTES NFR BLD MANUAL: 3 % (ref 0–0)
MONOCYTES # BLD AUTO: 0.58 10*3/MM3 (ref 0.19–1.3)
MYELOCYTES NFR BLD MANUAL: 2 % (ref 0–0)
NEUTROPHILS # BLD AUTO: 16.69 10*3/MM3 (ref 1.87–8.4)
NEUTROPHILS NFR BLD MANUAL: 79.2 % (ref 39–78)
NEUTS BAND NFR BLD MANUAL: 6.9 % (ref 0–10)
NEUTS VAC BLD QL SMEAR: ABNORMAL
PLAT MORPH BLD: NORMAL
PLATELET # BLD AUTO: 203 10*3/MM3 (ref 130–400)
PMV BLD AUTO: 10.5 FL (ref 6–12)
POIKILOCYTOSIS BLD QL SMEAR: ABNORMAL
POLYCHROMASIA BLD QL SMEAR: ABNORMAL
PROMYELOCYTES NFR BLD MANUAL: 3 % (ref 0–0)
RBC # BLD AUTO: 3.2 10*6/MM3 (ref 4.2–5.4)
TOXIC GRANULATION: ABNORMAL
VARIANT LYMPHS NFR BLD MANUAL: 1 % (ref 0–5)
WBC NRBC COR # BLD: 19.37 10*3/MM3 (ref 4.8–10.8)

## 2019-03-05 PROCEDURE — 94799 UNLISTED PULMONARY SVC/PX: CPT

## 2019-03-05 PROCEDURE — 85025 COMPLETE CBC W/AUTO DIFF WBC: CPT | Performed by: PHYSICIAN ASSISTANT

## 2019-03-05 PROCEDURE — 25010000002 ENOXAPARIN PER 10 MG: Performed by: PHYSICIAN ASSISTANT

## 2019-03-05 PROCEDURE — 25010000002 MEROPENEM PER 100 MG: Performed by: INTERNAL MEDICINE

## 2019-03-05 PROCEDURE — 94760 N-INVAS EAR/PLS OXIMETRY 1: CPT

## 2019-03-05 PROCEDURE — 25810000003 SODIUM CHLORIDE 0.9 % WITH KCL 20 MEQ 20-0.9 MEQ/L-% SOLUTION: Performed by: INTERNAL MEDICINE

## 2019-03-05 PROCEDURE — 51798 US URINE CAPACITY MEASURE: CPT

## 2019-03-05 PROCEDURE — 71046 X-RAY EXAM CHEST 2 VIEWS: CPT

## 2019-03-05 PROCEDURE — 97162 PT EVAL MOD COMPLEX 30 MIN: CPT

## 2019-03-05 PROCEDURE — 85007 BL SMEAR W/DIFF WBC COUNT: CPT | Performed by: PHYSICIAN ASSISTANT

## 2019-03-05 RX ADMIN — CLOTRIMAZOLE 10 MG: 10 LOZENGE ORAL; TOPICAL at 08:27

## 2019-03-05 RX ADMIN — VALACYCLOVIR 500 MG: 500 TABLET, FILM COATED ORAL at 14:33

## 2019-03-05 RX ADMIN — POTASSIUM CHLORIDE AND SODIUM CHLORIDE 40 ML/HR: 900; 150 INJECTION, SOLUTION INTRAVENOUS at 17:10

## 2019-03-05 RX ADMIN — MEROPENEM 1 G: 1 INJECTION, POWDER, FOR SOLUTION INTRAVENOUS at 17:11

## 2019-03-05 RX ADMIN — CARVEDILOL 3.12 MG: 3.12 TABLET, FILM COATED ORAL at 17:10

## 2019-03-05 RX ADMIN — SODIUM CHLORIDE, PRESERVATIVE FREE 3 ML: 5 INJECTION INTRAVENOUS at 08:28

## 2019-03-05 RX ADMIN — ACETAMINOPHEN 650 MG: 325 TABLET, FILM COATED ORAL at 00:41

## 2019-03-05 RX ADMIN — CARVEDILOL 3.12 MG: 3.12 TABLET, FILM COATED ORAL at 08:27

## 2019-03-05 RX ADMIN — LEVOTHYROXINE SODIUM 125 MCG: 125 TABLET ORAL at 05:58

## 2019-03-05 RX ADMIN — OXYBUTYNIN CHLORIDE 5 MG: 5 TABLET, EXTENDED RELEASE ORAL at 08:27

## 2019-03-05 RX ADMIN — CLOTRIMAZOLE 10 MG: 10 LOZENGE ORAL; TOPICAL at 20:51

## 2019-03-05 RX ADMIN — CLOTRIMAZOLE 10 MG: 10 LOZENGE ORAL; TOPICAL at 14:33

## 2019-03-05 RX ADMIN — VALACYCLOVIR 500 MG: 500 TABLET, FILM COATED ORAL at 20:51

## 2019-03-05 RX ADMIN — ENOXAPARIN SODIUM 40 MG: 40 INJECTION SUBCUTANEOUS at 14:33

## 2019-03-05 RX ADMIN — VALACYCLOVIR 500 MG: 500 TABLET, FILM COATED ORAL at 05:58

## 2019-03-05 RX ADMIN — CLOTRIMAZOLE 10 MG: 10 LOZENGE ORAL; TOPICAL at 05:58

## 2019-03-05 RX ADMIN — CLOTRIMAZOLE 10 MG: 10 LOZENGE ORAL; TOPICAL at 17:10

## 2019-03-05 RX ADMIN — CLOTRIMAZOLE 10 MG: 10 LOZENGE ORAL; TOPICAL at 10:59

## 2019-03-05 NOTE — THERAPY EVALUATION
Acute Care - Physical Therapy Initial Evaluation  [facility]     Patient Name: Kami Villanueva  : 1934  MRN: [id number]  Today's Date: 3/5/2019   Onset of Illness/Injury or Date of Surgery: 19  Date of Referral to PT: 19  Referring Physician: Dr. Osborn      Admit Date: 2019    Visit Dx:     ICD-10-CM ICD-9-CM   1. 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)   • Gram-negative bacteremia   • Neutropenia (CMS/HCC)   • Hypokalemia   • Pancytopenia due to antineoplastic chemotherapy (CMS/HCC)   • Neutropenic fever (CMS/HCC)     Past Medical History:   Diagnosis Date   • Allergic rhinitis    • Arthritis    • Cancer (CMS/HCC)     breast ca and B cell lymphoma   • Disease of thyroid gland     Hypothyroidism   • Diverticulitis    • Frequent urination    • Hard to intubate     vocal cord injury,  Has vocal cord implant   • Heartburn    • History of breast cancer    • Hypertension    • Incontinence    • Osteoporosis      Past Surgical History:   Procedure Laterality Date   • BREAST SURGERY Right     mastectomy   • CATARACT EXTRACTION WITH INTRAOCULAR LENS IMPLANT     • COLON RESECTION     • ENDOSCOPIC FUNCTIONAL SINUS SURGERY (FESS) Right 2018    Procedure: ENDOSCOPIC FUNCTIONAL SINUS SURGERY W TRACKING OF RIGHT MAXILLARY SINUS WITH BIOPSY.;  Surgeon: Mirza Felix MD;  Location: [facility] OR;  Service: ENT   • HYSTERECTOMY     • OMAYA RESERVOIR Right 10/22/2018    Procedure: OMAYA RESERVOIR WITH STEREOTACTIC NAVIGATION;  Surgeon: Graham Greer MD;  Location: [facility] OR;  Service: Neurosurgery   • THROAT SURGERY      vocal cord implant    • THYROID SURGERY          PT ASSESSMENT (last 12 hours)      Physical Therapy Evaluation     Row Name 03/05/19 1404          PT Evaluation Time/Intention    Subjective Information  complains of;weakness;fatigue  -TYRA     Document Type  evaluation  -TYRA     Mode of Treatment  physical therapy  -TYRA     Row Name 03/05/19 1404          General Information    Patient Profile Reviewed?  yes  -TYRA     Onset of Illness/Injury or Date of Surgery  02/27/19  -TYRA     Referring Physician  Dr. Osborn  -TYRA     Patient Observations  alert;cooperative;agree to therapy  -TYRA     Patient/Family Observations  Spouse, daughter, and duaghter in-law in room  -TYRA     General Observations of Patient  Reclined in chair, alert. Ready to return to bed  -TYRA     Prior Level of Function  independent:;all household mobility;ADL's;dressing;bathing;w/c or scooter uses w/c as needed  -TYRA     Equipment Currently Used at Home  wheelchair;walker, rolling;shower chair  -TYRA     Pertinent History of Current Functional Problem  Fever, weak, n/v. Dx: Neutropenic fever, anemia, pancytopenia, UTI. 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   -TYRA     Existing Precautions/Restrictions  fall  -TYRA     Risks Reviewed  patient and family:;LOB;nausea/vomiting;dizziness;increased discomfort;change in vital signs;lines disloged  -TYRA     Benefits Reviewed  patient and family:;improve function;increase independence;increase strength;increase balance;decrease pain;increase knowledge;improve skin integrity  -TYRA     Barriers to Rehab  medically complex;previous functional deficit  -TYRA     Row Name 03/05/19 1404          Relationship/Environment    Lives With  spouse  -TYRA     Family Caregiver if Needed  child(jeovany), adult  -TYRA     Row Name 03/05/19 1404          Resource/Environmental Concerns    Current Living Arrangements  home/apartment/condo  -TYRA     Row Name 03/05/19 1404          Cognitive  Assessment/Interventions    Additional Documentation  Cognitive Assessment/Intervention (Group)  -TYRA     Row Name 03/05/19 1404          Cognitive Assessment/Intervention- PT/OT    Affect/Mental Status (Cognitive)  confused  -TYRA     Orientation Status (Cognition)  oriented to;person;time  -TYRA     Follows Commands (Cognition)  follows multi-step commands;physical/tactile prompts required;repetition of directions required;verbal cues/prompting required  -TYRA     Safety Deficit (Cognitive)  awareness of need for assistance;insight into deficits/self awareness  -TYRA     Personal Safety Interventions  fall prevention program maintained;gait belt;muscle strengthening facilitated;nonskid shoes/slippers when out of bed  -TYRA     Row Name 03/05/19 1404          Safety Issues, Functional Mobility    Impairments Affecting Function (Mobility)  balance;cognition;endurance/activity tolerance;strength  -TYRA     Row Name 03/05/19 1404          Bed Mobility Assessment/Treatment    Bed Mobility Assessment/Treatment  sit-supine  -TYRA     Sit-Supine [place] (Bed Mobility)  minimum assist (75% patient effort)  -TYRA     Assistive Device (Bed Mobility)  head of bed elevated  -TYRA     Row Name 03/05/19 1404          Transfer Assessment/Treatment    Transfer Assessment/Treatment  sit-stand transfer;stand-sit transfer;toilet transfer  -TYRA     Comment (Transfers)  Few steps from chair to BSC, then gait from BSC to bed  (Significant)   -TYRA     Sit-Stand [place] (Transfers)  minimum assist (75% patient effort)  -TYRA     Stand-Sit [place] (Transfers)  minimum assist (75% patient effort)  -TYRA     [place] Level (Toilet Transfer)  minimum assist (75% patient effort)  -TYRA     Assistive Device (Toilet Transfer)  commode, bedside without drop arms  -TYRA     Row Name 03/05/19 1404          Toilet Transfer    Type (Toilet Transfer)  stand pivot/stand step  -YTRA     Row Name 03/05/19 1404          Gait/Stairs Assessment/Training    Gait/Stairs  Assessment/Training  gait/ambulation independence;distance ambulated;gait pattern;gait deviations  -TYRA     [place] Level (Gait)  minimum assist (75% patient effort);moderate assist (50% patient effort)  -TYRA     Distance in Feet (Gait)  15  -TYRA     Pattern (Gait)  swing-to  -TYRA     Deviations/Abnormal Patterns (Gait)  bilateral deviations;gait speed decreased;festinating/shuffling  -TYRA     Bilateral Gait Deviations  forward flexed posture  -TYRA     Comment (Gait/Stairs)  Reaches out to hold onto objects in room. Needs RW for safety and to decrease risk for falls.   (Significant)   -TYRA     Row Name 03/05/19 1404          General ROM    GENERAL ROM COMMENTS  B LE AROM WFL with activity/gait  -TYRA     Row Name 03/05/19 1404          MMT (Manual Muscle Testing)    General MMT Comments  B LE functionally 4-/5  -TYRA     Row Name 03/05/19 1404          Motor Assessment/Intervention    Additional Documentation  Balance (Group)  -TYRA     Row Name 03/05/19 1404          Balance    Balance  static sitting balance;static standing balance  -TYRA     Row Name 03/05/19 1404          Static Sitting Balance    Level of [place] (Unsupported Sitting, Static Balance)  standby assist  -TYRA     Sitting Position (Unsupported Sitting, Static Balance)  -- On BSC  -TYRA     Row Name 03/05/19 1404          Static Standing Balance    Level of [place] (Supported Standing, Static Balance)  minimal assist, 75% patient effort;moderate assist, 50 to 74% patient effort  -TYRA     Assistive Device Utilized (Supported Standing, Static Balance)  -- Needs RW for gait safety/balance.   -TYRA     Row Name 03/05/19 1404          Sensory Assessment/Intervention    Sensory General Assessment  no sensation deficits identified B LE, per pt  -TYRA     Row Name 03/05/19 1404          Pain Assessment    Additional Documentation  Pain Scale: Numbers Pre/Post-Treatment (Group)  -TYRA     Row Name 03/05/19 1404          Pain Scale: Numbers Pre/Post-Treatment    Pain  Scale: Numbers, Pretreatment  0/10 - no pain  -TYRA     Row Name 03/05/19 1404          Health Promotion    Additional Documentation  Coping (Group);Plan of Care Review (Group)  -TYRA     Row Name 03/05/19 1404          Coping    Observed Emotional State  accepting;cooperative  -TYRA     Row Name 03/05/19 1404          Plan of Care Review    Plan of Care Reviewed With  patient;spouse;daughter;family  -TYRA     Row Name 03/05/19 1404          Physical Therapy Clinical Impression    Date of Referral to PT  03/04/19  -TYRA     Patient/Family Goals Statement (PT Clinical Impression)  Increase strength  -TYRA     Criteria for Skilled Interventions Met (PT Clinical Impression)  yes;treatment indicated  -TYRA     Impairments Found (describe specific impairments)  gait, locomotion, and balance;aerobic capacity/endurance;arousal, attention, and cognition  -TYRA     Functional Limitations in Following Categories (Describe Specific Limitations)  self-care;home management  -TYRA     Rehab Potential (PT Clinical Summary)  good, to achieve stated therapy goals  -TYRA     Predicted Duration of Therapy (PT)  until d/c  -TYRA     Care Plan Review (PT)  evaluation/treatment results reviewed;risks/benefits reviewed;current/potential barriers reviewed;patient/other agree to care plan  -TYRA     Care Plan Review, Other Participant (PT Clinical Impression)  daughter;family;spouse  -TYRA     Row Name 03/05/19 1404          Physical Therapy Goals    Bed Mobility Goal Selection (PT)  bed mobility, PT goal 1  -TYRA     Transfer Goal Selection (PT)  transfer, PT goal 1  -TYRA     Gait Training Goal Selection (PT)  gait training, PT goal 1  -TYRA     Row Name 03/05/19 1404          Bed Mobility Goal 1 (PT)    Activity/Assistive Device (Bed Mobility Goal 1, PT)  sit to supine/supine to sit  -TYRA     [place] Level/Cues Needed (Bed Mobility Goal 1, PT)  supervision required  -TYRA     Time Frame (Bed Mobility Goal 1, PT)  long term goal (LTG);10 days  -TYRA      Progress/Outcomes (Bed Mobility Goal 1, PT)  goal ongoing  -TYRA     Row Name 03/05/19 1404          Transfer Goal 1 (PT)    Activity/Assistive Device (Transfer Goal 1, PT)  sit-to-stand/stand-to-sit;bed-to-chair/chair-to-bed;walker, rolling  -TYRA     [place] Level/Cues Needed (Transfer Goal 1, PT)  standby assist  -TYRA     Time Frame (Transfer Goal 1, PT)  long term goal (LTG);10 days  -TYRA     Progress/Outcome (Transfer Goal 1, PT)  goal ongoing  -TYRA     Row Name 03/05/19 1404          Gait Training Goal 1 (PT)    Activity/Assistive Device (Gait Training Goal 1, PT)  gait (walking locomotion);assistive device use;decrease fall risk;improve balance and speed;increase endurance/gait distance;walker, rolling  -TYRA     [place] Level (Gait Training Goal 1, PT)  standby assist  -TYRA     Distance (Gait Goal 1, PT)  100  -TYRA     Time Frame (Gait Training Goal 1, PT)  long term goal (LTG);10 days  -TYRA     Progress/Outcome (Gait Training Goal 1, PT)  goal ongoing  -TYRA     Row Name 03/05/19 1404          Positioning and Restraints    Pre-Treatment Position  sitting in chair/recliner  -TYRA     Post Treatment Position  bed  -TYRA     In Bed  fowlers;call light within reach;encouraged to call for assist;with family/caregiver  -TYRA     Row Name 03/05/19 1404          Living Environment    Home Accessibility  wheelchair accessible ramp  -TYRA       User Key  (r) = Recorded By, (t) = Taken By, (c) = Cosigned By    Initials Name Provider Type    Ronnell Overton, PT DPT Physical Therapist        Physical Therapy Education     Title: PT OT SLP Therapies (In Progress)     Topic: Physical Therapy (In Progress)     Point: Mobility training (Done)     Learning Progress Summary           Patient Acceptance, E, VU,NR by TYRA at 3/5/2019  2:04 PM    Comment:  Educated pt. on progression of PT POC and benefits of activity   Family Acceptance, E, VU,NR by TYRA at 3/5/2019  2:04 PM    Comment:  Educated pt. on progression of PT POC and benefits  of activity   Significant Other Acceptance, E, VU,NR by TYRA at 3/5/2019  2:04 PM    Comment:  Educated pt. on progression of PT POC and benefits of activity                               User Key     Initials Effective Dates Name Provider Type Discipline    TYRA 08/02/16 -  Ronnell Cantrell, PT DPT Physical Therapist PT              PT Recommendation and Plan  Anticipated Discharge Disposition (PT): skilled nursing facility  Planned Therapy Interventions (PT Eval): bed mobility training, transfer training, gait training, balance training, home exercise program, patient/family education, postural re-education, strengthening, wheelchair management/propulsion training  Therapy Frequency (PT Clinical Impression): daily  Outcome Summary/Treatment Plan (PT)  Anticipated Discharge Disposition (PT): skilled nursing facility  Plan of Care Reviewed With: patient, spouse, daughter, family  Outcome Summary: PT eval completed. She needed min assist to complete sit <> stand transfer and transfer on and off the BSC and she needed min/mod assist for gait in room. With gait in her room she would attempt to reach out to hold onto objects and also demos weakness and decreased balance. She is a high risk for falls and would benefit from use of RW to assist with gait and safety. PT will continue to progress her functional mobility, balance and strength. PT would recommend further therapy at SNF until ready for safety d/c home.   Outcome Measures     Row Name 03/05/19 8404             How much help from another person do you currently need...    Turning from your back to your side while in flat bed without using bedrails?  3  -TYRA      Moving from lying on back to sitting on the side of a flat bed without bedrails?  3  -YTRA      Moving to and from a bed to a chair (including a wheelchair)?  2  -TYRA      Standing up from a chair using your arms (e.g., wheelchair, bedside chair)?  2  -TYRA      Climbing 3-5 steps with a railing?  1  -TYRA      To  walk in hospital room?  2  -TYRA      AM-PAC 6 Clicks Score  13  -TYRA         Functional Assessment    Outcome Measure Options  AM-PAC 6 Clicks Basic Mobility (PT)  -TYRA        User Key  (r) = Recorded By, (t) = Taken By, (c) = Cosigned By    Initials Name Provider Type    Ronnell Overton, PT DPT Physical Therapist         Time Calculation:   PT Charges     Row Name 03/05/19 1524             Time Calculation    Start Time  1404  -TYRA      Stop Time  1444  -TYRA      Time Calculation (min)  40 min  -TYRA      PT Received On  03/05/19  -TYRA      PT Goal Re-Cert Due Date  03/15/19  -TYRA        User Key  (r) = Recorded By, (t) = Taken By, (c) = Cosigned By    Initials Name Provider Type    Ronnell Overton, PT DPT Physical Therapist        Therapy Suggested Charges     Code   Minutes Charges    None           Therapy Charges for Today     Code Description Service Date Service Provider Modifiers Qty    [id number] HC PT EVAL MOD COMPLEXITY 3 3/5/2019 Ronnell Cantrell, PT DPT GP 1          PT G-Codes  Outcome Measure Options: AM-PAC 6 Clicks Basic Mobility (PT)  AM-PAC 6 Clicks Score: 13      Ronnell Cantrell, PT DPT  3/5/2019

## 2019-03-05 NOTE — PROGRESS NOTES
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   Kami received cycle #6 of CHOP/R and Dose #11 of prophylactic intrathecal methotrexate 2019.  Kami was brought in by family ( and 2 daughters) with a temperature elevation to 100.8°. Recheck temperature here is 101°.  CBC reveals a WBC of 0.19 with an ANC of 0.06  Hemoglobin of 8.7 and platelet count of 209,000.  On review of systems, she has only lost 1 pound in the last week, she feels about baseline other than being weak and tired and experienced nausea and vomiting on the one-hour drive from home to the clinic.  She does not have a headache or cough, or burning on urination or sputum production.  She is being evaluated in anticipation of admission to the hospital for neutropenia and fever.      TARGET LYMPHOMA SITES:  1. Right maxillary sinus with local invasion to the floor of the right orbit   2.  on 10/10/18   3. borderline spleen size, 13 x 4.8 x 11.8 cm  4. Bone marrow negative on 10/11/18   5. CSF specimen via Omaya port negative on 10/24/18    TUMOR HISTORY: Right maxillary sinus high-grade diffuse large B-cell lymphoma 2018  Kami was treated for hyperthyroidism with radioactive iodine at about age 35. About 45 years ago she underwent a thyroidectomy followed subsequently by a vocal cord medialization procedure by Dr. Crane in [facility].  Several years ago she had right maxillary sinus surgery by Dr. Garcia.  Kami developed upper respiratory and sinus symptoms in the spring of 2018.  She was seen and treated at [facility] urgent care on 3/13/2018 for bronchitis.   She  was then seen in followup by her PCP, Dr. Kvng Strong on 4/27/2018. He felt an abnormality in the neck and ordered a CT scan of the soft tissues of the neck and referred her to ENT for further evaluation of this.  A CT scan of the soft tissues of the neck with contrast on 5/7/2018 documented extensive mucosal thickening of the right maxillary sinus consistent with chronic sinusitis. There was also opacification of several of the ethmoid air cells and mucosal thickening in the nasal cavity.  No neck mass or adenopathy was appreciated.   Kami was evaluated by Dr. Bong Felix on 5/30/2018 for evaluation of right neck pain and swelling.  Full ENT exam including nasopharyngeal area was within normal limits.  Review of the CT scan from 5/7/2018 was consistent with chronic sinusitis. Kami had had a medialization procedure by ENT and [place] earlier in the year for a vocal cord paralysis issue. It was felt that some of the changes on the CT scan were consistent with that procedure.  On 8/3/2018 Kami had a right upper posterior tooth pulled by an oral surgeon, Dr. Ortega in [facility] for what was felt to possibly be an infected tooth.  Kami was again seen at [facility] urgent care on 8/16/2018 with swelling, redness and tenderness in the right maxillary area with associated headaches not responding to antibiotics.   Plain film imaging studies and a CT scan of the area was performed.   Plain film x-rays of the facial bones on 8/16/2018 did not reveal acute abnormalities   CT scan of facial bones within without contrast on 8/16/2018 documented interval development of an enhancing soft tissue along the right maxilla extending along the anterior right maxillary sinus and along the floor of the right orbit measuring 2.4 cm in width. New erosive bony changes were noted involving the bony structure of the right orbital floor. The findings were concerning for a neoplastic process with erosive changes of  the right orbital floor with ENT consultation recommended.   Kami was seen by Kenny Mar PA-C with ENT on 8/20/2018 where the above CT scanning clinical findings were evaluated. Arrangements were made for a biopsy with Dr. Bong Felix.  On 9/17/2018, Dr. Bong Felix performed a nasal/sinus endoscopy with biopsy.  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 The cells are polymorphic with maintaining medium sized to large cells with irregular nuclear contour.  On flow cytometry, the monoclonal kappa B cell population does not express CD5 or CD10, but are low viability.  The immunostains, show a diffuse B-cell infiltrate positive for CD20, PAX-5 and BCL 6, but negative for CD5, CD10, BCL-2, cyclin D1 and CD43.  <30% of the cells were positive for Mum-1. Ki-67 showed a high proliferative rate of about 80%.  All of the above is consistent with a diffuse large B-cell lymphoma of the GBC type.  A CT scan of the soft tissues of the neck with contrast on 10/3/2018 was compared to the CT scan of the face on 8/16/2018 and 5/7/2018.  There had been significant progression of disease with interval increase in the size of the right buccal space hyperdense mass measuring approximately at least 7 x 2 cm on axial cuts.   There is involvement of the medial and lateral pterigoid muscle. The right temporalis muscle was also inseparable from the mass with involvement as well of the pterygomaxillary fissure, pterygopalatine fossa and sphenopalatine foramen.  Local cortex erosive the structure and was also noted along the maxillary gingival surface.  Involvement of the roof of the right maxillary sinus with progression of the erosive disease along the floor of the right orbit was also again identified. An asymmetric soft tissue density was also noted at the location of the right infraorbital area.  There was no evidence of involvement of the extraocular muscles nor do  the lacrimal glands demonstrate a discrete mass.  The lacrimal glands do not demonstrate a discrete mass.  Disease extends along the right lateral nasal cavity and ethmoid air cells. There is now disease along the right sphenoid sinus inseparable from the right foramen rotundum with probable involvement of the maxillary branch of V5.  Evidence of prior instrumentation with prior middle turbinectomies, uncinate to measles, antrostomies, and partial ethmoidectomies is also suspected based on the radiographic imaging.  The cavernous sinuses are symmetric.  Meckel's caves are symmetric.   There is now a right level one be lymphadenopathy by size criteria, and abnormal clustering with lymph node measuring up to 1.5 cm.  Otherwise the rest of the oral cavity and head and neck area did not demonstrate evidence of discrete mass.  Apparent right vocal cord paralysis would medialization of the true vocal cord with anteromedial rotation of the right was also described.   CT scan of the chest with and without contrast on 10/3/2018 did not reveal evidence of thoracic lymphadenopathy.  Tiny 2 mm nodules were noted in the subpleural left lung, likely to be benign with a 1 year followup recommended   CT scan of the abdomen and pelvis with contrast on 10/3/2018 did not reveal evidence of metastatic disease nor abdominal lymphadenopathy. The spleen was of borderline size measuring 13 cm x 4.8 cm x 11.8 cm.  Physical examination on 10/10/2018 revealed a very enlarged and swollen right maxillary facial area with induration of soft tissues around the upper teeth on the right and face.  No palpable peripheral lymphadenopathy was able to be appreciated in the head and neck area, epitrochlear and axillary areas, inguinal areas. No evidence of palpable splenomegaly or other abdominal findings were encountered on exam.  Bone marrow aspirate and biopsy on 10/11/18 showed no evidence of involvement by diffuse large B-cell lymphoma. FLOW  cytometry negative.  MRI of the brain w/wo contrast 10/11/18 at [facility] was consistent with abnormal soft tissue mass involving the right  and buccal space extending along the anterior margin of the right maxillary sinus contiguous with the floor of the right orbit. There was no definite invasion into the orbit, nor displacement of the inferior rectus musculature.  PET scan 10/12/18 at [facility] showed hypermetabolic activity associated with the right buccal space and  space mass, SUV 18.5. There is no distant focus of hypermetabolic activity seen to suggest other sites of involvement.  Echocardiogram 10/12/18 at [facility] revealed an estimated LVEF of 60%.   Hepatitis B and C were negative.  Hepatitis A IgM was positive and discussed with Dr. Osborn.  Left chest mediport was placed 10/18/18 by Dr. ANSON Mcgrath.  Right ommaya port was placed 10/22/18 by Dr. ANURADHA Greer.  CSF specimen was obtained via ommaya port by Dr. Osborn, sent for cytology and FLOW prior to initiation of Cycle #1 CHOP-R initiated on 10/24/18 was negative.     TREATMENT SUMMARY:  1. Cycle #1 CHOP-R initiated 10/24/18,   2. Dose #1 prophylactic intrathecal methotrexate (FLOW cytometry on CSF negative) given 10/29/18. Dose #2 on 11/14/18.   3. Anticipate consolidative XRT.     TARGET BREAST CANCER SITES:  1. Right mastectomy 09/23/04 for DCIS     TUMOR HISTORY: Right DCIS 09/23/04  On 09/23/04, Kami had a right simple mastectomy by Dr. 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   The ER was 95%, [place] was 13%.   No further specific therapy was indicated for this cancer other than close follow up of the area and the opposite breast.     TREATMENT SUMMARY:  1. Right mastectomy and 9/23/2004 for DCIS    REVIEW OF SYSTEMS:    Constitutional: positive for fever   HEENT: no blurring of vision, no double vision; positive for mildly sore mouth but improved                    Lungs: no hemoptysis; positive for cough  CVS: no palpitation, no  chest pain  GI: no abdominal pain, no constipation; no nausea  AMOS: no dysuria, frequency and urgency, no hematuria  Musculoskeletal: generalized weakness  Endocrine: no polyuria, polydypsia, no cold or heat intolerence; positive for chronic thyroid replacement  Hematology: positive for pancytopenia from chemotherapy, improved  Dermatology: no skin rash, no eczema, no pruritis  Psychiatry: no suicide ideation  Neurology: no syncope, no seizures; positive for Ommaya with IT chemotherapy     OBJECTIVE:    Vitals:    03/05/19 0401   BP: 135/60   Pulse: 84   Resp: 18   Temp: 98.5 °F (36.9 °C)   SpO2: 91%       Intake/Output Summary (Last 24 hours) at 3/5/2019 0658  Last data filed at 3/5/2019 0600  Gross per 24 hour   Intake 1820 ml   Output 725 ml   Net 1095 ml     PHYSICAL EXAM:    CONSTITUTIONAL: NAD, family at bedside  EYES: Nonicteric  ENT: No overt thrush-minimal erythema  NECK: Supple, no masses   CHEST/LUNGS: bibasilar crackles L>R  CARDIOVASCULAR: RRR, no murmurs  ABDOMEN: soft non-tender, active bowel sounds, no HSM  EXTREMITIES: warm, moves all extremities, weak  SKIN: warm, dry with no rashes or lesions.  port left chest wall without erythema, Ommaya right scalp without erythema  LYMPH: No cervical, clavicular, axillary, or inguinal lymphadenopathy  NEUROLOGIC: follows commands, non focal   PSYCH: mood and affect appropriate    CBC  Results from last 7 days   Lab Units 03/05/19  0442 03/04/19  0636 03/03/19  0539   WBC 10*3/mm3 19.37* 24.41* 15.22*   HEMOGLOBIN g/dL 9.3* 9.5* 9.6*   HEMATOCRIT % 28.4* 29.3* 29.5*   PLATELETS 10*3/mm3 203 160 102*         Lab Results   Component Value Date     (L) 03/03/2019    K 3.5 03/03/2019    CL 99 03/03/2019    CO2 29.0 03/03/2019    BUN 8 03/03/2019    CREATININE 0.61 03/03/2019    GLUCOSE 68 (L) 03/03/2019    CALCIUM 8.2 (L) 03/03/2019    BILITOT 0.4 02/27/2019    ALKPHOS 53 02/27/2019    AST 21 02/27/2019    ALT 26 02/27/2019    AGRATIO 1.5 02/27/2019    GLOB  2.1 02/27/2019       Lab Results   Component Value Date    INR 0.97 09/10/2018    PROTIME 13.2 09/10/2018     Cultures:    Lab Results   Component Value Date    BLOODCX No growth at less than 24 hours 03/04/2019    BLOODCX No growth at less than 24 hours 03/04/2019     No components found for: URINCX    ASSESSMENT/PLAN:    1.  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   S/p cycle # 6 of CHOP/R on 2/20/19    S/p dose # 11 of prophylactic intrathecal methotrexate given 2/20/19      2. Neutropenic (resolved) fever E Coli UTI/LLL infiltrate  WBC - 19.37    Max temp recorded,  102.2 at 2150 hrs. on 3/2/19.    Continues with fever - 100.8 last pm    B/p  135/60, HR 84 this am    Urine cx 2/27/19 - > 100,000 CFU/mL E. Coli  Blood culture x 2 2/27/19 -no growth at 5 days  Repeat blood culture port/peripheral 2/27/19 p.m. - no growth at 5 days  CSF 2/27/19 - gram stain negative, culture no growth at 5 days    Repeat blood culture port/peripheral 3/4/19 - no growth < 24 hrs    Repeat UA 3/4/19 - negtive    CXR 3/3/19 -  Trace pleural fluid with new patchy LLL infiltrate or atelectasis    Ceftriaxone 1 g every 24 hours  Valacyclovir 500 mg PO every 8 hrs    Appreciate Dr. Gruber Mcfaddin assistance       3.  Anemia from chemotherapy  HGB - 9.3 today, stable  (s/p 1 unit p RBC 2/28/19)      4.  Thrombocytopenia from chemotherapy (resolved)  PLT - 203,000       5.  DVT prophylaxis  - Lovenox 40 subq daily   - crt 0.61      5.  Hypokalemia  - K 3.5 on 3/3/19 after KCL runs       6.  Generalized weakness  - PT to start today      Discussed with patient and her daughter      ELLA Jiménez    03/05/19  6:58 AM     I personally saw and examined this patient, performing a face-to-face diagnostic evaluation with plan of care reviewed and developed with  Prashanth Chambers PA-C and nursing staff.   I have addended and/or modified the above history of present  illness, physical examination, and assessment and plan to reflect my findings and impressions.   Essential elements of the care plan were discussed with  Prashanth Chambers PA-C .   Agree with findings and assessment/plan as documented above.   Questions were encouraged, asked and answered to their understanding and satisfaction.    Alexander Osborn MD  3/5/2019 7:58 AM

## 2019-03-05 NOTE — PLAN OF CARE
Problem: Patient Care Overview  Goal: Plan of Care Review  Outcome: Ongoing (interventions implemented as appropriate)   03/05/19 1526   Coping/Psychosocial   Plan of Care Reviewed With patient   Plan of Care Review   Progress no change   OTHER   Outcome Summary No complaints of pain or discomfort. VSS. Temp max of 100.5 thus far this shift. IV abx adjusted. Worked with PT. Chest xray today. Bladder scanned post void of 198ml. Family at bedside. Safety maintained. Will continue to monitor.      Goal: Individualization and Mutuality  Outcome: Ongoing (interventions implemented as appropriate)    Goal: Discharge Needs Assessment  Outcome: Ongoing (interventions implemented as appropriate)    Goal: Interprofessional Rounds/Family Conf  Outcome: Ongoing (interventions implemented as appropriate)      Problem: Fall Risk (Adult)  Goal: Absence of Fall  Outcome: Ongoing (interventions implemented as appropriate)      Problem: Infection, Risk/Actual (Adult)  Goal: Infection Prevention/Resolution  Outcome: Ongoing (interventions implemented as appropriate)      Problem: Skin Injury Risk (Adult)  Goal: Skin Health and Integrity  Outcome: Ongoing (interventions implemented as appropriate)

## 2019-03-05 NOTE — PLAN OF CARE
Problem: Patient Care Overview  Goal: Plan of Care Review  Outcome: Ongoing (interventions implemented as appropriate)   03/05/19 6454   Coping/Psychosocial   Plan of Care Reviewed With patient;spouse;daughter;family   OTHER   Outcome Summary PT eval completed. She needed min assist to complete sit <> stand transfer and transfer on and off the BSC and she needed min/mod assist for gait in room. With gait in her room she would attempt to reach out to hold onto objects and also demos weakness and decreased balance. She is a high risk for falls and would benefit from use of RW to assist with gait and safety. PT will continue to progress her functional mobility, balance and strength. PT would recommend further therapy at SNF until ready for safety d/c home.

## 2019-03-05 NOTE — PROGRESS NOTES
INFECTIOUS DISEASES PROGRESS NOTE    Patient:  Kami Villanueva  YOB: 1934  MRN: [id number]   Admit date: 2/27/2019   Admitting Physician: Alexander Osborn*  Primary Care Physician: Kvng Strong MD    Chief Complaint: None today.    Interval History: Patient reports that she is not really having any different type of a cough.  Her daughter is in the room and states she is still coughing but it is no worse.    Nursing is at bedside.  They report they did an in and out catheterization and the patient had about 600 mL of urine.    She is still getting up about every 15 minutes according to the her daughter.  A post void residual has not been checked again.        Allergies:   Allergies   Allergen Reactions   • Alendronate Other (See Comments)     Pt does not know  FOSAMAX   • Lisinopril Cough   • Statins Other (See Comments) and Myalgia     Other     • Sulfa Antibiotics Other (See Comments)     Pt does not remember   • Teriparatide (Recombinant) Other (See Comments)     Pt does not know  FORTEO       Current Meds:     Current Facility-Administered Medications:   •  acetaminophen (TYLENOL) tablet 650 mg, 650 mg, Oral, Q6H PRN, Prashanth Chambers PA, 650 mg at 03/05/19 0041  •  carvedilol (COREG) tablet 3.125 mg, 3.125 mg, Oral, BID With Meals, Prashanth Chambers PA, 3.125 mg at 03/05/19 0827  •  cefTRIAXone (ROCEPHIN) 1 g/100 mL 0.9% NS (MBP), 1 g, Intravenous, Q24H, Sarah Robles MD, 1 g at 03/04/19 1601  •  clotrimazole (MYCELEX) colton 10 mg, 10 mg, Oral, 5x Daily, Sarah Robles MD, 10 mg at 03/05/19 1433  •  diphenhydrAMINE (BENADRYL) capsule 25 mg, 25 mg, Oral, Nightly PRN, Prashanth Chambers PA, 25 mg at 03/01/19 2201  •  diphenhydrAMINE (BENADRYL) capsule 50 mg, 50 mg, Oral, Nightly PRN, Prashanth Chambers PA, 50 mg at 03/03/19 2217  •  enoxaparin (LOVENOX) syringe 40 mg, 40 mg, Subcutaneous, Q24H, Prashanth Chambers PA, 40 mg at 03/05/19 1433  •  levothyroxine (SYNTHROID,  "LEVOTHROID) tablet 125 mcg, 125 mcg, Oral, Q AM, Prashanth Chambers PA, 125 mcg at 19 0558  •  lidocaine-Maalox-diphenhydramine (MIRACLE MOUTHWASH) oral suspension 30 mL, 30 mL, Oral, 4x Daily AC & at Bedtime PRN, Sarah Robles MD, 30 mL at 19 1449  •  ondansetron (ZOFRAN) tablet 8 mg, 8 mg, Oral, Q8H PRN, Prashanth Chambers PA  •  oxybutynin XL (DITROPAN-XL) 24 hr tablet 5 mg, 5 mg, Oral, Daily, Prashanth Chambers PA, 5 mg at 19 0827  •  potassium chloride 20 mEq in 50 mL IVPB, 20 mEq, Intravenous, Q1H PRN, Prashanth Chambers PA, Last Rate: 50 mL/hr at 19 1625, 20 mEq at 19 1625  •  sodium chloride 0.9 % flush 3 mL, 3 mL, Intravenous, Q12H, Prashanth Chambers PA, 3 mL at 19 0828  •  sodium chloride 0.9 % flush 3-10 mL, 3-10 mL, Intravenous, PRN, Prashanth Chambers PA  •  sodium chloride 0.9 % with KCl 20 mEq/L infusion, 40 mL/hr, Intravenous, Continuous, Alexander Osborn MD, Last Rate: 40 mL/hr at 19 1714, 40 mL/hr at 19 1714  •  valACYclovir (VALTREX) tablet 500 mg, 500 mg, Oral, Q8H, Sarah Robles MD, 500 mg at 19 1433      Review of Systems   Constitutional: Positive for fever (Patient thought was improving). Negative for chills.   HENT: Positive for mouth sores (Improved).    Eyes: Negative for redness.   Respiratory: Negative for shortness of breath.    Cardiovascular: Negative for chest pain.   Gastrointestinal: Negative for diarrhea.   Genitourinary: Positive for frequency and urgency.   Allergic/Immunologic: Positive for immunocompromised state.            Objective     Vital Signs:  Temp (24hrs), Av.5 °F (37.5 °C), Min:98.4 °F (36.9 °C), Max:101.5 °F (38.6 °C)      /57 (BP Location: Left arm, Patient Position: Lying)   Pulse 89   Temp 100.3 °F (37.9 °C) (Oral)   Resp 18   Ht 170.2 cm (67.01\")   Wt 67.3 kg (148 lb 6 oz)   LMP  (LMP Unknown)   SpO2 93%   BMI 23.23 kg/m²         Physical Exam   General: The patient is elderly lying in " bed in no acute distress  HEENT: Sclera anicteric and noninjected, oropharynx without thrush.  Buccal mucosal erosions are improved.  She only has a few healing erosive lesions on her lower lip and dry lips.  Neck: Supple  Heart: S1-S2 rate is regular  Respiratory: Effort even and unlabored.  No crackles or wheezing appreciated  Abdomen soft, nontender, nondistended, bowel sounds are positive I did not appreciate any rebound or guarding  Psych: She is pleasant cooperative  Neuro: Alert and oriented, answers questions appropriately.    Line/IV site: CCsubclavian left, condition patent and no redness    Results Review:    I reviewed the patient's new clinical results.    Lab Results:  CBC:   Lab Results   Lab 02/27/19  1325 02/28/19  0500 03/01/19  0434 03/02/19  0701 03/03/19  0539 03/04/19  0636 03/05/19  0442   WBC 0.17* 0.22* 0.42* 3.22* 15.22* 24.41* 19.37*   HEMOGLOBIN 9.2* 7.6* 8.1* 8.5* 9.6* 9.5* 9.3*   HEMATOCRIT 27.8* 22.7* 24.4* 25.9* 29.5* 29.3* 28.4*   PLATELETS 115* 81* 71* 74* 102* 160 203   LYMPHOCYTE % 65.0*  --   --  6.0* 4.0* 4.0* 2.0*   MONOCYTES % 8.0  --   --  9.0 7.1 2.0* 3.0*     >>>>>>>>>>>>>>> last dose of filgrastim was March 2  CMP:   Lab Results   Lab 02/27/19  1325 02/27/19  1622 03/01/19  0434 03/01/19  1659 03/02/19  0701 03/03/19  0539   SODIUM 130* 128* 131*  --  129* 133*   POTASSIUM 3.3* 3.3* 2.5* 3.5 3.4* 3.5   CHLORIDE 90* 91* 96*  --  96* 99   CO2 30.0 32.0* 32.0*  --  28.0 29.0   BUN 19 19 14  --  11 8   CREATININE 0.50 0.46* 0.53  --  0.61 0.61   CALCIUM 8.5 8.1* 7.9*  --  7.9* 8.2*   BILIRUBIN 0.4 0.4  --   --   --   --    ALK PHOS 56 53  --   --   --   --    ALT (SGPT) 24 26  --   --   --   --    AST (SGOT) 25 21  --   --   --   --    GLUCOSE 115* 106* 76  --  73 68*     Status:  Final result   Visible to patient:  No (Not Released)   Specimen Information: Urine, Catheter In/Out         Ref Range & Units 1d ago   Color, UA Yellow, Straw Yellow    Appearance, UA Clear Clear     pH, UA 5.0 - 8.0 7.0    Specific Gravity, UA 1.005 - 1.030 1.011    Glucose, UA Negative Negative    Ketones, UA Negative Negative    Bilirubin, UA Negative Negative    Blood, UA Negative Negative    Protein, UA Negative Negative    Leuk Esterase, UA Negative Negative    Nitrite, UA Negative Negative    Urobilinogen, UA 0.2 - 1.0 E.U./dL 0.2 E.U./dL    Resulting Agency  [facility]      Narrative   Performed by: [facility]   Urine microscopic not indicated.      Specimen Collected: 03/04/19 18:00 Last Resulted: 03/04/19 18:14               Culture Results:      Urine Culture   Date Value Ref Range Status   02/27/2019 >100,000 CFU/mL Escherichia coli (A)  Final       Microbiology Results Abnormal     Procedure Component Value - Date/Time    Blood Culture - Blood, Arm, Left [[id number]] Collected:  03/04/19 1643    Lab Status:  Preliminary result Specimen:  Blood from Arm, Left Updated:  03/05/19 0501     Blood Culture No growth at less than 24 hours    Blood Culture With REBECA - Blood, Blood, Central Line [[id number]] Collected:  03/04/19 1643    Lab Status:  Preliminary result Specimen:  Blood, Central Line Updated:  03/05/19 0501     Blood Culture No growth at less than 24 hours    Blood Culture With REBECA - Blood, Arm, Left [[id number]] Collected:  02/27/19 2141    Lab Status:  Final result Specimen:  Blood from Arm, Left Updated:  03/04/19 2200     Blood Culture No growth at 5 days    Blood Culture With REBECA - Blood, Blood, Venous Line [[id number]] Collected:  02/27/19 2142    Lab Status:  Final result Specimen:  Blood, Venous Line Updated:  03/04/19 2200     Blood Culture No growth at 5 days        02/27/2019 0900  03/01/2019 0849  Urine Culture - Urine, Urine, Clean Catch [[id number]]    (Abnormal)   Urine, Clean Catch     Final result  Urine Culture >100,000 CFU/mL Escherichia coli Abnormal        Susceptibility      Escherichia coli     ALFREDO     Ampicillin >=32  Resistant     Ampicillin + Sulbactam >=32  Resistant      Cefazolin <=4  Susceptible1     Cefepime <=1  Susceptible     Ceftriaxone <=1  Susceptible     Ertapenem <=0.5  Susceptible     ESBL Confirmation Test NEG  Negative     Gentamicin >=16  Resistant     Levofloxacin 1  Susceptible     Meropenem <=0.25  Susceptible     Nitrofurantoin 32  Susceptible     Piperacillin + Tazobactam <=4  Susceptible     Tobramycin 8  Intermediate     Trimethoprim + Sulfamethoxazole >=320  Resistant           1 Cefazolin results may be used to predict the potential effectiveness of oral cephalosporins for treating uncomplicated urinary tract infections.       Linear View                   Radiology:     Procedure Component Value Units Date/Time   XR Chest PA & Lateral [[id number]] Prashanth as Reviewed   Order Status: Completed Collected: 03/05/19 0942    Updated: 03/05/19 0946   Narrative:     EXAMINATION:  XR CHEST PA AND LATERAL-  3/5/2019 9:17 AM CST     HISTORY: Fever.     COMPARISON: 3/3/2019.     TECHNIQUE: 2 views were obtained.     FINDINGS:  There is left lower lobe infiltrate. There is mild blunting  of the costophrenic angles bilaterally. Heart size is normal. There is  vascular redistribution when compared to 12/21/2018. There may be a  trace amount of interstitial edema. There is a port catheter on the  left.      Impression:     1. Focal infiltrate at the left lung base may represent pneumonia or  atelectasis.  2. Vascular redistribution. There may be minimal edema. There are small  pleural effusions.         This report was finalized on 03/05/2019 09:43 by Dr. Fransico Aldana MD.               Imaging Results (last 72 hours)     Procedure Component Value Units Date/Time    XR Chest PA & Lateral [[id number]] Collected:  02/27/19 1426     Updated:  02/27/19 1433    Narrative:       EXAM: XR CHEST PA AND LATERAL- - 2/27/2019 1:32 PM CST     HISTORY: neutropenic fever       COMPARISON: 12/21/2018.      TECHNIQUE:  2 images.  Frontal and lateral views of the chest.     FINDINGS:     Portable overlies the left chest with intact catheter, tip projects at  the lower SVC.     No pneumothorax. Diffuse interstitial opacities. No focal consolidation.  Cardiac and mediastinal silhouette within normal limits. Calcified  aortic atherosclerosis. No acute bony findings.          Impression:       1. Is interstitial opacities, could represent atypical infection or  edema.  This report was finalized on 02/27/2019 14:30 by Dr Valentina Feldman MD.          Assessment/Plan     Active Hospital Problems    Diagnosis   • **Neutropenic fever (CMS/HCC)       IMPRESSION:  1. Fever-patient hemodynamically stable  overall fever curve improved again.  Chest x-ray essentially unchanged however there may be some atelectasis or minimal infiltrate noted in the left lower lobe noted on repeat as well.  Would have expected it to have progressed some if in fact that was the cause of her fever however feel we need to cover for possible pneumonia at this time since she has persistent fever  2. Neutropenia-resolved.  Leukocytosis now improving and expect to drift back down as filgrastim stopped  3. E. coli urinary tract infection-undergoing treatment.  Repeat urinalysis negative  4. 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 oral mucositis with lip lesions -improved         RECOMMENDATION:   · Discontinue ceftriaxone  · We will broaden therapy at this time to include meropenem.  Entertain the thought of utilizing Zosyn as we have used previously however would like to remove beta lactams altogether from the picture to rule out the possibility of drug fever in this patient who is minimally symptomatic, has had recovery of her white blood count.    · continue valacyclovir  · Continue miracle mouthwash.      Sarah Robles MD  03/05/19  3:07 PM

## 2019-03-05 NOTE — PLAN OF CARE
Problem: Patient Care Overview  Goal: Plan of Care Review  Outcome: Ongoing (interventions implemented as appropriate)   03/05/19 2550   Coping/Psychosocial   Plan of Care Reviewed With patient;daughter   Plan of Care Review   Progress no change   OTHER   Outcome Summary C/o of pain x1, prn given with relief, very little sleep, restless, VSS. Daughter at bedside, will continue to monitor.

## 2019-03-06 LAB
ANION GAP SERPL CALCULATED.3IONS-SCNC: 5 MMOL/L (ref 4–13)
ANISOCYTOSIS BLD QL: ABNORMAL
BUN BLD-MCNC: 10 MG/DL (ref 5–21)
BUN/CREAT SERPL: 18.9 (ref 7–25)
CALCIUM SPEC-SCNC: 7.8 MG/DL (ref 8.4–10.4)
CHLORIDE SERPL-SCNC: 96 MMOL/L (ref 98–110)
CLUMPED PLATELETS: PRESENT
CO2 SERPL-SCNC: 28 MMOL/L (ref 24–31)
CREAT BLD-MCNC: 0.53 MG/DL (ref 0.5–1.4)
DEPRECATED RDW RBC AUTO: 53.5 FL (ref 40–54)
ERYTHROCYTE [DISTWIDTH] IN BLOOD BY AUTOMATED COUNT: 16.6 % (ref 12–15)
GFR SERPL CREATININE-BSD FRML MDRD: 110 ML/MIN/1.73
GIANT PLATELETS: ABNORMAL
GLUCOSE BLD-MCNC: 71 MG/DL (ref 70–100)
HCT VFR BLD AUTO: 28.3 % (ref 37–47)
HGB BLD-MCNC: 9.1 G/DL (ref 12–16)
LYMPHOCYTES # BLD MANUAL: 0.29 10*3/MM3 (ref 0.72–4.86)
LYMPHOCYTES NFR BLD MANUAL: 2 % (ref 15–45)
MCH RBC QN AUTO: 28.8 PG (ref 28–32)
MCHC RBC AUTO-ENTMCNC: 32.2 G/DL (ref 33–36)
MCV RBC AUTO: 89.6 FL (ref 82–98)
METAMYELOCYTES NFR BLD MANUAL: 3 % (ref 0–0)
MYELOCYTES NFR BLD MANUAL: 2 % (ref 0–0)
NEUTROPHILS # BLD AUTO: 12.6 10*3/MM3 (ref 1.4–7)
NEUTROPHILS NFR BLD MANUAL: 83 % (ref 39–78)
NEUTS BAND NFR BLD MANUAL: 5 % (ref 0–10)
PLATELET # BLD AUTO: 256 10*3/MM3 (ref 130–400)
PMV BLD AUTO: 10.8 FL (ref 6–12)
POIKILOCYTOSIS BLD QL SMEAR: ABNORMAL
POLYCHROMASIA BLD QL SMEAR: ABNORMAL
POTASSIUM BLD-SCNC: 3.9 MMOL/L (ref 3.5–5.3)
PROMYELOCYTES NFR BLD MANUAL: 4 % (ref 0–0)
RBC # BLD AUTO: 3.16 10*6/MM3 (ref 4.2–5.4)
SODIUM BLD-SCNC: 129 MMOL/L (ref 135–145)
TOXIC GRANULATION: ABNORMAL
VARIANT LYMPHS NFR BLD MANUAL: 1 % (ref 0–5)
WBC NRBC COR # BLD: 14.32 10*3/MM3 (ref 4.8–10.8)

## 2019-03-06 PROCEDURE — 25810000003 SODIUM CHLORIDE 0.9 % WITH KCL 20 MEQ 20-0.9 MEQ/L-% SOLUTION: Performed by: INTERNAL MEDICINE

## 2019-03-06 PROCEDURE — 97116 GAIT TRAINING THERAPY: CPT

## 2019-03-06 PROCEDURE — 85007 BL SMEAR W/DIFF WBC COUNT: CPT | Performed by: PHYSICIAN ASSISTANT

## 2019-03-06 PROCEDURE — 25010000002 ENOXAPARIN PER 10 MG: Performed by: PHYSICIAN ASSISTANT

## 2019-03-06 PROCEDURE — 97110 THERAPEUTIC EXERCISES: CPT

## 2019-03-06 PROCEDURE — 97530 THERAPEUTIC ACTIVITIES: CPT

## 2019-03-06 PROCEDURE — 85025 COMPLETE CBC W/AUTO DIFF WBC: CPT | Performed by: PHYSICIAN ASSISTANT

## 2019-03-06 PROCEDURE — 63710000001 DIPHENHYDRAMINE PER 50 MG: Performed by: PHYSICIAN ASSISTANT

## 2019-03-06 PROCEDURE — 94760 N-INVAS EAR/PLS OXIMETRY 1: CPT

## 2019-03-06 PROCEDURE — 80048 BASIC METABOLIC PNL TOTAL CA: CPT | Performed by: NURSE PRACTITIONER

## 2019-03-06 PROCEDURE — 25010000002 MEROPENEM PER 100 MG: Performed by: INTERNAL MEDICINE

## 2019-03-06 PROCEDURE — 94799 UNLISTED PULMONARY SVC/PX: CPT

## 2019-03-06 RX ADMIN — CARVEDILOL 3.12 MG: 3.12 TABLET, FILM COATED ORAL at 17:51

## 2019-03-06 RX ADMIN — MEROPENEM 1 G: 1 INJECTION, POWDER, FOR SOLUTION INTRAVENOUS at 01:20

## 2019-03-06 RX ADMIN — MEROPENEM 1 G: 1 INJECTION, POWDER, FOR SOLUTION INTRAVENOUS at 17:49

## 2019-03-06 RX ADMIN — DIPHENHYDRAMINE HYDROCHLORIDE 25 MG: 25 CAPSULE ORAL at 21:52

## 2019-03-06 RX ADMIN — ENOXAPARIN SODIUM 40 MG: 40 INJECTION SUBCUTANEOUS at 19:13

## 2019-03-06 RX ADMIN — VALACYCLOVIR 500 MG: 500 TABLET, FILM COATED ORAL at 14:18

## 2019-03-06 RX ADMIN — CLOTRIMAZOLE 10 MG: 10 LOZENGE ORAL; TOPICAL at 14:18

## 2019-03-06 RX ADMIN — OXYBUTYNIN CHLORIDE 5 MG: 5 TABLET, EXTENDED RELEASE ORAL at 08:07

## 2019-03-06 RX ADMIN — DIPHENHYDRAMINE HYDROCHLORIDE 50 MG: 25 CAPSULE ORAL at 01:23

## 2019-03-06 RX ADMIN — MEROPENEM 1 G: 1 INJECTION, POWDER, FOR SOLUTION INTRAVENOUS at 08:01

## 2019-03-06 RX ADMIN — CARVEDILOL 3.12 MG: 3.12 TABLET, FILM COATED ORAL at 07:57

## 2019-03-06 RX ADMIN — VALACYCLOVIR 500 MG: 500 TABLET, FILM COATED ORAL at 21:41

## 2019-03-06 RX ADMIN — CLOTRIMAZOLE 10 MG: 10 LOZENGE ORAL; TOPICAL at 12:38

## 2019-03-06 RX ADMIN — CLOTRIMAZOLE 10 MG: 10 LOZENGE ORAL; TOPICAL at 21:41

## 2019-03-06 RX ADMIN — POTASSIUM CHLORIDE AND SODIUM CHLORIDE 40 ML/HR: 900; 150 INJECTION, SOLUTION INTRAVENOUS at 21:45

## 2019-03-06 RX ADMIN — VALACYCLOVIR 500 MG: 500 TABLET, FILM COATED ORAL at 05:57

## 2019-03-06 RX ADMIN — CLOTRIMAZOLE 10 MG: 10 LOZENGE ORAL; TOPICAL at 17:51

## 2019-03-06 RX ADMIN — LEVOTHYROXINE SODIUM 125 MCG: 125 TABLET ORAL at 05:57

## 2019-03-06 RX ADMIN — CLOTRIMAZOLE 10 MG: 10 LOZENGE ORAL; TOPICAL at 07:57

## 2019-03-06 NOTE — THERAPY TREATMENT NOTE
Acute Care - Physical Therapy Treatment Note  [facility]     Patient Name: Kami Villanueva  : 1934  MRN: [id number]  Today's Date: 3/6/2019  Onset of Illness/Injury or Date of Surgery: 19  Date of Referral to PT: 19  Referring Physician: Dr. Osborn    Admit Date: 2019    Visit Dx:    ICD-10-CM ICD-9-CM   1. 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)   • Gram-negative bacteremia   • Neutropenia (CMS/HCC)   • Hypokalemia   • Pancytopenia due to antineoplastic chemotherapy (CMS/HCC)   • Neutropenic fever (CMS/HCC)       Therapy Treatment    Rehabilitation Treatment Summary     Row Name 19 1556             Treatment Time/Intention    Discipline  physical therapy assistant  -LG      Document Type  therapy note (daily note)  -LG      Subjective Information  no complaints  -LG2      Mode of Treatment  individual therapy;physical therapy  -LG2      Patient/Family Observations  Pt's daughter present in room  -LG2      Existing Precautions/Restrictions  fall  -LG2      Recorded by [LG] Zak Philippe, PTA 19 1557  [LG2] Zak Philippe, PTA 19 164      Row Name 19 155             Bed Mobility Assessment/Treatment    Comment (Bed Mobility)  up in chair  -LG      Recorded by [LG] Zak Philippe, PTA 19 164      Row Name 19 155             Sit-Stand Transfer    Sit-Stand Lissie (Transfers)  verbal cues;minimum assist (75% patient effort)  -LG      Recorded by [LG] Zak Philippe, PTA 19 164      Row Name 19 155             Stand-Sit Transfer    Stand-Sit Lissie (Transfers)  verbal  cues;minimum assist (75% patient effort)  -LG      Recorded by [LG] Zak Philippe, [facility] 03/06/19 1646      Row Name 03/06/19 1556             Gait/Stairs Assessment/Training    Gait/Stairs Assessment/Training  gait/ambulation assistive device  -LG      [place] Level (Gait)  minimum assist (75% patient effort)  -LG      Assistive Device (Gait)  walker, front-wheeled  -LG      Distance in Feet (Gait)  15' x 2 reps  -LG      Pattern (Gait)  swing-to  -LG      Deviations/Abnormal Patterns (Gait)  bilateral deviations;base of support, narrow  -LG      Bilateral Gait Deviations  forward flexed posture  -LG      Recorded by [] Zak Philippe, [facility] 03/06/19 1646      Row Name 03/06/19 1556             Therapeutic Exercise    Comment (Therapeutic Exercise)  B LE AROM x 10 reps in sitting. APs,   -LG      Recorded by [LG] Zak Philippe, [facility] 03/06/19 1646      Row Name 03/06/19 1556             Positioning and Restraints    Pre-Treatment Position  sitting in chair/recliner  -LG      Post Treatment Position  chair  -LG      In Chair  sitting;call light within reach;encouraged to call for assist;with family/caregiver;notified nsg  -LG      Recorded by [] Zak Philippe, [facility] 03/06/19 1647      Row Name 03/06/19 1556             Pain Scale: Numbers Pre/Post-Treatment    Pain Scale: Numbers, Pretreatment  0/10 - no pain  -LG      Recorded by [] Zak Philippe, [facility] 03/06/19 1647        User Key  (r) = Recorded By, (t) = Taken By, (c) = Cosigned By    Initials Name Effective Dates Discipline     Zak Philippe, [facility] 08/02/16 -  PT                   Physical Therapy Education     Title: PT OT SLP Therapies (In Progress)     Topic: Physical Therapy (In Progress)     Point: Mobility training (Done)     Learning Progress Summary           Patient Acceptance, REJI CHACKO,NR by TYRA at 3/5/2019  2:04 PM    Comment:  Educated pt. on progression of PT POC and benefits of activity   Family Acceptance, REJI CHACKO,NR by TYRA at 3/5/2019  2:04 PM     Comment:  Educated pt. on progression of PT POC and benefits of activity   Significant Other Juan R, RICCO, REJI,NR by TYRA at 3/5/2019  2:04 PM    Comment:  Educated pt. on progression of PT POC and benefits of activity                               User Key     Initials Effective Dates Name Provider Type Ailyn MARY 08/02/16 -  Ronnell Cantrell, PT DPT Physical Therapist PT                PT Recommendation and Plan        Outcome Measures     Row Name 03/05/19 1404             How much help from another person do you currently need...    Turning from your back to your side while in flat bed without using bedrails?  3  -TYRA      Moving from lying on back to sitting on the side of a flat bed without bedrails?  3  -TYRA      Moving to and from a bed to a chair (including a wheelchair)?  2  -TYRA      Standing up from a chair using your arms (e.g., wheelchair, bedside chair)?  2  -TYRA      Climbing 3-5 steps with a railing?  1  -TYRA      To walk in hospital room?  2  -TYRA      AM-PAC 6 Clicks Score  13  -TYRA         Functional Assessment    Outcome Measure Options  AM-PAC 6 Clicks Basic Mobility (PT)  -TYRA        User Key  (r) = Recorded By, (t) = Taken By, (c) = Cosigned By    Initials Name Provider Type    Ronnell Overton, PT DPT Physical Therapist         Time Calculation:   PT Charges     Row Name 03/06/19 1556             Time Calculation    Start Time  1556  -LG      Stop Time  1634  -LG      Time Calculation (min)  38 min  -LG      PT Received On  03/06/19  -      PT Goal Re-Cert Due Date  03/15/19  -         Time Calculation- PT    Total Timed Code Minutes- PT  38 minute(s)  -LG        User Key  (r) = Recorded By, (t) = Taken By, (c) = Cosigned By    Initials Name Provider Type    LG Zak Philippe, PTA Physical Therapy Assistant        Therapy Suggested Charges     Code   Minutes Charges    None           Therapy Charges for Today     Code Description Service Date Service Provider Modifiers Qty    [id number]  HC GAIT TRAINING EA 15 MIN 3/6/2019 Zak Philippe, PTA GP 1    [id number] HC PT THERAPEUTIC ACT EA 15 MIN 3/6/2019 Zak Philippe, PTA GP 1    84053378989 HC PT THER PROC EA 15 MIN 3/6/2019 Zak Philippe, PTA GP 1          PT G-Codes  Outcome Measure Options: AM-PAC 6 Clicks Basic Mobility (PT)  AM-PAC 6 Clicks Score: 13    Zak Philippe PTA  3/6/2019

## 2019-03-06 NOTE — DISCHARGE PLACEMENT REQUEST
"Aviva Suarez [phone]  Kami Collazo (84 y.o. Female)     Date of Birth Social Security Number Address Home Phone MRN    1934  [address]  [facility] [address] [phone] [id number]    [employer]beterian Marital Status          Synagogue        Admission Date Admission Type Admitting Provider Attending Provider Department, Room/Bed    2/27/19 Urgent Alexander Osborn MD Skinner, William Lawrence, MD [facility] 4C, 462/1    Discharge Date Discharge Disposition Discharge Destination                       Attending Provider:  Alexander Osborn MD    Allergies:  Alendronate, Lisinopril, Statins, Sulfa Antibiotics, Teriparatide (Recombinant)    Isolation:  None   Infection:  None   Code Status:  CPR    Ht:  170.2 cm (67.01\")   Wt:  66.9 kg (147 lb 9 oz)    Admission Cmt:  None   Principal Problem:  Neutropenic fever (CMS/HCC) [D70.9,R50.81]                 Active Insurance as of 2/27/2019     Primary Coverage     Payor Plan Insurance Group Employer/Plan Group    MEDICARE MEDICARE A & B      Payor Plan Address Payor Plan Phone Number Payor Plan Fax Number Effective Dates    [address] [phone]  3/1/1999 - None Entered    [facility] [id number]       Subscriber Name Subscriber Birth Date Member ID       KAMI COLLAZO 1934 [id number]           Secondary Coverage     Payor Plan Insurance Group Employer/Plan Group    MISC MED SUPP MISC MCARE SUPPLEMENT      Coverage Address Coverage Phone Number Coverage Fax Number Effective Dates    [address] [address] [phone]  3/1/1999 - None Entered    [facility] [id number]       Subscriber Name Subscriber Birth Date Member ID       KAMI COLLAZO 1934 [id number]                 Emergency Contacts      (Rel.) Home Phone Work Phone Mobile Phone    Wade Collazo (Spouse) [phone] -- --    Teresa Menendez (Daughter) [phone] -- [phone]    PratikGale (Daughter) -- -- [phone]    " Estrada Jones (Grandchild) -- -- [phone]               History & Physical      Alexander Osborn MD at 2/27/2019  1:15 PM              ADMISSION HISTORY & PHYSICAL    Pt Name: Kami Villanueva  MRN: [id number]  [id number]  YOB: 1934  Date of evaluation: 2/27/2019    CHIEF COMPLAINT:  Neutropenic fever    History Obtained From:  PATIENT, FAMILY and CHART    HISTORY OF PRESENT ILLNESS:    Kami Villanueva is an 84-year-old  female with a diagnosis of 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   Kami received cycle #6 of CHOP/R and Dose #11 of prophylactic intrathecal methotrexate 2/20/2019.  Kami was brought in by family ( and 2 daughters) with a temperature elevation to 100.8°. Recheck temperature here is 101°.  CBC reveals a WBC of 0.19 with an ANC of 0.06  Hemoglobin of 8.7 and platelet count of 209,000.  On review of systems, she has only lost 1 pound in the last week, she feels about baseline other than being weak and tired and experienced nausea and vomiting on the one-hour drive from home to the clinic.  She does not have a headache or cough, or burning on urination or sputum production.  She is being evaluated in anticipation of admission to the hospital for neutropenia and fever.     TARGET LYMPHOMA SITES:  1. Right maxillary sinus with local invasion to the floor of the right orbit   2.  on 10/10/18   3. borderline spleen size, 13 x 4.8 x 11.8 cm  4. Bone marrow negative on 10/11/18   5. CSF specimen via Omaya port negative on 10/24/18  TUMOR HISTORY: Right maxillary sinus high-grade diffuse large B-cell lymphoma 9/17/2018  Kami was treated for hyperthyroidism with radioactive iodine at about age 35. About 45 years ago she underwent a thyroidectomy followed subsequently by a vocal cord medialization procedure by Dr. Crane in [facility].  Several years ago she had right  maxillary sinus surgery by Dr. Garcia.  Kami developed upper respiratory and sinus symptoms in the spring of 2018.  She was seen and treated at [facility] urgent care on 3/13/2018 for bronchitis.   She was then seen in followup by her PCP, Dr. Kvng Strong on 4/27/2018. He felt an abnormality in the neck and ordered a CT scan of the soft tissues of the neck and referred her to ENT for further evaluation of this.  A CT scan of the soft tissues of the neck with contrast on 5/7/2018 documented extensive mucosal thickening of the right maxillary sinus consistent with chronic sinusitis. There was also opacification of several of the ethmoid air cells and mucosal thickening in the nasal cavity.  No neck mass or adenopathy was appreciated.   Kami was evaluated by Dr. Bong Felix on 5/30/2018 for evaluation of right neck pain and swelling.  Full ENT exam including nasopharyngeal area was within normal limits.  Review of the CT scan from 5/7/2018 was consistent with chronic sinusitis. Kami had had a medialization procedure by ENT and [place] earlier in the year for a vocal cord paralysis issue. It was felt that some of the changes on the CT scan were consistent with that procedure.  On 8/3/2018 Kami had a right upper posterior tooth pulled by an oral surgeon, Dr. Ortega in [facility] for what was felt to possibly be an infected tooth.  Kami was again seen at [facility] urgent care on 8/16/2018 with swelling, redness and tenderness in the right maxillary area with associated headaches not responding to antibiotics.   Plain film imaging studies and a CT scan of the area was performed.   Plain film x-rays of the facial bones on 8/16/2018 did not reveal acute abnormalities   CT scan of facial bones within without contrast on 8/16/2018 documented interval development of an enhancing soft tissue along the right maxilla extending along the anterior right maxillary sinus and along the floor of the right  orbit measuring 2.4 cm in width. New erosive bony changes were noted involving the bony structure of the right orbital floor. The findings were concerning for a neoplastic process with erosive changes of the right orbital floor with ENT consultation recommended.   Kami was seen by Kenny Mar PA-C with ENT on 8/20/2018 where the above CT scanning clinical findings were evaluated. Arrangements were made for a biopsy with Dr. Bong Felix.  On 9/17/2018, Dr. Bong Felix performed a nasal/sinus endoscopy with biopsy.  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 The cells are polymorphic with maintaining medium sized to large cells with irregular nuclear contour.  On flow cytometry, the monoclonal kappa B cell population does not express CD5 or CD10, but are low viability.  The immunostains, show a diffuse B-cell infiltrate positive for CD20, PAX-5 and BCL 6, but negative for CD5, CD10, BCL-2, cyclin D1 and CD43.  <30% of the cells were positive for Mum-1. Ki-67 showed a high proliferative rate of about 80%.  All of the above is consistent with a diffuse large B-cell lymphoma of the GBC type.  A CT scan of the soft tissues of the neck with contrast on 10/3/2018 was compared to the CT scan of the face on 8/16/2018 and 5/7/2018.  There had been significant progression of disease with interval increase in the size of the right buccal space hyperdense mass measuring approximately at least 7 x 2 cm on axial cuts.   There is involvement of the medial and lateral pterigoid muscle. The right temporalis muscle was also inseparable from the mass with involvement as well of the pterygomaxillary fissure, pterygopalatine fossa and sphenopalatine foramen.  Local cortex erosive the structure and was also noted along the maxillary gingival surface.  Involvement of the roof of the right maxillary sinus with progression of the erosive disease along the floor of the right  orbit was also again identified. An asymmetric soft tissue density was also noted at the location of the right infraorbital area.  There was no evidence of involvement of the extraocular muscles nor do the lacrimal glands demonstrate a discrete mass.  The lacrimal glands do not demonstrate a discrete mass.  Disease extends along the right lateral nasal cavity and ethmoid air cells. There is now disease along the right sphenoid sinus inseparable from the right foramen rotundum with probable involvement of the maxillary branch of V5.  Evidence of prior instrumentation with prior middle turbinectomies, uncinate to measles, antrostomies, and partial ethmoidectomies is also suspected based on the radiographic imaging.  The cavernous sinuses are symmetric.  Meckel's caves are symmetric.   There is now a right level one be lymphadenopathy by size criteria, and abnormal clustering with lymph node measuring up to 1.5 cm.  Otherwise the rest of the oral cavity and head and neck area did not demonstrate evidence of discrete mass.  Apparent right vocal cord paralysis would medialization of the true vocal cord with anteromedial rotation of the right was also described.   CT scan of the chest with and without contrast on 10/3/2018 did not reveal evidence of thoracic lymphadenopathy.  Tiny 2 mm nodules were noted in the subpleural left lung, likely to be benign with a 1 year followup recommended   CT scan of the abdomen and pelvis with contrast on 10/3/2018 did not reveal evidence of metastatic disease nor abdominal lymphadenopathy. The spleen was of borderline size measuring 13 cm x 4.8 cm x 11.8 cm.  Physical examination on 10/10/2018 revealed a very enlarged and swollen right maxillary facial area with induration of soft tissues around the upper teeth on the right and face.  No palpable peripheral lymphadenopathy was able to be appreciated in the head and neck area, epitrochlear and axillary areas, inguinal areas. No evidence  of palpable splenomegaly or other abdominal findings were encountered on exam.  Bone marrow aspirate and biopsy on 10/11/18 showed no evidence of involvement by diffuse large B-cell lymphoma. FLOW cytometry negative.  MRI of the brain w/wo contrast 10/11/18 at [facility] was consistent with abnormal soft tissue mass involving the right  and buccal space extending along the anterior margin of the right maxillary sinus contiguous with the floor of the right orbit. There was no definite invasion into the orbit, nor displacement of the inferior rectus musculature.  PET scan 10/12/18 at [facility] showed hypermetabolic activity associated with the right buccal space and  space mass, SUV 18.5. There is no distant focus of hypermetabolic activity seen to suggest other sites of involvement.  Echocardiogram 10/12/18 at [facility] revealed an estimated LVEF of 60%.   Hepatitis B and C were negative.  Hepatitis A IgM was positive and discussed with Dr. Osborn.  Left chest mediport was placed 10/18/18 by Dr. ANSON Mcgrath.  Right ommaya port was placed 10/22/18 by Dr. ANURADHA Greer.  CSF specimen was obtained via ommaya port by Dr. Osborn, sent for cytology and FLOW prior to initiation of Cycle #1 CHOP-R initiated on 10/24/18 was negative.    TREATMENT SUMMARY:  1. Cycle #1 CHOP-R initiated 10/24/18,   2. Dose #1 prophylactic intrathecal methotrexate (FLOW cytometry on CSF negative) given 10/29/18. Dose #2 on 11/14/18.   3. Anticipate consolidative XRT.    TARGET BREAST CANCER SITES:  1. Right mastectomy 09/23/04 for DCIS    TUMOR HISTORY: Right DCIS 09/23/04  On 09/23/04, Kami had a right simple mastectomy by Dr. 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   The ER was 95%, GA was 13%.   No further specific therapy was indicated for this cancer other than close follow up of the area and the opposite breast.    TREATMENT SUMMARY:  1. Right mastectomy and 9/23/2004 for DCIS    Past Medical History:    Past Medical  History:   Diagnosis Date   • Allergic rhinitis    • Arthritis    • Cancer (CMS/HCC)     breast ca and B cell lymphoma   • Disease of thyroid gland     Hypothyroidism   • Diverticulitis    • Frequent urination    • Hard to intubate     vocal cord injury,  Has vocal cord implant   • Heartburn    • History of breast cancer    • Hypertension    • Incontinence    • Osteoporosis        Past Surgical History:    Past Surgical History:   Procedure Laterality Date   • BREAST SURGERY Right     mastectomy   • CATARACT EXTRACTION WITH INTRAOCULAR LENS IMPLANT     • COLON RESECTION     • ENDOSCOPIC FUNCTIONAL SINUS SURGERY (FESS) Right 9/17/2018    Procedure: ENDOSCOPIC FUNCTIONAL SINUS SURGERY W TRACKING OF RIGHT MAXILLARY SINUS WITH BIOPSY.;  Surgeon: Mirza Felix MD;  Location:  [facility] OR;  Service: ENT   • HYSTERECTOMY     • OMAYA RESERVOIR Right 10/22/2018    Procedure: OMAYA RESERVOIR WITH STEREOTACTIC NAVIGATION;  Surgeon: Graham Greer MD;  Location:  [facility] OR;  Service: Neurosurgery   • THROAT SURGERY      vocal cord implant   • THYROID SURGERY         Immunizations:      There is no immunization history on file for this patient.    Current Hospital Medications:      Current Facility-Administered Medications:   •  enoxaparin (LOVENOX) syringe 40 mg, 40 mg, Subcutaneous, Q24H, Prashanth Chambers PA  •  filgrastim (NEUPOGEN) injection 480 mcg, 480 mcg, Subcutaneous, Daily, Prashanth Chambers PA  •  piperacillin-tazobactam (ZOSYN) 4.5 g in iso-osmotic dextrose 100 mL IVPB (premix), 4.5 g, Intravenous, Q6H, Prashanth Chambers PA  •  sodium chloride 0.9 % flush 3 mL, 3 mL, Intravenous, Q12H, Prashanth Chambers PA  •  sodium chloride 0.9 % flush 3-10 mL, 3-10 mL, Intravenous, PRN, Prashanth Chambers PA  •  sodium chloride 0.9 % infusion, 85 mL/hr, Intravenous, Continuous, Prashanth Chambers PA    Allergies:   Allergies   Allergen Reactions   • Alendronate Other (See Comments)     Pt does not know  FOSAMAX   • Lisinopril Cough   •  Statins Other (See Comments) and Myalgia     Other     • Sulfa Antibiotics Other (See Comments)     Pt does not remember   • Teriparatide (Recombinant) Other (See Comments)     Pt does not know  FORTEO       Social History:    Social History     Socioeconomic History   • Marital status:      Spouse name: Not on file   • Number of children: Not on file   • Years of education: Not on file   • Highest education level: Not on file   Social Needs   • Financial resource strain: Not on file   • Food insecurity - worry: Not on file   • Food insecurity - inability: Not on file   • Transportation needs - medical: Not on file   • Transportation needs - non-medical: Not on file   Occupational History   • Not on file   Tobacco Use   • Smoking status: Never Smoker   • Smokeless tobacco: Never Used   Substance and Sexual Activity   • Alcohol use: No   • Drug use: No   • Sexual activity: Defer   Other Topics Concern   • Not on file   Social History Narrative   • Not on file       Family History:   Family History   Problem Relation Age of Onset   • No Known Problems Mother    • No Known Problems Father        REVIEW OF SYSTEMS:    Constitutional: positive for fatigue, fever as above, feeling cold-no rigors  HEENT: no blurring of vision, no double vision    Lungs: no hemoptysis, no cough  CVS: no palpitation, no chest pain  GI: no abdominal pain, no constipation; positive for nausea this morning  AMOS: no dysuria, frequency and urgency, no hematuria  Musculoskeletal: no joint pain, swelling , stiffness  Endocrine: no polyuria, polydypsia, no cold or heat intolerence; positive for chronic thyroid replacement  Hematology: positive for pancytopenia from chemotherapy  Dermatology: no skin rash, no eczema, no pruritis  Psychiatry: no suicide ideation  Neurology: no syncope, no seizures; positive for Ommaya with IT chemotherapy    Vitals:    LMP  (LMP Unknown)     PHYSICAL EXAM:    CONSTITUTIONAL: Weak NAD  EYES: Nonicteric  ENT:  Mucus membranes moist, no oropharyngeal lesions   NECK: Supple, no masses   CHEST/LUNGS: CTA bilaterally, normal respiratory effort   CARDIOVASCULAR: RRR, no murmurs  ABDOMEN: soft non-tender, active bowel sounds, no HSM  EXTREMITIES: warm, moves all fours  SKIN: warm, dry with no rashes or lesions.  port left chest wall without erythema, Ommaya right scalp without erythema  LYMPH: No cervical, clavicular, axillary, or inguinal lymphadenopathy  NEUROLOGIC: follows commands, non focal   PSYCH: mood and affect appropriate    CBC at the office 2/27/2019  WBC - 0.19 with ANC 0.06  HGB - 8.7  PLT - 109,000      Lab Results   Component Value Date     12/23/2018    K 4.1 12/23/2018     12/23/2018    CO2 27.0 12/23/2018    BUN 12 12/23/2018    CREATININE 0.64 12/23/2018    GLUCOSE 94 12/23/2018    CALCIUM 8.0 (L) 12/23/2018    BILITOT 1.1 (H) 12/22/2018    ALKPHOS 45 12/22/2018    AST 15 12/22/2018    ALT 37 12/22/2018    AGRATIO 1.3 12/22/2018    GLOB 2.1 12/22/2018       Lab Results   Component Value Date    INR 0.97 09/10/2018    PROTIME 13.2 09/10/2018       Cultures:    Lab Results   Component Value Date    BLOODCX No growth at 5 days 12/23/2018     No components found for: URINCX    ASSESSMENT/PLAN:  Kami Villanueva is an 84-year-old  female with a diagnosis of 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  Kami received cycle #6 of CHOP/R and Dose #11 of prophylactic intrathecal methotrexate 2/20/2019.  Kami was brought in by family ( and 2 daughters) with a temperature elevation to 100.8°. Recheck temperature here is 101°.  CBC today reveals a WBC of 0.19 with an ANC of 0.06  Hemoglobin of 8.7 and platelet count of 209,000.  On review of systems, she has only lost 1 pound in the last week, she feels about baseline other than being weak and tired and experienced nausea and vomiting on the one-hour drive  from home to the clinic.  She does not have a headache or cough, or burning on urination or sputum production.  She is being evaluated in anticipation of admission to the hospital for neutropenia and fever.  On physical examination, the right frontal Ommaya is in good position without signs of inflammation or other associated problems.  The edema about the oral cavity and right maxillary sinus and orbit has completely resolved.  The head and neck area is as well negative for palpable lymphadenopathy as are the other peripheral lymph node sites.  Lungs are clear. The heart is regular. The abdomen is soft and benign.  A 2-D echo on 12/5/2019 documented an EGF of 55-60%, and was otherwise unremarkable.     1.  Neutropenic fever  2.  High-grade Diffuse large B-cell lymphoma    Blood cultures, one from the port and 1 peripheral, CSF was obtained from the Ommaya port for full studies and cultures, UA with C&S was also obtained and all of these will be sent to [facility]) for processing.  Kami will be admitted to [facility] for IV fluids, IV antibiotics for neutropenia and fever, daily. CBC and close monitoring.  Currently, she is not septic.      ELLA Jiménez    02/27/19  1:15 PM    I personally saw and examined this patient on 2/27/2019, performing a face-to-face diagnostic evaluation with plan of care reviewed and developed with  Prashanth Chambers PA-C and nursing staff.   I have addended and/or modified the above history of present illness, physical examination, and assessment and plan to reflect my findings and impressions.   Essential elements of the care plan were discussed with  Prashanth Chambers PA-C .   Agree with findings and assessment/plan as documented above.   Questions were encouraged, asked and answered to their understanding and satisfaction.    Alexander Osborn MD  2/28/2019 7:43 AM            Electronically signed by Alexander Osborn MD at 2/28/2019  7:43 AM       [facility]  Medications (active)       Dose Frequency Start End    acetaminophen (TYLENOL) tablet 650 mg 650 mg Every 6 Hours PRN 2/27/2019     Sig - Route: Take 2 tablets by mouth Every 6 (Six) Hours As Needed for Mild Pain  or Fever (T >101.0). - Oral    carvedilol (COREG) tablet 3.125 mg 3.125 mg 2 Times Daily With Meals 2/27/2019     Sig - Route: Take 1 tablet by mouth 2 (Two) Times a Day With Meals. - Oral    Cosign for Ordering: Accepted by Alexander Osborn MD on 2/28/2019  7:42 AM    clotrimazole (MYCELEX) colton 10 mg 10 mg 5 Times Daily 3/1/2019     Sig - Route: Take 1 tablet by mouth 5 (Five) Times a Day. - Oral    diphenhydrAMINE (BENADRYL) capsule 25 mg 25 mg Nightly PRN 3/1/2019     Sig - Route: Take 1 capsule by mouth At Night As Needed for Itching. - Oral    diphenhydrAMINE (BENADRYL) capsule 50 mg 50 mg Nightly PRN 3/2/2019     Sig - Route: Take 2 capsules by mouth At Night As Needed for Sleep. - Oral    Cosign for Ordering: Accepted by Alexander Osborn MD on 3/2/2019 10:02 AM    enoxaparin (LOVENOX) syringe 40 mg 40 mg Every 24 Hours 2/27/2019     Sig - Route: Inject 0.4 mL under the skin into the appropriate area as directed Daily. - Subcutaneous    Cosign for Ordering: Accepted by Alexander Osborn MD on 2/28/2019  7:42 AM    levothyroxine (SYNTHROID, LEVOTHROID) tablet 125 mcg 125 mcg Every Early Morning 2/28/2019     Sig - Route: Take 1 tablet by mouth Every Morning. - Oral    Cosign for Ordering: Accepted by Alexander Osborn MD on 2/28/2019  7:42 AM    lidocaine-Maalox-diphenhydramine (MIRACLE MOUTHWASH) oral suspension 30 mL 30 mL 4 Times Daily Before Meals & Nightly PRN 3/1/2019     Sig - Route: Take 30 mL by mouth 4 (Four) Times a Day Before Meals & at Bedtime As Needed (mouth pain). - Oral    meropenem (MERREM) 1 g/100 mL 0.9% NS VTB (mbp) 1 g Every 8 Hours 3/5/2019 3/12/2019    Sig - Route: Infuse 100 mL into a venous catheter Every 8 (Eight) Hours. - Intravenous     ondansetron (ZOFRAN) tablet 8 mg 8 mg Every 8 Hours PRN 2/27/2019     Sig - Route: Take 2 tablets by mouth Every 8 (Eight) Hours As Needed for Nausea or Vomiting. - Oral    Cosign for Ordering: Accepted by Alexander Osborn MD on 2/28/2019  7:42 AM    oxybutynin XL (DITROPAN-XL) 24 hr tablet 5 mg 5 mg Daily 2/27/2019     Sig - Route: Take 1 tablet by mouth Daily. - Oral    Cosign for Ordering: Accepted by Alexander Osborn MD on 2/28/2019  7:42 AM    potassium chloride 20 mEq in 50 mL IVPB 20 mEq Every 1 Hour PRN 3/1/2019     Sig - Route: Infuse 50 mL into a venous catheter Every 1 (One) Hour As Needed (See admin Instructions.). - Intravenous    Cosign for Ordering: Accepted by Alexander Osborn MD on 3/1/2019  6:59 AM    sodium chloride 0.9 % flush 3 mL 3 mL Every 12 Hours Scheduled 2/27/2019     Sig - Route: Infuse 3 mL into a venous catheter Every 12 (Twelve) Hours. - Intravenous    Cosign for Ordering: Accepted by Alexander Osborn MD on 2/28/2019  7:42 AM    sodium chloride 0.9 % flush 3-10 mL 3-10 mL As Needed 2/27/2019     Sig - Route: Infuse 3-10 mL into a venous catheter As Needed for Line Care. - Intravenous    Cosign for Ordering: Accepted by Alexander Osborn MD on 2/28/2019  7:42 AM    sodium chloride 0.9 % with KCl 20 mEq/L infusion 40 mL/hr Continuous 2/28/2019     Sig - Route: Infuse 40 mL/hr into a venous catheter Continuous. - Intravenous    Cosign for Ordering: Accepted by Alexander Osborn MD on 2/28/2019  7:42 AM    valACYclovir (VALTREX) tablet 500 mg 500 mg Every 8 Hours Scheduled 3/1/2019 3/8/2019    Sig - Route: Take 1 tablet by mouth Every 8 (Eight) Hours. - Oral    cefTRIAXone (ROCEPHIN) 1 g/100 mL 0.9% NS (MBP) (Discontinued) 1 g Every 24 Hours 3/3/2019 3/5/2019    Sig - Route: Infuse 100 mL into a venous catheter Daily. - Intravenous          Operative/Procedure Notes (last 24 hours) (Notes from 3/5/2019  1:59 PM through 3/6/2019   1:59 PM)     No notes of this type exist for this encounter.           Physician Progress Notes (last 24 hours) (Notes from 3/5/2019  1:59 PM through 3/6/2019  1:59 PM)      Sarah Robles MD at 3/6/2019  1:24 PM          INFECTIOUS DISEASES PROGRESS NOTE    Patient:  Kami Villanueva  YOB: 1934  MRN: [id number]   Admit date: 2/27/2019   Admitting Physician: Alexander Osborn*  Primary Care Physician: Kvng Strong MD    Chief Complaint: None today.    Interval History:  The patient offers no new complaints -she is cold - not new    Cough unchanged     No dysuria    Her daughter is wondering where physical therapy is.  She thought they would have been and in the morning as she is supposed to get twice daily therapy.    Allergies:   Allergies   Allergen Reactions   • Alendronate Other (See Comments)     Pt does not know  FOSAMAX   • Lisinopril Cough   • Statins Other (See Comments) and Myalgia     Other     • Sulfa Antibiotics Other (See Comments)     Pt does not remember   • Teriparatide (Recombinant) Other (See Comments)     Pt does not know  FORTEO       Current Meds:     Current Facility-Administered Medications:   •  acetaminophen (TYLENOL) tablet 650 mg, 650 mg, Oral, Q6H PRN, Prashanth Chambers PA, 650 mg at 03/05/19 0041  •  carvedilol (COREG) tablet 3.125 mg, 3.125 mg, Oral, BID With Meals, Prashanth Chambers PA, 3.125 mg at 03/06/19 0757  •  clotrimazole (MYCELEX) colton 10 mg, 10 mg, Oral, 5x Daily, Sarah Robles MD, 10 mg at 03/06/19 1238  •  diphenhydrAMINE (BENADRYL) capsule 25 mg, 25 mg, Oral, Nightly PRN, Prashanth Chambers PA, 25 mg at 03/01/19 2201  •  diphenhydrAMINE (BENADRYL) capsule 50 mg, 50 mg, Oral, Nightly PRN, Prashanth Chambers PA, 50 mg at 03/06/19 0123  •  enoxaparin (LOVENOX) syringe 40 mg, 40 mg, Subcutaneous, Q24H, Prashanth Chambers PA, 40 mg at 03/05/19 1433  •  levothyroxine (SYNTHROID, LEVOTHROID) tablet 125 mcg, 125 mcg, Oral, Q AM, Prashanth Chambers PA,  "125 mcg at 19 0557  •  lidocaine-Maalox-diphenhydramine (MIRACLE MOUTHWASH) oral suspension 30 mL, 30 mL, Oral, 4x Daily AC & at Bedtime PRN, Sarah Robles MD, 30 mL at 19 1449  •  meropenem (MERREM) 1 g/100 mL 0.9% NS VTB (mbp), 1 g, Intravenous, Q8H, Sarah Robles MD, 1 g at 19 0801  •  ondansetron (ZOFRAN) tablet 8 mg, 8 mg, Oral, Q8H PRN, Prashanth Chambers PA  •  oxybutynin XL (DITROPAN-XL) 24 hr tablet 5 mg, 5 mg, Oral, Daily, Prashanth Chambers PA, 5 mg at 19 0807  •  potassium chloride 20 mEq in 50 mL IVPB, 20 mEq, Intravenous, Q1H PRN, Prashanth Chambers PA, Last Rate: 50 mL/hr at 19 1625, 20 mEq at 19 1625  •  sodium chloride 0.9 % flush 3 mL, 3 mL, Intravenous, Q12H, Prashanth Chambers PA, 3 mL at 19 0828  •  sodium chloride 0.9 % flush 3-10 mL, 3-10 mL, Intravenous, PRN, Prashanth Chambers PA  •  sodium chloride 0.9 % with KCl 20 mEq/L infusion, 40 mL/hr, Intravenous, Continuous, Alexander Osborn MD, Last Rate: 40 mL/hr at 19 1710, 40 mL/hr at 19 1710  •  valACYclovir (VALTREX) tablet 500 mg, 500 mg, Oral, Q8H, Sarah Robles MD, 500 mg at 19 0557      Review of Systems   Constitutional: Positive for fever (Improving). Negative for chills.   HENT: Positive for mouth sores.    Eyes: Negative for redness.   Respiratory: Positive for cough. Negative for shortness of breath.    Cardiovascular: Negative for chest pain.   Gastrointestinal: Negative for diarrhea.   Genitourinary: Positive for frequency and urgency.   Allergic/Immunologic: Positive for immunocompromised state.            Objective     Vital Signs:  Temp (24hrs), Av.4 °F (37.4 °C), Min:98.4 °F (36.9 °C), Max:100.3 °F (37.9 °C)      /54 (BP Location: Left arm, Patient Position: Sitting)   Pulse 92   Temp 99.1 °F (37.3 °C) (Oral)   Resp 18   Ht 170.2 cm (67.01\")   Wt 66.9 kg (147 lb 9 oz)   LMP  (LMP Unknown)   SpO2 93%   BMI 23.11 kg/m²          Physical " Exam   General: The patient is elderly female sitting up in chair after finishing lunch in no acute distress   HEENT: Sclera anicteric and noninjected, oropharynx without thrush.  Buccal mucosal erosions involved.  She only has a few healing erosive lesions on her lower lip   Neck: Supple  Heart: S1-S2 rate is regular  Respiratory: Effort even and unlabored.  Faint bibasilar crackles  Abdomen soft, nontender, nondistended, bowel sounds are positive I did not appreciate any rebound or guarding  Psych: She is pleasant cooperative  Neuro: Alert and oriented, answers questions appropriately.    Line/IV site: CC subclavian left, condition patent and no redness    Results Review:    I reviewed the patient's new clinical results.    Lab Results:  CBC:   Lab Results   Lab 02/27/19  1325 02/28/19  0500 03/01/19  0434 03/02/19  0701 03/03/19  0539 03/04/19  0636 03/05/19  0442 03/06/19  0739   WBC 0.17* 0.22* 0.42* 3.22* 15.22* 24.41* 19.37* 14.32*   HEMOGLOBIN 9.2* 7.6* 8.1* 8.5* 9.6* 9.5* 9.3* 9.1*   HEMATOCRIT 27.8* 22.7* 24.4* 25.9* 29.5* 29.3* 28.4* 28.3*   PLATELETS 115* 81* 71* 74* 102* 160 203 256   LYMPHOCYTE % 65.0*  --   --  6.0* 4.0* 4.0* 2.0* 2.0*   MONOCYTES % 8.0  --   --  9.0 7.1 2.0* 3.0*  --      >>>>>>>>>>>>>>> last dose of filgrastim was March 2  CMP:   Lab Results   Lab 02/27/19  1325 02/27/19  1622  03/02/19  0701 03/03/19  0539 03/06/19  0739   SODIUM 130* 128*   < > 129* 133* 129*   POTASSIUM 3.3* 3.3*   < > 3.4* 3.5 3.9   CHLORIDE 90* 91*   < > 96* 99 96*   CO2 30.0 32.0*   < > 28.0 29.0 28.0   BUN 19 19   < > 11 8 10   CREATININE 0.50 0.46*   < > 0.61 0.61 0.53   CALCIUM 8.5 8.1*   < > 7.9* 8.2* 7.8*   BILIRUBIN 0.4 0.4  --   --   --   --    ALK PHOS 56 53  --   --   --   --    ALT (SGPT) 24 26  --   --   --   --    AST (SGOT) 25 21  --   --   --   --    GLUCOSE 115* 106*   < > 73 68* 71    < > = values in this interval not displayed.     Status:  Final result   Visible to patient:  No (Not Released)    Specimen Information: Urine, Catheter In/Out         Ref Range & Units 1d ago   Color, UA Yellow, Straw Yellow    Appearance, UA Clear Clear    pH, UA 5.0 - 8.0 7.0    Specific Gravity, UA 1.005 - 1.030 1.011    Glucose, UA Negative Negative    Ketones, UA Negative Negative    Bilirubin, UA Negative Negative    Blood, UA Negative Negative    Protein, UA Negative Negative    Leuk Esterase, UA Negative Negative    Nitrite, UA Negative Negative    Urobilinogen, UA 0.2 - 1.0 E.U./dL 0.2 E.U./dL    Resulting Agency  [facility]      Narrative   Performed by: [facility]   Urine microscopic not indicated.      Specimen Collected: 03/04/19 18:00 Last Resulted: 03/04/19 18:14               Culture Results:      Urine Culture   Date Value Ref Range Status   02/27/2019 >100,000 CFU/mL Escherichia coli (A)  Final       Microbiology Results Abnormal     Procedure Component Value - Date/Time    Blood Culture - Blood, Arm, Left [[id number]] Collected:  03/04/19 1643    Lab Status:  Preliminary result Specimen:  Blood from Arm, Left Updated:  03/05/19 1700     Blood Culture No growth at 24 hours    Blood Culture With REBECA - Blood, Blood, Central Line [[id number]] Collected:  03/04/19 1643    Lab Status:  Preliminary result Specimen:  Blood, Central Line Updated:  03/05/19 1700     Blood Culture No growth at 24 hours    Blood Culture With REBECA - Blood, Arm, Left [[id number]] Collected:  02/27/19 2141    Lab Status:  Final result Specimen:  Blood from Arm, Left Updated:  03/04/19 2200     Blood Culture No growth at 5 days    Blood Culture With REBECA - Blood, Blood, Venous Line [[id number]] Collected:  02/27/19 2142    Lab Status:  Final result Specimen:  Blood, Venous Line Updated:  03/04/19 2200     Blood Culture No growth at 5 days        02/27/2019 0900  03/01/2019 0849  Urine Culture - Urine, Urine, Clean Catch [[id number]]    (Abnormal)   Urine, Clean Catch     Final result  Urine Culture >100,000 CFU/mL Escherichia coli Abnormal         Susceptibility      Escherichia coli     ALFREDO     Ampicillin >=32  Resistant     Ampicillin + Sulbactam >=32  Resistant     Cefazolin <=4  Susceptible1     Cefepime <=1  Susceptible     Ceftriaxone <=1  Susceptible     Ertapenem <=0.5  Susceptible     ESBL Confirmation Test NEG  Negative     Gentamicin >=16  Resistant     Levofloxacin 1  Susceptible     Meropenem <=0.25  Susceptible     Nitrofurantoin 32  Susceptible     Piperacillin + Tazobactam <=4  Susceptible     Tobramycin 8  Intermediate     Trimethoprim + Sulfamethoxazole >=320  Resistant           1 Cefazolin results may be used to predict the potential effectiveness of oral cephalosporins for treating uncomplicated urinary tract infections.       Linear View                   Radiology:     Procedure Component Value Units Date/Time   XR Chest PA & Lateral [[id number]] Prashanth as Reviewed   Order Status: Completed Collected: 03/05/19 0942    Updated: 03/05/19 0946   Narrative:     EXAMINATION:  XR CHEST PA AND LATERAL-  3/5/2019 9:17 AM CST     HISTORY: Fever.     COMPARISON: 3/3/2019.     TECHNIQUE: 2 views were obtained.     FINDINGS:  There is left lower lobe infiltrate. There is mild blunting  of the costophrenic angles bilaterally. Heart size is normal. There is  vascular redistribution when compared to 12/21/2018. There may be a  trace amount of interstitial edema. There is a port catheter on the  left.      Impression:     1. Focal infiltrate at the left lung base may represent pneumonia or  atelectasis.  2. Vascular redistribution. There may be minimal edema. There are small  pleural effusions.         This report was finalized on 03/05/2019 09:43 by Dr. Fransico Aldana MD.               Imaging Results (last 72 hours)     Procedure Component Value Units Date/Time    XR Chest PA & Lateral [[id number]] Collected:  02/27/19 1426     Updated:  02/27/19 1433    Narrative:       EXAM: XR CHEST PA AND LATERAL- - 2/27/2019 1:32 PM CST     HISTORY: neutropenic  fever       COMPARISON: 12/21/2018.      TECHNIQUE:  2 images.  Frontal and lateral views of the chest.     FINDINGS:    Portable overlies the left chest with intact catheter, tip projects at  the lower SVC.     No pneumothorax. Diffuse interstitial opacities. No focal consolidation.  Cardiac and mediastinal silhouette within normal limits. Calcified  aortic atherosclerosis. No acute bony findings.          Impression:       1. Is interstitial opacities, could represent atypical infection or  edema.  This report was finalized on 02/27/2019 14:30 by Dr Valentina Feldman MD.          Assessment/Plan     Active Hospital Problems    Diagnosis   • **Neutropenic fever (CMS/HCC)       IMPRESSION:  1. Fever-patient hemodynamically stable  overall fever curve improved again.  Chest x-ray essentially unchanged however there may be some atelectasis or minimal infiltrate noted in the left lower lobe noted on repeat as well.  Would have expected it to have progressed some if in fact that was the cause of her fever however feel we need to cover for possible pneumonia at this time since she has had persistent fever  2. Neutropenia-resolved.  Leukocytosis now improving and expect to drift back down as filgrastim has been stopped  3. E. coli urinary tract infection-undergoing treatment.  Repeat urinalysis negative  4. 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 coli urinary tract infection upon admission with neutropenic fever.  · If fever curve stays down on meropenem, would consider discharge home to complete an empiric 5-day course of meropenem  · We will tentatively put orders in for  to be looking at  insurance coverage    Sarah Robles MD  19  1:24 PM        Electronically signed by Sarah Robles MD at 3/6/2019  1:31 PM     Alexander Osborn MD at 3/6/2019  7:01 AM              PROGRESS NOTE  Patient name: Kami Villanueva  Patient : 1934  VISIT # [id number]  MR #[id number]   Room 462    SUBJECTIVE: sat in chair for 4 hours yesterday, worked with PT. Still very weak. Refused labs this am.    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   Kami received cycle #6 of CHOP/R and Dose #11 of prophylactic intrathecal methotrexate 2019.  Kami was brought in by family ( and 2 daughters) with a temperature elevation to 100.8°. Recheck temperature here is 101°.  CBC reveals a WBC of 0.19 with an ANC of 0.06  Hemoglobin of 8.7 and platelet count of 209,000.  On review of systems, she has only lost 1 pound in the last week, she feels about baseline other than being weak and tired and experienced nausea and vomiting on the one-hour drive from home to the clinic.  She does not have a headache or cough, or burning on urination or sputum production.  She is being evaluated in anticipation of admission to the hospital for neutropenia and fever.      TARGET LYMPHOMA SITES:  1. Right maxillary sinus with local invasion to the floor of the right orbit   2.  on 10/10/18   3. borderline spleen size, 13 x 4.8 x 11.8 cm  4. Bone marrow negative on 10/11/18   5. 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 About 45 years ago she underwent a thyroidectomy followed subsequently by a vocal cord medialization procedure by Dr. Crane  in [facility].  Several years ago she had right maxillary sinus surgery by Dr. Garcia.  Kami developed upper respiratory and sinus symptoms in the spring of 2018.  She was seen and treated at [facility] urgent care on 3/13/2018 for bronchitis.   She was then seen in followup by her PCP, Dr. Kvng Strong on 4/27/2018. He felt an abnormality in the neck and ordered a CT scan of the soft tissues of the neck and referred her to ENT for further evaluation of this.  A CT scan of the soft tissues of the neck with contrast on 5/7/2018 documented extensive mucosal thickening of the right maxillary sinus consistent with chronic sinusitis. There was also opacification of several of the ethmoid air cells and mucosal thickening in the nasal cavity.  No neck mass or adenopathy was appreciated.   Kami was evaluated by Dr. Bong Felix on 5/30/2018 for evaluation of right neck pain and swelling.  Full ENT exam including nasopharyngeal area was within normal limits.  Review of the CT scan from 5/7/2018 was consistent with chronic sinusitis. Kami had had a medialization procedure by ENT and [place] earlier in the year for a vocal cord paralysis issue. It was felt that some of the changes on the CT scan were consistent with that procedure.  On 8/3/2018 Kami had a right upper posterior tooth pulled by an oral surgeon, Dr. Ortega in [facility] for what was felt to possibly be an infected tooth.  Kami was again seen at [facility] urgent care on 8/16/2018 with swelling, redness and tenderness in the right maxillary area with associated headaches not responding to antibiotics.   Plain film imaging studies and a CT scan of the area was performed.   Plain film x-rays of the facial bones on 8/16/2018 did not reveal acute abnormalities   CT scan of facial bones within without contrast on 8/16/2018 documented interval development of an enhancing soft tissue along the right maxilla extending along the anterior right  maxillary sinus and along the floor of the right orbit measuring 2.4 cm in width. New erosive bony changes were noted involving the bony structure of the right orbital floor. The findings were concerning for a neoplastic process with erosive changes of the right orbital floor with ENT consultation recommended.   Kami was seen by Kenny Mar PA-C with ENT on 8/20/2018 where the above CT scanning clinical findings were evaluated. Arrangements were made for a biopsy with Dr. Bong Felix.  On 9/17/2018, Dr. Bong Felix performed a nasal/sinus endoscopy with biopsy.  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 The cells are polymorphic with maintaining medium sized to large cells with irregular nuclear contour.  On flow cytometry, the monoclonal kappa B cell population does not express CD5 or CD10, but are low viability.  The immunostains, show a diffuse B-cell infiltrate positive for CD20, PAX-5 and BCL 6, but negative for CD5, CD10, BCL-2, cyclin D1 and CD43.  <30% of the cells were positive for Mum-1. Ki-67 showed a high proliferative rate of about 80%.  All of the above is consistent with a diffuse large B-cell lymphoma of the GBC type.  A CT scan of the soft tissues of the neck with contrast on 10/3/2018 was compared to the CT scan of the face on 8/16/2018 and 5/7/2018.  There had been significant progression of disease with interval increase in the size of the right buccal space hyperdense mass measuring approximately at least 7 x 2 cm on axial cuts.   There is involvement of the medial and lateral pterigoid muscle. The right temporalis muscle was also inseparable from the mass with involvement as well of the pterygomaxillary fissure, pterygopalatine fossa and sphenopalatine foramen.  Local cortex erosive the structure and was also noted along the maxillary gingival surface.  Involvement of the roof of the right maxillary sinus with progression of  the erosive disease along the floor of the right orbit was also again identified. An asymmetric soft tissue density was also noted at the location of the right infraorbital area.  There was no evidence of involvement of the extraocular muscles nor do the lacrimal glands demonstrate a discrete mass.  The lacrimal glands do not demonstrate a discrete mass.  Disease extends along the right lateral nasal cavity and ethmoid air cells. There is now disease along the right sphenoid sinus inseparable from the right foramen rotundum with probable involvement of the maxillary branch of V5.  Evidence of prior instrumentation with prior middle turbinectomies, uncinate to measles, antrostomies, and partial ethmoidectomies is also suspected based on the radiographic imaging.  The cavernous sinuses are symmetric.  Meckel's caves are symmetric.   There is now a right level one be lymphadenopathy by size criteria, and abnormal clustering with lymph node measuring up to 1.5 cm.  Otherwise the rest of the oral cavity and head and neck area did not demonstrate evidence of discrete mass.  Apparent right vocal cord paralysis would medialization of the true vocal cord with anteromedial rotation of the right was also described.   CT scan of the chest with and without contrast on 10/3/2018 did not reveal evidence of thoracic lymphadenopathy.  Tiny 2 mm nodules were noted in the subpleural left lung, likely to be benign with a 1 year followup recommended   CT scan of the abdomen and pelvis with contrast on 10/3/2018 did not reveal evidence of metastatic disease nor abdominal lymphadenopathy. The spleen was of borderline size measuring 13 cm x 4.8 cm x 11.8 cm.  Physical examination on 10/10/2018 revealed a very enlarged and swollen right maxillary facial area with induration of soft tissues around the upper teeth on the right and face.  No palpable peripheral lymphadenopathy was able to be appreciated in the head and neck area, epitrochlear  and axillary areas, inguinal areas. No evidence of palpable splenomegaly or other abdominal findings were encountered on exam.  Bone marrow aspirate and biopsy on 10/11/18 showed no evidence of involvement by diffuse large B-cell lymphoma. FLOW cytometry negative.  MRI of the brain w/wo contrast 10/11/18 at [facility] was consistent with abnormal soft tissue mass involving the right  and buccal space extending along the anterior margin of the right maxillary sinus contiguous with the floor of the right orbit. There was no definite invasion into the orbit, nor displacement of the inferior rectus musculature.  PET scan 10/12/18 at [facility] showed hypermetabolic activity associated with the right buccal space and  space mass, SUV 18.5. There is no distant focus of hypermetabolic activity seen to suggest other sites of involvement.  Echocardiogram 10/12/18 at [facility] revealed an estimated LVEF of 60%.   Hepatitis B and C were negative.  Hepatitis A IgM was positive and discussed with Dr. Osborn.  Left chest mediport was placed 10/18/18 by Dr. ANSON Mcgrath.  Right ommaya port was placed 10/22/18 by Dr. ANURADHA Greer.  CSF specimen was obtained via ommaya port by Dr. Osborn, sent for cytology and FLOW prior to initiation of Cycle #1 CHOP-R initiated on 10/24/18 was negative.     TREATMENT SUMMARY:  1. Cycle #1 CHOP-R initiated 10/24/18,   2. Dose #1 prophylactic intrathecal methotrexate (FLOW cytometry on CSF negative) given 10/29/18. Dose #2 on 11/14/18.   3. Anticipate consolidative XRT.     TARGET BREAST CANCER SITES:  1. Right mastectomy 09/23/04 for DCIS     TUMOR HISTORY: Right DCIS 09/23/04  On 09/23/04, Kami had a right simple mastectomy by Dr. 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   The ER was 95%, ID was 13%.   No further specific therapy was indicated for this cancer other than close follow up of the area and the opposite breast.     TREATMENT SUMMARY:  1. Right mastectomy and 9/23/2004 for  DCIS    REVIEW OF SYSTEMS:    Constitutional: positive for fever   HEENT: no blurring of vision, no double vision; positive for mildly sore mouth - improved                Lungs: no hemoptysis; positive for cough, dry  CVS: no palpitation, no chest pain  GI: no abdominal pain, no constipation; no nausea  AMOS:  urinary frequency at night, not so much during the day  Musculoskeletal: generalized weakness  Endocrine: no polyuria, polydypsia, no cold or heat intolerence; positive for chronic thyroid replacement  Hematology: positive for pancytopenia from chemotherapy, improved  Dermatology: no skin rash, no eczema, no pruritis  Psychiatry: no suicide ideation  Neurology: no syncope, no seizures; positive for Ommaya with IT chemotherapy     OBJECTIVE:    Vitals:    03/06/19 0338   BP: 151/63   Pulse: 86   Resp: 18   Temp: 98.6 °F (37 °C)   SpO2: 91%       Intake/Output Summary (Last 24 hours) at 3/6/2019 0701  Last data filed at 3/5/2019 2341  Gross per 24 hour   Intake --   Output 550 ml   Net -550 ml     PHYSICAL EXAM:    CONSTITUTIONAL: NAD,  patient's daughter Gale at bedside  EYES: Nonicteric  ENT: No overt thrush-minimal erythema improved  NECK: Supple, no masses   CHEST/LUNGS: diminished bilateral bases, o/w clear  CARDIOVASCULAR: RRR  ABDOMEN: soft non-tender, active bowel sounds, no HSM  EXTREMITIES: warm, moves all extremities, weak  SKIN: warm, dry with no rashes or lesions.  port left chest wall without erythema, Ommaya right scalp without erythema  LYMPH: No cervical, clavicular, axillary, or inguinal lymphadenopathy  NEUROLOGIC: follows commands, non focal   PSYCH: mood and affect appropriate    CBC  Results from last 7 days   Lab Units 03/05/19  0442 03/04/19  0636 03/03/19  0539   WBC 10*3/mm3 19.37* 24.41* 15.22*   HEMOGLOBIN g/dL 9.3* 9.5* 9.6*   HEMATOCRIT % 28.4* 29.3* 29.5*   PLATELETS 10*3/mm3 203 160 102*       Lab Results   Component Value Date     (L) 03/03/2019    K 3.5 03/03/2019    CL  99 03/03/2019    CO2 29.0 03/03/2019    BUN 8 03/03/2019    CREATININE 0.61 03/03/2019    GLUCOSE 68 (L) 03/03/2019    CALCIUM 8.2 (L) 03/03/2019    BILITOT 0.4 02/27/2019    ALKPHOS 53 02/27/2019    AST 21 02/27/2019    ALT 26 02/27/2019    AGRATIO 1.5 02/27/2019    GLOB 2.1 02/27/2019       Lab Results   Component Value Date    INR 0.97 09/10/2018    PROTIME 13.2 09/10/2018     Cultures:    Lab Results   Component Value Date    BLOODCX No growth at 24 hours 03/04/2019    BLOODCX No growth at 24 hours 03/04/2019     No components found for: URINCX    ASSESSMENT/PLAN:    1.  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   S/p cycle # 6 of CHOP/R on 2/20/19    S/p dose # 11 of prophylactic intrathecal methotrexate given 2/20/19      2. Neutropenic (resolved) fever E Coli UTI/ LLL infiltrate  WBC - 19.37 on 3/5/19    Max temp recorded,  102.2 at 2150 hrs. on 3/2/19.    Last fever  - 100.8 on 3/4/19 at 11:29 pm.    B/p  151/63, HR 86 this am    Urine cx 2/27/19 - > 100,000 CFU/mL E. Coli  Blood culture x 2 2/27/19 -no growth at 5 days  Repeat blood culture port/peripheral 2/27/19 p.m. - no growth at 5 days  CSF 2/27/19 - gram stain negative, culture no growth at 5 days    Repeat blood culture port/peripheral 3/4/19 - no growth at 24 hrs    Repeat UA 3/4/19 - negtive    CXR 3/3/19 -  Trace pleural fluid with new patchy LLL infiltrate or atelectasis  CXR 3/5/19 -  Focal infiltrate at the left lung base may represent pneumonia or atelectasis.  Vascular redistribution.  There may be minimal edema, there are small pleural effusions.    Merrem 1 g IV q8 hrs  Valacyclovir 500 mg PO every 8 hrs    Appreciate Dr. Gruber Entiat assistance       3.  Anemia from chemotherapy  HGB - 9.3 on 3/5/19, stable  (s/p 1 unit p RBC 2/28/19)      4.  Thrombocytopenia from chemotherapy (resolved)  PLT - 203,000 on 3/5/19      5.  DVT prophylaxis  - Lovenox 40 subq  daily   - crt 0.61  - repeat BMP today      5.  Hypokalemia  - K 3.5 on 3/3/19 after KCL runs       6.  Generalized weakness  - undergoing PT     7.  Moderate protein calorie malnutrition  - followed by dietary  - supplements  - patient's daughter feels she is eating fair and states portions given here are a lot bigger than is fixed at home      Discussed with patient and her daughter, Gale.  Continue antibiotic therapy per ID.  Continue physical therapy.  Discussed with nursing.  Kami leslie agrees to lab draw for CBC and BMP this am.       Victorina Antonio, APRN    03/06/19  7:01 AM       I personally saw and examined this patient, performing a face-to-face diagnostic evaluation with plan of care reviewed and developed with  Victorina RIVERA and nursing staff.   I have addended and/or modified the above history of present illness, physical examination, and assessment and plan to reflect my findings and impressions.   Essential elements of the care plan were discussed with Victorina RIVERA .   Agree with findings and assessment/plan as documented above.   Questions were encouraged, asked and answered to their understanding and satisfaction.    Alexander Osborn MD  3/6/2019 7:53 AM                            Electronically signed by Alexander Osborn MD at 3/6/2019  7:56 AM     Sarah Robles MD at 3/5/2019  3:06 PM          INFECTIOUS DISEASES PROGRESS NOTE    Patient:  Kami Villanueva  YOB: 1934  MRN: [id number]   Admit date: 2/27/2019   Admitting Physician: Alexander Osborn*  Primary Care Physician: Kvng Strong MD    Chief Complaint: None today.    Interval History: Patient reports that she is not really having any different type of a cough.  Her daughter is in the room and states she is still coughing but it is no worse.    Nursing is at bedside.  They report they did an in and out catheterization and the patient had about 600 mL of  urine.    She is still getting up about every 15 minutes according to the her daughter.  A post void residual has not been checked again.        Allergies:   Allergies   Allergen Reactions   • Alendronate Other (See Comments)     Pt does not know  FOSAMAX   • Lisinopril Cough   • Statins Other (See Comments) and Myalgia     Other     • Sulfa Antibiotics Other (See Comments)     Pt does not remember   • Teriparatide (Recombinant) Other (See Comments)     Pt does not know  FORTEO       Current Meds:     Current Facility-Administered Medications:   •  acetaminophen (TYLENOL) tablet 650 mg, 650 mg, Oral, Q6H PRN, Prashanth Chambers PA, 650 mg at 03/05/19 0041  •  carvedilol (COREG) tablet 3.125 mg, 3.125 mg, Oral, BID With Meals, Prashanth Chambers PA, 3.125 mg at 03/05/19 0827  •  cefTRIAXone (ROCEPHIN) 1 g/100 mL 0.9% NS (MBP), 1 g, Intravenous, Q24H, Sarah Robles MD, 1 g at 03/04/19 1601  •  clotrimazole (MYCELEX) colton 10 mg, 10 mg, Oral, 5x Daily, Sarah Robles MD, 10 mg at 03/05/19 1433  •  diphenhydrAMINE (BENADRYL) capsule 25 mg, 25 mg, Oral, Nightly PRN, Prashanth Chambers PA, 25 mg at 03/01/19 2201  •  diphenhydrAMINE (BENADRYL) capsule 50 mg, 50 mg, Oral, Nightly PRN, Prashanth Chambers PA, 50 mg at 03/03/19 2217  •  enoxaparin (LOVENOX) syringe 40 mg, 40 mg, Subcutaneous, Q24H, Prashanth Chambers PA, 40 mg at 03/05/19 1433  •  levothyroxine (SYNTHROID, LEVOTHROID) tablet 125 mcg, 125 mcg, Oral, Q AM, Prashanth Chambers PA, 125 mcg at 03/05/19 0558  •  lidocaine-Maalox-diphenhydramine (MIRACLE MOUTHWASH) oral suspension 30 mL, 30 mL, Oral, 4x Daily AC & at Bedtime PRN, Sarah Robles MD, 30 mL at 03/03/19 1449  •  ondansetron (ZOFRAN) tablet 8 mg, 8 mg, Oral, Q8H PRN, Prashanth Chambers PA  •  oxybutynin XL (DITROPAN-XL) 24 hr tablet 5 mg, 5 mg, Oral, Daily, Prashanth Chambers PA, 5 mg at 03/05/19 0827  •  potassium chloride 20 mEq in 50 mL IVPB, 20 mEq, Intravenous, Q1H PRN, Prashanth Chambers PA, Last Rate: 50  "mL/hr at 19 1625, 20 mEq at 19 1625  •  sodium chloride 0.9 % flush 3 mL, 3 mL, Intravenous, Q12H, Prashanth Chambers PA, 3 mL at 19 0828  •  sodium chloride 0.9 % flush 3-10 mL, 3-10 mL, Intravenous, PRN, Prashanth Chambers PA  •  sodium chloride 0.9 % with KCl 20 mEq/L infusion, 40 mL/hr, Intravenous, Continuous, Alexander Osborn MD, Last Rate: 40 mL/hr at 19 1714, 40 mL/hr at 19 1714  •  valACYclovir (VALTREX) tablet 500 mg, 500 mg, Oral, Q8H, Sarah Robles MD, 500 mg at 19 1433      Review of Systems   Constitutional: Positive for fever (Patient thought was improving). Negative for chills.   HENT: Positive for mouth sores (Improved).    Eyes: Negative for redness.   Respiratory: Negative for shortness of breath.    Cardiovascular: Negative for chest pain.   Gastrointestinal: Negative for diarrhea.   Genitourinary: Positive for frequency and urgency.   Allergic/Immunologic: Positive for immunocompromised state.            Objective     Vital Signs:  Temp (24hrs), Av.5 °F (37.5 °C), Min:98.4 °F (36.9 °C), Max:101.5 °F (38.6 °C)      /57 (BP Location: Left arm, Patient Position: Lying)   Pulse 89   Temp 100.3 °F (37.9 °C) (Oral)   Resp 18   Ht 170.2 cm (67.01\")   Wt 67.3 kg (148 lb 6 oz)   LMP  (LMP Unknown)   SpO2 93%   BMI 23.23 kg/m²          Physical Exam   General: The patient is elderly lying in bed in no acute distress  HEENT: Sclera anicteric and noninjected, oropharynx without thrush.  Buccal mucosal erosions are improved.  She only has a few healing erosive lesions on her lower lip and dry lips.  Neck: Supple  Heart: S1-S2 rate is regular  Respiratory: Effort even and unlabored.  No crackles or wheezing appreciated  Abdomen soft, nontender, nondistended, bowel sounds are positive I did not appreciate any rebound or guarding  Psych: She is pleasant cooperative  Neuro: Alert and oriented, answers questions appropriately.    Line/IV site: " CCsubclavian left, condition patent and no redness    Results Review:    I reviewed the patient's new clinical results.    Lab Results:  CBC:   Lab Results   Lab 02/27/19  1325 02/28/19  0500 03/01/19  0434 03/02/19  0701 03/03/19  0539 03/04/19  0636 03/05/19  0442   WBC 0.17* 0.22* 0.42* 3.22* 15.22* 24.41* 19.37*   HEMOGLOBIN 9.2* 7.6* 8.1* 8.5* 9.6* 9.5* 9.3*   HEMATOCRIT 27.8* 22.7* 24.4* 25.9* 29.5* 29.3* 28.4*   PLATELETS 115* 81* 71* 74* 102* 160 203   LYMPHOCYTE % 65.0*  --   --  6.0* 4.0* 4.0* 2.0*   MONOCYTES % 8.0  --   --  9.0 7.1 2.0* 3.0*     >>>>>>>>>>>>>>> last dose of filgrastim was March 2  CMP:   Lab Results   Lab 02/27/19  1325 02/27/19  1622 03/01/19  0434 03/01/19  1659 03/02/19  0701 03/03/19  0539   SODIUM 130* 128* 131*  --  129* 133*   POTASSIUM 3.3* 3.3* 2.5* 3.5 3.4* 3.5   CHLORIDE 90* 91* 96*  --  96* 99   CO2 30.0 32.0* 32.0*  --  28.0 29.0   BUN 19 19 14  --  11 8   CREATININE 0.50 0.46* 0.53  --  0.61 0.61   CALCIUM 8.5 8.1* 7.9*  --  7.9* 8.2*   BILIRUBIN 0.4 0.4  --   --   --   --    ALK PHOS 56 53  --   --   --   --    ALT (SGPT) 24 26  --   --   --   --    AST (SGOT) 25 21  --   --   --   --    GLUCOSE 115* 106* 76  --  73 68*     Status:  Final result   Visible to patient:  No (Not Released)   Specimen Information: Urine, Catheter In/Out         Ref Range & Units 1d ago   Color, UA Yellow, Straw Yellow    Appearance, UA Clear Clear    pH, UA 5.0 - 8.0 7.0    Specific Gravity, UA 1.005 - 1.030 1.011    Glucose, UA Negative Negative    Ketones, UA Negative Negative    Bilirubin, UA Negative Negative    Blood, UA Negative Negative    Protein, UA Negative Negative    Leuk Esterase, UA Negative Negative    Nitrite, UA Negative Negative    Urobilinogen, UA 0.2 - 1.0 E.U./dL 0.2 E.U./dL    Resulting Agency  [facility]      Narrative   Performed by: [facility]   Urine microscopic not indicated.      Specimen Collected: 03/04/19 18:00 Last Resulted: 03/04/19 18:14               Culture  Results:      Urine Culture   Date Value Ref Range Status   02/27/2019 >100,000 CFU/mL Escherichia coli (A)  Final       Microbiology Results Abnormal     Procedure Component Value - Date/Time    Blood Culture - Blood, Arm, Left [[id number]] Collected:  03/04/19 1643    Lab Status:  Preliminary result Specimen:  Blood from Arm, Left Updated:  03/05/19 0501     Blood Culture No growth at less than 24 hours    Blood Culture With REBECA - Blood, Blood, Central Line [[id number]] Collected:  03/04/19 1643    Lab Status:  Preliminary result Specimen:  Blood, Central Line Updated:  03/05/19 0501     Blood Culture No growth at less than 24 hours    Blood Culture With REBECA - Blood, Arm, Left [[id number]] Collected:  02/27/19 2141    Lab Status:  Final result Specimen:  Blood from Arm, Left Updated:  03/04/19 2200     Blood Culture No growth at 5 days    Blood Culture With REBECA - Blood, Blood, Venous Line [[id number]] Collected:  02/27/19 2142    Lab Status:  Final result Specimen:  Blood, Venous Line Updated:  03/04/19 2200     Blood Culture No growth at 5 days        02/27/2019 0900  03/01/2019 0849  Urine Culture - Urine, Urine, Clean Catch [[id number]]    (Abnormal)   Urine, Clean Catch     Final result  Urine Culture >100,000 CFU/mL Escherichia coli Abnormal        Susceptibility      Escherichia coli     ALFREDO     Ampicillin >=32  Resistant     Ampicillin + Sulbactam >=32  Resistant     Cefazolin <=4  Susceptible1     Cefepime <=1  Susceptible     Ceftriaxone <=1  Susceptible     Ertapenem <=0.5  Susceptible     ESBL Confirmation Test NEG  Negative     Gentamicin >=16  Resistant     Levofloxacin 1  Susceptible     Meropenem <=0.25  Susceptible     Nitrofurantoin 32  Susceptible     Piperacillin + Tazobactam <=4  Susceptible     Tobramycin 8  Intermediate     Trimethoprim + Sulfamethoxazole >=320  Resistant           1 Cefazolin results may be used to predict the potential effectiveness of oral cephalosporins for treating  uncomplicated urinary tract infections.       Linear View                   Radiology:     Procedure Component Value Units Date/Time   XR Chest PA & Lateral [[id number]] Prashanth as Reviewed   Order Status: Completed Collected: 03/05/19 0942    Updated: 03/05/19 0946   Narrative:     EXAMINATION:  XR CHEST PA AND LATERAL-  3/5/2019 9:17 AM CST     HISTORY: Fever.     COMPARISON: 3/3/2019.     TECHNIQUE: 2 views were obtained.     FINDINGS:  There is left lower lobe infiltrate. There is mild blunting  of the costophrenic angles bilaterally. Heart size is normal. There is  vascular redistribution when compared to 12/21/2018. There may be a  trace amount of interstitial edema. There is a port catheter on the  left.      Impression:     1. Focal infiltrate at the left lung base may represent pneumonia or  atelectasis.  2. Vascular redistribution. There may be minimal edema. There are small  pleural effusions.         This report was finalized on 03/05/2019 09:43 by Dr. Fransico Aldana MD.               Imaging Results (last 72 hours)     Procedure Component Value Units Date/Time    XR Chest PA & Lateral [[id number]] Collected:  02/27/19 1426     Updated:  02/27/19 1433    Narrative:       EXAM: XR CHEST PA AND LATERAL- - 2/27/2019 1:32 PM CST     HISTORY: neutropenic fever       COMPARISON: 12/21/2018.      TECHNIQUE:  2 images.  Frontal and lateral views of the chest.     FINDINGS:    Portable overlies the left chest with intact catheter, tip projects at  the lower SVC.     No pneumothorax. Diffuse interstitial opacities. No focal consolidation.  Cardiac and mediastinal silhouette within normal limits. Calcified  aortic atherosclerosis. No acute bony findings.          Impression:       1. Is interstitial opacities, could represent atypical infection or  edema.  This report was finalized on 02/27/2019 14:30 by Dr Valentina Feldman MD.          Assessment/Plan     Active Hospital Problems    Diagnosis   • **Neutropenic fever  (CMS/[facility])       IMPRESSION:  5. Fever-patient hemodynamically stable  overall fever curve improved again.  Chest x-ray essentially unchanged however there may be some atelectasis or minimal infiltrate noted in the left lower lobe noted on repeat as well.  Would have expected it to have progressed some if in fact that was the cause of her fever however feel we need to cover for possible pneumonia at this time since she has persistent fever  6. Neutropenia-resolved.  Leukocytosis now improving and expect to drift back down as filgrastim stopped  7. E. coli urinary tract infection-undergoing treatment.  Repeat urinalysis negative  8. 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 oral mucositis with lip lesions -improved         RECOMMENDATION:   · Discontinue ceftriaxone  · We will broaden therapy at this time to include meropenem.  Entertain the thought of utilizing Zosyn as we have used previously however would like to remove beta lactams altogether from the picture to rule out the possibility of drug fever in this patient who is minimally symptomatic, has had recovery of her white blood count.    · continue valacyclovir  · Continue miracle mouthwash.      Sarah Robles MD  19  3:07 PM        Electronically signed by Sarah Robles MD at 3/5/2019  3:15 PM       Consult Notes (last 24 hours) (Notes from 3/5/2019  2:00 PM through 3/6/2019  2:00 PM)     No notes of this type exist for this encounter.        [address] [address]  Phone:  [phone]  Fax:          Patient:     Kami Villanueva MRN:  [id number]   [address]  [facility] [address] :  1934  SSN:    Phone: [phone] Sex:  F      INSURANCE PAYOR PLAN GROUP # SUBSCRIBER ID   Primary:  Secondary:    MEDICARE  MISC MED SUPP [id number]  [id number]       [id number]  [id number]   Admitting Diagnosis: Neutropenic fever (CMS/HCC) [D70.9, R50.81]  Order Date:  Mar 6, 2019         Case Management  Consult       (Order ID: [id number])     Diagnosis:         Priority:  Routine Expected Date:   Expiration Date:        Interval:   Count:    Comments: Please evaluate  coverage for meropenem 1 g IV push every 8 hours for 5 days with start date March 5, 2019.  The patient is not going to be discharged home today so we just need to see if she has coverage.  She has a port.  Reason for Consult? Home IV antibiotics     Specimen Type:   Specimen Source:   Specimen Taken Date:   Specimen Taken Time:                   Authorizing Provider:Sarah Robles MD  Authorizing Provider's NPI: [id number]  Order Entered By: Sarah Robles MD 3/6/2019  1:32 PM     Electronically signed by: Sarah Robles MD 3/6/2019  1:32 PM

## 2019-03-06 NOTE — PLAN OF CARE
Problem: Patient Care Overview  Goal: Plan of Care Review  Outcome: Ongoing (interventions implemented as appropriate)   03/06/19 0323   Coping/Psychosocial   Plan of Care Reviewed With patient;daughter   Plan of Care Review   Progress no change   OTHER   Outcome Summary No c/o of pain voiced this shift, continues with IV abx, VSS. Will continue to monitor.

## 2019-03-06 NOTE — PROGRESS NOTES
Continued Stay Note   Rafa     Patient Name: Kami Villanueva  MRN: [id number]  Today's Date: 3/6/2019    Admit Date: 2/27/2019    Discharge Plan     Row Name 03/06/19 4698       Plan    Plan  Home Health    Patient/Family in Agreement with Plan  yes    Plan Comments  Spoke with daughter in room to inform of order for IV needed and for SW to see if covered in home setting.  They are fine with using [facility], informed Carolyn from [facility] [phone] of referral and faxed referral to them.  Asked which HH they prefer in pt's county of residence and they will do some research and let this SW know. Will inform when coverage known. Will follow.         Discharge Codes    No documentation.             AARON Rebolledo

## 2019-03-06 NOTE — PROGRESS NOTES
Continued Stay Note   Rafa     Patient Name: Kami Villanueva  MRN: [id number]  Today's Date: 3/6/2019    Admit Date: 2/27/2019    Discharge Plan     Row Name 03/06/19 0957       Plan    Plan  Home Health for P.T. at home    Patient/Family in Agreement with Plan  yes    Plan Comments  Spoke with patient to reassess home needs. Therapy has recommended rehab upon discharge. Pt is not interested in SNF, but is open and will be fine with having HH care to follow for P.T. at home.  Pt also would like a light weight W/C ordered for home use.  Will follow.         Discharge Codes    No documentation.             AARON Rebolledo

## 2019-03-06 NOTE — PROGRESS NOTES
PROGRESS NOTE  Patient name: Kami Villanueva  Patient : 1934  VISIT # [id number]  MR #[id number]   Room 462    SUBJECTIVE: sat in chair for 4 hours yesterday, worked with PT. Still very weak. Refused labs this am.    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   Kami received cycle #6 of CHOP/R and Dose #11 of prophylactic intrathecal methotrexate 2019.  Kami was brought in by family ( and 2 daughters) with a temperature elevation to 100.8°. Recheck temperature here is 101°.  CBC reveals a WBC of 0.19 with an ANC of 0.06  Hemoglobin of 8.7 and platelet count of 209,000.  On review of systems, she has only lost 1 pound in the last week, she feels about baseline other than being weak and tired and experienced nausea and vomiting on the one-hour drive from home to the clinic.  She does not have a headache or cough, or burning on urination or sputum production.  She is being evaluated in anticipation of admission to the hospital for neutropenia and fever.      TARGET LYMPHOMA SITES:  1. Right maxillary sinus with local invasion to the floor of the right orbit   2.  on 10/10/18   3. borderline spleen size, 13 x 4.8 x 11.8 cm  4. Bone marrow negative on 10/11/18   5. CSF specimen via Omaya port negative on 10/24/18    TUMOR HISTORY: Right maxillary sinus high-grade diffuse large B-cell lymphoma 2018  Kami was treated for hyperthyroidism with radioactive iodine at about age 35. About 45 years ago she underwent a thyroidectomy followed subsequently by a vocal cord medialization procedure by Dr. Crane in [facility].  Several years ago she had right maxillary sinus surgery by Dr. Garcia.  Kami developed upper respiratory and sinus symptoms in the spring of 2018.  She was seen and treated at [facility]  urgent care on 3/13/2018 for bronchitis.   She was then seen in followup by her PCP, Dr. Kvng Strong on 4/27/2018. He felt an abnormality in the neck and ordered a CT scan of the soft tissues of the neck and referred her to ENT for further evaluation of this.  A CT scan of the soft tissues of the neck with contrast on 5/7/2018 documented extensive mucosal thickening of the right maxillary sinus consistent with chronic sinusitis. There was also opacification of several of the ethmoid air cells and mucosal thickening in the nasal cavity.  No neck mass or adenopathy was appreciated.   Kami was evaluated by Dr. Bong Felix on 5/30/2018 for evaluation of right neck pain and swelling.  Full ENT exam including nasopharyngeal area was within normal limits.  Review of the CT scan from 5/7/2018 was consistent with chronic sinusitis. Kami had had a medialization procedure by ENT and [place] earlier in the year for a vocal cord paralysis issue. It was felt that some of the changes on the CT scan were consistent with that procedure.  On 8/3/2018 Kami had a right upper posterior tooth pulled by an oral surgeon, Dr. Ortega in [facility] for what was felt to possibly be an infected tooth.  Kami was again seen at [facility] urgent care on 8/16/2018 with swelling, redness and tenderness in the right maxillary area with associated headaches not responding to antibiotics.   Plain film imaging studies and a CT scan of the area was performed.   Plain film x-rays of the facial bones on 8/16/2018 did not reveal acute abnormalities   CT scan of facial bones within without contrast on 8/16/2018 documented interval development of an enhancing soft tissue along the right maxilla extending along the anterior right maxillary sinus and along the floor of the right orbit measuring 2.4 cm in width. New erosive bony changes were noted involving the bony structure of the right orbital floor. The findings were concerning for  a neoplastic process with erosive changes of the right orbital floor with ENT consultation recommended.   Kami was seen by Kenny Mar PA-C with ENT on 8/20/2018 where the above CT scanning clinical findings were evaluated. Arrangements were made for a biopsy with Dr. Bong Felix.  On 9/17/2018, Dr. Bong Felix performed a nasal/sinus endoscopy with biopsy.  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 The cells are polymorphic with maintaining medium sized to large cells with irregular nuclear contour.  On flow cytometry, the monoclonal kappa B cell population does not express CD5 or CD10, but are low viability.  The immunostains, show a diffuse B-cell infiltrate positive for CD20, PAX-5 and BCL 6, but negative for CD5, CD10, BCL-2, cyclin D1 and CD43.  <30% of the cells were positive for Mum-1. Ki-67 showed a high proliferative rate of about 80%.  All of the above is consistent with a diffuse large B-cell lymphoma of the GBC type.  A CT scan of the soft tissues of the neck with contrast on 10/3/2018 was compared to the CT scan of the face on 8/16/2018 and 5/7/2018.  There had been significant progression of disease with interval increase in the size of the right buccal space hyperdense mass measuring approximately at least 7 x 2 cm on axial cuts.   There is involvement of the medial and lateral pterigoid muscle. The right temporalis muscle was also inseparable from the mass with involvement as well of the pterygomaxillary fissure, pterygopalatine fossa and sphenopalatine foramen.  Local cortex erosive the structure and was also noted along the maxillary gingival surface.  Involvement of the roof of the right maxillary sinus with progression of the erosive disease along the floor of the right orbit was also again identified. An asymmetric soft tissue density was also noted at the location of the right infraorbital area.  There was no evidence of  involvement of the extraocular muscles nor do the lacrimal glands demonstrate a discrete mass.  The lacrimal glands do not demonstrate a discrete mass.  Disease extends along the right lateral nasal cavity and ethmoid air cells. There is now disease along the right sphenoid sinus inseparable from the right foramen rotundum with probable involvement of the maxillary branch of V5.  Evidence of prior instrumentation with prior middle turbinectomies, uncinate to measles, antrostomies, and partial ethmoidectomies is also suspected based on the radiographic imaging.  The cavernous sinuses are symmetric.  Meckel's caves are symmetric.   There is now a right level one be lymphadenopathy by size criteria, and abnormal clustering with lymph node measuring up to 1.5 cm.  Otherwise the rest of the oral cavity and head and neck area did not demonstrate evidence of discrete mass.  Apparent right vocal cord paralysis would medialization of the true vocal cord with anteromedial rotation of the right was also described.   CT scan of the chest with and without contrast on 10/3/2018 did not reveal evidence of thoracic lymphadenopathy.  Tiny 2 mm nodules were noted in the subpleural left lung, likely to be benign with a 1 year followup recommended   CT scan of the abdomen and pelvis with contrast on 10/3/2018 did not reveal evidence of metastatic disease nor abdominal lymphadenopathy. The spleen was of borderline size measuring 13 cm x 4.8 cm x 11.8 cm.  Physical examination on 10/10/2018 revealed a very enlarged and swollen right maxillary facial area with induration of soft tissues around the upper teeth on the right and face.  No palpable peripheral lymphadenopathy was able to be appreciated in the head and neck area, epitrochlear and axillary areas, inguinal areas. No evidence of palpable splenomegaly or other abdominal findings were encountered on exam.  Bone marrow aspirate and biopsy on 10/11/18 showed no evidence of involvement  by diffuse large B-cell lymphoma. FLOW cytometry negative.  MRI of the brain w/wo contrast 10/11/18 at [facility] was consistent with abnormal soft tissue mass involving the right  and buccal space extending along the anterior margin of the right maxillary sinus contiguous with the floor of the right orbit. There was no definite invasion into the orbit, nor displacement of the inferior rectus musculature.  PET scan 10/12/18 at [facility] showed hypermetabolic activity associated with the right buccal space and  space mass, SUV 18.5. There is no distant focus of hypermetabolic activity seen to suggest other sites of involvement.  Echocardiogram 10/12/18 at [facility] revealed an estimated LVEF of 60%.   Hepatitis B and C were negative.  Hepatitis A IgM was positive and discussed with Dr. Osborn.  Left chest mediport was placed 10/18/18 by Dr. ANSON Mcgrath.  Right ommaya port was placed 10/22/18 by Dr. ANURADHA Greer.  CSF specimen was obtained via ommaya port by Dr. Osborn, sent for cytology and FLOW prior to initiation of Cycle #1 CHOP-R initiated on 10/24/18 was negative.     TREATMENT SUMMARY:  1. Cycle #1 CHOP-R initiated 10/24/18,   2. Dose #1 prophylactic intrathecal methotrexate (FLOW cytometry on CSF negative) given 10/29/18. Dose #2 on 11/14/18.   3. Anticipate consolidative XRT.     TARGET BREAST CANCER SITES:  1. Right mastectomy 09/23/04 for DCIS     TUMOR HISTORY: Right DCIS 09/23/04  On 09/23/04, Kami had a right simple mastectomy by Dr. 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   The ER was 95%, TX was 13%.   No further specific therapy was indicated for this cancer other than close follow up of the area and the opposite breast.     TREATMENT SUMMARY:  1. Right mastectomy and 9/23/2004 for DCIS    REVIEW OF SYSTEMS:    Constitutional: positive for fever   HEENT: no blurring of vision, no double vision; positive for mildly sore mouth - improved                Lungs: no hemoptysis; positive  for cough, dry  CVS: no palpitation, no chest pain  GI: no abdominal pain, no constipation; no nausea  AMOS: urinary frequency at night, not so much during the day  Musculoskeletal: generalized weakness  Endocrine: no polyuria, polydypsia, no cold or heat intolerence; positive for chronic thyroid replacement  Hematology: positive for pancytopenia from chemotherapy, improved  Dermatology: no skin rash, no eczema, no pruritis  Psychiatry: no suicide ideation  Neurology: no syncope, no seizures; positive for Ommaya with IT chemotherapy     OBJECTIVE:    Vitals:    03/06/19 0338   BP: 151/63   Pulse: 86   Resp: 18   Temp: 98.6 °F (37 °C)   SpO2: 91%       Intake/Output Summary (Last 24 hours) at 3/6/2019 0701  Last data filed at 3/5/2019 2341  Gross per 24 hour   Intake --   Output 550 ml   Net -550 ml     PHYSICAL EXAM:    CONSTITUTIONAL: NAD, patient's daughter Gale at bedside  EYES: Nonicteric  ENT: No overt thrush-minimal erythema improved  NECK: Supple, no masses   CHEST/LUNGS: diminished bilateral bases, o/w clear  CARDIOVASCULAR: RRR  ABDOMEN: soft non-tender, active bowel sounds, no HSM  EXTREMITIES: warm, moves all extremities, weak  SKIN: warm, dry with no rashes or lesions.  port left chest wall without erythema, Ommaya right scalp without erythema  LYMPH: No cervical, clavicular, axillary, or inguinal lymphadenopathy  NEUROLOGIC: follows commands, non focal   PSYCH: mood and affect appropriate    CBC  Results from last 7 days   Lab Units 03/05/19  0442 03/04/19  0636 03/03/19  0539   WBC 10*3/mm3 19.37* 24.41* 15.22*   HEMOGLOBIN g/dL 9.3* 9.5* 9.6*   HEMATOCRIT % 28.4* 29.3* 29.5*   PLATELETS 10*3/mm3 203 160 102*       Lab Results   Component Value Date     (L) 03/03/2019    K 3.5 03/03/2019    CL 99 03/03/2019    CO2 29.0 03/03/2019    BUN 8 03/03/2019    CREATININE 0.61 03/03/2019    GLUCOSE 68 (L) 03/03/2019    CALCIUM 8.2 (L) 03/03/2019    BILITOT 0.4 02/27/2019    ALKPHOS 53 02/27/2019    AST  21 02/27/2019    ALT 26 02/27/2019    AGRATIO 1.5 02/27/2019    GLOB 2.1 02/27/2019       Lab Results   Component Value Date    INR 0.97 09/10/2018    PROTIME 13.2 09/10/2018     Cultures:    Lab Results   Component Value Date    BLOODCX No growth at 24 hours 03/04/2019    BLOODCX No growth at 24 hours 03/04/2019     No components found for: URINCX    ASSESSMENT/PLAN:    1.  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   S/p cycle # 6 of CHOP/R on 2/20/19    S/p dose # 11 of prophylactic intrathecal methotrexate given 2/20/19      2. Neutropenic (resolved) fever E Coli UTI/ LLL infiltrate  WBC - 19.37 on 3/5/19    Max temp recorded,  102.2 at 2150 hrs. on 3/2/19.    Last fever  - 100.8 on 3/4/19 at 11:29 pm.    B/p  151/63, HR 86 this am    Urine cx 2/27/19 - > 100,000 CFU/mL E. Coli  Blood culture x 2 2/27/19 -no growth at 5 days  Repeat blood culture port/peripheral 2/27/19 p.m. - no growth at 5 days  CSF 2/27/19 - gram stain negative, culture no growth at 5 days    Repeat blood culture port/peripheral 3/4/19 - no growth at 24 hrs    Repeat UA 3/4/19 - negtive    CXR 3/3/19 -  Trace pleural fluid with new patchy LLL infiltrate or atelectasis  CXR 3/5/19 -  Focal infiltrate at the left lung base may represent pneumonia or atelectasis.  Vascular redistribution.  There may be minimal edema, there are small pleural effusions.    Merrem 1 g IV q8 hrs  Valacyclovir 500 mg PO every 8 hrs    Appreciate Dr. Gruber [place] assistance       3.  Anemia from chemotherapy  HGB - 9.3 on 3/5/19, stable  (s/p 1 unit p RBC 2/28/19)      4.  Thrombocytopenia from chemotherapy (resolved)  PLT - 203,000 on 3/5/19      5.  DVT prophylaxis  - Lovenox 40 subq daily   - crt 0.61  - repeat BMP today      5.  Hypokalemia  - K 3.5 on 3/3/19 after KCL runs       6.  Generalized weakness  - undergoing PT     7.  Moderate protein calorie malnutrition  - followed by  dietary  - supplements  - patient's daughter feels she is eating fair and states portions given here are a lot bigger than is fixed at home      Discussed with patient and her daughter, Gale.  Continue antibiotic therapy per ID.  Continue physical therapy.  Discussed with nursing.  Kami leslie agrees to lab draw for CBC and BMP this am.       Victorina Antonio, APRN    03/06/19  7:01 AM       I personally saw and examined this patient, performing a face-to-face diagnostic evaluation with plan of care reviewed and developed with  Victorina RIVERA and nursing staff.   I have addended and/or modified the above history of present illness, physical examination, and assessment and plan to reflect my findings and impressions.   Essential elements of the care plan were discussed with Victorina RIVERA .   Agree with findings and assessment/plan as documented above.   Questions were encouraged, asked and answered to their understanding and satisfaction.    Alexander Osborn MD  3/6/2019 7:53 AM

## 2019-03-06 NOTE — PROGRESS NOTES
INFECTIOUS DISEASES PROGRESS NOTE    Patient:  Kami Villanueva  YOB: 1934  MRN: [id number]   Admit date: 2/27/2019   Admitting Physician: Alexander Osborn*  Primary Care Physician: Kvng Strong MD    Chief Complaint: None today.    Interval History:  The patient offers no new complaints -she is cold - not new    Cough unchanged     No dysuria    Her daughter is wondering where physical therapy is.  She thought they would have been and in the morning as she is supposed to get twice daily therapy.    Allergies:   Allergies   Allergen Reactions   • Alendronate Other (See Comments)     Pt does not know  FOSAMAX   • Lisinopril Cough   • Statins Other (See Comments) and Myalgia     Other     • Sulfa Antibiotics Other (See Comments)     Pt does not remember   • Teriparatide (Recombinant) Other (See Comments)     Pt does not know  FORTEO       Current Meds:     Current Facility-Administered Medications:   •  acetaminophen (TYLENOL) tablet 650 mg, 650 mg, Oral, Q6H PRN, Prashanth Chambers PA, 650 mg at 03/05/19 0041  •  carvedilol (COREG) tablet 3.125 mg, 3.125 mg, Oral, BID With Meals, Prashanth Chambers PA, 3.125 mg at 03/06/19 0757  •  clotrimazole (MYCELEX) colton 10 mg, 10 mg, Oral, 5x Daily, Sarah Robles MD, 10 mg at 03/06/19 1238  •  diphenhydrAMINE (BENADRYL) capsule 25 mg, 25 mg, Oral, Nightly PRN, Prashanth Chambers PA, 25 mg at 03/01/19 2201  •  diphenhydrAMINE (BENADRYL) capsule 50 mg, 50 mg, Oral, Nightly PRN, Prashanth Chambers PA, 50 mg at 03/06/19 0123  •  enoxaparin (LOVENOX) syringe 40 mg, 40 mg, Subcutaneous, Q24H, Prashanth Chambers PA, 40 mg at 03/05/19 1433  •  levothyroxine (SYNTHROID, LEVOTHROID) tablet 125 mcg, 125 mcg, Oral, Q AM, Prashanht Chambers PA, 125 mcg at 03/06/19 0557  •  lidocaine-Maalox-diphenhydramine (MIRACLE MOUTHWASH) oral suspension 30 mL, 30 mL, Oral, 4x Daily AC & at Bedtime PRN, Sarah Robles MD, 30 mL at 03/03/19 7614  •  meropenem (MERREM) 1 g/100  "mL 0.9% NS VTB (mbp), 1 g, Intravenous, Q8H, Sarah Robles MD, 1 g at 19 0801  •  ondansetron (ZOFRAN) tablet 8 mg, 8 mg, Oral, Q8H PRN, Prashanth Chambers PA  •  oxybutynin XL (DITROPAN-XL) 24 hr tablet 5 mg, 5 mg, Oral, Daily, Prashanth Chambers PA, 5 mg at 19 0807  •  potassium chloride 20 mEq in 50 mL IVPB, 20 mEq, Intravenous, Q1H PRN, Prashanth Chambers PA, Last Rate: 50 mL/hr at 19 1625, 20 mEq at 19 1625  •  sodium chloride 0.9 % flush 3 mL, 3 mL, Intravenous, Q12H, Prashanth Chambers PA, 3 mL at 19 0828  •  sodium chloride 0.9 % flush 3-10 mL, 3-10 mL, Intravenous, PRN, Prashanth Chambers PA  •  sodium chloride 0.9 % with KCl 20 mEq/L infusion, 40 mL/hr, Intravenous, Continuous, Alexander Osborn MD, Last Rate: 40 mL/hr at 19 1710, 40 mL/hr at 19 1710  •  valACYclovir (VALTREX) tablet 500 mg, 500 mg, Oral, Q8H, Sarah Robles MD, 500 mg at 19 0557      Review of Systems   Constitutional: Positive for fever (Improving). Negative for chills.   HENT: Positive for mouth sores.    Eyes: Negative for redness.   Respiratory: Positive for cough. Negative for shortness of breath.    Cardiovascular: Negative for chest pain.   Gastrointestinal: Negative for diarrhea.   Genitourinary: Positive for frequency and urgency.   Allergic/Immunologic: Positive for immunocompromised state.            Objective     Vital Signs:  Temp (24hrs), Av.4 °F (37.4 °C), Min:98.4 °F (36.9 °C), Max:100.3 °F (37.9 °C)      /54 (BP Location: Left arm, Patient Position: Sitting)   Pulse 92   Temp 99.1 °F (37.3 °C) (Oral)   Resp 18   Ht 170.2 cm (67.01\")   Wt 66.9 kg (147 lb 9 oz)   LMP  (LMP Unknown)   SpO2 93%   BMI 23.11 kg/m²         Physical Exam   General: The patient is elderly female sitting up in chair after finishing lunch in no acute distress   HEENT: Sclera anicteric and noninjected, oropharynx without thrush.  Buccal mucosal erosions involved.  She only has a " few healing erosive lesions on her lower lip   Neck: Supple  Heart: S1-S2 rate is regular  Respiratory: Effort even and unlabored.  Faint bibasilar crackles  Abdomen soft, nontender, nondistended, bowel sounds are positive I did not appreciate any rebound or guarding  Psych: She is pleasant cooperative  Neuro: Alert and oriented, answers questions appropriately.    Line/IV site: CC subclavian left, condition patent and no redness    Results Review:    I reviewed the patient's new clinical results.    Lab Results:  CBC:   Lab Results   Lab 02/27/19  1325 02/28/19  0500 03/01/19  0434 03/02/19  0701 03/03/19  0539 03/04/19  0636 03/05/19  0442 03/06/19  0739   WBC 0.17* 0.22* 0.42* 3.22* 15.22* 24.41* 19.37* 14.32*   HEMOGLOBIN 9.2* 7.6* 8.1* 8.5* 9.6* 9.5* 9.3* 9.1*   HEMATOCRIT 27.8* 22.7* 24.4* 25.9* 29.5* 29.3* 28.4* 28.3*   PLATELETS 115* 81* 71* 74* 102* 160 203 256   LYMPHOCYTE % 65.0*  --   --  6.0* 4.0* 4.0* 2.0* 2.0*   MONOCYTES % 8.0  --   --  9.0 7.1 2.0* 3.0*  --      >>>>>>>>>>>>>>> last dose of filgrastim was March 2  CMP:   Lab Results   Lab 02/27/19  1325 02/27/19  1622  03/02/19  0701 03/03/19  0539 03/06/19  0739   SODIUM 130* 128*   < > 129* 133* 129*   POTASSIUM 3.3* 3.3*   < > 3.4* 3.5 3.9   CHLORIDE 90* 91*   < > 96* 99 96*   CO2 30.0 32.0*   < > 28.0 29.0 28.0   BUN 19 19   < > 11 8 10   CREATININE 0.50 0.46*   < > 0.61 0.61 0.53   CALCIUM 8.5 8.1*   < > 7.9* 8.2* 7.8*   BILIRUBIN 0.4 0.4  --   --   --   --    ALK PHOS 56 53  --   --   --   --    ALT (SGPT) 24 26  --   --   --   --    AST (SGOT) 25 21  --   --   --   --    GLUCOSE 115* 106*   < > 73 68* 71    < > = values in this interval not displayed.     Status:  Final result   Visible to patient:  No (Not Released)   Specimen Information: Urine, Catheter In/Out         Ref Range & Units 1d ago   Color, UA Yellow, Straw Yellow    Appearance, UA Clear Clear    pH, UA 5.0 - 8.0 7.0    Specific Gravity, UA 1.005 - 1.030 1.011    Glucose, UA  Negative Negative    Ketones, UA Negative Negative    Bilirubin, UA Negative Negative    Blood, UA Negative Negative    Protein, UA Negative Negative    Leuk Esterase, UA Negative Negative    Nitrite, UA Negative Negative    Urobilinogen, UA 0.2 - 1.0 E.U./dL 0.2 E.U./dL    Resulting Agency  [facility]      Narrative   Performed by: [facility]   Urine microscopic not indicated.      Specimen Collected: 03/04/19 18:00 Last Resulted: 03/04/19 18:14               Culture Results:      Urine Culture   Date Value Ref Range Status   02/27/2019 >100,000 CFU/mL Escherichia coli (A)  Final       Microbiology Results Abnormal     Procedure Component Value - Date/Time    Blood Culture - Blood, Arm, Left [[id number]] Collected:  03/04/19 1643    Lab Status:  Preliminary result Specimen:  Blood from Arm, Left Updated:  03/05/19 1700     Blood Culture No growth at 24 hours    Blood Culture With REBECA - Blood, Blood, Central Line [[id number]] Collected:  03/04/19 1643    Lab Status:  Preliminary result Specimen:  Blood, Central Line Updated:  03/05/19 1700     Blood Culture No growth at 24 hours    Blood Culture With REBECA - Blood, Arm, Left [[id number]] Collected:  02/27/19 2141    Lab Status:  Final result Specimen:  Blood from Arm, Left Updated:  03/04/19 2200     Blood Culture No growth at 5 days    Blood Culture With REBECA - Blood, Blood, Venous Line [[id number]] Collected:  02/27/19 2142    Lab Status:  Final result Specimen:  Blood, Venous Line Updated:  03/04/19 2200     Blood Culture No growth at 5 days        02/27/2019 0900  03/01/2019 0849  Urine Culture - Urine, Urine, Clean Catch [[id number]]    (Abnormal)   Urine, Clean Catch     Final result  Urine Culture >100,000 CFU/mL Escherichia coli Abnormal        Susceptibility      Escherichia coli     ALFREDO     Ampicillin >=32  Resistant     Ampicillin + Sulbactam >=32  Resistant     Cefazolin <=4  Susceptible1     Cefepime <=1  Susceptible     Ceftriaxone <=1  Susceptible      Ertapenem <=0.5  Susceptible     ESBL Confirmation Test NEG  Negative     Gentamicin >=16  Resistant     Levofloxacin 1  Susceptible     Meropenem <=0.25  Susceptible     Nitrofurantoin 32  Susceptible     Piperacillin + Tazobactam <=4  Susceptible     Tobramycin 8  Intermediate     Trimethoprim + Sulfamethoxazole >=320  Resistant           1 Cefazolin results may be used to predict the potential effectiveness of oral cephalosporins for treating uncomplicated urinary tract infections.       Linear View                   Radiology:     Procedure Component Value Units Date/Time   XR Chest PA & Lateral [[id number]] Prashanth as Reviewed   Order Status: Completed Collected: 03/05/19 0942    Updated: 03/05/19 0946   Narrative:     EXAMINATION:  XR CHEST PA AND LATERAL-  3/5/2019 9:17 AM CST     HISTORY: Fever.     COMPARISON: 3/3/2019.     TECHNIQUE: 2 views were obtained.     FINDINGS:  There is left lower lobe infiltrate. There is mild blunting  of the costophrenic angles bilaterally. Heart size is normal. There is  vascular redistribution when compared to 12/21/2018. There may be a  trace amount of interstitial edema. There is a port catheter on the  left.      Impression:     1. Focal infiltrate at the left lung base may represent pneumonia or  atelectasis.  2. Vascular redistribution. There may be minimal edema. There are small  pleural effusions.         This report was finalized on 03/05/2019 09:43 by Dr. Fransico Aldana MD.               Imaging Results (last 72 hours)     Procedure Component Value Units Date/Time    XR Chest PA & Lateral [[id number]] Collected:  02/27/19 1426     Updated:  02/27/19 1433    Narrative:       EXAM: XR CHEST PA AND LATERAL- - 2/27/2019 1:32 PM CST     HISTORY: neutropenic fever       COMPARISON: 12/21/2018.      TECHNIQUE:  2 images.  Frontal and lateral views of the chest.     FINDINGS:    Portable overlies the left chest with intact catheter, tip projects at  the lower SVC.     No  pneumothorax. Diffuse interstitial opacities. No focal consolidation.  Cardiac and mediastinal silhouette within normal limits. Calcified  aortic atherosclerosis. No acute bony findings.          Impression:       1. Is interstitial opacities, could represent atypical infection or  edema.  This report was finalized on 02/27/2019 14:30 by Dr Valentina Feldman MD.          Assessment/Plan     Active Hospital Problems    Diagnosis   • **Neutropenic fever (CMS/HCC)       IMPRESSION:  1. Fever-patient hemodynamically stable  overall fever curve improved again.  Chest x-ray essentially unchanged however there may be some atelectasis or minimal infiltrate noted in the left lower lobe noted on repeat as well.  Would have expected it to have progressed some if in fact that was the cause of her fever however feel we need to cover for possible pneumonia at this time since she has had persistent fever  2. Neutropenia-resolved.  Leukocytosis now improving and expect to drift back down as filgrastim has been stopped  3. E. coli urinary tract infection-undergoing treatment.  Repeat urinalysis negative  4. 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 coli urinary tract infection upon admission with neutropenic fever.  · If fever curve stays down on meropenem, would consider discharge home to complete an empiric 5-day course of meropenem  · We will tentatively put orders in for  to be looking at insurance coverage    Sarah Robles MD  03/06/19  1:24 PM

## 2019-03-07 LAB
ANISOCYTOSIS BLD QL: ABNORMAL
DEPRECATED RDW RBC AUTO: 51.7 FL (ref 40–54)
ERYTHROCYTE [DISTWIDTH] IN BLOOD BY AUTOMATED COUNT: 16.7 % (ref 12–15)
GIANT PLATELETS: ABNORMAL
HCT VFR BLD AUTO: 27 % (ref 37–47)
HGB BLD-MCNC: 8.7 G/DL (ref 12–16)
LYMPHOCYTES # BLD MANUAL: 0.25 10*3/MM3 (ref 0.72–4.86)
LYMPHOCYTES NFR BLD MANUAL: 2 % (ref 15–45)
LYMPHOCYTES NFR BLD MANUAL: 6.1 % (ref 4–12)
MCH RBC QN AUTO: 27.9 PG (ref 28–32)
MCHC RBC AUTO-ENTMCNC: 32.2 G/DL (ref 33–36)
MCV RBC AUTO: 86.5 FL (ref 82–98)
METAMYELOCYTES NFR BLD MANUAL: 6.1 % (ref 0–0)
MICROCYTES BLD QL: ABNORMAL
MONOCYTES # BLD AUTO: 0.75 10*3/MM3 (ref 0.19–1.3)
MYELOCYTES NFR BLD MANUAL: 1 % (ref 0–0)
NEUTROPHILS # BLD AUTO: 9.38 10*3/MM3 (ref 1.87–8.4)
NEUTROPHILS NFR BLD MANUAL: 70.4 % (ref 39–78)
NEUTS BAND NFR BLD MANUAL: 6.1 % (ref 0–10)
PLATELET # BLD AUTO: 288 10*3/MM3 (ref 130–400)
PMV BLD AUTO: 10.2 FL (ref 6–12)
POIKILOCYTOSIS BLD QL SMEAR: ABNORMAL
POLYCHROMASIA BLD QL SMEAR: ABNORMAL
PROMYELOCYTES NFR BLD MANUAL: 2 % (ref 0–0)
RBC # BLD AUTO: 3.12 10*6/MM3 (ref 4.2–5.4)
VARIANT LYMPHS NFR BLD MANUAL: 6.1 % (ref 0–5)
WBC MORPH BLD: NORMAL
WBC NRBC COR # BLD: 12.26 10*3/MM3 (ref 4.8–10.8)

## 2019-03-07 PROCEDURE — 87205 SMEAR GRAM STAIN: CPT | Performed by: INTERNAL MEDICINE

## 2019-03-07 PROCEDURE — 97530 THERAPEUTIC ACTIVITIES: CPT

## 2019-03-07 PROCEDURE — 97110 THERAPEUTIC EXERCISES: CPT

## 2019-03-07 PROCEDURE — 85025 COMPLETE CBC W/AUTO DIFF WBC: CPT | Performed by: PHYSICIAN ASSISTANT

## 2019-03-07 PROCEDURE — 87185 SC STD ENZYME DETCJ PER NZM: CPT | Performed by: INTERNAL MEDICINE

## 2019-03-07 PROCEDURE — 87040 BLOOD CULTURE FOR BACTERIA: CPT | Performed by: INTERNAL MEDICINE

## 2019-03-07 PROCEDURE — 87077 CULTURE AEROBIC IDENTIFY: CPT | Performed by: INTERNAL MEDICINE

## 2019-03-07 PROCEDURE — 87150 DNA/RNA AMPLIFIED PROBE: CPT | Performed by: INTERNAL MEDICINE

## 2019-03-07 PROCEDURE — 25010000002 MEROPENEM PER 100 MG: Performed by: INTERNAL MEDICINE

## 2019-03-07 PROCEDURE — 85007 BL SMEAR W/DIFF WBC COUNT: CPT | Performed by: PHYSICIAN ASSISTANT

## 2019-03-07 PROCEDURE — 97116 GAIT TRAINING THERAPY: CPT

## 2019-03-07 PROCEDURE — 25010000002 ENOXAPARIN PER 10 MG: Performed by: PHYSICIAN ASSISTANT

## 2019-03-07 PROCEDURE — 63710000001 DIPHENHYDRAMINE PER 50 MG: Performed by: PHYSICIAN ASSISTANT

## 2019-03-07 PROCEDURE — 87186 SC STD MICRODIL/AGAR DIL: CPT | Performed by: INTERNAL MEDICINE

## 2019-03-07 RX ADMIN — VALACYCLOVIR 500 MG: 500 TABLET, FILM COATED ORAL at 21:11

## 2019-03-07 RX ADMIN — CARVEDILOL 3.12 MG: 3.12 TABLET, FILM COATED ORAL at 17:23

## 2019-03-07 RX ADMIN — MEROPENEM 1 G: 1 INJECTION, POWDER, FOR SOLUTION INTRAVENOUS at 16:08

## 2019-03-07 RX ADMIN — CLOTRIMAZOLE 10 MG: 10 LOZENGE ORAL; TOPICAL at 11:17

## 2019-03-07 RX ADMIN — DIPHENHYDRAMINE HYDROCHLORIDE 25 MG: 25 CAPSULE ORAL at 21:11

## 2019-03-07 RX ADMIN — LEVOTHYROXINE SODIUM 125 MCG: 125 TABLET ORAL at 06:33

## 2019-03-07 RX ADMIN — MEROPENEM 1 G: 1 INJECTION, POWDER, FOR SOLUTION INTRAVENOUS at 01:05

## 2019-03-07 RX ADMIN — VALACYCLOVIR 500 MG: 500 TABLET, FILM COATED ORAL at 14:46

## 2019-03-07 RX ADMIN — MEROPENEM 1 G: 1 INJECTION, POWDER, FOR SOLUTION INTRAVENOUS at 08:31

## 2019-03-07 RX ADMIN — CLOTRIMAZOLE 10 MG: 10 LOZENGE ORAL; TOPICAL at 14:45

## 2019-03-07 RX ADMIN — SODIUM CHLORIDE, PRESERVATIVE FREE 3 ML: 5 INJECTION INTRAVENOUS at 08:31

## 2019-03-07 RX ADMIN — CLOTRIMAZOLE 10 MG: 10 LOZENGE ORAL; TOPICAL at 21:11

## 2019-03-07 RX ADMIN — CLOTRIMAZOLE 10 MG: 10 LOZENGE ORAL; TOPICAL at 17:23

## 2019-03-07 RX ADMIN — CARVEDILOL 3.12 MG: 3.12 TABLET, FILM COATED ORAL at 08:30

## 2019-03-07 RX ADMIN — ENOXAPARIN SODIUM 40 MG: 40 INJECTION SUBCUTANEOUS at 14:45

## 2019-03-07 RX ADMIN — VALACYCLOVIR 500 MG: 500 TABLET, FILM COATED ORAL at 06:33

## 2019-03-07 RX ADMIN — CLOTRIMAZOLE 10 MG: 10 LOZENGE ORAL; TOPICAL at 08:30

## 2019-03-07 RX ADMIN — OXYBUTYNIN CHLORIDE 5 MG: 5 TABLET, EXTENDED RELEASE ORAL at 08:31

## 2019-03-07 RX ADMIN — ACETAMINOPHEN 650 MG: 325 TABLET, FILM COATED ORAL at 21:11

## 2019-03-07 NOTE — THERAPY TREATMENT NOTE
Acute Care - Physical Therapy Treatment Note  [facility]     Patient Name: Kami Villanueva  : 1934  MRN: [id number]  Today's Date: 3/7/2019  Onset of Illness/Injury or Date of Surgery: 19  Date of Referral to PT: 19  Referring Physician: Dr. Osborn    Admit Date: 2019    Visit Dx:    ICD-10-CM ICD-9-CM   1. 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)   • Gram-negative bacteremia   • Neutropenia (CMS/HCC)   • Hypokalemia   • Pancytopenia due to antineoplastic chemotherapy (CMS/HCC)   • Neutropenic fever (CMS/HCC)       Therapy Treatment    Rehabilitation Treatment Summary     Row Name 19 1141             Treatment Time/Intention    Discipline  physical therapy assistant  -AE      Document Type  therapy note (daily note)  -AE      Subjective Information  complains of;weakness  -AE      Existing Precautions/Restrictions  fall  -AE      Recorded by [AE] Sandhya Fontanez PTA 19 1238      Row Name 19 1141             Bed Mobility Assessment/Treatment    Sit-Supine [place] (Bed Mobility)  verbal cues;minimum assist (75% patient effort)  -AE      Assistive Device (Bed Mobility)  bed rails  -AE      Comment (Bed Mobility)  Pt took several minutes to sit up  -AE      Recorded by [AE] Sandhya Fontanez PTA 19 1238      Row Name 19 1141             Sit-Stand Transfer    Sit-Stand [place] (Transfers)  minimum assist (75% patient effort);verbal cues  -AE      Recorded by [AE] Sandhya Fontanez PTA 19 1238      Row Name 19 1141             Stand-Sit Transfer    Stand-Sit [place] (Transfers)  contact  guard;verbal cues  -AE      Recorded by [AE] Sandhya Fontanez, PTA 03/07/19 1238      Row Name 03/07/19 1141             Toilet Transfer    [place] (Toilet Transfer)  minimum assist (75% patient effort);verbal cues  -AE      Assistive Device (Toilet Transfer)  -- assisted on/off BSC  -AE      Recorded by [AE] Sandhya Fnotanez, PTA 03/07/19 1238      Row Name 03/07/19 1141             Gait/Stairs Assessment/Training    [place] (Gait)  contact guard  -AE      Assistive Device (Gait)  walker, front-wheeled  -AE      Distance in Feet (Gait)  50  -AE      Pattern (Gait)  swing-to  -AE      Bilateral Gait Deviations  forward flexed posture  -AE      Recorded by [AE] Sandhya Fontanez, [facility] 03/07/19 1238      Row Name 03/07/19 1141             Positioning and Restraints    Pre-Treatment Position  in bed  -AE      Post Treatment Position  chair  -AE      In Chair  sitting;call light within reach;with family/caregiver  -AE      Recorded by [AE] Sandhya Fontanez, [facility] 03/07/19 1238        User Key  (r) = Recorded By, (t) = Taken By, (c) = Cosigned By    Initials Name Effective Dates Discipline    AE Sandhya Fontanez, [facility] 06/22/15 -  PT                   Physical Therapy Education     Title: PT OT SLP Therapies (In Progress)     Topic: Physical Therapy (In Progress)     Point: Mobility training (In Progress)     Learning Progress Summary           Patient Acceptance, E, NR by AE at 3/7/2019 12:40 PM    Comment:  POC t/f's    Acceptance, E, VU,NR by TYRA at 3/5/2019  2:04 PM    Comment:  Educated pt. on progression of PT POC and benefits of activity   Family Acceptance, E, VU,NR by TYRA at 3/5/2019  2:04 PM    Comment:  Educated pt. on progression of PT POC and benefits of activity   Significant Other Acceptance, E, VU,NR by TYRA at 3/5/2019  2:04 PM    Comment:  Educated pt. on progression of PT POC and benefits of activity                               User Key     Initials Effective Dates Name Provider Type  Discipline    AE 06/22/15 -  Sandhya Fontanez PTA Physical Therapy Assistant PT    TYRA 08/02/16 -  Ronnell Cantrell PT DPT Physical Therapist PT                PT Recommendation and Plan     Plan of Care Reviewed With: patient  Progress: improving  Outcome Summary: She needed min assist to complete sit <> stand transfer and transfer on and off the BSC and she needed min/mod   Outcome Measures     Row Name 03/05/19 1404             How much help from another person do you currently need...    Turning from your back to your side while in flat bed without using bedrails?  3  -TYRA      Moving from lying on back to sitting on the side of a flat bed without bedrails?  3  -TYRA      Moving to and from a bed to a chair (including a wheelchair)?  2  -TYRA      Standing up from a chair using your arms (e.g., wheelchair, bedside chair)?  2  -TYRA      Climbing 3-5 steps with a railing?  1  -TYRA      To walk in hospital room?  2  -TYRA      AM-PAC 6 Clicks Score  13  -TYRA         Functional Assessment    Outcome Measure Options  AM-PAC 6 Clicks Basic Mobility (PT)  -TYRA        User Key  (r) = Recorded By, (t) = Taken By, (c) = Cosigned By    Initials Name Provider Type    Ronnell Overton, PT DPT Physical Therapist         Time Calculation:   PT Charges     Row Name 03/07/19 1241             Time Calculation    Start Time  1141  -AE      Stop Time  1205  -AE      Time Calculation (min)  24 min  -AE      PT Received On  03/07/19  -AE      PT Goal Re-Cert Due Date  03/15/19  -AE         Time Calculation- PT    Total Timed Code Minutes- PT  24 minute(s)  -AE         Timed Charges    72936 - Gait Training Minutes   15  -AE      85419 - PT Therapeutic Activity Minutes  9  -AE        User Key  (r) = Recorded By, (t) = Taken By, (c) = Cosigned By    Initials Name Provider Type    AE Sandhya Fontanez PTA Physical Therapy Assistant        Therapy Suggested Charges     Code   Minutes Charges    74862 (CPT®) Hc Pt Neuromusc Re Education Ea 15  Min      51005 (CPT®) Hc Pt Ther Proc Ea 15 Min      74608 (CPT®) Hc Gait Training Ea 15 Min 15 1    98330 (CPT®) Hc Pt Therapeutic Act Ea 15 Min 9 1    81544 (CPT®) Hc Pt Manual Therapy Ea 15 Min      79120 (CPT®) Hc Pt Iontophoresis Ea 15 Min      61109 (CPT®) Hc Pt Elec Stim Ea-Per 15 Min      55024 (CPT®) Hc Pt Ultrasound Ea 15 Min      90896 (CPT®) Hc Pt Self Care/Mgmt/Train Ea 15 Min      83272 (CPT®) Hc Pt Prosthetic (S) Train Initial Encounter, Each 15 Min      15900 (CPT®) Hc Pt Orthotic(S)/Prosthetic(S) Encounter, Each 15 Min      30255 (CPT®) Hc Orthotic(S) Mgmt/Train Initial Encounter, Each 15min      Total  24 2        Therapy Charges for Today     Code Description Service Date Service Provider Modifiers Qty    17495247302 HC GAIT TRAINING EA 15 MIN 3/7/2019 Sandhya Fontanez, PTA GP 1    33307709094 HC PT THERAPEUTIC ACT EA 15 MIN 3/7/2019 Sandhya Fontanez, PTA GP 1          PT G-Codes  Outcome Measure Options: AM-PAC 6 Clicks Basic Mobility (PT)  AM-PAC 6 Clicks Score: 13    Sandhya Fontanez, RAVI  3/7/2019

## 2019-03-07 NOTE — PLAN OF CARE
Problem: Patient Care Overview  Goal: Plan of Care Review  Outcome: Ongoing (interventions implemented as appropriate)   03/07/19 2550   Coping/Psychosocial   Plan of Care Reviewed With patient;daughter   Plan of Care Review   Progress no change   OTHER   Outcome Summary Pt appetite still remains fair and breakfast continues to be her best meal of the day. She is not drinking Boost Plus, however is agreeable to trying Boost Pudding and Magic Cup. Encouraged intake and will continue to follow.

## 2019-03-07 NOTE — PROGRESS NOTES
"Infectious Diseases Progress Note    Patient:  Kami Villanueva  YOB: 1934  MRN: [id number]   Admit date: 2/27/2019   Admitting Physician: Alexander Osborn*  Primary Care Physician: Kvng Strong MD    Chief Complaint/Interval History: She seems comfortable at present.  Daughter at bedside.  Daughter would like to see her somewhat stronger before she gets home.  Patient indicated her throat was slightly sore.  There is no erythema or exudate.  Mucosa slightly dry.  She also thought there was a little unilateral nasal congestion-feel this was most likely physiologic.    Intake/Output Summary (Last 24 hours) at 3/7/2019 1635  Last data filed at 3/7/2019 1309  Gross per 24 hour   Intake 720 ml   Output --   Net 720 ml     Allergies:   Allergies   Allergen Reactions   • Alendronate Other (See Comments)     Pt does not know  FOSAMAX   • Lisinopril Cough   • Statins Other (See Comments) and Myalgia     Other     • Sulfa Antibiotics Other (See Comments)     Pt does not remember   • Teriparatide (Recombinant) Other (See Comments)     Pt does not know  FORTEO     Current Scheduled Medications:     carvedilol 3.125 mg Oral BID With Meals   clotrimazole 10 mg Oral 5x Daily   enoxaparin 40 mg Subcutaneous Q24H   levothyroxine 125 mcg Oral Q AM   meropenem 1 g Intravenous Q8H   oxybutynin XL 5 mg Oral Daily   sodium chloride 3 mL Intravenous Q12H   valACYclovir 500 mg Oral Q8H     Current PRN Medications:  •  acetaminophen  •  diphenhydrAMINE  •  diphenhydrAMINE  •  lidocaine-Maalox-diphenhydramine  •  ondansetron  •  potassium chloride  •  sodium chloride    Review of Systems see HPI    Vital Signs:  /67 (BP Location: Left arm, Patient Position: Lying)   Pulse 96   Temp 98.4 °F (36.9 °C) (Oral)   Resp 16   Ht 170.2 cm (67.01\")   Wt 66.9 kg (147 lb 9 oz)   LMP  (LMP Unknown)   SpO2 92%   BMI 23.11 kg/m²     Physical Exam  Vital signs reviewed.  Lungs clear without crackles  Abdomen " soft and nontender    Lab Results:  CBC: Results from last 7 days   Lab Units 03/07/19  0430 03/06/19  0739 03/05/19  0442 03/04/19  0636 03/03/19  0539 03/02/19  0701 03/01/19  0434   WBC 10*3/mm3 12.26* 14.32* 19.37* 24.41* 15.22* 3.22* 0.42*   HEMOGLOBIN g/dL 8.7* 9.1* 9.3* 9.5* 9.6* 8.5* 8.1*   HEMATOCRIT % 27.0* 28.3* 28.4* 29.3* 29.5* 25.9* 24.4*   PLATELETS 10*3/mm3 288 256 203 160 102* 74* 71*     BMP:  Results from last 7 days   Lab Units 03/06/19  0739 03/03/19  0539 03/02/19  0701 03/01/19  1659 03/01/19  0434   SODIUM mmol/L 129* 133* 129*  --  131*   POTASSIUM mmol/L 3.9 3.5 3.4* 3.5 2.5*   CHLORIDE mmol/L 96* 99 96*  --  96*   CO2 mmol/L 28.0 29.0 28.0  --  32.0*   BUN mg/dL 10 8 11  --  14   CREATININE mg/dL 0.53 0.61 0.61  --  0.53   GLUCOSE mg/dL 71 68* 73  --  76   CALCIUM mg/dL 7.8* 8.2* 7.9*  --  7.9*     Culture Results:   Blood Culture   Date Value Ref Range Status   03/04/2019 No growth at 2 days  Preliminary   03/04/2019 No growth at 2 days  Preliminary     Radiology: None  Additional Studies Reviewed: None    Impression:   Fever-resolved  Neutropenia-resolved    Recommendations:   Currently completing meropenem for the possibility of healthcare associated pneumonia.  She needs 5 days total of meropenem.  Patient's daughter would feel most comfortable if she remains hospitalized to complete treatment.  She is concerned about completing IV therapy at home.  She would also like her mother slightly stronger and more able to attend to her activities of daily living prior to discharge home.    Kobi Fajardo MD

## 2019-03-07 NOTE — PLAN OF CARE
Problem: Patient Care Overview  Goal: Plan of Care Review  Outcome: Ongoing (interventions implemented as appropriate)   03/06/19 5255   Coping/Psychosocial   Plan of Care Reviewed With patient;daughter   Plan of Care Review   Progress improving   OTHER   Outcome Summary Pt alert and oriented. No c/o pain. Up to chair for majority of shift. VSS. Continues IV antibiotics.        Problem: Fall Risk (Adult)  Goal: Absence of Fall  Outcome: Ongoing (interventions implemented as appropriate)      Problem: Infection, Risk/Actual (Adult)  Goal: Infection Prevention/Resolution  Outcome: Ongoing (interventions implemented as appropriate)      Problem: Skin Injury Risk (Adult)  Goal: Skin Health and Integrity  Outcome: Ongoing (interventions implemented as appropriate)

## 2019-03-07 NOTE — PLAN OF CARE
Problem: Patient Care Overview  Goal: Plan of Care Review  Outcome: Ongoing (interventions implemented as appropriate)   03/07/19 3200   Coping/Psychosocial   Plan of Care Reviewed With patient;daughter   Plan of Care Review   Progress improving   OTHER   Outcome Summary Pt denies any pain. More alert. Up wiht assist. Walks with PT. Up to chair for meals. Encouraged to turn and weight shift. Continues on IVF and IV antibiotics       Problem: Fall Risk (Adult)  Goal: Absence of Fall  Outcome: Ongoing (interventions implemented as appropriate)      Problem: Infection, Risk/Actual (Adult)  Goal: Infection Prevention/Resolution  Outcome: Ongoing (interventions implemented as appropriate)      Problem: Skin Injury Risk (Adult)  Goal: Skin Health and Integrity  Outcome: Ongoing (interventions implemented as appropriate)

## 2019-03-07 NOTE — PLAN OF CARE
Problem: Patient Care Overview  Goal: Plan of Care Review  Outcome: Ongoing (interventions implemented as appropriate)   03/07/19 5231   Coping/Psychosocial   Plan of Care Reviewed With patient   Plan of Care Review   Progress improving   OTHER   Outcome Summary She needed a lot of motivation to work with PT and was min assist to complete sit <> stand transfer and transfer on and off the BSC and she needed min x 1 to walk 50ft with RW. Pt left up in chair with family. Pt would benefit from continued PT.

## 2019-03-07 NOTE — PROGRESS NOTES
PROGRESS NOTE  Patient name: Kami Villanueva  Patient : 1934  VISIT # [id number]  MR #[id number]   Room 462    SUBJECTIVE: She continues to get a little stronger with physical therapy.    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   Kami received cycle #6 of CHOP/R and Dose #11 of prophylactic intrathecal methotrexate 2019.  Kami was brought in by family ( and 2 daughters) with a temperature elevation to 100.8°. Recheck temperature here is 101°.  CBC reveals a WBC of 0.19 with an ANC of 0.06  Hemoglobin of 8.7 and platelet count of 209,000.  On review of systems, she has only lost 1 pound in the last week, she feels about baseline other than being weak and tired and experienced nausea and vomiting on the one-hour drive from home to the clinic.  She does not have a headache or cough, or burning on urination or sputum production.  She is being evaluated in anticipation of admission to the hospital for neutropenia and fever.      TARGET LYMPHOMA SITES:  1. Right maxillary sinus with local invasion to the floor of the right orbit   2.  on 10/10/18   3. borderline spleen size, 13 x 4.8 x 11.8 cm  4. Bone marrow negative on 10/11/18   5. CSF specimen via Omaya port negative on 10/24/18    TUMOR HISTORY: Right maxillary sinus high-grade diffuse large B-cell lymphoma 2018  Kami was treated for hyperthyroidism with radioactive iodine at about age 35. About 45 years ago she underwent a thyroidectomy followed subsequently by a vocal cord medialization procedure by Dr. Crane in [facility].  Several years ago she had right maxillary sinus surgery by Dr. Garcia.  Kami developed upper respiratory and sinus symptoms in the spring of 2018.  She was seen and treated at [facility] urgent care on 3/13/2018 for  bronchitis.   She was then seen in followup by her PCP, Dr. Kvng Strong on 4/27/2018. He felt an abnormality in the neck and ordered a CT scan of the soft tissues of the neck and referred her to ENT for further evaluation of this.  A CT scan of the soft tissues of the neck with contrast on 5/7/2018 documented extensive mucosal thickening of the right maxillary sinus consistent with chronic sinusitis. There was also opacification of several of the ethmoid air cells and mucosal thickening in the nasal cavity.  No neck mass or adenopathy was appreciated.   Kami was evaluated by Dr. Bong Felix on 5/30/2018 for evaluation of right neck pain and swelling.  Full ENT exam including nasopharyngeal area was within normal limits.  Review of the CT scan from 5/7/2018 was consistent with chronic sinusitis. Kami had had a medialization procedure by ENT and [place] earlier in the year for a vocal cord paralysis issue. It was felt that some of the changes on the CT scan were consistent with that procedure.  On 8/3/2018 Kami had a right upper posterior tooth pulled by an oral surgeon, Dr. Ortega in [facility] for what was felt to possibly be an infected tooth.  Kami was again seen at [facility] urgent care on 8/16/2018 with swelling, redness and tenderness in the right maxillary area with associated headaches not responding to antibiotics.   Plain film imaging studies and a CT scan of the area was performed.   Plain film x-rays of the facial bones on 8/16/2018 did not reveal acute abnormalities   CT scan of facial bones within without contrast on 8/16/2018 documented interval development of an enhancing soft tissue along the right maxilla extending along the anterior right maxillary sinus and along the floor of the right orbit measuring 2.4 cm in width. New erosive bony changes were noted involving the bony structure of the right orbital floor. The findings were concerning for a neoplastic process with  erosive changes of the right orbital floor with ENT consultation recommended.   Kami was seen by Kenny Mar PA-C with ENT on 8/20/2018 where the above CT scanning clinical findings were evaluated. Arrangements were made for a biopsy with Dr. Bong Felix.  On 9/17/2018, Dr. Bong Felix performed a nasal/sinus endoscopy with biopsy.  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 The cells are polymorphic with maintaining medium sized to large cells with irregular nuclear contour.  On flow cytometry, the monoclonal kappa B cell population does not express CD5 or CD10, but are low viability.  The immunostains, show a diffuse B-cell infiltrate positive for CD20, PAX-5 and BCL 6, but negative for CD5, CD10, BCL-2, cyclin D1 and CD43.  <30% of the cells were positive for Mum-1. Ki-67 showed a high proliferative rate of about 80%.  All of the above is consistent with a diffuse large B-cell lymphoma of the GBC type.  A CT scan of the soft tissues of the neck with contrast on 10/3/2018 was compared to the CT scan of the face on 8/16/2018 and 5/7/2018.  There had been significant progression of disease with interval increase in the size of the right buccal space hyperdense mass measuring approximately at least 7 x 2 cm on axial cuts.   There is involvement of the medial and lateral pterigoid muscle. The right temporalis muscle was also inseparable from the mass with involvement as well of the pterygomaxillary fissure, pterygopalatine fossa and sphenopalatine foramen.  Local cortex erosive the structure and was also noted along the maxillary gingival surface.  Involvement of the roof of the right maxillary sinus with progression of the erosive disease along the floor of the right orbit was also again identified. An asymmetric soft tissue density was also noted at the location of the right infraorbital area.  There was no evidence of involvement of the  extraocular muscles nor do the lacrimal glands demonstrate a discrete mass.  The lacrimal glands do not demonstrate a discrete mass.  Disease extends along the right lateral nasal cavity and ethmoid air cells. There is now disease along the right sphenoid sinus inseparable from the right foramen rotundum with probable involvement of the maxillary branch of V5.  Evidence of prior instrumentation with prior middle turbinectomies, uncinate to measles, antrostomies, and partial ethmoidectomies is also suspected based on the radiographic imaging.  The cavernous sinuses are symmetric.  Meckel's caves are symmetric.   There is now a right level one be lymphadenopathy by size criteria, and abnormal clustering with lymph node measuring up to 1.5 cm.  Otherwise the rest of the oral cavity and head and neck area did not demonstrate evidence of discrete mass.  Apparent right vocal cord paralysis would medialization of the true vocal cord with anteromedial rotation of the right was also described.   CT scan of the chest with and without contrast on 10/3/2018 did not reveal evidence of thoracic lymphadenopathy.  Tiny 2 mm nodules were noted in the subpleural left lung, likely to be benign with a 1 year followup recommended   CT scan of the abdomen and pelvis with contrast on 10/3/2018 did not reveal evidence of metastatic disease nor abdominal lymphadenopathy. The spleen was of borderline size measuring 13 cm x 4.8 cm x 11.8 cm.  Physical examination on 10/10/2018 revealed a very enlarged and swollen right maxillary facial area with induration of soft tissues around the upper teeth on the right and face.  No palpable peripheral lymphadenopathy was able to be appreciated in the head and neck area, epitrochlear and axillary areas, inguinal areas. No evidence of palpable splenomegaly or other abdominal findings were encountered on exam.  Bone marrow aspirate and biopsy on 10/11/18 showed no evidence of involvement by diffuse large  B-cell lymphoma. FLOW cytometry negative.  MRI of the brain w/wo contrast 10/11/18 at [facility] was consistent with abnormal soft tissue mass involving the right  and buccal space extending along the anterior margin of the right maxillary sinus contiguous with the floor of the right orbit. There was no definite invasion into the orbit, nor displacement of the inferior rectus musculature.  PET scan 10/12/18 at [facility] showed hypermetabolic activity associated with the right buccal space and  space mass, SUV 18.5. There is no distant focus of hypermetabolic activity seen to suggest other sites of involvement.  Echocardiogram 10/12/18 at [facility] revealed an estimated LVEF of 60%.   Hepatitis B and C were negative.  Hepatitis A IgM was positive and discussed with Dr. Osborn.  Left chest mediport was placed 10/18/18 by Dr. ANSON Mcgrath.  Right ommaya port was placed 10/22/18 by Dr. ANURADHA Greer.  CSF specimen was obtained via ommaya port by Dr. Osborn, sent for cytology and FLOW prior to initiation of Cycle #1 CHOP-R initiated on 10/24/18 was negative.     TREATMENT SUMMARY:  1. Cycle #1 CHOP-R initiated 10/24/18,   2. Dose #1 prophylactic intrathecal methotrexate (FLOW cytometry on CSF negative) given 10/29/18. Dose #2 on 11/14/18.   3. Anticipate consolidative XRT.     TARGET BREAST CANCER SITES:  1. Right mastectomy 09/23/04 for DCIS     TUMOR HISTORY: Right DCIS 09/23/04  On 09/23/04, Kami had a right simple mastectomy by Dr. 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   The ER was 95%, IA was 13%.   No further specific therapy was indicated for this cancer other than close follow up of the area and the opposite breast.     TREATMENT SUMMARY:  1. Right mastectomy and 9/23/2004 for DCIS    REVIEW OF SYSTEMS:    Constitutional: positive for fever - resolved at present  HEENT: no blurring of vision, no double vision              Lungs: no hemoptysis; positive for cough - starting to be mildly  productive  CVS: no palpitation, no chest pain  GI: no abdominal pain, no constipation; no nausea  AMOS: urinary frequency at night, not so much during the day  Musculoskeletal: generalized weakness  Endocrine: no polyuria, polydypsia, no cold or heat intolerence; positive for chronic thyroid replacement  Hematology: positive for pancytopenia from chemotherapy, improved  Dermatology: no skin rash, no eczema, no pruritis  Psychiatry: no suicide ideation  Neurology: no syncope, no seizures; positive for Ommaya with IT chemotherapy     OBJECTIVE:    Vitals:    03/07/19 0418   BP: 136/69   Pulse: 87   Resp: 16   Temp: 97.9 °F (36.6 °C)   SpO2: 94%       Intake/Output Summary (Last 24 hours) at 3/7/2019 0622  Last data filed at 3/6/2019 1310  Gross per 24 hour   Intake 360 ml   Output --   Net 360 ml     PHYSICAL EXAM:    CONSTITUTIONAL: NAD, patient's daughter Gale at bedside  EYES: Nonicteric  ENT: No overt thrush-minimal erythema improved  NECK: Supple, no masses   CHEST/LUNGS: diminished bilateral bases, o/w clear  CARDIOVASCULAR: RRR  ABDOMEN: soft non-tender, active bowel sounds, no HSM  EXTREMITIES: warm, moves all extremities, weak  SKIN: warm, dry with no rashes or lesions.  port left chest wall without erythema, Ommaya right scalp without erythema  LYMPH: No cervical, clavicular, axillary, or inguinal lymphadenopathy  NEUROLOGIC: follows commands, non focal   PSYCH: mood and affect appropriate    CBC  Results from last 7 days   Lab Units 03/07/19  0430 03/06/19  0739 03/05/19  0442   WBC 10*3/mm3 12.26* 14.32* 19.37*   HEMOGLOBIN g/dL 8.7* 9.1* 9.3*   HEMATOCRIT % 27.0* 28.3* 28.4*   PLATELETS 10*3/mm3 288 256 203       Lab Results   Component Value Date     (L) 03/06/2019    K 3.9 03/06/2019    CL 96 (L) 03/06/2019    CO2 28.0 03/06/2019    BUN 10 03/06/2019    CREATININE 0.53 03/06/2019    GLUCOSE 71 03/06/2019    CALCIUM 7.8 (L) 03/06/2019    BILITOT 0.4 02/27/2019    ALKPHOS 53 02/27/2019    AST 21  02/27/2019    ALT 26 02/27/2019    AGRATIO 1.5 02/27/2019    GLOB 2.1 02/27/2019       Lab Results   Component Value Date    INR 0.97 09/10/2018    PROTIME 13.2 09/10/2018     Cultures:    Lab Results   Component Value Date    BLOODCX No growth at 2 days 03/04/2019    BLOODCX No growth at 2 days 03/04/2019     No components found for: URINCX    ASSESSMENT/PLAN:    1.  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   S/p cycle # 6 of CHOP/R on 2/20/19    S/p dose # 11 of prophylactic intrathecal methotrexate given 2/20/19    2. Neutropenic (resolved) fever E Coli UTI/ LLL infiltrate  WBC - 12.26     Max temp recorded,  102.2 at 2150 hrs. on 3/2/19.    Last fever  - 100.8 on 3/4/19 at 11:29 pm. Temp curve decreased since and continues afebrile    B/p  136/69, HR 86 this am    Urine cx 2/27/19 - > 100,000 CFU/mL E. Coli  Blood culture x 2 2/27/19 -no growth at 5 days  Repeat blood culture port/peripheral 2/27/19 p.m. - no growth at 5 days  CSF 2/27/19 - gram stain negative, culture no growth at 5 days    Repeat blood culture port/peripheral 3/4/19 - no growth at 2 days    Repeat UA 3/4/19 - negative    CXR 3/3/19 -  Trace pleural fluid with new patchy LLL infiltrate or atelectasis  CXR 3/5/19 -  Focal infiltrate at the left lung base may represent pneumonia or atelectasis.  Vascular redistribution.  There may be minimal edema, there are small pleural effusions.    Merrem 1 g IV q8 hrs  Valacyclovir 500 mg PO every 8 hrs    Appreciate Dr. Gruber [place] assistance     3.  Anemia from chemotherapy  HGB - 8.7 this am (s/p 1 unit p RBC 2/28/19)    4.  Thrombocytopenia from chemotherapy (resolved)  PLT - 288,000     5.  DVT prophylaxis  - Lovenox 40 subq daily   - crt 0.53 on 3/6/19    5.  Hypokalemia  - K 3.9 on 3/6/19      6.  Generalized weakness  - undergoing PT     7.  Moderate protein calorie malnutrition  - followed by dietary  -  supplements  - patient's daughter feels she is eating fair and states portions given here are a lot bigger than is fixed at home      Discussed with patient and her daughter  Continue antibiotic therapy per ID - SS working on insurance for potential Merrem at home -family however is very hesitant about getting IV Merrem at home and prefer to stay in the hospital to complete antibiotics which will be completed on Saturday, 3/8/2019-will discuss with Dr. Robles to find out the actual last planned dose  Continue physical therapy.    Continue to discharge would be either 3/8 evening or 3/9/19 morning    ELLA Jiménez    03/07/19  6:22 AM     I personally saw and examined this patient, performing a face-to-face diagnostic evaluation with plan of care reviewed and developed with  Prashanth Chambers PA-C and nursing staff.   I have addended and/or modified the above history of present illness, physical examination, and assessment and plan to reflect my findings and impressions.   Essential elements of the care plan were discussed with  Prashanth Chambers PA-C .   Agree with findings and assessment/plan as documented above.   Questions were encouraged, asked and answered to their understanding and satisfaction.    Alexander Osborn MD  3/7/2019 1:59 PM

## 2019-03-07 NOTE — PLAN OF CARE
Problem: Patient Care Overview  Goal: Plan of Care Review  Outcome: Ongoing (interventions implemented as appropriate)   03/07/19 5812   Coping/Psychosocial   Plan of Care Reviewed With patient;daughter   Plan of Care Review   Progress improving   OTHER   Outcome Summary No c/o of pain voiced, 25mg Benadryl given for sleep, pt ambulaed from chair to bed with assist 1 and walker became fatigued. Continues on IV abx, VSS. Daughter at bedside, will continue to monitor.

## 2019-03-07 NOTE — PROGRESS NOTES
Continued Stay Note   Rafa     Patient Name: Kami Villanueva  MRN: [id number]  Today's Date: 3/7/2019    Admit Date: 2/27/2019    Discharge Plan     Row Name 03/07/19 0840       Plan    Plan  Home Health    Patient/Family in Agreement with Plan  yes    Plan Comments  Spoke with daughter in room re: coverage for IV need at home, they are fine with the cost but not on administering the drug.  Discussed short term rehab that would cover and they are not interested saying they would prefer to stay here until completed.  Pt would benefit from continued P.T. via HH at discharge and order for light weight W/C. Will follow.         Discharge Codes    No documentation.             AARON Rebolledo

## 2019-03-07 NOTE — THERAPY TREATMENT NOTE
Acute Care - Physical Therapy Treatment Note  [facility]     Patient Name: Kami Villanueva  : 1934  MRN: [id number]  Today's Date: 3/7/2019  Onset of Illness/Injury or Date of Surgery: 19  Date of Referral to PT: 19  Referring Physician: Dr. Osborn    Admit Date: 2019    Visit Dx:    ICD-10-CM ICD-9-CM   1. 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)   • Gram-negative bacteremia   • Neutropenia (CMS/HCC)   • Hypokalemia   • Pancytopenia due to antineoplastic chemotherapy (CMS/HCC)   • Neutropenic fever (CMS/HCC)       Therapy Treatment    Rehabilitation Treatment Summary     Row Name 19 1325 19 1141          Treatment Time/Intention    Discipline  physical therapy assistant  -AE  physical therapy assistant  -AE     Document Type  therapy note (daily note)  -AE  therapy note (daily note)  -AE     Subjective Information  complains of;weakness  -AE  complains of;weakness  -AE     Existing Precautions/Restrictions  fall  -AE  fall  -AE     Recorded by [AE] Sandhya Fontanez, PTA 19 1358 [AE] Sandhya Fontanez, PTA 19 1238     Row Name 19 1325 19 1141          Bed Mobility Assessment/Treatment    Sit-Supine [place] (Bed Mobility)  --  verbal cues;minimum assist (75% patient effort)  -AE     Sidelying-Sit-Sidelying [place] (Bed Mobility)  minimum assist (75% patient effort);verbal cues  -AE  --     Assistive Device (Bed Mobility)  --  bed rails  -AE     Comment (Bed Mobility)  UP IN CHAIR  -AE  Pt took several minutes to sit up  -AE     Recorded by [AE] Sandhya Fontanez, PTA 19 1358 [AE] [place]yin  Sandhya HANNAH, PTA 03/07/19 1238     Row Name 03/07/19 1325 03/07/19 1141          Sit-Stand Transfer    Sit-Stand [place] (Transfers)  minimum assist (75% patient effort)  -AE  minimum assist (75% patient effort);verbal cues  -AE     Recorded by [AE] Sandhya Fontanez, PTA 03/07/19 1358 [AE] Sandhya Fontanez, PTA 03/07/19 1238     Row Name 03/07/19 1325 03/07/19 1141          Stand-Sit Transfer    Stand-Sit [place] (Transfers)  minimum assist (75% patient effort);verbal cues  -AE  contact guard;verbal cues  -AE     Recorded by [AE] Sandhya Fontanez, PTA 03/07/19 1358 [AE] Sandhya Fontanez, PTA 03/07/19 1238     Row Name 03/07/19 1325 03/07/19 1141          Toilet Transfer    [place] Level (Toilet Transfer)  minimum assist (75% patient effort)  -AE  minimum assist (75% patient effort);verbal cues  -AE     Assistive Device (Toilet Transfer)  -- assisted on/off BSC  -AE  -- assisted on/off BSC  -AE     Recorded by [AE] Sandhya Fontanez, PTA 03/07/19 1358 [AE] Sandhya Fontanez, PTA 03/07/19 1238     Row Name 03/07/19 1325 03/07/19 1141          Gait/Stairs Assessment/Training    Gait/Stairs Assessment/Training  gait/ambulation independence  -AE  --     [place] Level (Gait)  contact guard  -AE  contact guard  -AE     Assistive Device (Gait)  walker, front-wheeled  -AE  walker, front-wheeled  -AE     Distance in Feet (Gait)  50  -AE  50  -AE     Pattern (Gait)  swing-to  -AE  swing-to  -AE     Bilateral Gait Deviations  forward flexed posture  -AE  forward flexed posture  -AE     Recorded by [AE] Sandhya Fontanez, PTA 03/07/19 1358 [AE] Sandhya Fontanez, PTA 03/07/19 1238     Row Name 03/07/19 1325             Therapeutic Exercise    Exercise Type (Therapeutic Exercise)  AROM (active range of motion)  -AE      Position (Therapeutic Exercise)  seated  -AE      Sets/Reps (Therapeutic Exercise)  20  -AE      Recorded by [AE] Sandhya Fontanez, PTA 03/07/19 1358      Row Name 03/07/19 1325 03/07/19 1146           Positioning and Restraints    Pre-Treatment Position  sitting in chair/recliner  -AE  in bed  -AE     Post Treatment Position  chair  -AE  chair  -AE     In Bed  fowlers;call light within reach  -AE  --     In Chair  --  sitting;call light within reach;with family/caregiver  -AE     Recorded by [AE] Sandhya Fontanez, \[facility] [place] [facility]\"" 03/07/19 1358 [AE] Sandhya Fontanez, PTA 03/07/19 1238     Row Name 03/07/19 1325             Pain Scale: Numbers Pre/Post-Treatment    Pain Scale: Numbers, Pretreatment  0/10 - no pain  -AE      Recorded by [AE] Sandhya Fontanez, PTA 03/07/19 1358        User Key  (r) = Recorded By, (t) = Taken By, (c) = Cosigned By    Initials Name Effective Dates Discipline    Sandhya Bergeron, RAVI 06/22/15 -  PT                   Physical Therapy Education     Title: PT OT SLP Therapies (In Progress)     Topic: Physical Therapy (In Progress)     Point: Mobility training (In Progress)     Learning Progress Summary           Patient Acceptance, E, NR by AE at 3/7/2019 12:40 PM    Comment:  POC t/f's    Acceptance, E, VU,NR by TYRA at 3/5/2019  2:04 PM    Comment:  Educated pt. on progression of PT POC and benefits of activity   Family Acceptance, E, VU,NR by TYRA at 3/5/2019  2:04 PM    Comment:  Educated pt. on progression of PT POC and benefits of activity   Significant Other Acceptance, E, VU,NR by TYRA at 3/5/2019  2:04 PM    Comment:  Educated pt. on progression of PT POC and benefits of activity                               User Key     Initials Effective Dates Name Provider Type Discipline     06/22/15 -  Sandhya Fontanez PTA Physical Therapy Assistant PT    TYRA 08/02/16 -  Ronnell Cantrell PT DPT Physical Therapist PT                PT Recommendation and Plan     Plan of Care Reviewed With: patient  Progress: improving  Outcome Summary: She needed min assist to complete sit <> stand transfer and transfer on and off the BSC and she needed min/mod   Outcome Measures     Row Name 03/05/19 6424             How  much help from another person do you currently need...    Turning from your back to your side while in flat bed without using bedrails?  3  -TYRA      Moving from lying on back to sitting on the side of a flat bed without bedrails?  3  -TYRA      Moving to and from a bed to a chair (including a wheelchair)?  2  -TYRA      Standing up from a chair using your arms (e.g., wheelchair, bedside chair)?  2  -TYRA      Climbing 3-5 steps with a railing?  1  -TYRA      To walk in hospital room?  2  -TYRA      AM-PAC 6 Clicks Score  13  -TYRA         Functional Assessment    Outcome Measure Options  AM-PAC 6 Clicks Basic Mobility (PT)  -TYRA        User Key  (r) = Recorded By, (t) = Taken By, (c) = Cosigned By    Initials Name Provider Type    Ronnell Overton, PT DPT Physical Therapist         Time Calculation:   PT Charges     Row Name 03/07/19 1358 03/07/19 1241          Time Calculation    Start Time  1325  -AE  1141  -AE     Stop Time  1348  -AE  1205  -AE     Time Calculation (min)  23 min  -AE  24 min  -AE     PT Received On  03/07/19  -AE  03/07/19  -AE     PT Goal Re-Cert Due Date  03/15/19  -AE  03/15/19  -AE        Time Calculation- PT    Total Timed Code Minutes- PT  23 minute(s)  -AE  24 minute(s)  -AE        Timed Charges    75353 - PT Therapeutic Exercise Minutes  8  -AE  --     63954 - Gait Training Minutes   15  -AE  15  -AE     58842 - PT Therapeutic Activity Minutes  --  9  -AE       User Key  (r) = Recorded By, (t) = Taken By, (c) = Cosigned By    Initials Name Provider Type    Sandhya Bergeron, PTA Physical Therapy Assistant        Therapy Suggested Charges     Code   Minutes Charges    82325 (CPT®) Hc Pt Neuromusc Re Education Ea 15 Min      46975 (CPT®) Hc Pt Ther Proc Ea 15 Min 8     16028 (CPT®) Hc Gait Training Ea 15 Min 30 2    16699 (CPT®) Hc Pt Therapeutic Act Ea 15 Min 9 1    24630 (CPT®) Hc Pt Manual Therapy Ea 15 Min      57347 (CPT®) Hc Pt Iontophoresis Ea 15 Min      76534 (CPT®) Hc Pt Elec Stim Ea-Per  15 Min      30279 (CPT®) Hc Pt Ultrasound Ea 15 Min      60542 (CPT®) Hc Pt Self Care/Mgmt/Train Ea 15 Min      77629 (CPT®) Hc Pt Prosthetic (S) Train Initial Encounter, Each 15 Min      02022 (CPT®) Hc Pt Orthotic(S)/Prosthetic(S) Encounter, Each 15 Min      74552 (CPT®) Hc Orthotic(S) Mgmt/Train Initial Encounter, Each 15min      Total  47 3        Therapy Charges for Today     Code Description Service Date Service Provider Modifiers Qty    35768046675 HC GAIT TRAINING EA 15 MIN 3/7/2019 Sandhya Fontanez, PTA GP 1    79705172680 HC PT THERAPEUTIC ACT EA 15 MIN 3/7/2019 Sandhya Fontanez, PTA GP 1    69286392699 HC GAIT TRAINING EA 15 MIN 3/7/2019 Sandhya Fontanez, PTA GP 2    26694606266 HC PT THER PROC EA 15 MIN 3/7/2019 Sandhya Fontanez, PTA GP 1          PT G-Codes  Outcome Measure Options: AM-PAC 6 Clicks Basic Mobility (PT)  AM-PAC 6 Clicks Score: 13    Sandhya Fontanez PTA  3/7/2019

## 2019-03-08 ENCOUNTER — APPOINTMENT (OUTPATIENT)
Dept: GENERAL RADIOLOGY | Facility: HOSPITAL | Age: 84
End: 2019-03-08

## 2019-03-08 ENCOUNTER — APPOINTMENT (OUTPATIENT)
Dept: CARDIOLOGY | Facility: HOSPITAL | Age: 84
End: 2019-03-08

## 2019-03-08 LAB
ANISOCYTOSIS BLD QL: ABNORMAL
BH CV ECHO MEAS - AO MAX PG (FULL): 2.2 MMHG
BH CV ECHO MEAS - AO MAX PG: 7.6 MMHG
BH CV ECHO MEAS - AO MEAN PG (FULL): 1 MMHG
BH CV ECHO MEAS - AO MEAN PG: 4 MMHG
BH CV ECHO MEAS - AO ROOT AREA: 8 CM^2
BH CV ECHO MEAS - AO ROOT DIAM: 3.2 CM
BH CV ECHO MEAS - AO V2 MAX: 138 CM/SEC
BH CV ECHO MEAS - AO V2 MEAN: 98 CM/SEC
BH CV ECHO MEAS - AO V2 VTI: 22.7 CM
BH CV ECHO MEAS - AVA(I,A): 2.3 CM^2
BH CV ECHO MEAS - AVA(I,D): 2.3 CM^2
BH CV ECHO MEAS - AVA(V,A): 2.1 CM^2
BH CV ECHO MEAS - AVA(V,D): 2.1 CM^2
BH CV ECHO MEAS - EDV(CUBED): 68.9 ML
BH CV ECHO MEAS - EDV(MOD-SP4): 74.7 ML
BH CV ECHO MEAS - EDV(TEICH): 74.2 ML
BH CV ECHO MEAS - EF(CUBED): 47.9 %
BH CV ECHO MEAS - EF(MOD-SP4): 54.6 %
BH CV ECHO MEAS - EF(TEICH): 40.5 %
BH CV ECHO MEAS - ESV(CUBED): 35.9 ML
BH CV ECHO MEAS - ESV(MOD-SP4): 33.9 ML
BH CV ECHO MEAS - ESV(TEICH): 44.1 ML
BH CV ECHO MEAS - FS: 19.5 %
BH CV ECHO MEAS - IVS/LVPW: 1.1
BH CV ECHO MEAS - IVSD: 0.8 CM
BH CV ECHO MEAS - LA DIMENSION: 2.7 CM
BH CV ECHO MEAS - LA/AO: 0.84
BH CV ECHO MEAS - LAT PEAK E' VEL: 7 CM/SEC
BH CV ECHO MEAS - LV MASS(C)D: 89.4 GRAMS
BH CV ECHO MEAS - LV MAX PG: 5.4 MMHG
BH CV ECHO MEAS - LV MEAN PG: 3 MMHG
BH CV ECHO MEAS - LV V1 MAX: 116 CM/SEC
BH CV ECHO MEAS - LV V1 MEAN: 73.3 CM/SEC
BH CV ECHO MEAS - LV V1 VTI: 20.2 CM
BH CV ECHO MEAS - LVIDD: 4.1 CM
BH CV ECHO MEAS - LVIDS: 3.3 CM
BH CV ECHO MEAS - LVLD AP4: 8.4 CM
BH CV ECHO MEAS - LVLS AP4: 6.9 CM
BH CV ECHO MEAS - LVOT AREA (M): 2.5 CM^2
BH CV ECHO MEAS - LVOT AREA: 2.5 CM^2
BH CV ECHO MEAS - LVOT DIAM: 1.8 CM
BH CV ECHO MEAS - LVPWD: 0.7 CM
BH CV ECHO MEAS - MED PEAK E' VEL: 5.44 CM/SEC
BH CV ECHO MEAS - MR MAX PG: 112 MMHG
BH CV ECHO MEAS - MR MAX VEL: 529 CM/SEC
BH CV ECHO MEAS - MV A MAX VEL: 155 CM/SEC
BH CV ECHO MEAS - MV DEC TIME: 0.25 SEC
BH CV ECHO MEAS - MV E MAX VEL: 96.7 CM/SEC
BH CV ECHO MEAS - MV E/A: 0.62
BH CV ECHO MEAS - RAP SYSTOLE: 5 MMHG
BH CV ECHO MEAS - RVSP: 22.6 MMHG
BH CV ECHO MEAS - SV(AO): 182.6 ML
BH CV ECHO MEAS - SV(CUBED): 33 ML
BH CV ECHO MEAS - SV(LVOT): 51.4 ML
BH CV ECHO MEAS - SV(MOD-SP4): 40.8 ML
BH CV ECHO MEAS - SV(TEICH): 30.1 ML
BH CV ECHO MEAS - TR MAX VEL: 210 CM/SEC
BH CV ECHO MEASUREMENTS AVERAGE E/E' RATIO: 15.55
DEPRECATED RDW RBC AUTO: 53.1 FL (ref 40–54)
ERYTHROCYTE [DISTWIDTH] IN BLOOD BY AUTOMATED COUNT: 16.9 % (ref 12–15)
GIANT PLATELETS: ABNORMAL
HCT VFR BLD AUTO: 32 % (ref 37–47)
HGB BLD-MCNC: 10.1 G/DL (ref 12–16)
HYPOCHROMIA BLD QL: ABNORMAL
LEFT ATRIUM VOLUME: 64 CM3
LV EF 2D ECHO EST: 55 %
LYMPHOCYTES # BLD MANUAL: 0.24 10*3/MM3 (ref 0.72–4.86)
LYMPHOCYTES NFR BLD MANUAL: 2 % (ref 15–45)
LYMPHOCYTES NFR BLD MANUAL: 3 % (ref 4–12)
MAXIMAL PREDICTED HEART RATE: 136 BPM
MCH RBC QN AUTO: 27.8 PG (ref 28–32)
MCHC RBC AUTO-ENTMCNC: 31.6 G/DL (ref 33–36)
MCV RBC AUTO: 88.2 FL (ref 82–98)
METAMYELOCYTES NFR BLD MANUAL: 3 % (ref 0–0)
MONOCYTES # BLD AUTO: 0.36 10*3/MM3 (ref 0.19–1.3)
MYELOCYTES NFR BLD MANUAL: 1 % (ref 0–0)
NEUTROPHILS # BLD AUTO: 10.77 10*3/MM3 (ref 1.87–8.4)
NEUTROPHILS NFR BLD MANUAL: 85.9 % (ref 39–78)
NEUTS BAND NFR BLD MANUAL: 5.1 % (ref 0–10)
PLATELET # BLD AUTO: 373 10*3/MM3 (ref 130–400)
PMV BLD AUTO: 10 FL (ref 6–12)
POIKILOCYTOSIS BLD QL SMEAR: ABNORMAL
POLYCHROMASIA BLD QL SMEAR: ABNORMAL
RBC # BLD AUTO: 3.63 10*6/MM3 (ref 4.2–5.4)
STRESS TARGET HR: 116 BPM
TOXIC GRANULATION: ABNORMAL
WBC NRBC COR # BLD: 11.85 10*3/MM3 (ref 4.8–10.8)

## 2019-03-08 PROCEDURE — 85007 BL SMEAR W/DIFF WBC COUNT: CPT | Performed by: PHYSICIAN ASSISTANT

## 2019-03-08 PROCEDURE — 25010000002 PERFLUTREN 6.52 MG/ML SUSPENSION: Performed by: INTERNAL MEDICINE

## 2019-03-08 PROCEDURE — 71046 X-RAY EXAM CHEST 2 VIEWS: CPT

## 2019-03-08 PROCEDURE — 93306 TTE W/DOPPLER COMPLETE: CPT

## 2019-03-08 PROCEDURE — 97116 GAIT TRAINING THERAPY: CPT

## 2019-03-08 PROCEDURE — 25010000002 MEROPENEM PER 100 MG: Performed by: INTERNAL MEDICINE

## 2019-03-08 PROCEDURE — 93306 TTE W/DOPPLER COMPLETE: CPT | Performed by: INTERNAL MEDICINE

## 2019-03-08 PROCEDURE — 63710000001 DIPHENHYDRAMINE PER 50 MG: Performed by: PHYSICIAN ASSISTANT

## 2019-03-08 PROCEDURE — 25010000002 ENOXAPARIN PER 10 MG: Performed by: PHYSICIAN ASSISTANT

## 2019-03-08 PROCEDURE — 97110 THERAPEUTIC EXERCISES: CPT

## 2019-03-08 PROCEDURE — 25810000003 SODIUM CHLORIDE 0.9 % WITH KCL 20 MEQ 20-0.9 MEQ/L-% SOLUTION: Performed by: INTERNAL MEDICINE

## 2019-03-08 PROCEDURE — 85025 COMPLETE CBC W/AUTO DIFF WBC: CPT | Performed by: PHYSICIAN ASSISTANT

## 2019-03-08 RX ADMIN — OXYBUTYNIN CHLORIDE 5 MG: 5 TABLET, EXTENDED RELEASE ORAL at 10:02

## 2019-03-08 RX ADMIN — CLOTRIMAZOLE 10 MG: 10 LOZENGE ORAL; TOPICAL at 17:02

## 2019-03-08 RX ADMIN — POTASSIUM CHLORIDE AND SODIUM CHLORIDE 40 ML/HR: 900; 150 INJECTION, SOLUTION INTRAVENOUS at 04:02

## 2019-03-08 RX ADMIN — CLOTRIMAZOLE 10 MG: 10 LOZENGE ORAL; TOPICAL at 21:04

## 2019-03-08 RX ADMIN — LEVOTHYROXINE SODIUM 125 MCG: 125 TABLET ORAL at 06:06

## 2019-03-08 RX ADMIN — CLOTRIMAZOLE 10 MG: 10 LOZENGE ORAL; TOPICAL at 06:06

## 2019-03-08 RX ADMIN — CLOTRIMAZOLE 10 MG: 10 LOZENGE ORAL; TOPICAL at 10:02

## 2019-03-08 RX ADMIN — CARVEDILOL 3.12 MG: 3.12 TABLET, FILM COATED ORAL at 17:02

## 2019-03-08 RX ADMIN — CLOTRIMAZOLE 10 MG: 10 LOZENGE ORAL; TOPICAL at 14:19

## 2019-03-08 RX ADMIN — PERFLUTREN: 6.52 INJECTION, SUSPENSION INTRAVENOUS at 08:57

## 2019-03-08 RX ADMIN — DIPHENHYDRAMINE HYDROCHLORIDE 25 MG: 25 CAPSULE ORAL at 21:09

## 2019-03-08 RX ADMIN — CARVEDILOL 3.12 MG: 3.12 TABLET, FILM COATED ORAL at 10:02

## 2019-03-08 RX ADMIN — ENOXAPARIN SODIUM 40 MG: 40 INJECTION SUBCUTANEOUS at 14:20

## 2019-03-08 RX ADMIN — MEROPENEM 1 G: 1 INJECTION, POWDER, FOR SOLUTION INTRAVENOUS at 10:02

## 2019-03-08 RX ADMIN — VALACYCLOVIR 500 MG: 500 TABLET, FILM COATED ORAL at 06:06

## 2019-03-08 RX ADMIN — MEROPENEM 1 G: 1 INJECTION, POWDER, FOR SOLUTION INTRAVENOUS at 00:33

## 2019-03-08 RX ADMIN — MEROPENEM 1 G: 1 INJECTION, POWDER, FOR SOLUTION INTRAVENOUS at 15:53

## 2019-03-08 NOTE — THERAPY TREATMENT NOTE
Acute Care - Physical Therapy Treatment Note  [facility]     Patient Name: Kami Villanueva  : 1934  MRN: [id number]  Today's Date: 3/8/2019  Onset of Illness/Injury or Date of Surgery: 19  Date of Referral to PT: 19  Referring Physician: Dr. Osborn    Admit Date: 2019    Visit Dx:    ICD-10-CM ICD-9-CM   1. 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)   • Gram-negative bacteremia   • Neutropenia (CMS/HCC)   • Hypokalemia   • Pancytopenia due to antineoplastic chemotherapy (CMS/HCC)   • Neutropenic fever (CMS/HCC)       Therapy Treatment    Rehabilitation Treatment Summary     Row Name 19 1528 19 1402 19 0958       Treatment Time/Intention    Discipline  physical therapy assistant  -AF  physical therapy assistant  -AF  physical therapy assistant  -AF    Document Type  therapy note (daily note)  -AF  therapy note (daily note)  -AF  therapy note (daily note)  -AF    Subjective Information  complains of;weakness  -AF2  --  complains of;weakness;fatigue tailbone is sensitive.  -AF2    Mode of Treatment  --  --  physical therapy  -AF3    Comment  --  just got back into bed, will check baack later  -AF  --    Reason Treatment Not Performed  --  patient/family declined treatment, not feeling well  -AF  --    Existing Precautions/Restrictions  fall  -AF  fall  -AF  fall  -AF    Recorded by [AF] [place], Danielle, PTA 19 1529  [AF2] [place], Danielle, PTA 19 1558 [AF] [place], Danielle, PTA 19 1404 [AF] [place], Danielle, PTA 19 0959  [AF2] [place], Danielle, PTA 19 1038  [AF3] [place], Danielle, PTA  03/08/19 1036    Row Name 03/08/19 0847             Treatment Time/Intention    Discipline  physical therapy assistant  -AF      Document Type  therapy note (daily note)  -AF      Comment  Pt. gone to cardiology  -AF2      Existing Precautions/Restrictions  fall  -AF      Recorded by [AF] Danielle Mcmahon, PTA 03/08/19 0847  [AF2] Danielle Mcmahon, PTA 03/08/19 0848      Row Name 03/08/19 1528             Bed Mobility Assessment/Treatment    Sidelying-Sit-Sidelying [place] (Bed Mobility)  verbal cues;contact guard  -AF      Assistive Device (Bed Mobility)  bed rails  -AF      Recorded by [AF] Danielle Mcmahon, PTA 03/08/19 1558      Row Name 03/08/19 1528 03/08/19 0958          Sit-Stand Transfer    Sit-Stand [place] (Transfers)  verbal cues;contact guard  -AF  verbal cues;minimum assist (75% patient effort)  -AF     Recorded by [AF] Danielle Mcmahon, PTA 03/08/19 1558 [AF] Danielle Mcmahon, PTA 03/08/19 1036     Row Name 03/08/19 1528 03/08/19 0958          Stand-Sit Transfer    Stand-Sit [place] (Transfers)  verbal cues;contact guard  -AF  verbal cues;contact guard  -AF     Recorded by [AF] Danielle Mcmahon, PTA 03/08/19 1558 [AF] Danielle Mcmahon, PTA 03/08/19 1036     Row Name 03/08/19 1528 03/08/19 0958          Gait/Stairs Assessment/Training    [place] Level (Gait)  contact guard needed help steering  -AF  contact guard  -AF     Assistive Device (Gait)  walker, front-wheeled  -AF  walker, front-wheeled  -AF     Distance in Feet (Gait)  50 x 4 4 standing rest breaks  -AF  62 x 2 3 standing rest breaks  -AF2     Deviations/Abnormal Patterns (Gait)  juan decreased;gait speed decreased;stride length decreased  -AF  base of support, narrow;juan decreased;gait speed decreased;stride length decreased  -AF     Bilateral Gait Deviations  forward flexed posture  -AF  forward flexed posture  -AF     Recorded by [AF] Danielle Mcmahon, PTA 03/08/19 1558 [AF] Danielle Mcmahon, PTA  03/08/19 1036  [AF2] Danielle Mcmahon, PTA 03/08/19 1038     Row Name 03/08/19 0958             Therapeutic Exercise    Lower Extremity Range of Motion (Therapeutic Exercise)  hip flexion/extension, bilateral;knee flexion/extension, bilateral;ankle dorsiflexion/plantar flexion, bilateral  -AF      Exercise Type (Therapeutic Exercise)  AROM (active range of motion)  -AF      Position (Therapeutic Exercise)  seated  -AF      Sets/Reps (Therapeutic Exercise)  10  -AF      Recorded by [AF] [place], PTA 03/08/19 1036      Row Name 03/08/19 0958             Static Standing Balance    Level of [place] (Supported Standing, Static Balance)  minimal assist, 75% patient effort  -AF      Recorded by [AF] [place], PTA 03/08/19 1036      Row Name 03/08/19 1528 03/08/19 0958          Positioning and Restraints    Pre-Treatment Position  in bed  -AF  sitting in chair/recliner  -AF     Post Treatment Position  bed  -AF  chair  -AF     In Bed  fowlers;call light within reach;encouraged to call for assist;with family/caregiver  -AF  --     In Chair  --  sitting;encouraged to call for assist;with family/caregiver  -AF     Recorded by [AF] [place], PTA 03/08/19 1558 [AF] [place], PTA 03/08/19 1036     Row Name 03/08/19 1528 03/08/19 0958          Pain Scale: Numbers Pre/Post-Treatment    Pain Scale: Numbers, Pretreatment  0/10 - no pain  -AF  0/10 - no pain  -AF     Pain Scale: Numbers, Post-Treatment  0/10 - no pain  -AF  0/10 - no pain  -AF     Recorded by [AF] [place], PTA 03/08/19 1558 [AF] [place], PTA 03/08/19 1036       User Key  (r) = Recorded By, (t) = Taken By, (c) = Cosigned By    Initials Name Effective Dates Discipline    AF [place], PTA 02/11/19 -  PT                   Physical Therapy Education     Title: PT OT SLP Therapies (In Progress)     Topic: Physical Therapy (In Progress)     Point: Mobility training (In Progress)     Learning Progress  Summary           Patient Acceptance, E, NR by AF at 3/8/2019 10:40 AM    Comment:  benefits of activity and amb in relation to independence    Acceptance, E, NR by AE at 3/7/2019 12:40 PM    Comment:  POC t/f's    Acceptance, E, VU,NR by TYRA at 3/5/2019  2:04 PM    Comment:  Educated pt. on progression of PT POC and benefits of activity   Family Acceptance, E, VU,NR by TYRA at 3/5/2019  2:04 PM    Comment:  Educated pt. on progression of PT POC and benefits of activity   Significant Other Acceptance, E, VU,NR by TYRA at 3/5/2019  2:04 PM    Comment:  Educated pt. on progression of PT POC and benefits of activity                               User Key     Initials Effective Dates Name Provider Type Discipline    AE 06/22/15 -  Sandhya Fontanez, PTA Physical Therapy Assistant PT    TYRA 08/02/16 -  Ronnell Cantrell, PT DPT Physical Therapist PT    AF 02/11/19 -  Danielle Mcmahon PTA Physical Therapy Assistant PT                PT Recommendation and Plan     Plan of Care Reviewed With: patient  Outcome Summary: Pt. requires [place] and vc's to stand from sitting, and CGA to amb 62' x2 w/  a front-wheeled walker. She required help steering the walker and 3 standing rest breaks. Pt. requires CGA and vc's to sit in chair from standing. She reports her tailbone feels sensitive when she sits directly on it. She requires constant encouragement to engage in therapy and is easily fatigued. Will benefit from increased amb and strengthening.  Outcome Measures     Row Name 03/08/19 0958             How much help from another person do you currently need...    Turning from your back to your side while in flat bed without using bedrails?  3  -AF      Moving from lying on back to sitting on the side of a flat bed without bedrails?  3  -AF      Moving to and from a bed to a chair (including a wheelchair)?  2  -AF      Standing up from a chair using your arms (e.g., wheelchair, bedside chair)?  3  -AF      Climbing 3-5 steps with a railing?   1  -AF      To walk in hospital room?  3  -AF      AM-PAC 6 Clicks Score  15  -AF         Functional Assessment    Outcome Measure Options  AM-PAC 6 Clicks Basic Mobility (PT)  -AF        User Key  (r) = Recorded By, (t) = Taken By, (c) = Cosigned By    Initials Name Provider Type    AF [place], Danielle, PTA Physical Therapy Assistant         Time Calculation:   PT Charges     Row Name 03/08/19 1558 03/08/19 1042          Time Calculation    Start Time  1528  -AF  0958  -AF     Stop Time  1545  -AF  1030  -AF     Time Calculation (min)  17 min  -AF  32 min  -AF     PT Received On  03/08/19  -AF  03/08/19  -AF     PT Goal Re-Cert Due Date  03/15/19  -AF  03/15/19  -AF        Timed Charges    56440 - PT Therapeutic Exercise Minutes  --  16  -AF     30463 - Gait Training Minutes   17  -AF  16  -AF       User Key  (r) = Recorded By, (t) = Taken By, (c) = Cosigned By    Initials Name Provider Type    AF [place], Danielle, PTA Physical Therapy Assistant        Therapy Suggested Charges     Code   Minutes Charges    07155 (CPT®) Hc Pt Neuromusc Re Education Ea 15 Min      81945 (CPT®) Hc Pt Ther Proc Ea 15 Min      22362 (CPT®) Hc Gait Training Ea 15 Min 17 1    21070 (CPT®) Hc Pt Therapeutic Act Ea 15 Min      09367 (CPT®) Hc Pt Manual Therapy Ea 15 Min      02796 (CPT®) Hc Pt Iontophoresis Ea 15 Min      41693 (CPT®) Hc Pt Elec Stim Ea-Per 15 Min      84257 (CPT®) Hc Pt Ultrasound Ea 15 Min      81418 (CPT®) Hc Pt Self Care/Mgmt/Train Ea 15 Min      76723 (CPT®) Hc Pt Prosthetic (S) Train Initial Encounter, Each 15 Min      96714 (CPT®) Hc Pt Orthotic(S)/Prosthetic(S) Encounter, Each 15 Min      52817 (CPT®) Hc Orthotic(S) Mgmt/Train Initial Encounter, Each 15min      Total  17 1        Therapy Charges for Today     Code Description Service Date Service Provider Modifiers Qty    52129559998 HC PT THER PROC EA 15 MIN 3/8/2019 [place], Danielle, PTA GP 1    94346726327 HC GAIT TRAINING EA 15 MIN 3/8/2019 [place],  RAVI Santos GP 1    31175448914  GAIT TRAINING EA 15 MIN 3/8/2019 Danielle Mcmahon, RAVI GP 1          PT G-Codes  Outcome Measure Options: AM-PAC 6 Clicks Basic Mobility (PT)  AM-PAC 6 Clicks Score: 15    Danielle Mcmahon PTA  3/8/2019

## 2019-03-08 NOTE — PLAN OF CARE
Problem: Patient Care Overview  Goal: Plan of Care Review  Outcome: Ongoing (interventions implemented as appropriate)   03/08/19 0537   Coping/Psychosocial   Plan of Care Reviewed With patient   Plan of Care Review   Progress no change   OTHER   Outcome Summary Pt up with 1-2 and walker to bathroom/BSC this shift. Pt self turns. No breakdown noted. IV abx as ordered. Pt with 101.2 temp Dr. Rossi notified and cultures drawn. Tylenol given x1 with good results continue to monitor       Problem: Fall Risk (Adult)  Goal: Absence of Fall  Outcome: Ongoing (interventions implemented as appropriate)      Problem: Infection, Risk/Actual (Adult)  Goal: Infection Prevention/Resolution  Outcome: Ongoing (interventions implemented as appropriate)      Problem: Skin Injury Risk (Adult)  Goal: Skin Health and Integrity  Outcome: Ongoing (interventions implemented as appropriate)

## 2019-03-08 NOTE — PLAN OF CARE
Problem: Patient Care Overview  Goal: Plan of Care Review  Outcome: Ongoing (interventions implemented as appropriate)   03/08/19 8282   Coping/Psychosocial   Plan of Care Reviewed With patient   OTHER   Outcome Summary Pt. requires [place] and vc's to stand from sitting, and CGA to amb 62' x2 w/ a front-wheeled walker. She required help steering the walker and 3 standing rest breaks. Pt. requires CGA and vc's to sit in chair from standing. She reports her tailbone feels sensitive when she sits directly on it. She requires constant encouragement to engage in therapy and is easily fatigued. Will benefit from increased amb and strengthening.

## 2019-03-08 NOTE — PROGRESS NOTES
Continued Stay Note   [facility]     Patient Name: Kami Villanueva  MRN: [id number]  Today's Date: 3/8/2019    Admit Date: 2/27/2019    Discharge Plan     Row Name 03/08/19 0909       Plan    Plan  Home Health    Patient/Family in Agreement with Plan  yes    Plan Comments  Pt would benefit from continued P.T. via HH at discharge and order for light weight W/C. Pt/family prefer patient staying here and not going home with IV needed.  Pt will return home with spouse upon discharge. Will follow.         Discharge Codes    No documentation.             AARON Rebolledo

## 2019-03-08 NOTE — PROGRESS NOTES
"Infectious Diseases Progress Note    Patient:  Kami Villanueva  YOB: 1934  MRN: [id number]   Admit date: 2/27/2019   Admitting Physician: Alexander Osborn*  Primary Care Physician: Kvng Strong MD    Chief Complaint/Interval History: She feels better today.  She had temperature elevation last night.  Temperature shown some improvement today.  She walked with physical therapy on her walker.  Still not strong enough for ambulatory enough to manage at home with the help of her .    Intake/Output Summary (Last 24 hours) at 3/8/2019 1525  Last data filed at 3/8/2019 1002  Gross per 24 hour   Intake 100 ml   Output --   Net 100 ml     Allergies:   Allergies   Allergen Reactions   • Alendronate Other (See Comments)     Pt does not know  FOSAMAX   • Lisinopril Cough   • Statins Other (See Comments) and Myalgia     Other     • Sulfa Antibiotics Other (See Comments)     Pt does not remember   • Teriparatide (Recombinant) Other (See Comments)     Pt does not know  FORTEO     Current Scheduled Medications:     carvedilol 3.125 mg Oral BID With Meals   clotrimazole 10 mg Oral 5x Daily   enoxaparin 40 mg Subcutaneous Q24H   levothyroxine 125 mcg Oral Q AM   meropenem 1 g Intravenous Q8H   oxybutynin XL 5 mg Oral Daily   sodium chloride 3 mL Intravenous Q12H     Current PRN Medications:  •  acetaminophen  •  diphenhydrAMINE  •  diphenhydrAMINE  •  lidocaine-Maalox-diphenhydramine  •  ondansetron  •  potassium chloride  •  sodium chloride    Review of Systems she denies any cough, sputum production, dyspnea, nausea or vomiting.    Vital Signs:  /78 (BP Location: Right arm, Patient Position: Lying)   Pulse 95   Temp 100.5 °F (38.1 °C) (Oral)   Resp 19   Ht 170.2 cm (67.01\")   Wt 66.9 kg (147 lb 9 oz)   LMP  (LMP Unknown)   SpO2 95%   BMI 23.11 kg/m²     Physical Exam  Vital signs reviewed.  Lungs without crackles  Abdomen is soft.  No significant tenderness.  No " rebound.  Line/IV (Vaufyo-g-Lgos site) site: No erythema, warmth, induration, or tenderness.    Lab Results:  CBC: Results from last 7 days   Lab Units 03/08/19  0650 03/07/19  0430 03/06/19  0739 03/05/19  0442 03/04/19  0636 03/03/19  0539 03/02/19  0701   WBC 10*3/mm3 11.85* 12.26* 14.32* 19.37* 24.41* 15.22* 3.22*   HEMOGLOBIN g/dL 10.1* 8.7* 9.1* 9.3* 9.5* 9.6* 8.5*   HEMATOCRIT % 32.0* 27.0* 28.3* 28.4* 29.3* 29.5* 25.9*   PLATELETS 10*3/mm3 373 288 256 203 160 102* 74*     BMP:  Results from last 7 days   Lab Units 03/06/19  0739 03/03/19  0539 03/02/19  0701  03/01/19  1659   SODIUM mmol/L 129* 133* 129*  --   --    POTASSIUM mmol/L 3.9 3.5 3.4*  --  3.5   CHLORIDE mmol/L 96* 99 96*  --   --    CO2 mmol/L 28.0 29.0 28.0  --   --    BUN mg/dL 10 8 11  --   --    CREATININE mg/dL 0.53 0.61 0.61  --   --    GLUCOSE mg/dL 71 68* 73   < >  --    CALCIUM mg/dL 7.8* 8.2* 7.9*  --   --     < > = values in this interval not displayed.     Culture Results:   Blood Culture   Date Value Ref Range Status   03/07/2019 No growth at less than 24 hours  Preliminary   03/07/2019 No growth at less than 24 hours  Preliminary   03/04/2019 No growth at 3 days  Preliminary   03/04/2019 No growth at 3 days  Preliminary     Radiology:   Chest x-ray today:  1. Mild pulmonary edema with tiny pleural effusions.  2. Small area of alveolar edema in the right upper lobe versus  developing pneumonia.  This report was finalized on 03/08/2019 11:00 by Dr Leonard Barrett, .    Additional Studies Reviewed: None    Impression:   Fever.  Definite source uncertain.  She was completing empiric course of Merrem.  Clinically she looks better and stronger with the exception of her fever overnight.    Recommendations:   Per discussion with patient and her family, she no longer needs the Czutsb-b-Vlyc.  If positive blood cultures or recurring fever, port removal a possibility.  At present would continue empiric therapy with Merrem and follow.  Going to  extend Merrem course slightly longer at this point.  Continue to follow.    Kobi Fajardo MD

## 2019-03-08 NOTE — THERAPY TREATMENT NOTE
Acute Care - Physical Therapy Treatment Note  [facility]     Patient Name: Kami Villanueva  : 1934  MRN: [id number]  Today's Date: 3/8/2019  Onset of Illness/Injury or Date of Surgery: 19  Date of Referral to PT: 19  Referring Physician: Dr. Osborn    Admit Date: 2019    Visit Dx:    ICD-10-CM ICD-9-CM   1. 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  -AF2  --     Mode of Treatment  physical therapy  -AF3  --     Comment  --  Pt. gone to cardiology  -AF2     Existing Precautions/Restrictions  fall  -AF  fall  -AF     Recorded by [AF] [place], Danielle, PTA 19 0959  [AF2] [place], Danielle, PTA 19 1038  [AF3] [place], Danielle, PTA 19 1036 [AF] [place], Danielle, PTA 19 0847  [AF2] [place], Danielle, PTA 19 0848     Row Name 19 0958             Sit-Stand Transfer    Sit-Stand [place] (Transfers)  verbal cues;minimum assist (75% patient effort)  -AF      Recorded by [AF] [place], Danielle, PTA 19 1036      Row Name 19 0958             Stand-Sit Transfer     Stand-Sit [place] (Transfers)  verbal cues;contact guard  -AF      Recorded by [AF] Danielle Mcmahon, [facility] 03/08/19 1036      Row Name 03/08/19 0958             Gait/Stairs Assessment/Training    [place] Level (Gait)  contact guard  -AF      Assistive Device (Gait)  walker, front-wheeled  -AF      Distance in Feet (Gait)  62 x 2 3 standing rest breaks  -AF2      Deviations/Abnormal Patterns (Gait)  base of support, narrow;juan decreased;gait speed decreased;stride length decreased  -AF      Bilateral Gait Deviations  forward flexed posture  -AF      Recorded by [AF] Danielle Mcmahon, [facility] 03/08/19 1036  [AF2] Danielle Mcmahon, [facility] 03/08/19 1038      Row Name 03/08/19 0958             Therapeutic Exercise    Lower Extremity Range of Motion (Therapeutic Exercise)  hip flexion/extension, bilateral;knee flexion/extension, bilateral;ankle dorsiflexion/plantar flexion, bilateral  -AF      Exercise Type (Therapeutic Exercise)  AROM (active range of motion)  -AF      Position (Therapeutic Exercise)  seated  -AF      Sets/Reps (Therapeutic Exercise)  10  -AF      Recorded by [AF] Danielle Mcmahon, [facility] 03/08/19 1036      Row Name 03/08/19 0958             Static Standing Balance    Level of [place] (Supported Standing, Static Balance)  minimal assist, 75% patient effort  -AF      Recorded by [AF] Danielle Mcmahon, [facility] 03/08/19 1036      Row Name 03/08/19 0958             Positioning and Restraints    Pre-Treatment Position  sitting in chair/recliner  -AF      Post Treatment Position  chair  -AF      In Chair  sitting;encouraged to call for assist;with family/caregiver  -AF      Recorded by [AF] Danielle Mcmahon, [facility] 03/08/19 1036      Row Name 03/08/19 0958             Pain Scale: Numbers Pre/Post-Treatment    Pain Scale: Numbers, Pretreatment  0/10 - no pain  -AF      Pain Scale: Numbers, Post-Treatment  0/10 - no pain  -AF      Recorded by [AF] Danielle Mcmahon, [facility] 03/08/19 1036        User Key  (r)  = Recorded By, (t) = Taken By, (c) = Cosigned By    Initials Name Effective Dates Discipline    AF Danielle Mcmahon PTA 02/11/19 -  PT                   Physical Therapy Education     Title: PT OT SLP Therapies (In Progress)     Topic: Physical Therapy (In Progress)     Point: Mobility training (In Progress)     Learning Progress Summary           Patient Acceptance, E, NR by AF at 3/8/2019 10:40 AM    Comment:  benefits of activity and amb in relation to independence    Acceptance, E, NR by AE at 3/7/2019 12:40 PM    Comment:  POC t/f's    Acceptance, E, VU,NR by TYRA at 3/5/2019  2:04 PM    Comment:  Educated pt. on progression of PT POC and benefits of activity   Family Acceptance, E, VU,NR by TYRA at 3/5/2019  2:04 PM    Comment:  Educated pt. on progression of PT POC and benefits of activity   Significant Other Acceptance, E, VU,NR by TYRA at 3/5/2019  2:04 PM    Comment:  Educated pt. on progression of PT POC and benefits of activity                               User Key     Initials Effective Dates Name Provider Type Discipline    AE 06/22/15 -  Sandhya Fontanez PTA Physical Therapy Assistant PT    TYRA 08/02/16 -  Ronnell Cantrell, PT DPT Physical Therapist PT    AF 02/11/19 -  Danielle Mcmahon PTA Physical Therapy Assistant PT                PT Recommendation and Plan     Plan of Care Reviewed With: patient  Outcome Summary: Pt. requires [place] and vc's to stand from sitting, and CGA to amb 62' x2 w/  a front-wheeled walker. She required help steering the walker and 3 standing rest breaks. Pt. requires CGA and vc's to sit in chair from standing. She reports her tailbone feels sensitive when she sits directly on it. She requires constant encouragement to engage in therapy and is easily fatigued. Will benefit from increased amb and strengthening.  Outcome Measures     Row Name 03/08/19 0958 03/05/19 7499          How much help from another person do you currently need...    Turning from your back to your side  while in flat bed without using bedrails?  3  -AF  3  -TYRA     Moving from lying on back to sitting on the side of a flat bed without bedrails?  3  -AF  3  -TYRA     Moving to and from a bed to a chair (including a wheelchair)?  2  -AF  2  -TYRA     Standing up from a chair using your arms (e.g., wheelchair, bedside chair)?  3  -AF  2  -TYRA     Climbing 3-5 steps with a railing?  1  -AF  1  -TYRA     To walk in hospital room?  3  -AF  2  -TYRA     AM-PAC 6 Clicks Score  15  -AF  13  -TYRA        Functional Assessment    Outcome Measure Options  AM-PAC 6 Clicks Basic Mobility (PT)  -AF  AM-PAC 6 Clicks Basic Mobility (PT)  -TYRA       User Key  (r) = Recorded By, (t) = Taken By, (c) = Cosigned By    Initials Name Provider Type    Ronnell [place]ton, PT DPT Physical Therapist    AF [place], Danielle, PTA Physical Therapy Assistant         Time Calculation:   PT Charges     Row Name 03/08/19 1042             Time Calculation    Start Time  0958  -AF      Stop Time  1030  -AF      Time Calculation (min)  32 min  -AF      PT Received On  03/08/19  -AF      PT Goal Re-Cert Due Date  03/15/19  -AF         Timed Charges    27279 - PT Therapeutic Exercise Minutes  16  -AF      63531 - Gait Training Minutes   16  -AF        User Key  (r) = Recorded By, (t) = Taken By, (c) = Cosigned By    Initials Name Provider Type    AF [place], Danielle, PTA Physical Therapy Assistant        Therapy Suggested Charges     Code   Minutes Charges    98284 (CPT®) Hc Pt Neuromusc Re Education Ea 15 Min      63743 (CPT®) Hc Pt Ther Proc Ea 15 Min 16 1    20816 (CPT®) Hc Gait Training Ea 15 Min 16 1    27636 (CPT®) Hc Pt Therapeutic Act Ea 15 Min      33722 (CPT®) Hc Pt Manual Therapy Ea 15 Min      05051 (CPT®) Hc Pt Iontophoresis Ea 15 Min      17610 (CPT®) Hc Pt Elec Stim Ea-Per 15 Min      79003 (CPT®) Hc Pt Ultrasound Ea 15 Min      99362 (CPT®) Hc Pt Self Care/Mgmt/Train Ea 15 Min      49895 (CPT®) Hc Pt Prosthetic (S) Train Initial Encounter, Each 15  Min      99511 (CPT®) Hc Pt Orthotic(S)/Prosthetic(S) Encounter, Each 15 Min      57618 (CPT®) Hc Orthotic(S) Mgmt/Train Initial Encounter, Each 15min      Total  32 2        Therapy Charges for Today     Code Description Service Date Service Provider Modifiers Qty    09025768546 HC PT THER PROC EA 15 MIN 3/8/2019 [place], Danielle, PTA GP 1    14196867488 HC GAIT TRAINING EA 15 MIN 3/8/2019 Danielle Mcmahon, PTA GP 1          PT G-Codes  Outcome Measure Options: AM-PAC 6 Clicks Basic Mobility (PT)  AM-PAC 6 Clicks Score: 15    Danielle Lisandro PTA  3/8/2019

## 2019-03-08 NOTE — PLAN OF CARE
Problem: Patient Care Overview  Goal: Plan of Care Review  Outcome: Ongoing (interventions implemented as appropriate)   03/08/19 1526   Coping/Psychosocial   Plan of Care Reviewed With patient   Plan of Care Review   Progress no change   OTHER   Outcome Summary No complaints of pain or discomfort. VSS. Temp max of 100.5 thus far. Worked and ambulated in [place] with PT. Went for chest xray/echo today. Results pending. IV abx/fluids continue. Family at bedside. Safety maintained. Will continue to monitor.      Goal: Individualization and Mutuality  Outcome: Ongoing (interventions implemented as appropriate)    Goal: Discharge Needs Assessment  Outcome: Ongoing (interventions implemented as appropriate)    Goal: Interprofessional Rounds/Family Conf  Outcome: Ongoing (interventions implemented as appropriate)      Problem: Fall Risk (Adult)  Goal: Absence of Fall  Outcome: Ongoing (interventions implemented as appropriate)      Problem: Infection, Risk/Actual (Adult)  Goal: Infection Prevention/Resolution  Outcome: Ongoing (interventions implemented as appropriate)      Problem: Skin Injury Risk (Adult)  Goal: Skin Health and Integrity  Outcome: Ongoing (interventions implemented as appropriate)

## 2019-03-08 NOTE — PROGRESS NOTES
PROGRESS NOTE  Patient name: Kami Villanueva  Patient : 1934  VISIT # [id number]  MR #[id number]   Room 462    SUBJECTIVE: Fever 101.2 at 8:00 last night.  She continues to be very weak.    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   Kami received cycle #6 of CHOP/R and Dose #11 of prophylactic intrathecal methotrexate 2019.  Kami was brought in by family ( and 2 daughters) with a temperature elevation to 100.8°. Recheck temperature here is 101°.  CBC reveals a WBC of 0.19 with an ANC of 0.06  Hemoglobin of 8.7 and platelet count of 209,000.  On review of systems, she has only lost 1 pound in the last week, she feels about baseline other than being weak and tired and experienced nausea and vomiting on the one-hour drive from home to the clinic.  She does not have a headache or cough, or burning on urination or sputum production.  She is being evaluated in anticipation of admission to the hospital for neutropenia and fever.      TARGET LYMPHOMA SITES:  1. Right maxillary sinus with local invasion to the floor of the right orbit   2.  on 10/10/18   3. borderline spleen size, 13 x 4.8 x 11.8 cm  4. Bone marrow negative on 10/11/18   5. CSF specimen via Omaya port negative on 10/24/18    TUMOR HISTORY: Right maxillary sinus high-grade diffuse large B-cell lymphoma 2018  Kami was treated for hyperthyroidism with radioactive iodine at about age 35. About 45 years ago she underwent a thyroidectomy followed subsequently by a vocal cord medialization procedure by Dr. Crane in [facility].  Several years ago she had right maxillary sinus surgery by Dr. Garcia.  Kami developed upper respiratory and sinus symptoms in the spring of 2018.  She was seen and treated at [facility] urgent care on 3/13/2018 for  bronchitis.   She was then seen in followup by her PCP, Dr. Kvng Strong on 4/27/2018. He felt an abnormality in the neck and ordered a CT scan of the soft tissues of the neck and referred her to ENT for further evaluation of this.  A CT scan of the soft tissues of the neck with contrast on 5/7/2018 documented extensive mucosal thickening of the right maxillary sinus consistent with chronic sinusitis. There was also opacification of several of the ethmoid air cells and mucosal thickening in the nasal cavity.  No neck mass or adenopathy was appreciated.   Kami was evaluated by Dr. Bong Felix on 5/30/2018 for evaluation of right neck pain and swelling.  Full ENT exam including nasopharyngeal area was within normal limits.  Review of the CT scan from 5/7/2018 was consistent with chronic sinusitis. Kami had had a medialization procedure by ENT and [place] earlier in the year for a vocal cord paralysis issue. It was felt that some of the changes on the CT scan were consistent with that procedure.  On 8/3/2018 Kami had a right upper posterior tooth pulled by an oral surgeon, Dr. Ortega in [facility] for what was felt to possibly be an infected tooth.  Kami was again seen at [facility] urgent care on 8/16/2018 with swelling, redness and tenderness in the right maxillary area with associated headaches not responding to antibiotics.   Plain film imaging studies and a CT scan of the area was performed.   Plain film x-rays of the facial bones on 8/16/2018 did not reveal acute abnormalities   CT scan of facial bones within without contrast on 8/16/2018 documented interval development of an enhancing soft tissue along the right maxilla extending along the anterior right maxillary sinus and along the floor of the right orbit measuring 2.4 cm in width. New erosive bony changes were noted involving the bony structure of the right orbital floor. The findings were concerning for a neoplastic process with  erosive changes of the right orbital floor with ENT consultation recommended.   Kami was seen by Kenny Mar PA-C with ENT on 8/20/2018 where the above CT scanning clinical findings were evaluated. Arrangements were made for a biopsy with Dr. Bong Felix.  On 9/17/2018, Dr. Bong Felix performed a nasal/sinus endoscopy with biopsy.  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 The cells are polymorphic with maintaining medium sized to large cells with irregular nuclear contour.  On flow cytometry, the monoclonal kappa B cell population does not express CD5 or CD10, but are low viability.  The immunostains, show a diffuse B-cell infiltrate positive for CD20, PAX-5 and BCL 6, but negative for CD5, CD10, BCL-2, cyclin D1 and CD43.  <30% of the cells were positive for Mum-1. Ki-67 showed a high proliferative rate of about 80%.  All of the above is consistent with a diffuse large B-cell lymphoma of the GBC type.  A CT scan of the soft tissues of the neck with contrast on 10/3/2018 was compared to the CT scan of the face on 8/16/2018 and 5/7/2018.  There had been significant progression of disease with interval increase in the size of the right buccal space hyperdense mass measuring approximately at least 7 x 2 cm on axial cuts.   There is involvement of the medial and lateral pterigoid muscle. The right temporalis muscle was also inseparable from the mass with involvement as well of the pterygomaxillary fissure, pterygopalatine fossa and sphenopalatine foramen.  Local cortex erosive the structure and was also noted along the maxillary gingival surface.  Involvement of the roof of the right maxillary sinus with progression of the erosive disease along the floor of the right orbit was also again identified. An asymmetric soft tissue density was also noted at the location of the right infraorbital area.  There was no evidence of involvement of the  extraocular muscles nor do the lacrimal glands demonstrate a discrete mass.  The lacrimal glands do not demonstrate a discrete mass.  Disease extends along the right lateral nasal cavity and ethmoid air cells. There is now disease along the right sphenoid sinus inseparable from the right foramen rotundum with probable involvement of the maxillary branch of V5.  Evidence of prior instrumentation with prior middle turbinectomies, uncinate to measles, antrostomies, and partial ethmoidectomies is also suspected based on the radiographic imaging.  The cavernous sinuses are symmetric.  Meckel's caves are symmetric.   There is now a right level one be lymphadenopathy by size criteria, and abnormal clustering with lymph node measuring up to 1.5 cm.  Otherwise the rest of the oral cavity and head and neck area did not demonstrate evidence of discrete mass.  Apparent right vocal cord paralysis would medialization of the true vocal cord with anteromedial rotation of the right was also described.   CT scan of the chest with and without contrast on 10/3/2018 did not reveal evidence of thoracic lymphadenopathy.  Tiny 2 mm nodules were noted in the subpleural left lung, likely to be benign with a 1 year followup recommended   CT scan of the abdomen and pelvis with contrast on 10/3/2018 did not reveal evidence of metastatic disease nor abdominal lymphadenopathy. The spleen was of borderline size measuring 13 cm x 4.8 cm x 11.8 cm.  Physical examination on 10/10/2018 revealed a very enlarged and swollen right maxillary facial area with induration of soft tissues around the upper teeth on the right and face.  No palpable peripheral lymphadenopathy was able to be appreciated in the head and neck area, epitrochlear and axillary areas, inguinal areas. No evidence of palpable splenomegaly or other abdominal findings were encountered on exam.  Bone marrow aspirate and biopsy on 10/11/18 showed no evidence of involvement by diffuse large  B-cell lymphoma. FLOW cytometry negative.  MRI of the brain w/wo contrast 10/11/18 at [facility] was consistent with abnormal soft tissue mass involving the right  and buccal space extending along the anterior margin of the right maxillary sinus contiguous with the floor of the right orbit. There was no definite invasion into the orbit, nor displacement of the inferior rectus musculature.  PET scan 10/12/18 at [facility] showed hypermetabolic activity associated with the right buccal space and  space mass, SUV 18.5. There is no distant focus of hypermetabolic activity seen to suggest other sites of involvement.  Echocardiogram 10/12/18 at [facility] revealed an estimated LVEF of 60%.   Hepatitis B and C were negative.  Hepatitis A IgM was positive and discussed with Dr. Osborn.  Left chest mediport was placed 10/18/18 by Dr. ANSON Mcgrath.  Right ommaya port was placed 10/22/18 by Dr. ANURADHA Greer.  CSF specimen was obtained via ommaya port by Dr. Osborn, sent for cytology and FLOW prior to initiation of Cycle #1 CHOP-R initiated on 10/24/18 was negative.     TREATMENT SUMMARY:  1. Cycle #1 CHOP-R initiated 10/24/18,   2. Dose #1 prophylactic intrathecal methotrexate (FLOW cytometry on CSF negative) given 10/29/18. Dose #2 on 11/14/18.   3. Anticipate consolidative XRT.     TARGET BREAST CANCER SITES:  1. Right mastectomy 09/23/04 for DCIS     TUMOR HISTORY: Right DCIS 09/23/04  On 09/23/04, Kami had a right simple mastectomy by Dr. 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   The ER was 95%, NE was 13%.   No further specific therapy was indicated for this cancer other than close follow up of the area and the opposite breast.     TREATMENT SUMMARY:  1. Right mastectomy and 9/23/2004 for DCIS    REVIEW OF SYSTEMS:    Constitutional: positive for fever - resolved at present  HEENT: no blurring of vision, no double vision              Lungs: no hemoptysis; positive for cough - starting to be mildly  productive  CVS: no palpitation, no chest pain  GI: no abdominal pain, no constipation; no nausea  AMOS: urinary frequency at night, not so much during the day  Musculoskeletal: generalized weakness  Endocrine: no polyuria, polydypsia, no cold or heat intolerence; positive for chronic thyroid replacement  Hematology: positive for pancytopenia from chemotherapy, improved  Dermatology: no skin rash, no eczema, no pruritis  Psychiatry: no suicide ideation  Neurology: no syncope, no seizures; positive for Ommaya with IT chemotherapy     OBJECTIVE:    Vitals:    03/08/19 0440   BP:    Pulse: 79   Resp: 18   Temp: 97.5 °F (36.4 °C)   SpO2: 93%       Intake/Output Summary (Last 24 hours) at 3/8/2019 0711  Last data filed at 3/7/2019 1309  Gross per 24 hour   Intake 720 ml   Output --   Net 720 ml     PHYSICAL EXAM:    CONSTITUTIONAL: NAD, patient's daughter Gale at bedside  EYES: Nonicteric  ENT: No overt thrush-minimal erythema improved  NECK: Supple, no masses   CHEST/LUNGS: Left posterior basilar crackles  CARDIOVASCULAR: RRR - no murmurs  ABDOMEN: soft non-tender, active bowel sounds, no HSM  EXTREMITIES: warm, moves all extremities, weak  SKIN: warm, dry with no rashes or lesions.  port left chest wall without erythema, Ommaya right scalp without erythema  LYMPH: No cervical, clavicular, axillary, or inguinal lymphadenopathy  NEUROLOGIC: follows commands, non focal   PSYCH: mood and affect appropriate    CBC  Results from last 7 days   Lab Units 03/07/19  0430 03/06/19  0739 03/05/19  0442   WBC 10*3/mm3 12.26* 14.32* 19.37*   HEMOGLOBIN g/dL 8.7* 9.1* 9.3*   HEMATOCRIT % 27.0* 28.3* 28.4*   PLATELETS 10*3/mm3 288 256 203       Lab Results   Component Value Date     (L) 03/06/2019    K 3.9 03/06/2019    CL 96 (L) 03/06/2019    CO2 28.0 03/06/2019    BUN 10 03/06/2019    CREATININE 0.53 03/06/2019    GLUCOSE 71 03/06/2019    CALCIUM 7.8 (L) 03/06/2019    BILITOT 0.4 02/27/2019    ALKPHOS 53 02/27/2019    AST 21  02/27/2019    ALT 26 02/27/2019    AGRATIO 1.5 02/27/2019    GLOB 2.1 02/27/2019       Lab Results   Component Value Date    INR 0.97 09/10/2018    PROTIME 13.2 09/10/2018     Cultures:    Lab Results   Component Value Date    BLOODCX No growth at 3 days 03/04/2019    BLOODCX No growth at 3 days 03/04/2019     No components found for: URINCX    ASSESSMENT/PLAN:    1.  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   S/p cycle # 6 of CHOP/R on 2/20/19    S/p dose # 11 of prophylactic intrathecal methotrexate given 2/20/19    2. Neutropenic (resolved) fever E Coli UTI/ LLL infiltrate  WBC - 11.85    Max temp recorded,  102.2 at 2150 hrs. on 3/2/19.    Fever - 101.2.   3/7/2019,  PM    B/p  119/45, HR 80 this am    Urine cx 2/27/19 - > 100,000 CFU/mL E. Coli  Blood culture x 2 2/27/19 -no growth at 5 days  Repeat blood culture port/peripheral 2/27/19 p.m. - no growth at 5 days  CSF 2/27/19 - gram stain negative, culture no growth at 5 days    Repeat blood culture port/peripheral 3/4/19 - no growth at 3 days    Repeat blood culture port/peripheral 3/7/19 - pending    Repeat UA 3/4/19 - negative    CXR 3/3/19 -  Trace pleural fluid with new patchy LLL infiltrate or atelectasis  CXR 3/5/19 -  Focal infiltrate at the left lung base may represent pneumonia or atelectasis.  Vascular redistribution.  There may be minimal edema, there are small pleural effusions.    Merrem 1 g IV q8 hrs  Valacyclovir 500 mg PO every 8 hrs    Dr. Small note reviewd    3.  Anemia from chemotherapy  HGB - 10.1 on 3/7/19 (s/p 1 unit p RBC 2/28/19)    4.  Thrombocytopenia from chemotherapy (resolved)  PLT - 373,000     5.  DVT prophylaxis  - Lovenox 40 subq daily   - crt 0.53 on 3/6/19    5.  Hypokalemia  - K 3.9 on 3/6/19      6.  Generalized weakness  - undergoing PT     7.  Moderate protein calorie malnutrition  - followed by dietary  - supplements  - patient's  daughter feels she is eating fair and states portions given here are a lot bigger than is fixed at home      Continue physical therapy.    Plan was to discharge either 3/8 evening or 3/9/19 morning after Merrem dosing completed -however now with recurrent fever will recheck a chest x-ray, and check 2D echocardiogram.  No obvious murmurs audible on exam.    ELLA Jiménez    03/08/19  7:11 AM     Will complete 5 days of Merrem Saturday PM, now with fever, will probably continue IV antibiotics over the weekend.   Further antibiotic adjustments per ID.   She has finished all of her IV chemotherapy, so if the port needs to come out (per ID) that would be OK with me.    WIll await results of CXR and 2D Echo.    I did discuss longer-term care with the daughter.  She and her family are considering rehab which I believe would be a very good option.  She will be talking to the  here at the hospital this morning to consider possible rehab placement at [facility].    I personally saw and examined this patient, performing a face-to-face diagnostic evaluation with plan of care reviewed and developed with  Prashanth Chambers PA-C and nursing staff.   I have addended and/or modified the above history of present illness, physical examination, and assessment and plan to reflect my findings and impressions.   Essential elements of the care plan were discussed with  Prashanth Chambers PA-C .   Agree with findings and assessment/plan as documented above.   Questions were encouraged, asked and answered to their understanding and satisfaction.    Alexander Osborn MD  3/8/2019 8:50 AM

## 2019-03-09 LAB
ALBUMIN SERPL-MCNC: 2.6 G/DL (ref 3.5–5)
ALBUMIN/GLOB SERPL: 1.2 G/DL (ref 1.1–2.5)
ALP SERPL-CCNC: 59 U/L (ref 24–120)
ALT SERPL W P-5'-P-CCNC: 30 U/L (ref 0–54)
ANION GAP SERPL CALCULATED.3IONS-SCNC: 7 MMOL/L (ref 4–13)
ANISOCYTOSIS BLD QL: ABNORMAL
AST SERPL-CCNC: 44 U/L (ref 7–45)
BACTERIA BLD CULT: ABNORMAL
BACTERIA SPEC AEROBE CULT: NORMAL
BACTERIA SPEC AEROBE CULT: NORMAL
BILIRUB SERPL-MCNC: 0.2 MG/DL (ref 0.1–1)
BUN BLD-MCNC: 14 MG/DL (ref 5–21)
BUN/CREAT SERPL: 23.3 (ref 7–25)
CALCIUM SPEC-SCNC: 8.1 MG/DL (ref 8.4–10.4)
CHLORIDE SERPL-SCNC: 95 MMOL/L (ref 98–110)
CO2 SERPL-SCNC: 30 MMOL/L (ref 24–31)
CREAT BLD-MCNC: 0.6 MG/DL (ref 0.5–1.4)
DEPRECATED RDW RBC AUTO: 53.1 FL (ref 40–54)
ERYTHROCYTE [DISTWIDTH] IN BLOOD BY AUTOMATED COUNT: 16.8 % (ref 12–15)
GFR SERPL CREATININE-BSD FRML MDRD: 95 ML/MIN/1.73
GLOBULIN UR ELPH-MCNC: 2.2 GM/DL
GLUCOSE BLD-MCNC: 100 MG/DL (ref 70–100)
HCT VFR BLD AUTO: 26.8 % (ref 37–47)
HGB BLD-MCNC: 8.8 G/DL (ref 12–16)
LYMPHOCYTES # BLD MANUAL: 0.45 10*3/MM3 (ref 0.72–4.86)
LYMPHOCYTES NFR BLD MANUAL: 5 % (ref 15–45)
LYMPHOCYTES NFR BLD MANUAL: 9 % (ref 4–12)
MCH RBC QN AUTO: 28.8 PG (ref 28–32)
MCHC RBC AUTO-ENTMCNC: 32.8 G/DL (ref 33–36)
MCV RBC AUTO: 87.6 FL (ref 82–98)
MONOCYTES # BLD AUTO: 0.82 10*3/MM3 (ref 0.19–1.3)
NEUTROPHILS # BLD AUTO: 7.82 10*3/MM3 (ref 1.87–8.4)
NEUTROPHILS NFR BLD MANUAL: 85 % (ref 39–78)
NEUTS BAND NFR BLD MANUAL: 1 % (ref 0–10)
PLAT MORPH BLD: NORMAL
PLATELET # BLD AUTO: 347 10*3/MM3 (ref 130–400)
PMV BLD AUTO: 9.8 FL (ref 6–12)
POIKILOCYTOSIS BLD QL SMEAR: ABNORMAL
POTASSIUM BLD-SCNC: 3.9 MMOL/L (ref 3.5–5.3)
PROT SERPL-MCNC: 4.8 G/DL (ref 6.3–8.7)
RBC # BLD AUTO: 3.06 10*6/MM3 (ref 4.2–5.4)
SODIUM BLD-SCNC: 132 MMOL/L (ref 135–145)
WBC MORPH BLD: NORMAL
WBC NRBC COR # BLD: 9.09 10*3/MM3 (ref 4.8–10.8)

## 2019-03-09 PROCEDURE — 97116 GAIT TRAINING THERAPY: CPT

## 2019-03-09 PROCEDURE — 85025 COMPLETE CBC W/AUTO DIFF WBC: CPT | Performed by: PHYSICIAN ASSISTANT

## 2019-03-09 PROCEDURE — 63710000001 DIPHENHYDRAMINE PER 50 MG: Performed by: PHYSICIAN ASSISTANT

## 2019-03-09 PROCEDURE — 97110 THERAPEUTIC EXERCISES: CPT

## 2019-03-09 PROCEDURE — 25010000002 MEROPENEM PER 100 MG: Performed by: INTERNAL MEDICINE

## 2019-03-09 PROCEDURE — 85007 BL SMEAR W/DIFF WBC COUNT: CPT | Performed by: PHYSICIAN ASSISTANT

## 2019-03-09 PROCEDURE — 87205 SMEAR GRAM STAIN: CPT | Performed by: INTERNAL MEDICINE

## 2019-03-09 PROCEDURE — 25010000002 VANCOMYCIN PER 500 MG: Performed by: INTERNAL MEDICINE

## 2019-03-09 PROCEDURE — 25010000002 ENOXAPARIN PER 10 MG: Performed by: PHYSICIAN ASSISTANT

## 2019-03-09 PROCEDURE — 25810000003 SODIUM CHLORIDE 0.9 % WITH KCL 20 MEQ 20-0.9 MEQ/L-% SOLUTION: Performed by: INTERNAL MEDICINE

## 2019-03-09 PROCEDURE — 87040 BLOOD CULTURE FOR BACTERIA: CPT | Performed by: INTERNAL MEDICINE

## 2019-03-09 PROCEDURE — 80053 COMPREHEN METABOLIC PANEL: CPT | Performed by: NURSE PRACTITIONER

## 2019-03-09 RX ORDER — VANCOMYCIN HYDROCHLORIDE 1 G/200ML
1000 INJECTION, SOLUTION INTRAVENOUS EVERY 24 HOURS
Status: DISCONTINUED | OUTPATIENT
Start: 2019-03-09 | End: 2019-03-10

## 2019-03-09 RX ADMIN — LEVOTHYROXINE SODIUM 125 MCG: 125 TABLET ORAL at 06:28

## 2019-03-09 RX ADMIN — OXYBUTYNIN CHLORIDE 5 MG: 5 TABLET, EXTENDED RELEASE ORAL at 08:01

## 2019-03-09 RX ADMIN — MEROPENEM 1 G: 1 INJECTION, POWDER, FOR SOLUTION INTRAVENOUS at 08:00

## 2019-03-09 RX ADMIN — SODIUM CHLORIDE, PRESERVATIVE FREE 3 ML: 5 INJECTION INTRAVENOUS at 08:01

## 2019-03-09 RX ADMIN — CLOTRIMAZOLE 10 MG: 10 LOZENGE ORAL; TOPICAL at 14:20

## 2019-03-09 RX ADMIN — CLOTRIMAZOLE 10 MG: 10 LOZENGE ORAL; TOPICAL at 17:21

## 2019-03-09 RX ADMIN — VANCOMYCIN HYDROCHLORIDE 1000 MG: 1 INJECTION, SOLUTION INTRAVENOUS at 03:21

## 2019-03-09 RX ADMIN — MEROPENEM 1 G: 1 INJECTION, POWDER, FOR SOLUTION INTRAVENOUS at 00:04

## 2019-03-09 RX ADMIN — POTASSIUM CHLORIDE AND SODIUM CHLORIDE 40 ML/HR: 900; 150 INJECTION, SOLUTION INTRAVENOUS at 08:02

## 2019-03-09 RX ADMIN — CARVEDILOL 3.12 MG: 3.12 TABLET, FILM COATED ORAL at 08:01

## 2019-03-09 RX ADMIN — MEROPENEM 1 G: 1 INJECTION, POWDER, FOR SOLUTION INTRAVENOUS at 17:20

## 2019-03-09 RX ADMIN — CARVEDILOL 3.12 MG: 3.12 TABLET, FILM COATED ORAL at 17:21

## 2019-03-09 RX ADMIN — DIPHENHYDRAMINE HYDROCHLORIDE 25 MG: 25 CAPSULE ORAL at 21:05

## 2019-03-09 RX ADMIN — CLOTRIMAZOLE 10 MG: 10 LOZENGE ORAL; TOPICAL at 21:05

## 2019-03-09 RX ADMIN — ENOXAPARIN SODIUM 40 MG: 40 INJECTION SUBCUTANEOUS at 14:20

## 2019-03-09 RX ADMIN — CLOTRIMAZOLE 10 MG: 10 LOZENGE ORAL; TOPICAL at 06:29

## 2019-03-09 NOTE — THERAPY TREATMENT NOTE
Acute Care - Physical Therapy Treatment Note  [facility]     Patient Name: Kami Villanueva  : 1934  MRN: [id number]  Today's Date: 3/9/2019  Onset of Illness/Injury or Date of Surgery: 19  Date of Referral to PT: 19  Referring Physician: Dr. Osborn    Admit Date: 2019    Visit Dx:    ICD-10-CM ICD-9-CM   1. 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)   • Gram-negative bacteremia   • Neutropenia (CMS/HCC)   • Hypokalemia   • Pancytopenia due to antineoplastic chemotherapy (CMS/HCC)   • Neutropenic fever (CMS/HCC)       Therapy Treatment    Rehabilitation Treatment Summary     Row Name 19 1546             Treatment Time/Intention    Discipline  physical therapy assistant  -LG      Document Type  therapy note (daily note)  -LG      Subjective Information  no complaints  -LG2      Mode of Treatment  individual therapy;physical therapy  -LG2      Patient/Family Observations  Pt's daughter in room  -LG2      Comment  --  -LG3      Existing Precautions/Restrictions  fall  -LG2      Recorded by [LG] Zak Philippe, PTA 19 1547  [LG2] Zak Philippe, PTA 19 1619  [LG3] Zak Philippe, PTA 19 1628      Row Name 19 1546             Bed Mobility Assessment/Treatment    Comment (Bed Mobility)  up in chair  -LG      Recorded by [LG] Zak Philippe, PTA 19 1619      Row Name 19 1546             Sit-Stand Transfer    Sit-Stand [place] (Transfers)  verbal cues;contact guard;minimum assist (75% patient effort)  -LG      Recorded by [LG] Zak Philippe, PTA 19 1619      Row Name 19 1546              Stand-Sit Transfer    Stand-Sit [place] (Transfers)  verbal cues;contact guard;minimum assist (75% patient effort)  -LG      Recorded by [LG] Zak Philippe, hospitals 03/09/19 1619      Row Name 03/09/19 1546             Gait/Stairs Assessment/Training    Gait/Stairs Assessment/Training  gait/ambulation assistive device  -LG      [place] Level (Gait)  contact guard;minimum assist (75% patient effort)  -LG      Assistive Device (Gait)  walker, front-wheeled  -LG      Distance in Feet (Gait)  50' x 2 reps with 1 standing rest break  -LG2      Pattern (Gait)  swing-to  -LG2      Bilateral Gait Deviations  forward flexed posture  -LG2      Recorded by [LG] Zak Philippe, hospitals 03/09/19 1619  [LG2] Zak Philippe, hospitals 03/09/19 1628      Row Name 03/09/19 1546             Therapeutic Exercise    Comment (Therapeutic Exercise)  B LE AROM x 2 sets of 10 reps in siting.   -LG      Recorded by [LG] Zak Philippe, hospitals 03/09/19 1628      Row Name 03/09/19 1546             Positioning and Restraints    Pre-Treatment Position  sitting in chair/recliner  -LG      Post Treatment Position  chair  -LG      In Chair  sitting;call light within reach;encouraged to call for assist  -LG      Recorded by [LG] Zak Philippe, hospitals 03/09/19 1628      Row Name 03/09/19 1546             Pain Scale: Numbers Pre/Post-Treatment    Pain Scale: Numbers, Pretreatment  0/10 - no pain  -LG      Pain Scale: Numbers, Post-Treatment  0/10 - no pain  -LG      Recorded by [LG] aZk Philippe, hospitals 03/09/19 1628        User Key  (r) = Recorded By, (t) = Taken By, (c) = Cosigned By    Initials Name Effective Dates Discipline    LG Zak Philippe, hospitals 08/02/16 -  PT                   Physical Therapy Education     Title: PT OT SLP Therapies (In Progress)     Topic: Physical Therapy (In Progress)     Point: Mobility training (In Progress)     Learning Progress Summary           Patient Acceptance, E, NR by TUYET at 3/8/2019 10:40 AM    Comment:  benefits of activity  and amb in relation to independence    Acceptance, E, NR by AE at 3/7/2019 12:40 PM    Comment:  POC t/f's    Acceptance, E, VU,NR by TYRA at 3/5/2019  2:04 PM    Comment:  Educated pt. on progression of PT POC and benefits of activity   Family Acceptance, E, VU,NR by TYRA at 3/5/2019  2:04 PM    Comment:  Educated pt. on progression of PT POC and benefits of activity   Significant Other Acceptance, E, VU,NR by TYRA at 3/5/2019  2:04 PM    Comment:  Educated pt. on progression of PT POC and benefits of activity                               User Key     Initials Effective Dates Name Provider Type Discipline    AE 06/22/15 -  Sandhya Fontanez PTA Physical Therapy Assistant PT    TYRA 08/02/16 -  Ronnell Cantrell PT DPT Physical Therapist PT    AF 02/11/19 -  Danielle Mcmahon PTA Physical Therapy Assistant PT                PT Recommendation and Plan        Outcome Measures     Row Name 03/08/19 0958             How much help from another person do you currently need...    Turning from your back to your side while in flat bed without using bedrails?  3  -AF      Moving from lying on back to sitting on the side of a flat bed without bedrails?  3  -AF      Moving to and from a bed to a chair (including a wheelchair)?  2  -AF      Standing up from a chair using your arms (e.g., wheelchair, bedside chair)?  3  -AF      Climbing 3-5 steps with a railing?  1  -AF      To walk in hospital room?  3  -AF      AM-PAC 6 Clicks Score  15  -AF         Functional Assessment    Outcome Measure Options  AM-PAC 6 Clicks Basic Mobility (PT)  -AF        User Key  (r) = Recorded By, (t) = Taken By, (c) = Cosigned By    Initials Name Provider Type    AF Danielle Mcmahon, RAVI Physical Therapy Assistant         Time Calculation:   PT Charges     Row Name 03/09/19 1546             Time Calculation    Start Time  1546  -LG      Stop Time  1618  -LG      Time Calculation (min)  32 min  -LG      PT Received On  03/09/19  -LG      PT Goal Re-Cert  Due Date  03/15/19  -LG         Time Calculation- PT    Total Timed Code Minutes- PT  32 minute(s)  -LG        User Key  (r) = Recorded By, (t) = Taken By, (c) = Cosigned By    Initials Name Provider Type    LG Zak Philippe, PTA Physical Therapy Assistant        Therapy Suggested Charges     Code   Minutes Charges    43684 (CPT®) Hc Pt Neuromusc Re Education Ea 15 Min      91838 (CPT®) Hc Pt Ther Proc Ea 15 Min      88023 (CPT®) Hc Gait Training Ea 15 Min 17 1    82053 (CPT®) Hc Pt Therapeutic Act Ea 15 Min      39417 (CPT®) Hc Pt Manual Therapy Ea 15 Min      87288 (CPT®) Hc Pt Iontophoresis Ea 15 Min      58729 (CPT®) Hc Pt Elec Stim Ea-Per 15 Min      89855 (CPT®) Hc Pt Ultrasound Ea 15 Min      87991 (CPT®) Hc Pt Self Care/Mgmt/Train Ea 15 Min      19069 (CPT®) Hc Pt Prosthetic (S) Train Initial Encounter, Each 15 Min      54720 (CPT®) Hc Pt Orthotic(S)/Prosthetic(S) Encounter, Each 15 Min      60525 (CPT®) Hc Orthotic(S) Mgmt/Train Initial Encounter, Each 15min      Total  17 1        Therapy Charges for Today     Code Description Service Date Service Provider Modifiers Qty    20679384268 HC GAIT TRAINING EA 15 MIN 3/9/2019 Zak Philippe, PTA GP 1    19134618947 HC PT THER PROC EA 15 MIN 3/9/2019 Zak Philippe, PTA GP 1          PT G-Codes  Outcome Measure Options: AM-PAC 6 Clicks Basic Mobility (PT)  AM-PAC 6 Clicks Score: 15    Zak Philippe PTA  3/9/2019

## 2019-03-09 NOTE — PROGRESS NOTES
"Infectious Diseases Progress Note    Patient:  Kami Villanueva  YOB: 1934  MRN: [id number]   Admit date: 2/27/2019   Admitting Physician: Alexander Osborn*  Primary Care Physician: Kvng Strong MD    Chief Complaint/Interval History: She is up to chair.  She seems to be looking stronger.  She is hemodynamically stable.  They called one positive blood culture overnight.  Blood culture repeated.  Vancomycin added.  Awaiting identification.  This may be contaminant.    Intake/Output Summary (Last 24 hours) at 3/9/2019 1216  Last data filed at 3/9/2019 0801  Gross per 24 hour   Intake 1030 ml   Output --   Net 1030 ml     Allergies:   Allergies   Allergen Reactions   • Alendronate Other (See Comments)     Pt does not know  FOSAMAX   • Lisinopril Cough   • Statins Other (See Comments) and Myalgia     Other     • Sulfa Antibiotics Other (See Comments)     Pt does not remember   • Teriparatide (Recombinant) Other (See Comments)     Pt does not know  FORTEO     Current Scheduled Medications:     carvedilol 3.125 mg Oral BID With Meals   clotrimazole 10 mg Oral 5x Daily   enoxaparin 40 mg Subcutaneous Q24H   levothyroxine 125 mcg Oral Q AM   meropenem 1 g Intravenous Q8H   oxybutynin XL 5 mg Oral Daily   sodium chloride 3 mL Intravenous Q12H   vancomycin 1,000 mg Intravenous Q24H     Current PRN Medications:  •  acetaminophen  •  diphenhydrAMINE  •  diphenhydrAMINE  •  lidocaine-Maalox-diphenhydramine  •  ondansetron  •  potassium chloride  •  sodium chloride    Review of Systems see HPI    Vital Signs:  /55 (BP Location: Left arm, Patient Position: Lying)   Pulse 91   Temp 98.5 °F (36.9 °C) (Oral)   Resp 16   Ht 170.2 cm (67.01\")   Wt 66.9 kg (147 lb 9 oz)   LMP  (LMP Unknown)   SpO2 91%   BMI 23.11 kg/m²     Physical Exam  Vital signs reviewed.  General-alert and interactive  Lungs clear without crackles  Abdomen soft and nontender  Line/IV site: No erythema, warmth, " induration, or tenderness.    Lab Results:  CBC: Results from last 7 days   Lab Units 03/08/19  0650 03/07/19  0430 03/06/19  0739 03/05/19  0442 03/04/19  0636 03/03/19  0539   WBC 10*3/mm3 11.85* 12.26* 14.32* 19.37* 24.41* 15.22*   HEMOGLOBIN g/dL 10.1* 8.7* 9.1* 9.3* 9.5* 9.6*   HEMATOCRIT % 32.0* 27.0* 28.3* 28.4* 29.3* 29.5*   PLATELETS 10*3/mm3 373 288 256 203 160 102*     BMP:  Results from last 7 days   Lab Units 03/06/19  0739 03/03/19  0539   SODIUM mmol/L 129* 133*   POTASSIUM mmol/L 3.9 3.5   CHLORIDE mmol/L 96* 99   CO2 mmol/L 28.0 29.0   BUN mg/dL 10 8   CREATININE mg/dL 0.53 0.61   GLUCOSE mg/dL 71 68*   CALCIUM mg/dL 7.8* 8.2*     Culture Results:   Blood Culture   Date Value Ref Range Status   03/07/2019 Gram Positive Cocci (A)  Preliminary   03/07/2019 No growth at 24 hours  Preliminary   03/04/2019 No growth at 4 days  Preliminary   03/04/2019 No growth at 4 days  Preliminary     Radiology: None  Additional Studies Reviewed: None    Impression:   Fever-seems to be improving.  Positive blood culture may be contaminant.  Await identification.  Completing course of meropenem treatment   for the possibility of pneumonia.  Stable from pulmonary standpoint at present.     Recommendations:   Continue vancomycin and meropenem  Await identification of blood culture   Continue to follow    Kobi Fajardo MD

## 2019-03-09 NOTE — PLAN OF CARE
Problem: Patient Care Overview  Goal: Plan of Care Review  Outcome: Ongoing (interventions implemented as appropriate)   03/09/19 0324   Coping/Psychosocial   Plan of Care Reviewed With patient   Plan of Care Review   Progress no change   OTHER   Outcome Summary No fever noted this shift, VSS. Positive blood cultures, redrawn from left chest port. IV fluids, ABX continue. Safety maintained. Continue to monitor.        Problem: Fall Risk (Adult)  Goal: Identify Related Risk Factors and Signs and Symptoms  Outcome: Ongoing (interventions implemented as appropriate)   03/09/19 0324   Fall Risk (Adult)   Related Risk Factors (Fall Risk) age-related changes;confusion/agitation;fatigue/slow reaction;gait/mobility problems   Signs and Symptoms (Fall Risk) presence of risk factors     Goal: Absence of Fall  Outcome: Ongoing (interventions implemented as appropriate)   03/09/19 0324   Fall Risk (Adult)   Absence of Fall achieves outcome       Problem: Infection, Risk/Actual (Adult)  Goal: Identify Related Risk Factors and Signs and Symptoms  Outcome: Ongoing (interventions implemented as appropriate)   03/09/19 0324   Infection, Risk/Actual (Adult)   Related Risk Factors (Infection, Risk/Actual) age extremes;chronic illness/condition;exposure to microbes;immunosuppressed   Signs and Symptoms (Infection, Risk/Actual) lab value changes;mental/behavioral changes;weakness     Goal: Infection Prevention/Resolution  Outcome: Ongoing (interventions implemented as appropriate)   03/09/19 0324   Infection, Risk/Actual (Adult)   Infection Prevention/Resolution making progress toward outcome       Problem: Skin Injury Risk (Adult)  Goal: Identify Related Risk Factors and Signs and Symptoms  Outcome: Ongoing (interventions implemented as appropriate)   03/09/19 0324   Skin Injury Risk (Adult)   Related Risk Factors (Skin Injury Risk) advanced age;infection;mobility impaired     Goal: Skin Health and Integrity  Outcome: Ongoing  (interventions implemented as appropriate)   03/09/19 0324   Skin Injury Risk (Adult)   Skin Health and Integrity making progress toward outcome

## 2019-03-09 NOTE — PROGRESS NOTES
PROGRESS NOTE  Patient name: Kami Villanueva  Patient : 1934  VISIT # [id number]  MR #[id number]   Room 462    SUBJECTIVE: Low grade temp 100.5 at 11:00 yesterday.  She continues to be very weak. Up in chair eating.    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   Kami received cycle #6 of CHOP/R and Dose #11 of prophylactic intrathecal methotrexate 2019.  Kami was brought in by family ( and 2 daughters) with a temperature elevation to 100.8°. Recheck temperature here is 101°.  CBC reveals a WBC of 0.19 with an ANC of 0.06  Hemoglobin of 8.7 and platelet count of 209,000.  On review of systems, she has only lost 1 pound in the last week, she feels about baseline other than being weak and tired and experienced nausea and vomiting on the one-hour drive from home to the clinic.  She does not have a headache or cough, or burning on urination or sputum production.  She is being evaluated in anticipation of admission to the hospital for neutropenia and fever.      TARGET LYMPHOMA SITES:  1. Right maxillary sinus with local invasion to the floor of the right orbit   2.  on 10/10/18   3. borderline spleen size, 13 x 4.8 x 11.8 cm  4. Bone marrow negative on 10/11/18   5. CSF specimen via Omaya port negative on 10/24/18    TUMOR HISTORY: Right maxillary sinus high-grade diffuse large B-cell lymphoma 2018  Kami was treated for hyperthyroidism with radioactive iodine at about age 35. About 45 years ago she underwent a thyroidectomy followed subsequently by a vocal cord medialization procedure by Dr. Crane in [facility].  Several years ago she had right maxillary sinus surgery by Dr. Garcia.  Kami developed upper respiratory and sinus symptoms in the spring of 2018.  She was seen and treated at [facility]  urgent care on 3/13/2018 for bronchitis.   She was then seen in followup by her PCP, Dr. Kvng Strong on 4/27/2018. He felt an abnormality in the neck and ordered a CT scan of the soft tissues of the neck and referred her to ENT for further evaluation of this.  A CT scan of the soft tissues of the neck with contrast on 5/7/2018 documented extensive mucosal thickening of the right maxillary sinus consistent with chronic sinusitis. There was also opacification of several of the ethmoid air cells and mucosal thickening in the nasal cavity.  No neck mass or adenopathy was appreciated.   Kami was evaluated by Dr. Bong Felix on 5/30/2018 for evaluation of right neck pain and swelling.  Full ENT exam including nasopharyngeal area was within normal limits.  Review of the CT scan from 5/7/2018 was consistent with chronic sinusitis. Kami had had a medialization procedure by ENT and [place] earlier in the year for a vocal cord paralysis issue. It was felt that some of the changes on the CT scan were consistent with that procedure.  On 8/3/2018 Kami had a right upper posterior tooth pulled by an oral surgeon, Dr. Ortega in [facility] for what was felt to possibly be an infected tooth.  Kami was again seen at [facility] urgent care on 8/16/2018 with swelling, redness and tenderness in the right maxillary area with associated headaches not responding to antibiotics.   Plain film imaging studies and a CT scan of the area was performed.   Plain film x-rays of the facial bones on 8/16/2018 did not reveal acute abnormalities   CT scan of facial bones within without contrast on 8/16/2018 documented interval development of an enhancing soft tissue along the right maxilla extending along the anterior right maxillary sinus and along the floor of the right orbit measuring 2.4 cm in width. New erosive bony changes were noted involving the bony structure of the right orbital floor. The findings were concerning for  a neoplastic process with erosive changes of the right orbital floor with ENT consultation recommended.   Kami was seen by Kenny Mar PA-C with ENT on 8/20/2018 where the above CT scanning clinical findings were evaluated. Arrangements were made for a biopsy with Dr. Bong Felix.  On 9/17/2018, Dr. Bong Felix performed a nasal/sinus endoscopy with biopsy.  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 The cells are polymorphic with maintaining medium sized to large cells with irregular nuclear contour.  On flow cytometry, the monoclonal kappa B cell population does not express CD5 or CD10, but are low viability.  The immunostains, show a diffuse B-cell infiltrate positive for CD20, PAX-5 and BCL 6, but negative for CD5, CD10, BCL-2, cyclin D1 and CD43.  <30% of the cells were positive for Mum-1. Ki-67 showed a high proliferative rate of about 80%.  All of the above is consistent with a diffuse large B-cell lymphoma of the GBC type.  A CT scan of the soft tissues of the neck with contrast on 10/3/2018 was compared to the CT scan of the face on 8/16/2018 and 5/7/2018.  There had been significant progression of disease with interval increase in the size of the right buccal space hyperdense mass measuring approximately at least 7 x 2 cm on axial cuts.   There is involvement of the medial and lateral pterigoid muscle. The right temporalis muscle was also inseparable from the mass with involvement as well of the pterygomaxillary fissure, pterygopalatine fossa and sphenopalatine foramen.  Local cortex erosive the structure and was also noted along the maxillary gingival surface.  Involvement of the roof of the right maxillary sinus with progression of the erosive disease along the floor of the right orbit was also again identified. An asymmetric soft tissue density was also noted at the location of the right infraorbital area.  There was no evidence of  involvement of the extraocular muscles nor do the lacrimal glands demonstrate a discrete mass.  The lacrimal glands do not demonstrate a discrete mass.  Disease extends along the right lateral nasal cavity and ethmoid air cells. There is now disease along the right sphenoid sinus inseparable from the right foramen rotundum with probable involvement of the maxillary branch of V5.  Evidence of prior instrumentation with prior middle turbinectomies, uncinate to measles, antrostomies, and partial ethmoidectomies is also suspected based on the radiographic imaging.  The cavernous sinuses are symmetric.  Meckel's caves are symmetric.   There is now a right level one be lymphadenopathy by size criteria, and abnormal clustering with lymph node measuring up to 1.5 cm.  Otherwise the rest of the oral cavity and head and neck area did not demonstrate evidence of discrete mass.  Apparent right vocal cord paralysis would medialization of the true vocal cord with anteromedial rotation of the right was also described.   CT scan of the chest with and without contrast on 10/3/2018 did not reveal evidence of thoracic lymphadenopathy.  Tiny 2 mm nodules were noted in the subpleural left lung, likely to be benign with a 1 year followup recommended   CT scan of the abdomen and pelvis with contrast on 10/3/2018 did not reveal evidence of metastatic disease nor abdominal lymphadenopathy. The spleen was of borderline size measuring 13 cm x 4.8 cm x 11.8 cm.  Physical examination on 10/10/2018 revealed a very enlarged and swollen right maxillary facial area with induration of soft tissues around the upper teeth on the right and face.  No palpable peripheral lymphadenopathy was able to be appreciated in the head and neck area, epitrochlear and axillary areas, inguinal areas. No evidence of palpable splenomegaly or other abdominal findings were encountered on exam.  Bone marrow aspirate and biopsy on 10/11/18 showed no evidence of involvement  by diffuse large B-cell lymphoma. FLOW cytometry negative.  MRI of the brain w/wo contrast 10/11/18 at [facility] was consistent with abnormal soft tissue mass involving the right  and buccal space extending along the anterior margin of the right maxillary sinus contiguous with the floor of the right orbit. There was no definite invasion into the orbit, nor displacement of the inferior rectus musculature.  PET scan 10/12/18 at [facility] showed hypermetabolic activity associated with the right buccal space and  space mass, SUV 18.5. There is no distant focus of hypermetabolic activity seen to suggest other sites of involvement.  Echocardiogram 10/12/18 at [facility] revealed an estimated LVEF of 60%.   Hepatitis B and C were negative.  Hepatitis A IgM was positive and discussed with Dr. Osborn.  Left chest mediport was placed 10/18/18 by Dr. ANSON Mcgrath.  Right ommaya port was placed 10/22/18 by Dr. ANURADHA Greer.  CSF specimen was obtained via ommaya port by Dr. Osborn, sent for cytology and FLOW prior to initiation of Cycle #1 CHOP-R initiated on 10/24/18 was negative.     TREATMENT SUMMARY:  1. Cycle #1 CHOP-R initiated 10/24/18,   2. Dose #1 prophylactic intrathecal methotrexate (FLOW cytometry on CSF negative) given 10/29/18. Dose #2 on 11/14/18.   3. Anticipate consolidative XRT.     TARGET BREAST CANCER SITES:  1. Right mastectomy 09/23/04 for DCIS     TUMOR HISTORY: Right DCIS 09/23/04  On 09/23/04, Kami had a right simple mastectomy by Dr. 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   The ER was 95%, IA was 13%.   No further specific therapy was indicated for this cancer other than close follow up of the area and the opposite breast.     TREATMENT SUMMARY:  1. Right mastectomy and 9/23/2004 for DCIS    REVIEW OF SYSTEMS:    Constitutional: positive for fever - resolved at present  HEENT: no blurring of vision, no double vision              Lungs: no hemoptysis; positive for cough - starting to be  mildly productive  CVS: no palpitation, no chest pain  GI: no abdominal pain, no constipation; no nausea  AMOS: urinary frequency at night, not so much during the day  Musculoskeletal: generalized weakness  Endocrine: no polyuria, polydypsia, no cold or heat intolerence; positive for chronic thyroid replacement  Hematology: positive for pancytopenia from chemotherapy, improved  Dermatology: no skin rash, no eczema, no pruritis  Psychiatry: no suicide ideation  Neurology: no syncope, no seizures; positive for Ommaya with IT chemotherapy     OBJECTIVE:    Vitals:    03/09/19 0722   BP: 140/63   Pulse: 84   Resp: 18   Temp: 98.2 °F (36.8 °C)   SpO2: 92%       Intake/Output Summary (Last 24 hours) at 3/9/2019 0753  Last data filed at 3/8/2019 1700  Gross per 24 hour   Intake 520 ml   Output 500 ml   Net 20 ml     PHYSICAL EXAM:    CONSTITUTIONAL: NAD, patient's daughter Gale at bedside  EYES: Nonicteric  ENT: No overt thrush-minimal erythema improved  NECK: Supple, no masses   CHEST/LUNGS: Left posterior basilar crackles  CARDIOVASCULAR: RRR - no murmurs  ABDOMEN: soft non-tender, active bowel sounds, no HSM  EXTREMITIES: warm, moves all extremities, weak  SKIN: warm, dry with no rashes or lesions.  port left chest wall without erythema, Ommaya right scalp without erythema  LYMPH: No cervical, clavicular, axillary, or inguinal lymphadenopathy  NEUROLOGIC: follows commands, non focal   PSYCH: mood and affect appropriate    CBC  Results from last 7 days   Lab Units 03/08/19  0650 03/07/19  0430 03/06/19  0739   WBC 10*3/mm3 11.85* 12.26* 14.32*   HEMOGLOBIN g/dL 10.1* 8.7* 9.1*   HEMATOCRIT % 32.0* 27.0* 28.3*   PLATELETS 10*3/mm3 373 288 256       Lab Results   Component Value Date     (L) 03/06/2019    K 3.9 03/06/2019    CL 96 (L) 03/06/2019    CO2 28.0 03/06/2019    BUN 10 03/06/2019    CREATININE 0.53 03/06/2019    GLUCOSE 71 03/06/2019    CALCIUM 7.8 (L) 03/06/2019    BILITOT 0.4 02/27/2019    ALKPHOS 53  02/27/2019    AST 21 02/27/2019    ALT 26 02/27/2019    AGRATIO 1.5 02/27/2019    GLOB 2.1 02/27/2019       Lab Results   Component Value Date    INR 0.97 09/10/2018    PROTIME 13.2 09/10/2018     Cultures:    Lab Results   Component Value Date    BLOODCX Gram Positive Cocci (A) 03/07/2019     No components found for: URINCX    ASSESSMENT/PLAN:    1.  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   S/p cycle # 6 of CHOP/R on 2/20/19    S/p dose # 11 of prophylactic intrathecal methotrexate given 2/20/19    2. Neutropenic (resolved) fever E Coli UTI/ LLL infiltrate  WBC - 11.85 on 3/8/2019    Max temp recorded,  102.2 at 2150 hrs. on 3/2/19.    Fever - 101.2.   3/7/2019, 8 PM  Low grade temp 100.5 at 11:00 on 3/8/2019    B/p  140/63, HR 84 this am    Urine cx 2/27/19 - > 100,000 CFU/mL E. Coli  Blood culture x 2 2/27/19 -no growth at 5 days  Repeat blood culture port/peripheral 2/27/19 p.m. - no growth at 5 days  CSF 2/27/19 - gram stain negative, culture no growth at 5 days  Repeat blood culture port/peripheral 3/4/19 - no growth at 4 days  Repeat blood culture port/peripheral 3/7/19 - Gram positive cocci from port  Repeat blood culture port/peripheral 3/9/19 - in process   Repeat UA 3/4/19 - negative    CXR 3/3/19 -  Trace pleural fluid with new patchy LLL infiltrate or atelectasis  CXR 3/5/19 -  Focal infiltrate at the left lung base may represent pneumonia or atelectasis.  Vascular redistribution.  There may be minimal edema, there are small pleural effusions.  CXR 3/8/2019- Mild pulmonary edema with tiny pleural effusions. Small area of alveolar edema in the right upper lobe versus developing pneumonia  Merrem 1 g IV q8 hrs  Valacyclovir 500 mg PO every 8 hrs  Vancomycin 1 g IV q24 hrs    Dr. Small note reviewd    2D echo on 3/9/2019 with estimated EF and evidence of pulmonary HTN    3.  Anemia from chemotherapy  HGB - 10.1 on 3/8/19  (s/p 1 unit p RBC 2/28/19)    CBC in AM    4.  Thrombocytopenia from chemotherapy (resolved)  PLT - 373,000 on 3/8/2019    5.  DVT prophylaxis  - Lovenox 40 subq daily   - crt 0.53 on 3/6/19    5.  Hypokalemia  - K 3.9 on 3/6/19      6.  Generalized weakness  - undergoing PT     7.  Moderate protein calorie malnutrition  - followed by dietary  - supplements  - patient's daughter feels she is eating fair and states portions given here are a lot bigger than is fixed at home    Dr. Osborn discussed longer-term care with the daughter on 3/8/2019.  She and her family are considering rehab which I believe would be a very good option.  She will be talking to the  here at the hospital this morning to consider possible rehab placement at [facility].    Noreen Byrne, APRN    03/09/19  7:53 AM

## 2019-03-09 NOTE — PLAN OF CARE
Problem: Patient Care Overview  Goal: Plan of Care Review  Outcome: Ongoing (interventions implemented as appropriate)      Problem: Fall Risk (Adult)  Goal: Identify Related Risk Factors and Signs and Symptoms  Outcome: Ongoing (interventions implemented as appropriate)    Goal: Absence of Fall  Outcome: Ongoing (interventions implemented as appropriate)      Problem: Infection, Risk/Actual (Adult)  Goal: Identify Related Risk Factors and Signs and Symptoms  Outcome: Ongoing (interventions implemented as appropriate)    Goal: Infection Prevention/Resolution  Outcome: Ongoing (interventions implemented as appropriate)      Problem: Skin Injury Risk (Adult)  Goal: Identify Related Risk Factors and Signs and Symptoms  Outcome: Ongoing (interventions implemented as appropriate)    Goal: Skin Health and Integrity  Outcome: Ongoing (interventions implemented as appropriate)

## 2019-03-09 NOTE — PLAN OF CARE
Problem: Patient Care Overview  Goal: Plan of Care Review   03/09/19 1446   Coping/Psychosocial   Plan of Care Reviewed With patient   Plan of Care Review   Progress improving   OTHER   Outcome Summary No fever this shift. VSS. IVF and abx continue.

## 2019-03-10 LAB
ANISOCYTOSIS BLD QL: ABNORMAL
BASOPHILS # BLD MANUAL: 0.09 10*3/MM3 (ref 0–0.2)
BASOPHILS NFR BLD AUTO: 1 % (ref 0–2)
DEPRECATED RDW RBC AUTO: 53.2 FL (ref 40–54)
ERYTHROCYTE [DISTWIDTH] IN BLOOD BY AUTOMATED COUNT: 16.7 % (ref 12–15)
HCT VFR BLD AUTO: 27.3 % (ref 37–47)
HGB BLD-MCNC: 8.8 G/DL (ref 12–16)
HYPOCHROMIA BLD QL: ABNORMAL
LYMPHOCYTES # BLD MANUAL: 0.18 10*3/MM3 (ref 0.72–4.86)
LYMPHOCYTES NFR BLD MANUAL: 2 % (ref 15–45)
LYMPHOCYTES NFR BLD MANUAL: 5 % (ref 4–12)
MCH RBC QN AUTO: 28.8 PG (ref 28–32)
MCHC RBC AUTO-ENTMCNC: 32.2 G/DL (ref 33–36)
MCV RBC AUTO: 89.2 FL (ref 82–98)
METAMYELOCYTES NFR BLD MANUAL: 1 % (ref 0–0)
MICROCYTES BLD QL: ABNORMAL
MONOCYTES # BLD AUTO: 0.46 10*3/MM3 (ref 0.19–1.3)
NEUTROPHILS # BLD AUTO: 8.36 10*3/MM3 (ref 1.87–8.4)
NEUTROPHILS NFR BLD MANUAL: 91.1 % (ref 39–78)
NEUTS VAC BLD QL SMEAR: ABNORMAL
PLAT MORPH BLD: NORMAL
PLATELET # BLD AUTO: 356 10*3/MM3 (ref 130–400)
PMV BLD AUTO: 9.9 FL (ref 6–12)
POIKILOCYTOSIS BLD QL SMEAR: ABNORMAL
POLYCHROMASIA BLD QL SMEAR: ABNORMAL
RBC # BLD AUTO: 3.06 10*6/MM3 (ref 4.2–5.4)
TOXIC GRANULATION: ABNORMAL
WBC NRBC COR # BLD: 9.18 10*3/MM3 (ref 4.8–10.8)

## 2019-03-10 PROCEDURE — 85007 BL SMEAR W/DIFF WBC COUNT: CPT | Performed by: PHYSICIAN ASSISTANT

## 2019-03-10 PROCEDURE — 25010000002 VANCOMYCIN PER 500 MG: Performed by: INTERNAL MEDICINE

## 2019-03-10 PROCEDURE — 25010000002 MEROPENEM PER 100 MG: Performed by: INTERNAL MEDICINE

## 2019-03-10 PROCEDURE — 85025 COMPLETE CBC W/AUTO DIFF WBC: CPT | Performed by: PHYSICIAN ASSISTANT

## 2019-03-10 PROCEDURE — 25010000002 ENOXAPARIN PER 10 MG: Performed by: PHYSICIAN ASSISTANT

## 2019-03-10 PROCEDURE — 63710000001 DIPHENHYDRAMINE PER 50 MG: Performed by: PHYSICIAN ASSISTANT

## 2019-03-10 PROCEDURE — 25810000003 SODIUM CHLORIDE 0.9 % WITH KCL 20 MEQ 20-0.9 MEQ/L-% SOLUTION: Performed by: INTERNAL MEDICINE

## 2019-03-10 RX ADMIN — LEVOTHYROXINE SODIUM 125 MCG: 125 TABLET ORAL at 05:32

## 2019-03-10 RX ADMIN — SODIUM CHLORIDE, PRESERVATIVE FREE 3 ML: 5 INJECTION INTRAVENOUS at 09:41

## 2019-03-10 RX ADMIN — CLOTRIMAZOLE 10 MG: 10 LOZENGE ORAL; TOPICAL at 20:39

## 2019-03-10 RX ADMIN — MEROPENEM 1 G: 1 INJECTION, POWDER, FOR SOLUTION INTRAVENOUS at 00:27

## 2019-03-10 RX ADMIN — DIPHENHYDRAMINE HYDROCHLORIDE 25 MG: 25 CAPSULE ORAL at 23:19

## 2019-03-10 RX ADMIN — POTASSIUM CHLORIDE AND SODIUM CHLORIDE 40 ML/HR: 900; 150 INJECTION, SOLUTION INTRAVENOUS at 03:51

## 2019-03-10 RX ADMIN — CLOTRIMAZOLE 10 MG: 10 LOZENGE ORAL; TOPICAL at 12:03

## 2019-03-10 RX ADMIN — CLOTRIMAZOLE 10 MG: 10 LOZENGE ORAL; TOPICAL at 06:53

## 2019-03-10 RX ADMIN — VANCOMYCIN HYDROCHLORIDE 1000 MG: 1 INJECTION, SOLUTION INTRAVENOUS at 03:44

## 2019-03-10 RX ADMIN — SODIUM CHLORIDE, PRESERVATIVE FREE 3 ML: 5 INJECTION INTRAVENOUS at 20:39

## 2019-03-10 RX ADMIN — CLOTRIMAZOLE 10 MG: 10 LOZENGE ORAL; TOPICAL at 17:44

## 2019-03-10 RX ADMIN — ENOXAPARIN SODIUM 40 MG: 40 INJECTION SUBCUTANEOUS at 17:44

## 2019-03-10 RX ADMIN — CARVEDILOL 3.12 MG: 3.12 TABLET, FILM COATED ORAL at 17:44

## 2019-03-10 RX ADMIN — OXYBUTYNIN CHLORIDE 5 MG: 5 TABLET, EXTENDED RELEASE ORAL at 09:48

## 2019-03-10 RX ADMIN — MEROPENEM 1 G: 1 INJECTION, POWDER, FOR SOLUTION INTRAVENOUS at 17:44

## 2019-03-10 RX ADMIN — CARVEDILOL 3.12 MG: 3.12 TABLET, FILM COATED ORAL at 09:40

## 2019-03-10 RX ADMIN — MEROPENEM 1 G: 1 INJECTION, POWDER, FOR SOLUTION INTRAVENOUS at 09:41

## 2019-03-10 RX ADMIN — MEROPENEM 1 G: 1 INJECTION, POWDER, FOR SOLUTION INTRAVENOUS at 23:19

## 2019-03-10 NOTE — PROGRESS NOTES
"Infectious Diseases Progress Note    Patient:  Kami Villanueva  YOB: 1934  MRN: [id number]   Admit date: 2/27/2019   Admitting Physician: Alexander Osborn*  Primary Care Physician: Kvng Strong MD    Chief Complaint/Interval History: She seems to be improving.  She was up walking some with walker.  She ate a very large breakfast.  She has had no fevers or chills.  No cough or dyspnea.  Daughter at bedside.  She is pleased with her progress as well.    Intake/Output Summary (Last 24 hours) at 3/10/2019 0834  Last data filed at 3/10/2019 0351  Gross per 24 hour   Intake 740 ml   Output --   Net 740 ml     Allergies:   Allergies   Allergen Reactions   • Alendronate Other (See Comments)     Pt does not know  FOSAMAX   • Lisinopril Cough   • Statins Other (See Comments) and Myalgia     Other     • Sulfa Antibiotics Other (See Comments)     Pt does not remember   • Teriparatide (Recombinant) Other (See Comments)     Pt does not know  FORTEO     Current Scheduled Medications:     carvedilol 3.125 mg Oral BID With Meals   clotrimazole 10 mg Oral 5x Daily   enoxaparin 40 mg Subcutaneous Q24H   levothyroxine 125 mcg Oral Q AM   meropenem 1 g Intravenous Q8H   oxybutynin XL 5 mg Oral Daily   sodium chloride 3 mL Intravenous Q12H   vancomycin 1,000 mg Intravenous Q24H     Current PRN Medications:  •  acetaminophen  •  diphenhydrAMINE  •  diphenhydrAMINE  •  lidocaine-Maalox-diphenhydramine  •  ondansetron  •  potassium chloride  •  sodium chloride    Review of Systems see HPI    Vital Signs:  /72 (BP Location: Left arm, Patient Position: Lying)   Pulse 90   Temp 98.1 °F (36.7 °C) (Oral)   Resp 18   Ht 170.2 cm (67.01\")   Wt 66.9 kg (147 lb 9 oz)   LMP  (LMP Unknown)   SpO2 92%   BMI 23.11 kg/m²     Physical Exam  Vital signs reviewed.  No new findings  Line/IV site: No erythema, warmth, induration, or tenderness.    Lab Results:  CBC: Results from last 7 days   Lab Units " 03/10/19  0539 03/09/19  1220 03/08/19  0650 03/07/19  0430 03/06/19  0739 03/05/19  0442 03/04/19  0636   WBC 10*3/mm3 9.18 9.09 11.85* 12.26* 14.32* 19.37* 24.41*   HEMOGLOBIN g/dL 8.8* 8.8* 10.1* 8.7* 9.1* 9.3* 9.5*   HEMATOCRIT % 27.3* 26.8* 32.0* 27.0* 28.3* 28.4* 29.3*   PLATELETS 10*3/mm3 356 347 373 288 256 203 160     BMP:  Results from last 7 days   Lab Units 03/09/19  1220 03/06/19  0739   SODIUM mmol/L 132* 129*   POTASSIUM mmol/L 3.9 3.9   CHLORIDE mmol/L 95* 96*   CO2 mmol/L 30.0 28.0   BUN mg/dL 14 10   CREATININE mg/dL 0.60 0.53   GLUCOSE mg/dL 100 71   CALCIUM mg/dL 8.1* 7.8*   ALT (SGPT) U/L 30  --      Culture Results:   Blood Culture   Date Value Ref Range Status   03/09/2019 No growth at 24 hours  Preliminary   03/07/2019 Gram Positive Cocci (A)  Preliminary   03/07/2019 No growth at 2 days  Preliminary   03/04/2019 No growth at 5 days  Final   03/04/2019 No growth at 5 days  Final     Radiology: None  Additional Studies Reviewed: None    Impression:   Fever-seems to be resolved.  She is completing empiric course of Merrem for possibility of pneumonia.  I suspect the one positive blood culture for coagulase-negative staph is likely contaminant.    Recommendations:   Would discontinue meropenem after last doses on March 11, 2019  Going to discontinue vancomycin therapy  If additional blood culture from March 9 positive for coagulase-negative staph, will need to reconsider whether the March 7 culture was contaminant.  Lean toward contaminant at this time.  Continue physical therapy    Kobi Fajardo MD

## 2019-03-10 NOTE — PLAN OF CARE
Problem: Patient Care Overview  Goal: Plan of Care Review  Outcome: Ongoing (interventions implemented as appropriate)   03/10/19 0616   Coping/Psychosocial   Plan of Care Reviewed With daughter   Plan of Care Review   Progress improving   OTHER   Outcome Summary Afebrile this shift. She has c/o pain in her buttocks a few times but has not wanted any Tylenol. Repositioning has been effective for her pain. Mediport reaccessed this morning. Pt has been awake all night.     Goal: Individualization and Mutuality  Outcome: Ongoing (interventions implemented as appropriate)    Goal: Discharge Needs Assessment  Outcome: Ongoing (interventions implemented as appropriate)    Goal: Interprofessional Rounds/Family Conf  Outcome: Ongoing (interventions implemented as appropriate)      Problem: Fall Risk (Adult)  Goal: Identify Related Risk Factors and Signs and Symptoms  Outcome: Ongoing (interventions implemented as appropriate)    Goal: Absence of Fall  Outcome: Ongoing (interventions implemented as appropriate)      Problem: Infection, Risk/Actual (Adult)  Goal: Identify Related Risk Factors and Signs and Symptoms  Outcome: Ongoing (interventions implemented as appropriate)    Goal: Infection Prevention/Resolution  Outcome: Ongoing (interventions implemented as appropriate)      Problem: Skin Injury Risk (Adult)  Goal: Identify Related Risk Factors and Signs and Symptoms  Outcome: Ongoing (interventions implemented as appropriate)    Goal: Skin Health and Integrity  Outcome: Ongoing (interventions implemented as appropriate)

## 2019-03-10 NOTE — PROGRESS NOTES
PROGRESS NOTE  Patient name: Kami Villanueva  Patient : 1934  VISIT # [id number]  MR #[id number]   Room 462    SUBJECTIVE: Afebrile.  No new complaints. Resting in bed. Encouraged increasing activity level.     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   Kami received cycle #6 of CHOP/R and Dose #11 of prophylactic intrathecal methotrexate 2019.  Kami was brought in by family ( and 2 daughters) with a temperature elevation to 100.8°. Recheck temperature here is 101°.  CBC reveals a WBC of 0.19 with an ANC of 0.06  Hemoglobin of 8.7 and platelet count of 209,000.  On review of systems, she has only lost 1 pound in the last week, she feels about baseline other than being weak and tired and experienced nausea and vomiting on the one-hour drive from home to the clinic.  She does not have a headache or cough, or burning on urination or sputum production.  She is being evaluated in anticipation of admission to the hospital for neutropenia and fever.      TARGET LYMPHOMA SITES:  1. Right maxillary sinus with local invasion to the floor of the right orbit   2.  on 10/10/18   3. borderline spleen size, 13 x 4.8 x 11.8 cm  4. Bone marrow negative on 10/11/18   5. CSF specimen via Omaya port negative on 10/24/18    TUMOR HISTORY: Right maxillary sinus high-grade diffuse large B-cell lymphoma 2018  Kami was treated for hyperthyroidism with radioactive iodine at about age 35. About 45 years ago she underwent a thyroidectomy followed subsequently by a vocal cord medialization procedure by Dr. Crane in [facility].  Several years ago she had right maxillary sinus surgery by Dr. Garcia.  Kami developed upper respiratory and sinus symptoms in the spring of 2018.  She was seen and treated at [facility]  care on 3/13/2018 for bronchitis.   She was then seen in followup by her PCP, Dr. Kvng Strogn on 4/27/2018. He felt an abnormality in the neck and ordered a CT scan of the soft tissues of the neck and referred her to ENT for further evaluation of this.  A CT scan of the soft tissues of the neck with contrast on 5/7/2018 documented extensive mucosal thickening of the right maxillary sinus consistent with chronic sinusitis. There was also opacification of several of the ethmoid air cells and mucosal thickening in the nasal cavity.  No neck mass or adenopathy was appreciated.   Kami was evaluated by Dr. Bong Felix on 5/30/2018 for evaluation of right neck pain and swelling.  Full ENT exam including nasopharyngeal area was within normal limits.  Review of the CT scan from 5/7/2018 was consistent with chronic sinusitis. Kami had had a medialization procedure by ENT and [place] earlier in the year for a vocal cord paralysis issue. It was felt that some of the changes on the CT scan were consistent with that procedure.  On 8/3/2018 Kami had a right upper posterior tooth pulled by an oral surgeon, Dr. Ortega in [facility] for what was felt to possibly be an infected tooth.  Kami was again seen at [facility] urgent care on 8/16/2018 with swelling, redness and tenderness in the right maxillary area with associated headaches not responding to antibiotics.   Plain film imaging studies and a CT scan of the area was performed.   Plain film x-rays of the facial bones on 8/16/2018 did not reveal acute abnormalities   CT scan of facial bones within without contrast on 8/16/2018 documented interval development of an enhancing soft tissue along the right maxilla extending along the anterior right maxillary sinus and along the floor of the right orbit measuring 2.4 cm in width. New erosive bony changes were noted involving the bony structure of the right orbital floor. The findings were concerning for a  neoplastic process with erosive changes of the right orbital floor with ENT consultation recommended.   Kami was seen by Kenny Mar PA-C with ENT on 8/20/2018 where the above CT scanning clinical findings were evaluated. Arrangements were made for a biopsy with Dr. Bong Felix.  On 9/17/2018, Dr. Bong Felix performed a nasal/sinus endoscopy with biopsy.  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 The cells are polymorphic with maintaining medium sized to large cells with irregular nuclear contour.  On flow cytometry, the monoclonal kappa B cell population does not express CD5 or CD10, but are low viability.  The immunostains, show a diffuse B-cell infiltrate positive for CD20, PAX-5 and BCL 6, but negative for CD5, CD10, BCL-2, cyclin D1 and CD43.  <30% of the cells were positive for Mum-1. Ki-67 showed a high proliferative rate of about 80%.  All of the above is consistent with a diffuse large B-cell lymphoma of the GBC type.  A CT scan of the soft tissues of the neck with contrast on 10/3/2018 was compared to the CT scan of the face on 8/16/2018 and 5/7/2018.  There had been significant progression of disease with interval increase in the size of the right buccal space hyperdense mass measuring approximately at least 7 x 2 cm on axial cuts.   There is involvement of the medial and lateral pterigoid muscle. The right temporalis muscle was also inseparable from the mass with involvement as well of the pterygomaxillary fissure, pterygopalatine fossa and sphenopalatine foramen.  Local cortex erosive the structure and was also noted along the maxillary gingival surface.  Involvement of the roof of the right maxillary sinus with progression of the erosive disease along the floor of the right orbit was also again identified. An asymmetric soft tissue density was also noted at the location of the right infraorbital area.  There was no evidence of  involvement of the extraocular muscles nor do the lacrimal glands demonstrate a discrete mass.  The lacrimal glands do not demonstrate a discrete mass.  Disease extends along the right lateral nasal cavity and ethmoid air cells. There is now disease along the right sphenoid sinus inseparable from the right foramen rotundum with probable involvement of the maxillary branch of V5.  Evidence of prior instrumentation with prior middle turbinectomies, uncinate to measles, antrostomies, and partial ethmoidectomies is also suspected based on the radiographic imaging.  The cavernous sinuses are symmetric.  Meckel's caves are symmetric.   There is now a right level one be lymphadenopathy by size criteria, and abnormal clustering with lymph node measuring up to 1.5 cm.  Otherwise the rest of the oral cavity and head and neck area did not demonstrate evidence of discrete mass.  Apparent right vocal cord paralysis would medialization of the true vocal cord with anteromedial rotation of the right was also described.   CT scan of the chest with and without contrast on 10/3/2018 did not reveal evidence of thoracic lymphadenopathy.  Tiny 2 mm nodules were noted in the subpleural left lung, likely to be benign with a 1 year followup recommended   CT scan of the abdomen and pelvis with contrast on 10/3/2018 did not reveal evidence of metastatic disease nor abdominal lymphadenopathy. The spleen was of borderline size measuring 13 cm x 4.8 cm x 11.8 cm.  Physical examination on 10/10/2018 revealed a very enlarged and swollen right maxillary facial area with induration of soft tissues around the upper teeth on the right and face.  No palpable peripheral lymphadenopathy was able to be appreciated in the head and neck area, epitrochlear and axillary areas, inguinal areas. No evidence of palpable splenomegaly or other abdominal findings were encountered on exam.  Bone marrow aspirate and biopsy on 10/11/18 showed no evidence of involvement  by diffuse large B-cell lymphoma. FLOW cytometry negative.  MRI of the brain w/wo contrast 10/11/18 at [facility] was consistent with abnormal soft tissue mass involving the right  and buccal space extending along the anterior margin of the right maxillary sinus contiguous with the floor of the right orbit. There was no definite invasion into the orbit, nor displacement of the inferior rectus musculature.  PET scan 10/12/18 at [facility] showed hypermetabolic activity associated with the right buccal space and  space mass, SUV 18.5. There is no distant focus of hypermetabolic activity seen to suggest other sites of involvement.  Echocardiogram 10/12/18 at [facility] revealed an estimated LVEF of 60%.   Hepatitis B and C were negative.  Hepatitis A IgM was positive and discussed with Dr. Osborn.  Left chest mediport was placed 10/18/18 by Dr. ANSON Mcgrath.  Right ommaya port was placed 10/22/18 by Dr. ANURADHA Greer.  CSF specimen was obtained via ommaya port by Dr. Osborn, sent for cytology and FLOW prior to initiation of Cycle #1 CHOP-R initiated on 10/24/18 was negative.     TREATMENT SUMMARY:  1. Cycle #1 CHOP-R initiated 10/24/18,   2. Dose #1 prophylactic intrathecal methotrexate (FLOW cytometry on CSF negative) given 10/29/18. Dose #2 on 11/14/18.   3. Anticipate consolidative XRT.     TARGET BREAST CANCER SITES:  1. Right mastectomy 09/23/04 for DCIS     TUMOR HISTORY: Right DCIS 09/23/04  On 09/23/04, Kami had a right simple mastectomy by Dr. 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   The ER was 95%, TX was 13%.   No further specific therapy was indicated for this cancer other than close follow up of the area and the opposite breast.     TREATMENT SUMMARY:  1. Right mastectomy and 9/23/2004 for DCIS    REVIEW OF SYSTEMS:    Constitutional: positive for fever - resolved at present  HEENT: no blurring of vision, no double vision              Lungs: no hemoptysis; positive for cough - starting to be  mildly productive  CVS: no palpitation, no chest pain  GI: no abdominal pain, no constipation; no nausea  AMOS: urinary frequency at night, not so much during the day  Musculoskeletal: generalized weakness  Endocrine: no polyuria, polydypsia, no cold or heat intolerence; positive for chronic thyroid replacement  Hematology: positive for pancytopenia from chemotherapy, improved  Dermatology: no skin rash, no eczema, no pruritis  Psychiatry: no suicide ideation  Neurology: no syncope, no seizures; positive for Ommaya with IT chemotherapy     OBJECTIVE:    Vitals:    03/10/19 0328   BP: 142/72   Pulse: 90   Resp: 18   Temp: 98.1 °F (36.7 °C)   SpO2: 92%       Intake/Output Summary (Last 24 hours) at 3/10/2019 0803  Last data filed at 3/10/2019 0351  Gross per 24 hour   Intake 740 ml   Output --   Net 740 ml     PHYSICAL EXAM:    CONSTITUTIONAL: NAD, patient's daughter Gale at bedside  EYES: Nonicteric  ENT: No overt thrush-minimal erythema improved  NECK: Supple, no masses   CHEST/LUNGS: Left posterior basilar crackles  CARDIOVASCULAR: RRR - no murmurs  ABDOMEN: soft non-tender, active bowel sounds, no HSM  EXTREMITIES: warm, moves all extremities, weak  SKIN: warm, dry with no rashes or lesions.  port left chest wall without erythema, Ommaya right scalp without erythema  LYMPH: No cervical, clavicular, axillary, or inguinal lymphadenopathy  NEUROLOGIC: follows commands, non focal   PSYCH: mood and affect appropriate    CBC  Results from last 7 days   Lab Units 03/10/19  0539 03/09/19  1220 03/08/19  0650   WBC 10*3/mm3 9.18 9.09 11.85*   HEMOGLOBIN g/dL 8.8* 8.8* 10.1*   HEMATOCRIT % 27.3* 26.8* 32.0*   PLATELETS 10*3/mm3 356 347 373       Lab Results   Component Value Date     (L) 03/09/2019    K 3.9 03/09/2019    CL 95 (L) 03/09/2019    CO2 30.0 03/09/2019    BUN 14 03/09/2019    CREATININE 0.60 03/09/2019    GLUCOSE 100 03/09/2019    CALCIUM 8.1 (L) 03/09/2019    BILITOT 0.2 03/09/2019    ALKPHOS 59  03/09/2019    AST 44 03/09/2019    ALT 30 03/09/2019    AGRATIO 1.2 03/09/2019    GLOB 2.2 03/09/2019       Lab Results   Component Value Date    INR 0.97 09/10/2018    PROTIME 13.2 09/10/2018     Cultures:    Lab Results   Component Value Date    BLOODCX No growth at 24 hours 03/09/2019     No components found for: URINCX    ASSESSMENT/PLAN:    1.  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   S/p cycle # 6 of CHOP/R on 2/20/19    S/p dose # 11 of prophylactic intrathecal methotrexate given 2/20/19    2. Neutropenic (resolved) fever E Coli UTI/ LLL infiltrate  WBC - 11.85 on 3/8/2019    Max temp recorded,  102.2 at 2150 hrs. on 3/2/19.    Fever - 101.2.   3/7/2019, 8 PM  Low grade temp 100.5 at 11:00 on 3/8/2019  Afebrile presently, 3/10/2019     Urine cx 2/27/19 - > 100,000 CFU/mL E. Coli  Blood culture x 2 2/27/19 -no growth at 5 days  Repeat blood culture port/peripheral 2/27/19 p.m. - no growth at 5 days  CSF 2/27/19 - gram stain negative, culture no growth at 5 days  Repeat blood culture port/peripheral 3/4/19 - no growth at 5 days  Repeat blood culture port/peripheral 3/7/19 - Staphylococcus, Gram positive cocci from port  Repeat blood culture port/peripheral 3/9/19 - No growth to date   Repeat UA 3/4/19 - negative    CXR 3/3/19 -  Trace pleural fluid with new patchy LLL infiltrate or atelectasis  CXR 3/5/19 -  Focal infiltrate at the left lung base may represent pneumonia or atelectasis.  Vascular redistribution.  There may be minimal edema, there are small pleural effusions.  CXR 3/8/2019- Mild pulmonary edema with tiny pleural effusions. Small area of alveolar edema in the right upper lobe versus developing pneumonia.  2D echo on 3/9/2019 with estimated EF and evidence of pulmonary HTN  Merrem 1 g IV q8 hrs  Valacyclovir 500 mg PO every 8 hrs  Vancomycin 1 g IV q24 hrs    Dr. Small note reviewd      3.  Anemia from  chemotherapy  HGB - 8.8 on 3/9/19 (s/p 1 unit p RBC 2/28/19)    4.  Thrombocytopenia from chemotherapy (resolved)  PLT - 356,000 on 3/10/2019    5.  DVT prophylaxis  - Lovenox 40 subq daily   - crt 0.60 on 3/9/19    5.  Hypokalemia  - K 3.9 on 3/9/19      6.  Generalized weakness  - undergoing PT     7.  Moderate protein calorie malnutrition  - followed by dietary  - supplements  - patient's daughter feels she is eating fair and states portions given here are a lot bigger than is fixed at home    Dr. Osborn discussed longer-term care with the daughter on 3/8/2019.  She and her family are considering rehab which is a very good option.  She will be talking to the  here at the hospital to consider possible rehab placement at [facility].    Noreen Byrne, APRN    03/10/19  8:03 AM

## 2019-03-10 NOTE — PLAN OF CARE
Problem: Patient Care Overview  Goal: Plan of Care Review  Outcome: Ongoing (interventions implemented as appropriate)   03/10/19 1547   Coping/Psychosocial   Plan of Care Reviewed With patient   Plan of Care Review   Progress improving   OTHER   Outcome Summary VSS. Patient sat up in chair today. IVF and iv abx continue.        Problem: Fall Risk (Adult)  Goal: Identify Related Risk Factors and Signs and Symptoms  Outcome: Ongoing (interventions implemented as appropriate)    Goal: Absence of Fall  Outcome: Ongoing (interventions implemented as appropriate)      Problem: Infection, Risk/Actual (Adult)  Goal: Identify Related Risk Factors and Signs and Symptoms  Outcome: Ongoing (interventions implemented as appropriate)    Goal: Infection Prevention/Resolution  Outcome: Ongoing (interventions implemented as appropriate)      Problem: Skin Injury Risk (Adult)  Goal: Identify Related Risk Factors and Signs and Symptoms  Outcome: Ongoing (interventions implemented as appropriate)    Goal: Skin Health and Integrity  Outcome: Ongoing (interventions implemented as appropriate)

## 2019-03-11 LAB
DEPRECATED RDW RBC AUTO: 55.2 FL (ref 40–54)
EOSINOPHIL # BLD MANUAL: 0.18 10*3/MM3 (ref 0–0.7)
EOSINOPHIL NFR BLD MANUAL: 2 % (ref 0–4)
ERYTHROCYTE [DISTWIDTH] IN BLOOD BY AUTOMATED COUNT: 17 % (ref 12–15)
HCT VFR BLD AUTO: 27.4 % (ref 37–47)
HGB BLD-MCNC: 8.8 G/DL (ref 12–16)
HYPOCHROMIA BLD QL: ABNORMAL
LYMPHOCYTES # BLD MANUAL: 0.44 10*3/MM3 (ref 0.72–4.86)
LYMPHOCYTES NFR BLD MANUAL: 2 % (ref 4–12)
LYMPHOCYTES NFR BLD MANUAL: 5 % (ref 15–45)
MCH RBC QN AUTO: 28.9 PG (ref 28–32)
MCHC RBC AUTO-ENTMCNC: 32.1 G/DL (ref 33–36)
MCV RBC AUTO: 89.8 FL (ref 82–98)
MONOCYTES # BLD AUTO: 0.18 10*3/MM3 (ref 0.19–1.3)
NEUTROPHILS # BLD AUTO: 8.02 10*3/MM3 (ref 1.87–8.4)
NEUTROPHILS NFR BLD MANUAL: 90 % (ref 39–78)
NEUTS BAND NFR BLD MANUAL: 1 % (ref 0–10)
PLAT MORPH BLD: NORMAL
PLATELET # BLD AUTO: 367 10*3/MM3 (ref 130–400)
PMV BLD AUTO: 9.8 FL (ref 6–12)
RBC # BLD AUTO: 3.05 10*6/MM3 (ref 4.2–5.4)
WBC MORPH BLD: NORMAL
WBC NRBC COR # BLD: 8.81 10*3/MM3 (ref 4.8–10.8)

## 2019-03-11 PROCEDURE — 97110 THERAPEUTIC EXERCISES: CPT

## 2019-03-11 PROCEDURE — 85025 COMPLETE CBC W/AUTO DIFF WBC: CPT | Performed by: PHYSICIAN ASSISTANT

## 2019-03-11 PROCEDURE — 25010000002 ENOXAPARIN PER 10 MG: Performed by: PHYSICIAN ASSISTANT

## 2019-03-11 PROCEDURE — 97116 GAIT TRAINING THERAPY: CPT

## 2019-03-11 PROCEDURE — 25810000003 SODIUM CHLORIDE 0.9 % WITH KCL 20 MEQ 20-0.9 MEQ/L-% SOLUTION: Performed by: INTERNAL MEDICINE

## 2019-03-11 PROCEDURE — 85007 BL SMEAR W/DIFF WBC COUNT: CPT | Performed by: PHYSICIAN ASSISTANT

## 2019-03-11 PROCEDURE — 25010000002 MEROPENEM PER 100 MG: Performed by: INTERNAL MEDICINE

## 2019-03-11 PROCEDURE — 97530 THERAPEUTIC ACTIVITIES: CPT

## 2019-03-11 RX ADMIN — POTASSIUM CHLORIDE AND SODIUM CHLORIDE 40 ML/HR: 900; 150 INJECTION, SOLUTION INTRAVENOUS at 03:31

## 2019-03-11 RX ADMIN — ENOXAPARIN SODIUM 40 MG: 40 INJECTION SUBCUTANEOUS at 15:28

## 2019-03-11 RX ADMIN — POTASSIUM CHLORIDE AND SODIUM CHLORIDE 40 ML/HR: 900; 150 INJECTION, SOLUTION INTRAVENOUS at 22:44

## 2019-03-11 RX ADMIN — OXYBUTYNIN CHLORIDE 5 MG: 5 TABLET, EXTENDED RELEASE ORAL at 09:28

## 2019-03-11 RX ADMIN — CARVEDILOL 3.12 MG: 3.12 TABLET, FILM COATED ORAL at 17:31

## 2019-03-11 RX ADMIN — CARVEDILOL 3.12 MG: 3.12 TABLET, FILM COATED ORAL at 09:28

## 2019-03-11 RX ADMIN — CLOTRIMAZOLE 10 MG: 10 LOZENGE ORAL; TOPICAL at 21:39

## 2019-03-11 RX ADMIN — LEVOTHYROXINE SODIUM 125 MCG: 125 TABLET ORAL at 05:28

## 2019-03-11 RX ADMIN — MEROPENEM 1 G: 1 INJECTION, POWDER, FOR SOLUTION INTRAVENOUS at 15:28

## 2019-03-11 RX ADMIN — CLOTRIMAZOLE 10 MG: 10 LOZENGE ORAL; TOPICAL at 15:28

## 2019-03-11 RX ADMIN — CLOTRIMAZOLE 10 MG: 10 LOZENGE ORAL; TOPICAL at 17:31

## 2019-03-11 RX ADMIN — CLOTRIMAZOLE 10 MG: 10 LOZENGE ORAL; TOPICAL at 07:26

## 2019-03-11 RX ADMIN — CLOTRIMAZOLE 10 MG: 10 LOZENGE ORAL; TOPICAL at 12:05

## 2019-03-11 RX ADMIN — MEROPENEM 1 G: 1 INJECTION, POWDER, FOR SOLUTION INTRAVENOUS at 09:28

## 2019-03-11 NOTE — PLAN OF CARE
Problem: Patient Care Overview  Goal: Plan of Care Review  Outcome: Ongoing (interventions implemented as appropriate)   03/11/19 0856   Coping/Psychosocial   Plan of Care Reviewed With patient;daughter   Plan of Care Review   Progress improving   OTHER   Outcome Summary Pt Min A sit to stand, CGA-Min A Gait 75' with RW x 2 reps. Pt has noted foot drop on right side during gait and runs RW into walls/doorways. Completed B LE/UE AROM sitting in chair Plans are now for Rehab before returning home.

## 2019-03-11 NOTE — PROGRESS NOTES
PROGRESS NOTE  Patient name: Kami Villanueva  Patient : 1934  VISIT # [id number]  MR #[id number]   Room 462    SUBJECTIVE: Continues afebrile.  No new complaints. Resting in bed. Encouraged increasing activity level.     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   Kami received cycle #6 of CHOP/R and Dose #11 of prophylactic intrathecal methotrexate 2019.  Kami was brought in by family ( and 2 daughters) with a temperature elevation to 100.8°. Recheck temperature here is 101°.  CBC reveals a WBC of 0.19 with an ANC of 0.06  Hemoglobin of 8.7 and platelet count of 209,000.  On review of systems, she has only lost 1 pound in the last week, she feels about baseline other than being weak and tired and experienced nausea and vomiting on the one-hour drive from home to the clinic.  She does not have a headache or cough, or burning on urination or sputum production.  She is being evaluated in anticipation of admission to the hospital for neutropenia and fever.      TARGET LYMPHOMA SITES:  1. Right maxillary sinus with local invasion to the floor of the right orbit   2.  on 10/10/18   3. borderline spleen size, 13 x 4.8 x 11.8 cm  4. Bone marrow negative on 10/11/18   5. CSF specimen via Omaya port negative on 10/24/18    TUMOR HISTORY: Right maxillary sinus high-grade diffuse large B-cell lymphoma 2018  Kami was treated for hyperthyroidism with radioactive iodine at about age 35. About 45 years ago she underwent a thyroidectomy followed subsequently by a vocal cord medialization procedure by Dr. Crane in [facility].  Several years ago she had right maxillary sinus surgery by Dr. Garcia.  Kami developed upper respiratory and sinus symptoms in the spring of 2018.  She was seen and treated at [facility]  urgent care on 3/13/2018 for bronchitis.   She was then seen in followup by her PCP, Dr. Kvng Strong on 4/27/2018. He felt an abnormality in the neck and ordered a CT scan of the soft tissues of the neck and referred her to ENT for further evaluation of this.  A CT scan of the soft tissues of the neck with contrast on 5/7/2018 documented extensive mucosal thickening of the right maxillary sinus consistent with chronic sinusitis. There was also opacification of several of the ethmoid air cells and mucosal thickening in the nasal cavity.  No neck mass or adenopathy was appreciated.   Kami was evaluated by Dr. Bong Felix on 5/30/2018 for evaluation of right neck pain and swelling.  Full ENT exam including nasopharyngeal area was within normal limits.  Review of the CT scan from 5/7/2018 was consistent with chronic sinusitis. Kami had had a medialization procedure by ENT and [place] earlier in the year for a vocal cord paralysis issue. It was felt that some of the changes on the CT scan were consistent with that procedure.  On 8/3/2018 Kami had a right upper posterior tooth pulled by an oral surgeon, Dr. Ortega in [facility] for what was felt to possibly be an infected tooth.  Kami was again seen at [facility] urgent care on 8/16/2018 with swelling, redness and tenderness in the right maxillary area with associated headaches not responding to antibiotics.   Plain film imaging studies and a CT scan of the area was performed.   Plain film x-rays of the facial bones on 8/16/2018 did not reveal acute abnormalities   CT scan of facial bones within without contrast on 8/16/2018 documented interval development of an enhancing soft tissue along the right maxilla extending along the anterior right maxillary sinus and along the floor of the right orbit measuring 2.4 cm in width. New erosive bony changes were noted involving the bony structure of the right orbital floor. The findings were concerning for  a neoplastic process with erosive changes of the right orbital floor with ENT consultation recommended.   Kami was seen by Kenny Mar PA-C with ENT on 8/20/2018 where the above CT scanning clinical findings were evaluated. Arrangements were made for a biopsy with Dr. Bong Felix.  On 9/17/2018, Dr. Bong Felix performed a nasal/sinus endoscopy with biopsy.  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 The cells are polymorphic with maintaining medium sized to large cells with irregular nuclear contour.  On flow cytometry, the monoclonal kappa B cell population does not express CD5 or CD10, but are low viability.  The immunostains, show a diffuse B-cell infiltrate positive for CD20, PAX-5 and BCL 6, but negative for CD5, CD10, BCL-2, cyclin D1 and CD43.  <30% of the cells were positive for Mum-1. Ki-67 showed a high proliferative rate of about 80%.  All of the above is consistent with a diffuse large B-cell lymphoma of the GBC type.  A CT scan of the soft tissues of the neck with contrast on 10/3/2018 was compared to the CT scan of the face on 8/16/2018 and 5/7/2018.  There had been significant progression of disease with interval increase in the size of the right buccal space hyperdense mass measuring approximately at least 7 x 2 cm on axial cuts.   There is involvement of the medial and lateral pterigoid muscle. The right temporalis muscle was also inseparable from the mass with involvement as well of the pterygomaxillary fissure, pterygopalatine fossa and sphenopalatine foramen.  Local cortex erosive the structure and was also noted along the maxillary gingival surface.  Involvement of the roof of the right maxillary sinus with progression of the erosive disease along the floor of the right orbit was also again identified. An asymmetric soft tissue density was also noted at the location of the right infraorbital area.  There was no evidence of  involvement of the extraocular muscles nor do the lacrimal glands demonstrate a discrete mass.  The lacrimal glands do not demonstrate a discrete mass.  Disease extends along the right lateral nasal cavity and ethmoid air cells. There is now disease along the right sphenoid sinus inseparable from the right foramen rotundum with probable involvement of the maxillary branch of V5.  Evidence of prior instrumentation with prior middle turbinectomies, uncinate to measles, antrostomies, and partial ethmoidectomies is also suspected based on the radiographic imaging.  The cavernous sinuses are symmetric.  Meckel's caves are symmetric.   There is now a right level one be lymphadenopathy by size criteria, and abnormal clustering with lymph node measuring up to 1.5 cm.  Otherwise the rest of the oral cavity and head and neck area did not demonstrate evidence of discrete mass.  Apparent right vocal cord paralysis would medialization of the true vocal cord with anteromedial rotation of the right was also described.   CT scan of the chest with and without contrast on 10/3/2018 did not reveal evidence of thoracic lymphadenopathy.  Tiny 2 mm nodules were noted in the subpleural left lung, likely to be benign with a 1 year followup recommended   CT scan of the abdomen and pelvis with contrast on 10/3/2018 did not reveal evidence of metastatic disease nor abdominal lymphadenopathy. The spleen was of borderline size measuring 13 cm x 4.8 cm x 11.8 cm.  Physical examination on 10/10/2018 revealed a very enlarged and swollen right maxillary facial area with induration of soft tissues around the upper teeth on the right and face.  No palpable peripheral lymphadenopathy was able to be appreciated in the head and neck area, epitrochlear and axillary areas, inguinal areas. No evidence of palpable splenomegaly or other abdominal findings were encountered on exam.  Bone marrow aspirate and biopsy on 10/11/18 showed no evidence of involvement  by diffuse large B-cell lymphoma. FLOW cytometry negative.  MRI of the brain w/wo contrast 10/11/18 at [facility] was consistent with abnormal soft tissue mass involving the right  and buccal space extending along the anterior margin of the right maxillary sinus contiguous with the floor of the right orbit. There was no definite invasion into the orbit, nor displacement of the inferior rectus musculature.  PET scan 10/12/18 at [facility] showed hypermetabolic activity associated with the right buccal space and  space mass, SUV 18.5. There is no distant focus of hypermetabolic activity seen to suggest other sites of involvement.  Echocardiogram 10/12/18 at [facility] revealed an estimated LVEF of 60%.   Hepatitis B and C were negative.  Hepatitis A IgM was positive and discussed with Dr. Osborn.  Left chest mediport was placed 10/18/18 by Dr. ANSON Mcgrath.  Right ommaya port was placed 10/22/18 by Dr. ANURADHA Greer.  CSF specimen was obtained via ommaya port by Dr. Osborn, sent for cytology and FLOW prior to initiation of Cycle #1 CHOP-R initiated on 10/24/18 was negative.     TREATMENT SUMMARY:  1. Cycle #1 CHOP-R initiated 10/24/18,   2. Dose #1 prophylactic intrathecal methotrexate (FLOW cytometry on CSF negative) given 10/29/18. Dose #2 on 11/14/18.   3. Anticipate consolidative XRT.     TARGET BREAST CANCER SITES:  1. Right mastectomy 09/23/04 for DCIS     TUMOR HISTORY: Right DCIS 09/23/04  On 09/23/04, Kami had a right simple mastectomy by Dr. 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   The ER was 95%, PA was 13%.   No further specific therapy was indicated for this cancer other than close follow up of the area and the opposite breast.     TREATMENT SUMMARY:  1. Right mastectomy and 9/23/2004 for DCIS    REVIEW OF SYSTEMS:    Constitutional: positive for fever - resolved at present  HEENT: no blurring of vision, no double vision              Lungs: no hemoptysis; positive for cough - improved  CVS:  no palpitation, no chest pain  GI: no abdominal pain, no constipation; no nausea  AMOS: urinary frequency at night, not so much during the day  Musculoskeletal: generalized weakness  Endocrine: no polyuria, polydypsia, no cold or heat intolerence; positive for chronic thyroid replacement  Hematology: positive for pancytopenia from chemotherapy, improved  Dermatology: no skin rash, no eczema, no pruritis  Psychiatry: no suicide ideation  Neurology: no syncope, no seizures; positive for Ommaya with IT chemotherapy     OBJECTIVE:    Vitals:    03/10/19 2316   BP: 153/70   Pulse: 87   Resp: 20   Temp: 98.8 °F (37.1 °C)   SpO2: 94%       Intake/Output Summary (Last 24 hours) at 3/11/2019 0636  Last data filed at 3/10/2019 1834  Gross per 24 hour   Intake 940 ml   Output --   Net 940 ml     PHYSICAL EXAM:    CONSTITUTIONAL: NAD, patient's daughter Gale at bedside  EYES: Nonicteric  ENT: No overt thrush-minimal erythema improved  NECK: Supple, no masses   CHEST/LUNGS: lungs are clear bilaterally  CARDIOVASCULAR: RRR - no murmurs  ABDOMEN: soft non-tender, active bowel sounds, no HSM  EXTREMITIES: warm, moves all extremities, weak  SKIN: warm, dry with no rashes or lesions.  port left chest wall without erythema, Ommaya right scalp without erythema  LYMPH: No cervical, clavicular, axillary, or inguinal lymphadenopathy  NEUROLOGIC: follows commands, non focal   PSYCH: mood and affect appropriate    CBC  Results from last 7 days   Lab Units 03/10/19  0539 03/09/19  1220 03/08/19  0650   WBC 10*3/mm3 9.18 9.09 11.85*   HEMOGLOBIN g/dL 8.8* 8.8* 10.1*   HEMATOCRIT % 27.3* 26.8* 32.0*   PLATELETS 10*3/mm3 356 347 373       Lab Results   Component Value Date     (L) 03/09/2019    K 3.9 03/09/2019    CL 95 (L) 03/09/2019    CO2 30.0 03/09/2019    BUN 14 03/09/2019    CREATININE 0.60 03/09/2019    GLUCOSE 100 03/09/2019    CALCIUM 8.1 (L) 03/09/2019    BILITOT 0.2 03/09/2019    ALKPHOS 59 03/09/2019    AST 44 03/09/2019     ALT 30 03/09/2019    AGRATIO 1.2 03/09/2019    GLOB 2.2 03/09/2019       Lab Results   Component Value Date    INR 0.97 09/10/2018    PROTIME 13.2 09/10/2018     Cultures:    Lab Results   Component Value Date    BLOODCX No growth at 2 days 03/09/2019     No components found for: URINCX    ASSESSMENT/PLAN:    1.  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   S/p cycle # 6 of CHOP/R on 2/20/19    S/p dose # 11 of prophylactic intrathecal methotrexate given 2/20/19    2. Neutropenic (resolved) fever E Coli UTI/ LLL infiltrate  WBC - 9.18 on 3/10/2019    Max temp recorded,  102.2 at 2150 hrs. on 3/2/19.    Fever - 101.2.   3/7/2019, 8 PM  Low grade temp 100.5 at 11:00 on 3/8/2019  Continues afebrile presently, 3/10/2019     Urine cx 2/27/19 - > 100,000 CFU/mL E. Coli  Blood culture x 2 2/27/19 -no growth at 5 days  Repeat blood culture port/peripheral 2/27/19 p.m. - no growth at 5 days  CSF 2/27/19 - gram stain negative, culture no growth at 5 days  Repeat blood culture port/peripheral 3/4/19 - no growth at 5 days  Repeat blood culture port/peripheral 3/7/19 - Staphylococcus, Gram positive cocci from port  -- possible contaminant  Repeat blood culture port 3/9/19 - No growth at 2 days     Repeat UA 3/4/19 - negative    CXR 3/3/19 -  Trace pleural fluid with new patchy LLL infiltrate or atelectasis  CXR 3/5/19 -  Focal infiltrate at the left lung base may represent pneumonia or atelectasis.  Vascular redistribution.  There may be minimal edema, there are small pleural effusions.  CXR 3/8/2019- Mild pulmonary edema with tiny pleural effusions. Small area of alveolar edema in the right upper lobe versus developing pneumonia.  2D echo on 3/9/2019 with estimated EF and evidence of pulmonary HTN      Merrem 1 g IV q8 hrs - last doses today  Valacyclovir 500 mg PO every 8 hrs  Vancomycin 1 g IV q24 hrs d/c'ed 3/10/19    Dr. Small note  reviewd      3.  Anemia from chemotherapy  HGB - 8.8 on 3/10/19 (s/p 1 unit p RBC 2/28/19)    4.  Thrombocytopenia from chemotherapy (resolved)  PLT - 356,000 on 3/10/2019    5.  DVT prophylaxis  - Lovenox 40 subq daily   - crt 0.60 on 3/9/19    5.  Hypokalemia  - K 3.9 on 3/9/19      6.  Generalized weakness  - undergoing PT     7.  Moderate protein calorie malnutrition  - followed by dietary  - supplements      Dr. Osborn discussed longer-term care with the daughter on 3/8/2019.  She and her family are considering rehab which is a very good option.  She will be talking to the  here at the hospital to consider possible rehab placement at [facility].    Antibiotics to be completed today-afebrile at present.- to help work with potential [facility] placement only prefers [facility] rehabilitation - possible discharge tomorrow.  ELLA Jiménez    03/11/19  6:36 AM     I personally saw and examined this patient, performing a face-to-face diagnostic evaluation with plan of care reviewed and developed with  Prashanth Chambers PA-C and nursing staff.   I have addended and/or modified the above history of present illness, physical examination, and assessment and plan to reflect my findings and impressions.   Essential elements of the care plan were discussed with  Prashanth Chambers PA-C .   Agree with findings and assessment/plan as documented above.   Questions were encouraged, asked and answered to their understanding and satisfaction.    Alexander Osborn MD  3/11/2019 8:07 AM

## 2019-03-11 NOTE — PLAN OF CARE
Problem: Patient Care Overview  Goal: Plan of Care Review  Outcome: Ongoing (interventions implemented as appropriate)   03/11/19 8586   Coping/Psychosocial   Plan of Care Reviewed With patient   Plan of Care Review   Progress improving   OTHER   Outcome Summary Patient oral intake has improved. No complaints of pain. Daughter at bedside. Up with PT, Up to bathroom x1. Safety maintained, will continue to follow       Problem: Fall Risk (Adult)  Goal: Identify Related Risk Factors and Signs and Symptoms  Outcome: Ongoing (interventions implemented as appropriate)    Goal: Absence of Fall  Outcome: Ongoing (interventions implemented as appropriate)      Problem: Infection, Risk/Actual (Adult)  Goal: Identify Related Risk Factors and Signs and Symptoms  Outcome: Ongoing (interventions implemented as appropriate)    Goal: Infection Prevention/Resolution  Outcome: Ongoing (interventions implemented as appropriate)      Problem: Skin Injury Risk (Adult)  Goal: Identify Related Risk Factors and Signs and Symptoms  Outcome: Ongoing (interventions implemented as appropriate)    Goal: Skin Health and Integrity  Outcome: Ongoing (interventions implemented as appropriate)

## 2019-03-11 NOTE — PLAN OF CARE
Problem: Patient Care Overview  Goal: Plan of Care Review  Outcome: Ongoing (interventions implemented as appropriate)   03/11/19 5240   Coping/Psychosocial   Plan of Care Reviewed With patient;daughter   Plan of Care Review   Progress improving   OTHER   Outcome Summary Pt's best intake continues to be at breakfast time. She is on a regular diet with thin liquids and has fair appetite at most other meals. Did continue Boost pudding daily with lunch, however Pt refuses to drink/eat other supplements offered. RDN encouraged intake and will continue to follow.

## 2019-03-11 NOTE — PROGRESS NOTES
Continued Stay Note   [place]     Patient Name: Kami Villanueva  MRN: [id number]  Today's Date: 3/11/2019    Admit Date: 2/27/2019    Discharge Plan     Row Name 03/11/19 0855       Plan    Plan  Referral to [facility]    Patient/Family in Agreement with Plan  yes    Plan Comments  Daughter in room (Gale) now saying they are interested in patient being admitted to [facility] for rehab before returning home. Pt is ready for discharge today. Informed Gala from [facility] of referral.  Will follow.     Addendum: [facility] unable to offer pt a bed there.  Daughter saying she wants to discuss with family and inform of alternative plan. Will follow.     After further discussion with family [facility] is reconsidering admission. They will inform no later than AM. Will follow.         Discharge Codes    No documentation.             AARON Rebolledo

## 2019-03-11 NOTE — PLAN OF CARE
Problem: Patient Care Overview  Goal: Plan of Care Review  Outcome: Ongoing (interventions implemented as appropriate)   03/11/19 7290   Coping/Psychosocial   Plan of Care Reviewed With patient;daughter   Plan of Care Review   Progress improving   OTHER   Outcome Summary Pt denies pain. Temp 99.1, 98.9, 98.8. No SOB. Pt disoriented to time and situation.      Goal: Individualization and Mutuality  Outcome: Ongoing (interventions implemented as appropriate)    Goal: Discharge Needs Assessment  Outcome: Ongoing (interventions implemented as appropriate)    Goal: Interprofessional Rounds/Family Conf  Outcome: Ongoing (interventions implemented as appropriate)      Problem: Fall Risk (Adult)  Goal: Identify Related Risk Factors and Signs and Symptoms  Outcome: Ongoing (interventions implemented as appropriate)    Goal: Absence of Fall  Outcome: Ongoing (interventions implemented as appropriate)      Problem: Infection, Risk/Actual (Adult)  Goal: Identify Related Risk Factors and Signs and Symptoms  Outcome: Ongoing (interventions implemented as appropriate)    Goal: Infection Prevention/Resolution  Outcome: Ongoing (interventions implemented as appropriate)      Problem: Skin Injury Risk (Adult)  Goal: Identify Related Risk Factors and Signs and Symptoms  Outcome: Ongoing (interventions implemented as appropriate)    Goal: Skin Health and Integrity  Outcome: Ongoing (interventions implemented as appropriate)

## 2019-03-11 NOTE — PROGRESS NOTES
"Infectious Diseases Progress Note    Patient:  Kami Villanueva  YOB: 1934  MRN: [id number]   Admit date: 2/27/2019   Admitting Physician: Alexander Osborn*  Primary Care Physician: Kvng Strong MD    Chief Complaint/Interval History: She seems to be doing well.  She is gaining strength.  She is without fever.  She completes antibiotic treatment today.  Most recent follow-up blood cultures via port no growth.  They are planning to go to [facility] for additional rehab.    Intake/Output Summary (Last 24 hours) at 3/11/2019 1554  Last data filed at 3/10/2019 1834  Gross per 24 hour   Intake 240 ml   Output --   Net 240 ml     Allergies:   Allergies   Allergen Reactions   • Alendronate Other (See Comments)     Pt does not know  FOSAMAX   • Lisinopril Cough   • Statins Other (See Comments) and Myalgia     Other     • Sulfa Antibiotics Other (See Comments)     Pt does not remember   • Teriparatide (Recombinant) Other (See Comments)     Pt does not know  FORTEO     Current Scheduled Medications:     carvedilol 3.125 mg Oral BID With Meals   clotrimazole 10 mg Oral 5x Daily   enoxaparin 40 mg Subcutaneous Q24H   levothyroxine 125 mcg Oral Q AM   meropenem 1 g Intravenous Q8H   oxybutynin XL 5 mg Oral Daily   sodium chloride 3 mL Intravenous Q12H     Current PRN Medications:  •  acetaminophen  •  diphenhydrAMINE  •  diphenhydrAMINE  •  lidocaine-Maalox-diphenhydramine  •  ondansetron  •  potassium chloride  •  sodium chloride    Review of Systems see HPI    Vital Signs:  /58 (BP Location: Left arm, Patient Position: Lying)   Pulse 70   Temp 98.2 °F (36.8 °C) (Oral)   Resp 19   Ht 170.2 cm (67.01\")   Wt 66.9 kg (147 lb 9 oz)   LMP  (LMP Unknown)   SpO2 95%   BMI 23.11 kg/m²     Physical Exam  Vital signs reviewed.  Line/IV (Xztzav-f-Qepo site left upper chest) site: No erythema, warmth, induration, or tenderness.  Lungs without crackles  General stronger appearing.  No cough " or dyspnea.  Abdomen soft and nontender    Lab Results:  CBC: Results from last 7 days   Lab Units 03/11/19  0625 03/10/19  0539 03/09/19  1220 03/08/19  0650 03/07/19  0430 03/06/19  0739 03/05/19  0442   WBC 10*3/mm3 8.81 9.18 9.09 11.85* 12.26* 14.32* 19.37*   HEMOGLOBIN g/dL 8.8* 8.8* 8.8* 10.1* 8.7* 9.1* 9.3*   HEMATOCRIT % 27.4* 27.3* 26.8* 32.0* 27.0* 28.3* 28.4*   PLATELETS 10*3/mm3 367 356 347 373 288 256 203     BMP:  Results from last 7 days   Lab Units 03/09/19  1220 03/06/19  0739   SODIUM mmol/L 132* 129*   POTASSIUM mmol/L 3.9 3.9   CHLORIDE mmol/L 95* 96*   CO2 mmol/L 30.0 28.0   BUN mg/dL 14 10   CREATININE mg/dL 0.60 0.53   GLUCOSE mg/dL 100 71   CALCIUM mg/dL 8.1* 7.8*   ALT (SGPT) U/L 30  --      Culture Results:   Blood Culture   Date Value Ref Range Status   03/09/2019 No growth at 2 days  Preliminary   03/07/2019 Staphylococcus epidermidis (A)  Preliminary   03/07/2019 No growth at 3 days  Preliminary   03/04/2019 No growth at 5 days  Final   03/04/2019 No growth at 5 days  Final     Radiology: None  Additional Studies Reviewed: None    Impression:   1.  Fever-resolved.  She completed an empiric course of treatment with meropenem for the possibility of pneumonia.  No current pulmonary symptoms.  White blood cell count normal.  2.  Positive blood culture for coagulase-negative staph on March 7-likely contaminant.  Follow-up blood cultures via port on March 9 no growth.    Recommendations:   Discontinue meropenem after today's doses  Okay with me to nursing home and ready for release per others  Do not feel there is evidence of port infection at present.  Feel port can remain in place in case needed at this time.  If she continues to do well and has no further need for her port from hematology standpoint it could be removed.  Will sign off for now  Please call if additional questions for infectious diseases  Follow-up with infectious diseases as needed    Kobi Fajardo MD

## 2019-03-11 NOTE — THERAPY TREATMENT NOTE
Acute Care - Physical Therapy Treatment Note  [facility]     Patient Name: Kami Villanueva  : 1934  MRN: [id number]  Today's Date: 3/11/2019  Onset of Illness/Injury or Date of Surgery: 19  Date of Referral to PT: 19  Referring Physician: Dr. Osborn    Admit Date: 2019    Visit Dx:    ICD-10-CM ICD-9-CM   1. 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   -LG2      Comment  Considerable time spent educating pt and daughter on HEP and focused exercises for R LE (ankle DF)  -LG2      Existing Precautions/Restrictions  fall  -LG2      Recorded by [LG] Zak Philippe, PTA 19 0856  [LG2] Zak Philippe, PTA 19 09      Row Name 19 0846             Bed Mobility Assessment/Treatment    Comment (Bed Mobility)  up in chair  -LG      Recorded by [LG] Zak Philippe, PTA 19 0955      Row Name 19 0846             Sit-Stand Transfer    Sit-Stand [place] (Transfers)  verbal cues;contact guard;minimum assist (75% patient effort)  -LG      Recorded by [LG] Philippe, [place]  R, PTA 03/11/19 0955      Row Name 03/11/19 0846             Stand-Sit Transfer    Stand-Sit [place] (Transfers)  verbal cues;contact guard;minimum assist (75% patient effort)  -LG      Recorded by [LG] Zak Philippe, PTA 03/11/19 0955      Row Name 03/11/19 0846             Toilet Transfer    Type (Toilet Transfer)  sit-stand;stand-sit  -LG      [place] Level (Toilet Transfer)  verbal cues;contact guard;minimum assist (75% patient effort)  -LG      Assistive Device (Toilet Transfer)  commode  -LG      Recorded by [LG] Zak Philippe, PTA 03/11/19 0955      Row Name 03/11/19 0846             Gait/Stairs Assessment/Training    Gait/Stairs Assessment/Training  gait/ambulation assistive device  -LG      [place] Level (Gait)  verbal cues;contact guard;minimum assist (75% patient effort)  -LG      Assistive Device (Gait)  walker, front-wheeled  -LG      Distance in Feet (Gait)  75' x 2 reps  -LG2      Pattern (Gait)  step-to  -LG2      Deviations/Abnormal Patterns (Gait)  juan decreased  -LG2      Bilateral Gait Deviations  forward flexed posture  -LG2      Recorded by [LG] Zak Philippe, PTA 03/11/19 0955  [LG2] Zak Philippe, [facility] 03/11/19 1000      Row Name 03/11/19 0846             Therapeutic Exercise    Comment (Therapeutic Exercise)  B LE/UE's  AROM x 2 sets of 10 reps  -LG      Recorded by [LG] Zak Philippe, [facility] 03/11/19 1001      Row Name 03/11/19 0846             Positioning and Restraints    Pre-Treatment Position  sitting in chair/recliner  -LG      Post Treatment Position  chair  -LG      In Chair  sitting;call light within reach;encouraged to call for assist;with family/caregiver  -LG      Recorded by [LG] Zak Philippe, PTA 03/11/19 1000      Row Name 03/11/19 0846             Pain Scale: Numbers Pre/Post-Treatment    Pain Scale: Numbers, Pretreatment  0/10 - no pain  -LG      Pain Scale: Numbers, Post-Treatment  0/10 - no pain  -LG      Recorded by [LG] Zak Philippe, PTA 03/11/19 1000         User Key  (r) = Recorded By, (t) = Taken By, (c) = Cosigned By    Initials Name Effective Dates Discipline    LG PhilippeZak, RAVI 08/02/16 -  PT                   Physical Therapy Education     Title: PT OT SLP Therapies (In Progress)     Topic: Physical Therapy (In Progress)     Point: Mobility training (In Progress)     Learning Progress Summary           Patient Acceptance, E, NR by AF at 3/8/2019 10:40 AM    Comment:  benefits of activity and amb in relation to independence    Acceptance, E, NR by AE at 3/7/2019 12:40 PM    Comment:  POC t/f's    Acceptance, E, VU,NR by TYRA at 3/5/2019  2:04 PM    Comment:  Educated pt. on progression of PT POC and benefits of activity   Family Acceptance, E, VU,NR by TYRA at 3/5/2019  2:04 PM    Comment:  Educated pt. on progression of PT POC and benefits of activity   Significant Other Acceptance, E, VU,NR by TYRA at 3/5/2019  2:04 PM    Comment:  Educated pt. on progression of PT POC and benefits of activity                               User Key     Initials Effective Dates Name Provider Type Discipline    AE 06/22/15 -  Sandhya Fontanez PTA Physical Therapy Assistant PT    TYRA 08/02/16 -  Ronnell Cantrell PT DPT Physical Therapist PT    AF 02/11/19 -  Danielle Mcmahon PTA Physical Therapy Assistant PT                PT Recommendation and Plan     Plan of Care Reviewed With: patient, daughter  Progress: improving  Outcome Summary: Pt Min A sit to stand, CGA-Min A Gait 75' with RW x 2 reps. Pt has noted foot drop on right side during gait and runs RW into walls/doorways. Completed  B LE/UE AROM sitting in chair Plans are now for Rehab before returning home.   Outcome Measures     Row Name 03/11/19 1003             How much help from another person do you currently need...    Turning from your back to your side while in flat bed without using bedrails?  3  -LG      Moving from lying on back to sitting on the side of a flat bed without bedrails?  3  -LG      Moving to and  from a bed to a chair (including a wheelchair)?  3  -LG      Standing up from a chair using your arms (e.g., wheelchair, bedside chair)?  3  -LG      Climbing 3-5 steps with a railing?  1  -LG      To walk in hospital room?  3  -LG      AM-PAC 6 Clicks Score  16  -LG         Functional Assessment    Outcome Measure Options  AM-PAC 6 Clicks Basic Mobility (PT)  -LG        User Key  (r) = Recorded By, (t) = Taken By, (c) = Cosigned By    Initials Name Provider Type    LG Zak Philippe, RAVI Physical Therapy Assistant         Time Calculation:   PT Charges     Row Name 03/11/19 0846             Time Calculation    Start Time  0846  -      Stop Time  1004  -      Time Calculation (min)  78 min  -LG      PT Received On  03/11/19  -      PT Goal Re-Cert Due Date  03/15/19  -         Time Calculation- PT    Total Timed Code Minutes- PT  78 minute(s)  -        User Key  (r) = Recorded By, (t) = Taken By, (c) = Cosigned By    Initials Name Provider Type    LG Zak Philippe, RAVI Physical Therapy Assistant        Therapy Suggested Charges     Code   Minutes Charges    02067 (CPT®) Hc Pt Neuromusc Re Education Ea 15 Min      94532 (CPT®) Hc Pt Ther Proc Ea 15 Min      98084 (CPT®) Hc Gait Training Ea 15 Min 17 1    22794 (CPT®) Hc Pt Therapeutic Act Ea 15 Min      37669 (CPT®) Hc Pt Manual Therapy Ea 15 Min      87644 (CPT®) Hc Pt Iontophoresis Ea 15 Min      49922 (CPT®) Hc Pt Elec Stim Ea-Per 15 Min      37808 (CPT®) Hc Pt Ultrasound Ea 15 Min      53181 (CPT®) Hc Pt Self Care/Mgmt/Train Ea 15 Min      43564 (CPT®) Hc Pt Prosthetic (S) Train Initial Encounter, Each 15 Min      11824 (CPT®) Hc Pt Orthotic(S)/Prosthetic(S) Encounter, Each 15 Min      41916 (CPT®) Hc Orthotic(S) Mgmt/Train Initial Encounter, Each 15min      Total  17 1        Therapy Charges for Today     Code Description Service Date Service Provider Modifiers Qty    59585556930 HC GAIT TRAINING EA 15 MIN 3/11/2019 Zak Philippe, PTA GP 1     52239094689  PT THERAPEUTIC ACT EA 15 MIN 3/11/2019 Zak Philippe, PTA GP 2    41689943926 HC PT THER PROC EA 15 MIN 3/11/2019 Zak Philippe, RAVI GP 1          PT G-Codes  Outcome Measure Options: AM-PAC 6 Clicks Basic Mobility (PT)  AM-PAC 6 Clicks Score: 16    Zak Philippe PTA  3/11/2019

## 2019-03-11 NOTE — THERAPY TREATMENT NOTE
Acute Care - Physical Therapy Treatment Note  [facility]     Patient Name: Kami Villanueva  : 1934  MRN: [id number]  Today's Date: 3/11/2019  Onset of Illness/Injury or Date of Surgery: 19  Date of Referral to PT: 19  Referring Physician: Dr. Osborn    Admit Date: 2019    Visit Dx:    ICD-10-CM ICD-9-CM   1. 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)   • Gram-negative bacteremia   • Neutropenia (CMS/HCC)   • Hypokalemia   • Pancytopenia due to antineoplastic chemotherapy (CMS/HCC)   • Neutropenic fever (CMS/HCC)       Therapy Treatment    Rehabilitation Treatment Summary     Row Name 19 1426 19 0846          Treatment Time/Intention    Discipline  physical therapy assistant  -LG  physical therapy assistant  -LG     Document Type  therapy note (daily note)  -LG  therapy note (daily note)  -LG     Subjective Information  no complaints  -LG2  no complaints  -LG2     Mode of Treatment  individual therapy;physical therapy  -LG2  individual therapy;physical therapy  -LG2     Patient/Family Observations  pt's daughter in room  -LG2  Pt's daughter present in room.   -LG2     Comment  --  Considerable time spent educating pt and daughter on HEP and focused exercises for R LE (ankle DF)  -LG2     Existing Precautions/Restrictions  fall  -LG2  fall  -LG2     Recorded by [LG] Zak Philippe, PTA 19 1427  [LG2] Zak Philippe, PTA 19 1457 [LG] Zak Philippe, PTA 19 0856  [LG2] Zak Philippe, PTA 19 0955     Row Name 19 1426 19 0846          Bed Mobility Assessment/Treatment    Sit-Supine [place] (Bed  Mobility)  verbal cues;contact guard;minimum assist (75% patient effort)  -LG  --     Sit-Sidelying [place] (Bed Mobility)  verbal cues;contact guard  -LG  --     Assistive Device (Bed Mobility)  bed rails;head of bed elevated  -LG  --     Comment (Bed Mobility)  --  up in chair  -LG     Recorded by [LG] Zak Philippe, PTA 03/11/19 1457 [LG] Zak Philippe, PTA 03/11/19 0955     Row Name 03/11/19 1426 03/11/19 0846          Sit-Stand Transfer    Sit-Stand [place] (Transfers)  verbal cues;contact guard;minimum assist (75% patient effort)  -LG  verbal cues;contact guard;minimum assist (75% patient effort)  -LG     Recorded by [LG] Zak Philippe, PTA 03/11/19 1457 [LG] Zak Philippe, PTA 03/11/19 0955     Row Name 03/11/19 1426 03/11/19 0846          Stand-Sit Transfer    Stand-Sit [place] (Transfers)  verbal cues;contact guard;minimum assist (75% patient effort)  -LG  verbal cues;contact guard;minimum assist (75% patient effort)  -LG     Recorded by [LG] Zak Philippe, PTA 03/11/19 1457 [LG] Zak Philippe, PTA 03/11/19 0955     Row Name 03/11/19 1426 03/11/19 0846          Toilet Transfer    Type (Toilet Transfer)  sit-stand;stand-sit  -LG  sit-stand;stand-sit  -LG     [place] Level (Toilet Transfer)  nonverbal cues (demo/gesture);contact guard  -LG  verbal cues;contact guard;minimum assist (75% patient effort)  -LG     Assistive Device (Toilet Transfer)  commode;walker, front-wheeled  -LG  commode;walker, front-wheeled  -LG2     Recorded by [LG] Zak Philippe, PTA 03/11/19 1457 [LG] Zak Philippe, PTA 03/11/19 0955  [LG2] Zak Philippe, PTA 03/11/19 1457     Row Name 03/11/19 1426 03/11/19 0846          Gait/Stairs Assessment/Training    Gait/Stairs Assessment/Training  gait/ambulation assistive device  -LG  gait/ambulation assistive device  -     [place] Level (Gait)  verbal cues;contact guard  -  verbal cues;contact guard;minimum assist (75% patient effort)  -     Assistive Device  (Gait)  walker, front-wheeled  -LG  walker, front-wheeled  -LG     Distance in Feet (Gait)  80' x 2 reps with standing rest breaks  -LG  75' x 2 reps  -LG2     Pattern (Gait)  step-to  -LG  step-to  -LG2     Deviations/Abnormal Patterns (Gait)  base of support, narrow;gait speed decreased;stride length decreased  -LG  juan decreased  -LG2     Bilateral Gait Deviations  forward flexed posture;foot drop/toe drag  -LG  forward flexed posture;foot drop/toe drag  -LG3     Comment (Gait/Stairs)  Pt fatigues easily and requires enouragement to improve gait..  -LG  --     Recorded by [LG] Zak Philippe, PTA 03/11/19 1457 [LG] Zak Philippe, PTA 03/11/19 0955  [LG2] Zak Philippe, PTA 03/11/19 1000  [LG3] Zak Philippe, PTA 03/11/19 1457     Row Name 03/11/19 1426 03/11/19 0846          Therapeutic Exercise    Comment (Therapeutic Exercise)  B LE/UE AROM x 2 sets of 10 reps sitting eob  -LG  B LE/UE's  AROM x 2 sets of 10 reps  -LG     Recorded by [LG] Zak Philippe, PTA 03/11/19 1457 [LG] Zak Philippe, PTA 03/11/19 1001     Row Name 03/11/19 1426 03/11/19 0846          Positioning and Restraints    Pre-Treatment Position  in bed  -LG  sitting in chair/recliner  -LG     Post Treatment Position  --  chair  -LG     In Bed  fowlers;call light within reach;encouraged to call for assist;exit alarm on;with family/caregiver;notified nsg  -LG  --     In Chair  --  sitting;call light within reach;encouraged to call for assist;with family/caregiver  -LG     Recorded by [LG] Zak Philippe, PTA 03/11/19 1457 [LG] Zak Philippe, PTA 03/11/19 1000     Row Name 03/11/19 0846             Pain Scale: Numbers Pre/Post-Treatment    Pain Scale: Numbers, Pretreatment  0/10 - no pain  -LG      Pain Scale: Numbers, Post-Treatment  0/10 - no pain  -LG      Recorded by [LG] Zak Philippe, PTA 03/11/19 1000        User Key  (r) = Recorded By, (t) = Taken By, (c) = Cosigned By    Initials Name Effective Dates Discipline    LG Zak Philippe, PTA  08/02/16 -  PT                   Physical Therapy Education     Title: PT OT SLP Therapies (In Progress)     Topic: Physical Therapy (In Progress)     Point: Mobility training (In Progress)     Learning Progress Summary           Patient Acceptance, E, NR by AF at 3/8/2019 10:40 AM    Comment:  benefits of activity and amb in relation to independence    Acceptance, E, NR by AE at 3/7/2019 12:40 PM    Comment:  POC t/f's    Acceptance, E, VU,NR by TYRA at 3/5/2019  2:04 PM    Comment:  Educated pt. on progression of PT POC and benefits of activity   Family Acceptance, E, VU,NR by TYRA at 3/5/2019  2:04 PM    Comment:  Educated pt. on progression of PT POC and benefits of activity   Significant Other Acceptance, E, VU,NR by TYRA at 3/5/2019  2:04 PM    Comment:  Educated pt. on progression of PT POC and benefits of activity                               User Key     Initials Effective Dates Name Provider Type Discipline    AE 06/22/15 -  Sandhya Fontanez, PTA Physical Therapy Assistant PT    TYRA 08/02/16 -  Ronnell Cantrell PT DPT Physical Therapist PT    AF 02/11/19 -  Danielle Mcmahon PTA Physical Therapy Assistant PT                PT Recommendation and Plan     Plan of Care Reviewed With: patient, daughter  Progress: improving  Outcome Summary: Pt Min A sit to stand, CGA-Min A Gait 75' with RW x 2 reps. Pt has noted foot drop on right side during gait and runs RW into walls/doorways. Completed  B LE/UE AROM sitting in chair Plans are now for Rehab before returning home.   Outcome Measures     Row Name 03/11/19 1003             How much help from another person do you currently need...    Turning from your back to your side while in flat bed without using bedrails?  3  -LG      Moving from lying on back to sitting on the side of a flat bed without bedrails?  3  -LG      Moving to and from a bed to a chair (including a wheelchair)?  3  -LG      Standing up from a chair using your arms (e.g., wheelchair, bedside chair)?   3  -LG      Climbing 3-5 steps with a railing?  1  -LG      To walk in hospital room?  3  -LG      AM-PAC 6 Clicks Score  16  -LG         Functional Assessment    Outcome Measure Options  AM-PAC 6 Clicks Basic Mobility (PT)  -LG        User Key  (r) = Recorded By, (t) = Taken By, (c) = Cosigned By    Initials Name Provider Type    Zak Landin PTA Physical Therapy Assistant         Time Calculation:   PT Charges     Row Name 03/11/19 1426 03/11/19 0846          Time Calculation    Start Time  1426  -LG  0846  -LG     Stop Time  1458  -LG  1004  -LG     Time Calculation (min)  32 min  -LG  78 min  -LG     PT Received On  03/11/19  -LG  03/11/19  -LG     PT Goal Re-Cert Due Date  03/15/19  -LG  03/15/19  -LG        Time Calculation- PT    Total Timed Code Minutes- PT  32 minute(s)  -LG  78 minute(s)  -LG       User Key  (r) = Recorded By, (t) = Taken By, (c) = Cosigned By    Initials Name Provider Type    Zak Landin PTA Physical Therapy Assistant        Therapy Suggested Charges     Code   Minutes Charges    45635 (CPT®) Hc Pt Neuromusc Re Education Ea 15 Min      60760 (CPT®) Hc Pt Ther Proc Ea 15 Min      98745 (CPT®) Hc Gait Training Ea 15 Min 17 1    65640 (CPT®) Hc Pt Therapeutic Act Ea 15 Min      53111 (CPT®) Hc Pt Manual Therapy Ea 15 Min      98486 (CPT®) Hc Pt Iontophoresis Ea 15 Min      83147 (CPT®) Hc Pt Elec Stim Ea-Per 15 Min      26230 (CPT®) Hc Pt Ultrasound Ea 15 Min      91268 (CPT®) Hc Pt Self Care/Mgmt/Train Ea 15 Min      45697 (CPT®) Hc Pt Prosthetic (S) Train Initial Encounter, Each 15 Min      83872 (CPT®) Hc Pt Orthotic(S)/Prosthetic(S) Encounter, Each 15 Min      89964 (CPT®) Hc Orthotic(S) Mgmt/Train Initial Encounter, Each 15min      Total  17 1        Therapy Charges for Today     Code Description Service Date Service Provider Modifiers Qty    01644569510 HC GAIT TRAINING EA 15 MIN 3/11/2019 Zak Philippe PTA GP 1    91662746232 HC PT THERAPEUTIC ACT EA 15 MIN 3/11/2019  Zak Philippe, PTA GP 2    37691269610 HC PT THER PROC EA 15 MIN 3/11/2019 Zak Philippe, PTA GP 1    85985794909 HC GAIT TRAINING EA 15 MIN 3/11/2019 Zak Philippe, PTA GP 1    05072172632 HC PT THER PROC EA 15 MIN 3/11/2019 Zak Philippe, PTA GP 1          PT G-Codes  Outcome Measure Options: AM-PAC 6 Clicks Basic Mobility (PT)  AM-PAC 6 Clicks Score: 16    Zak Philippe PTA  3/11/2019

## 2019-03-12 VITALS
HEART RATE: 86 BPM | BODY MASS INDEX: 23.14 KG/M2 | OXYGEN SATURATION: 92 % | DIASTOLIC BLOOD PRESSURE: 69 MMHG | HEIGHT: 67 IN | WEIGHT: 147.44 LBS | RESPIRATION RATE: 16 BRPM | TEMPERATURE: 98.2 F | SYSTOLIC BLOOD PRESSURE: 149 MMHG

## 2019-03-12 LAB
ANISOCYTOSIS BLD QL: ABNORMAL
BACTERIA SPEC AEROBE CULT: NORMAL
BASOPHILS # BLD MANUAL: 0.08 10*3/MM3 (ref 0–0.2)
BASOPHILS NFR BLD AUTO: 1 % (ref 0–2)
DEPRECATED RDW RBC AUTO: 53.8 FL (ref 40–54)
ERYTHROCYTE [DISTWIDTH] IN BLOOD BY AUTOMATED COUNT: 16.8 % (ref 12–15)
HCT VFR BLD AUTO: 27.7 % (ref 37–47)
HGB BLD-MCNC: 9 G/DL (ref 12–16)
LYMPHOCYTES # BLD MANUAL: 0.34 10*3/MM3 (ref 0.72–4.86)
LYMPHOCYTES NFR BLD MANUAL: 1 % (ref 4–12)
LYMPHOCYTES NFR BLD MANUAL: 4.1 % (ref 15–45)
MCH RBC QN AUTO: 28.9 PG (ref 28–32)
MCHC RBC AUTO-ENTMCNC: 32.5 G/DL (ref 33–36)
MCV RBC AUTO: 89.1 FL (ref 82–98)
METAMYELOCYTES NFR BLD MANUAL: 2 % (ref 0–0)
MICROCYTES BLD QL: ABNORMAL
MONOCYTES # BLD AUTO: 0.08 10*3/MM3 (ref 0.19–1.3)
MYELOCYTES NFR BLD MANUAL: 2 % (ref 0–0)
NEUTROPHILS # BLD AUTO: 7.44 10*3/MM3 (ref 1.87–8.4)
NEUTROPHILS NFR BLD MANUAL: 89.8 % (ref 39–78)
PLAT MORPH BLD: NORMAL
PLATELET # BLD AUTO: 373 10*3/MM3 (ref 130–400)
PMV BLD AUTO: 9.8 FL (ref 6–12)
POLYCHROMASIA BLD QL SMEAR: ABNORMAL
RBC # BLD AUTO: 3.11 10*6/MM3 (ref 4.2–5.4)
TOXIC GRANULATION: ABNORMAL
WBC NRBC COR # BLD: 8.29 10*3/MM3 (ref 4.8–10.8)

## 2019-03-12 PROCEDURE — 97116 GAIT TRAINING THERAPY: CPT

## 2019-03-12 PROCEDURE — 85025 COMPLETE CBC W/AUTO DIFF WBC: CPT | Performed by: PHYSICIAN ASSISTANT

## 2019-03-12 PROCEDURE — 85007 BL SMEAR W/DIFF WBC COUNT: CPT | Performed by: PHYSICIAN ASSISTANT

## 2019-03-12 PROCEDURE — 97110 THERAPEUTIC EXERCISES: CPT

## 2019-03-12 RX ORDER — LEVOTHYROXINE SODIUM 0.12 MG/1
125 TABLET ORAL DAILY
Start: 2019-03-12 | End: 2019-05-08 | Stop reason: DRUGHIGH

## 2019-03-12 RX ADMIN — LEVOTHYROXINE SODIUM 125 MCG: 125 TABLET ORAL at 05:57

## 2019-03-12 RX ADMIN — CLOTRIMAZOLE 10 MG: 10 LOZENGE ORAL; TOPICAL at 08:11

## 2019-03-12 RX ADMIN — OXYBUTYNIN CHLORIDE 5 MG: 5 TABLET, EXTENDED RELEASE ORAL at 08:11

## 2019-03-12 RX ADMIN — CARVEDILOL 3.12 MG: 3.12 TABLET, FILM COATED ORAL at 08:11

## 2019-03-12 RX ADMIN — CLOTRIMAZOLE 10 MG: 10 LOZENGE ORAL; TOPICAL at 12:04

## 2019-03-12 RX ADMIN — CLOTRIMAZOLE 10 MG: 10 LOZENGE ORAL; TOPICAL at 05:57

## 2019-03-12 NOTE — PLAN OF CARE
Problem: Patient Care Overview  Goal: Plan of Care Review  Outcome: Ongoing (interventions implemented as appropriate)   03/12/19 0001   Coping/Psychosocial   Plan of Care Reviewed With patient   Plan of Care Review   Progress no change   OTHER   Outcome Summary No C/O pain this shift. IV fluids infusing. Possible DC to NH today. Safety maintained.       Problem: Fall Risk (Adult)  Goal: Identify Related Risk Factors and Signs and Symptoms  Outcome: Outcome(s) achieved Date Met: 03/12/19    Goal: Absence of Fall  Outcome: Ongoing (interventions implemented as appropriate)      Problem: Infection, Risk/Actual (Adult)  Goal: Identify Related Risk Factors and Signs and Symptoms  Outcome: Outcome(s) achieved Date Met: 03/12/19    Goal: Infection Prevention/Resolution  Outcome: Ongoing (interventions implemented as appropriate)      Problem: Skin Injury Risk (Adult)  Goal: Identify Related Risk Factors and Signs and Symptoms  Outcome: Outcome(s) achieved Date Met: 03/12/19    Goal: Skin Health and Integrity  Outcome: Ongoing (interventions implemented as appropriate)

## 2019-03-12 NOTE — THERAPY TREATMENT NOTE
Acute Care - Physical Therapy Treatment Note  [facility]     Patient Name: Kami Villanueva  : 1934  MRN: [id number]  Today's Date: 3/12/2019  Onset of Illness/Injury or Date of Surgery: 19  Date of Referral to PT: 19  Referring Physician: Dr. Osborn    Admit Date: 2019    Visit Dx:    ICD-10-CM ICD-9-CM   1. 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)   • Gram-negative bacteremia   • Neutropenia (CMS/HCC)   • Hypokalemia   • Pancytopenia due to antineoplastic chemotherapy (CMS/HCC)   • Neutropenic fever (CMS/HCC)       Therapy Treatment    Rehabilitation Treatment Summary     Row Name 1916             Treatment Time/Intention    Discipline  physical therapy assistant  -NW      Document Type  therapy note (daily note)  -NW      Subjective Information  no complaints  -NW2      Patient/Family Observations  daughter present  -NW2      Existing Precautions/Restrictions  fall  -NW2      Recorded by [NW] Lisa Hinojosa, [facility] 19 0927  [NW2] Lisa Hinojosa, [facility] 19 1001      Row Name 19             Safety Issues, Functional Mobility    Safety Issues Affecting Function (Mobility)  safety precaution awareness  -NW      Impairments Affecting Function (Mobility)  balance;endurance/activity tolerance;strength  -NW      Recorded by [NW] Lisa Hinojosa [facility] 19 1001      Row Name 19             Bed Mobility Assessment/Treatment    Sit-Supine [place] (Bed Mobility)  verbal cues;contact guard  -NW      Sit-Sidelying [place] (Bed Mobility)  -- up in chair  -NW      Assistive Device (Bed Mobility)   bed rails  -NW      Recorded by [NW] Lisa Hinojosa, [facility] 03/12/19 1001      Row Name 03/12/19 0916             Sit-Stand Transfer    Sit-Stand [place] (Transfers)  verbal cues;contact guard;minimum assist (75% patient effort)  -NW      Recorded by [NW] Lisa Hinojosa, [facility] 03/12/19 1001      Row Name 03/12/19 0916             Stand-Sit Transfer    Stand-Sit [place] (Transfers)  verbal cues;contact guard  -NW      Recorded by [NW] Lisa Hinojosa, [facility] 03/12/19 1001      Row Name 03/12/19 0916             Toilet Transfer    [place] Level (Toilet Transfer)  verbal cues;contact guard;minimum assist (75% patient effort)  -NW      Assistive Device (Toilet Transfer)  commode  -NW      Recorded by [NW] Lisa Hinojosa, [facility] 03/12/19 1001      Row Name 03/12/19 0916             Gait/Stairs Assessment/Training    [place] Level (Gait)  verbal cues;contact guard;minimum assist (75% patient effort)  -NW      Assistive Device (Gait)  walker, front-wheeled  -NW      Distance in Feet (Gait)  80x2  -NW      Deviations/Abnormal Patterns (Gait)  base of support, narrow;gait speed decreased;stride length decreased  -NW      Bilateral Gait Deviations  forward flexed posture  -NW      Comment (Gait/Stairs)  Pt cont to fatigue easily, cues for safety and help w/ AD.   -NW      Recorded by [NW] Lisa Hinojosa, [facility] 03/12/19 1001      Row Name 03/12/19 0916             General ROM    GENERAL ROM COMMENTS  BLE AROM x20 sitting  -NW      Recorded by [NW] Lisa Hinojosa, [facility] 03/12/19 1001      Row Name 03/12/19 0916             Positioning and Restraints    Pre-Treatment Position  in bed  -NW      Post Treatment Position  chair  -NW      In Chair  call light within reach;reclined  -NW      Recorded by [NW] Lisa Hinojosa, [facility] 03/12/19 1001      Row Name 03/12/19 0916             Pain Scale: Numbers Pre/Post-Treatment    Pain Scale: Numbers, Pretreatment  0/10 - no pain  -NW      Recorded by [NW] Lisa Hinojosa,  [facility] 03/12/19 1001        User Key  (r) = Recorded By, (t) = Taken By, (c) = Cosigned By    Initials Name Effective Dates Discipline    NW Lisa Hinojosa, PTA 08/02/16 -  PT                   Physical Therapy Education     Title: PT OT SLP Therapies (In Progress)     Topic: Physical Therapy (In Progress)     Point: Mobility training (In Progress)     Learning Progress Summary           Patient Acceptance, E, NR by AF at 3/8/2019 10:40 AM    Comment:  benefits of activity and amb in relation to independence    Acceptance, E, NR by AE at 3/7/2019 12:40 PM    Comment:  POC t/f's    Acceptance, E, VU,NR by TYRA at 3/5/2019  2:04 PM    Comment:  Educated pt. on progression of PT POC and benefits of activity   Family Acceptance, E, VU,NR by TYRA at 3/5/2019  2:04 PM    Comment:  Educated pt. on progression of PT POC and benefits of activity   Significant Other Acceptance, E, VU,NR by TYRA at 3/5/2019  2:04 PM    Comment:  Educated pt. on progression of PT POC and benefits of activity                               User Key     Initials Effective Dates Name Provider Type Discipline    AE 06/22/15 -  Sandhya Fontanez, PTA Physical Therapy Assistant PT    TYRA 08/02/16 -  Ronnell Cantrell, PT DPT Physical Therapist PT    AF 02/11/19 -  Danielle Mcmahon PTA Physical Therapy Assistant PT                PT Recommendation and Plan        Outcome Measures     Row Name 03/12/19 1000 03/11/19 1003          How much help from another person do you currently need...    Turning from your back to your side while in flat bed without using bedrails?  3  -NW  3  -LG     Moving from lying on back to sitting on the side of a flat bed without bedrails?  3  -NW  3  -LG     Moving to and from a bed to a chair (including a wheelchair)?  3  -NW  3  -LG     Standing up from a chair using your arms (e.g., wheelchair, bedside chair)?  3  -NW  3  -LG     Climbing 3-5 steps with a railing?  3  -NW  1  -LG     To walk in hospital room?  3  -NW  3  -LG      AM-PAC 6 Clicks Score  18  -NW  16  -LG        Functional Assessment    Outcome Measure Options  AM-PAC 6 Clicks Basic Mobility (PT)  -NW  AM-PAC 6 Clicks Basic Mobility (PT)  -LG       User Key  (r) = Recorded By, (t) = Taken By, (c) = Cosigned By    Initials Name Provider Type    LG Zak Philippe, RAVI Physical Therapy Assistant    NW Lisa Hinojosa PTA Physical Therapy Assistant         Time Calculation:   PT Charges     Row Name 03/12/19 1002             Time Calculation    Start Time  0916  -NW      Stop Time  0950  -NW      Time Calculation (min)  34 min  -NW      PT Received On  03/12/19  -NW      PT Goal Re-Cert Due Date  03/15/19  -NW         Time Calculation- PT    Total Timed Code Minutes- PT  34 minute(s)  -NW         Timed Charges    18718 - PT Therapeutic Exercise Minutes  14  -NW      06279 - Gait Training Minutes   20  -NW        User Key  (r) = Recorded By, (t) = Taken By, (c) = Cosigned By    Initials Name Provider Type    NW Lisa Hinojosa PTA Physical Therapy Assistant        Therapy Suggested Charges     Code   Minutes Charges    67400 (CPT®) Hc Pt Neuromusc Re Education Ea 15 Min      79259 (CPT®) Hc Pt Ther Proc Ea 15 Min 14 1    10868 (CPT®) Hc Gait Training Ea 15 Min 20 1    63717 (CPT®) Hc Pt Therapeutic Act Ea 15 Min      85498 (CPT®) Hc Pt Manual Therapy Ea 15 Min      00938 (CPT®) Hc Pt Iontophoresis Ea 15 Min      66098 (CPT®) Hc Pt Elec Stim Ea-Per 15 Min      88221 (CPT®) Hc Pt Ultrasound Ea 15 Min      00437 (CPT®) Hc Pt Self Care/Mgmt/Train Ea 15 Min      73025 (CPT®) Hc Pt Prosthetic (S) Train Initial Encounter, Each 15 Min      68792 (CPT®) Hc Pt Orthotic(S)/Prosthetic(S) Encounter, Each 15 Min      46110 (CPT®) Hc Orthotic(S) Mgmt/Train Initial Encounter, Each 15min      Total  34 2        Therapy Charges for Today     Code Description Service Date Service Provider Modifiers Qty    26301600662 HC PT THER PROC EA 15 MIN 3/12/2019 Lisa Hinojosa PTA GP 1    95094905833 HC  GAIT TRAINING EA 15 MIN 3/12/2019 Lisa Hinojosa, PTA GP 1          PT G-Codes  Outcome Measure Options: AM-PAC 6 Clicks Basic Mobility (PT)  AM-PAC 6 Clicks Score: 18    Lisa Hinojosa, PTA  3/12/2019

## 2019-03-12 NOTE — THERAPY DISCHARGE NOTE
Acute Care - Physical Therapy Discharge Summary  [facility]       Patient Name: Kami Villanueva  : 1934  MRN: [id number]    Today's Date: 3/12/2019  Onset of Illness/Injury or Date of Surgery: 19    Date of Referral to PT: 19  Referring Physician: Dr. Osborn      Admit Date: 2019      PT Recommendation and Plan    Visit Dx:    ICD-10-CM ICD-9-CM   1. Impaired mobility Z74.09 799.89       Outcome Measures     Row Name 19 1000 19 1003          How much help from another person do you currently need...    Turning from your back to your side while in flat bed without using bedrails?  3  -NW  3  -LG     Moving from lying on back to sitting on the side of a flat bed without bedrails?  3  -NW  3  -LG     Moving to and from a bed to a chair (including a wheelchair)?  3  -NW  3  -LG     Standing up from a chair using your arms (e.g., wheelchair, bedside chair)?  3  -NW  3  -LG     Climbing 3-5 steps with a railing?  3  -NW  1  -LG     To walk in hospital room?  3  -NW  3  -LG     AM-PAC 6 Clicks Score  18  -NW  16  -LG        Functional Assessment    Outcome Measure Options  AM-PAC 6 Clicks Basic Mobility (PT)  -NW  AM-PAC 6 Clicks Basic Mobility (PT)  -LG       User Key  (r) = Recorded By, (t) = Taken By, (c) = Cosigned By    Initials Name Provider Type    LG Zak Philippe, RAVI Physical Therapy Assistant    NW Lisa Hinojosa PTA Physical Therapy Assistant          PT Charges     Row Name 19 1002             Time Calculation    Start Time  0916  -NW      Stop Time  0950  -NW      Time Calculation (min)  34 min  -NW      PT Received On  19  -NW      PT Goal Re-Cert Due Date  03/15/19  -NW         Time Calculation- PT    Total Timed Code Minutes- PT  34 minute(s)  -NW         Timed Charges    55051 - PT Therapeutic Exercise Minutes  14  -NW      05461 - Gait Training Minutes   20  -NW        User Key  (r) = Recorded By, (t) = Taken By, (c) = Cosigned By    Initials Name  Provider Type    NW Lisa Hinojosa, PTA Physical Therapy Assistant        Therapy Suggested Charges     Code   Minutes Charges    62166 (CPT®) Hc Pt Neuromusc Re Education Ea 15 Min      51963 (CPT®) Hc Pt Ther Proc Ea 15 Min 14 1    28631 (CPT®) Hc Gait Training Ea 15 Min 20 1    90626 (CPT®) Hc Pt Therapeutic Act Ea 15 Min      03244 (CPT®) Hc Pt Manual Therapy Ea 15 Min      16089 (CPT®) Hc Pt Iontophoresis Ea 15 Min      85284 (CPT®) Hc Pt Elec Stim Ea-Per 15 Min      94901 (CPT®) Hc Pt Ultrasound Ea 15 Min      79645 (CPT®) Hc Pt Self Care/Mgmt/Train Ea 15 Min      04621 (CPT®) Hc Pt Prosthetic (S) Train Initial Encounter, Each 15 Min      16619 (CPT®) Hc Pt Orthotic(S)/Prosthetic(S) Encounter, Each 15 Min      20419 (CPT®) Hc Orthotic(S) Mgmt/Train Initial Encounter, Each 15min      Total  34 2          Rehab Goal Summary     Row Name 03/12/19 2267             Physical Therapy Goals    Bed Mobility Goal Selection (PT)  bed mobility, PT goal 1  -NW      Transfer Goal Selection (PT)  transfer, PT goal 1  -NW      Gait Training Goal Selection (PT)  gait training, PT goal 1  -NW         Bed Mobility Goal 1 (PT)    Activity/Assistive Device (Bed Mobility Goal 1, PT)  sit to supine/supine to sit  -NW      [place] Level/Cues Needed (Bed Mobility Goal 1, PT)  supervision required  -NW      Time Frame (Bed Mobility Goal 1, PT)  long term goal (LTG);10 days  -NW      Progress/Outcomes (Bed Mobility Goal 1, PT)  goal not met  -NW         Transfer Goal 1 (PT)    Activity/Assistive Device (Transfer Goal 1, PT)  sit-to-stand/stand-to-sit;bed-to-chair/chair-to-bed;walker, rolling  -NW      [place] Level/Cues Needed (Transfer Goal 1, PT)  standby assist  -NW      Time Frame (Transfer Goal 1, PT)  long term goal (LTG);10 days  -NW      Progress/Outcome (Transfer Goal 1, PT)  goal not met  -NW         Gait Training Goal 1 (PT)    Activity/Assistive Device (Gait Training Goal 1, PT)  gait (walking  locomotion);assistive device use;decrease fall risk;improve balance and speed;increase endurance/gait distance;walker, rolling  -NW      [place] Level (Gait Training Goal 1, PT)  standby assist  -NW      Distance (Gait Goal 1, PT)  100  -NW      Time Frame (Gait Training Goal 1, PT)  long term goal (LTG);10 days  -NW      Progress/Outcome (Gait Training Goal 1, PT)  goal not met  -NW        User Key  (r) = Recorded By, (t) = Taken By, (c) = Cosigned By    Initials Name Provider Type Discipline    NW Lisa Hinojosa PTA Physical Therapy Assistant PT          Therapy Charges for Today     Code Description Service Date Service Provider Modifiers Qty    33189165760 HC PT THER PROC EA 15 MIN 3/12/2019 Lisa Hinojosa PTA GP 1    71358747874 HC GAIT TRAINING EA 15 MIN 3/12/2019 Lisa Hinojosa PTA GP 1          PT Discharge Summary  Anticipated Discharge Disposition (PT): skilled nursing facility  Reason for Discharge: Discharge from facility  Outcomes Achieved: Refer to plan of care for updates on goals achieved  Discharge Destination: SNF      Lisa Hinojosa PTA   3/12/2019

## 2019-03-12 NOTE — DISCHARGE SUMMARY
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 coli UTI  Anemia from chemotherapy  Thrombocytopenia from chemotherapy  Hypokalemia  Moderate protein caloric malnutrition  Generalized weakness    Problem List:    Neutropenic fever (CMS/HCC)      Presenting Problem  Neutropenic fever (CMS/HCC) [D70.9, R50.81]    Hospital Course  Kami Villanueva is an 84-year-old  female with a diagnosis of 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   Kami received cycle #6 of CHOP/R and Dose #11 of prophylactic intrathecal methotrexate 2/20/2019.  Kami was brought in by family ( and 2 daughters) with a temperature elevation to 100.8°. Recheck temperature here is 101°.  CBC reveals a WBC of 0.19 with an ANC of 0.06  Hemoglobin of 8.7 and platelet count of 209,000.  On review of systems, she has only lost 1 pound in the last week, she feels about baseline other than being weak and tired and experienced nausea and vomiting on the one-hour drive from home to the clinic.  She does not have a headache or cough, or burning on urination or sputum production.  She is being evaluated in anticipation of admission to the hospital for neutropenia and fever.     Kami was admitted to the hospital with urine culture on 2/27/2019 consistent with E. coli.  Blood cultures on 227 2019 x 2 were negative.  Repeat blood cultures the evening of admission were again negative.  CSF was withdrawn from her Ommaya port on 2/27/2019 and this was negative as well.  She was treated with broad-spectrum antibiotics that were weaned as possible.    Repeat urinalysis on 3/4/2019 was negative.    She continued to have fever and  Dr. Reji Macias did see her in consultation and adjusted antimicrobial therapy.  She was treated for only during his hospitalization and completed her antibiotic therapy on 3/11/2019.  She did develop some worsening fever and repeat blood cultures on 3/7/2019 did show staph growing from her port.  This culture was repeated and has been negative for 3 days consistent with a contaminant being drawn from her port.  She has been afebrile.    Phisical therapy did work with her as she was able to get stronger during the stay and at this point the family is wanting her to get further therapy if possible-[facility] may be admitting her later today.    She did experience anemia secondary to chemotherapy and received 1 unit packed red blood cells on 2/28/2019 but her hemoglobin has been relatively stable since that time.  She had thrombocytopenia from chemotherapy that resolved during the stay.    She did have transient hypokalemia that was addressed with IV replacement.    1.  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   S/p cycle # 6 of CHOP/R on 2/20/19    S/p dose # 11 of prophylactic intrathecal methotrexate given 2/20/19     2. Neutropenic (resolved) fever E Coli UTI/ LLL infiltrate  WBC - 8.29 with ANC 7.44     Continues afebrile presently, 3/10/2019      Urine cx 2/27/19 - > 100,000 CFU/mL E. Coli  Blood culture x 2 2/27/19 -no growth at 5 days  Repeat blood culture port/peripheral 2/27/19 p.m. - no growth at 5 days  CSF 2/27/19 - gram stain negative, culture no growth at 5 days  Repeat blood culture port/peripheral 3/4/19 - no growth at 5 days  Repeat blood culture port/peripheral 3/7/19 - Staphylococcus, Gram positive cocci from port  -- possible contaminant  Repeat blood culture port 3/9/19 - No growth at 3 days      Repeat UA 3/4/19 - negative     CXR 3/3/19 -  Trace pleural fluid with new patchy LLL infiltrate or atelectasis  CXR  3/5/19 -  Focal infiltrate at the left lung base may represent pneumonia or atelectasis.  Vascular redistribution.  There may be minimal edema, there are small pleural effusions.  CXR 3/8/2019- Mild pulmonary edema with tiny pleural effusions. Small area of alveolar edema in the right upper lobe versus developing pneumonia.  2D echo on 3/9/2019 with estimated EF and evidence of pulmonary HTN        Merrem 1 g IV q8 hrs - last doses 3/11/19  Vancomycin 1 g IV q24 hrs d/c'ed 3/10/19     Dr. Small note reviewd        3.  Anemia from chemotherapy  HGB - 9.0 today (s/p 1 unit p RBC 2/28/19)     4.  Thrombocytopenia from chemotherapy (resolved)  PLT - 373,000 on 3/10/2019     5.  DVT prophylaxis  - Lovenox 40 subq daily - d/c ed at discharge   - crt 0.60 on 3/9/19     5.  Hypokalemia  - K 3.9 on 3/9/19       6.  Generalized weakness  - undergoing PT      7.  Moderate protein calorie malnutrition  - followed by dietary  - supplements        Procedures Performed    2D echo 3/8/2019  · Estimated EF = 55%.  · Left ventricular diastolic dysfunction.  · No evidence of pulmonary hypertension is present.  · No valvular vegetations    Consults:   Consults     Date and Time Order Name Status Description    2/28/2019 0718 Inpatient Infectious Diseases Consult            Pertinent Test Results:   Lab Results (last 24 hours)     Procedure Component Value Units Date/Time    CBC & Differential [[id number]] Collected:  03/12/19 0436    Specimen:  Blood Updated:  03/12/19 0544    Narrative:       The following orders were created for panel order CBC & Differential.  Procedure                               Abnormality         Status                     ---------                               -----------         ------                     Manual Differential[[id number]]          Abnormal            Final result               CBC Auto Differential[[id number]]        Abnormal            Final result                 Please view results for  these tests on the individual orders.    CBC Auto Differential [[id number]]  (Abnormal) Collected:  03/12/19 0436    Specimen:  Blood Updated:  03/12/19 0544     WBC 8.29 10*3/mm3      RBC 3.11 10*6/mm3      Hemoglobin 9.0 g/dL      Hematocrit 27.7 %      MCV 89.1 fL      MCH 28.9 pg      MCHC 32.5 g/dL      RDW 16.8 %      RDW-SD 53.8 fl      MPV 9.8 fL      Platelets 373 10*3/mm3     Manual Differential [[id number]]  (Abnormal) Collected:  03/12/19 0436    Specimen:  Blood Updated:  03/12/19 0544     Neutrophil % 89.8 %      Lymphocyte % 4.1 %      Monocyte % 1.0 %      Basophil % 1.0 %      Metamyelocyte % 2.0 %      Myelocyte % 2.0 %      Neutrophils Absolute 7.44 10*3/mm3      Lymphocytes Absolute 0.34 10*3/mm3      Monocytes Absolute 0.08 10*3/mm3      Basophils Absolute 0.08 10*3/mm3      Anisocytosis Slight/1+     Microcytes Slight/1+     Polychromasia Slight/1+     Toxic Granulation Slight/1+     Platelet Morphology Normal    Blood Culture With REBECA - Blood, Blood, Central Line [[id number]] Collected:  03/09/19 0310    Specimen:  Blood, Central Line Updated:  03/12/19 0430     Blood Culture No growth at 3 days    Blood Culture With REBECA - Blood, Arm, Left [[id number]] Collected:  03/07/19 2138    Specimen:  Blood from Arm, Left Updated:  03/11/19 2330     Blood Culture No growth at 4 days    CBC & Differential [[id number]] Collected:  03/11/19 0625    Specimen:  Blood Updated:  03/11/19 0733    Narrative:       The following orders were created for panel order CBC & Differential.  Procedure                               Abnormality         Status                     ---------                               -----------         ------                     Manual Differential[[id number]]                                                         CBC Auto Differential[[id number]]        Abnormal            Final result                 Please view results for these tests on the individual orders.    CBC Auto Differential  [[id number]]  (Abnormal) Collected:  03/11/19 0625    Specimen:  Blood Updated:  03/11/19 0733     WBC 8.81 10*3/mm3      RBC 3.05 10*6/mm3      Hemoglobin 8.8 g/dL      Hematocrit 27.4 %      MCV 89.8 fL      MCH 28.9 pg      MCHC 32.1 g/dL      RDW 17.0 %      RDW-SD 55.2 fl      MPV 9.8 fL      Platelets 367 10*3/mm3     Manual Differential [[id number]]  (Abnormal) Collected:  03/11/19 0625    Specimen:  Blood Updated:  03/11/19 0733     Neutrophil % 90.0 %      Lymphocyte % 5.0 %      Monocyte % 2.0 %      Eosinophil % 2.0 %      Bands %  1.0 %      Neutrophils Absolute 8.02 10*3/mm3      Lymphocytes Absolute 0.44 10*3/mm3      Monocytes Absolute 0.18 10*3/mm3      Eosinophils Absolute 0.18 10*3/mm3      Hypochromia Slight/1+     WBC Morphology Normal     Platelet Morphology Normal    Blood Culture With REBECA - Blood, Blood, Central Line [[id number]]  (Abnormal)  (Susceptibility) Collected:  03/07/19 2320    Specimen:  Blood, Central Line Updated:  03/11/19 0721     Blood Culture Staphylococcus epidermidis     Isolated from Aerobic Bottle     BETA LACTAMASE Positive     Gram Stain Gram positive cocci    Susceptibility      Staphylococcus epidermidis     ALFREDO (Preliminary)     Clindamycin Susceptible     Erythromycin Resistant     Gentamicin Susceptible     Inducible Clindamycin Resistance Negative     Levofloxacin Intermediate [1]      Oxacillin Resistant     Penicillin G Resistant     Tetracycline Resistant     Vancomycin Susceptible            [1]   Staphylococcus species may develop resistance during prolonged therapy with quinolones. Isolates that are initially susceptible may become resistant within three to four days after initiation of therapy. Testing of repeat isolates may be warranted.              Susceptibility Comments     Staphylococcus epidermidis    This isolate does not demonstrate inducible clindamycin resistance in vitro.                     Cultures:    Blood Culture   Date Value Ref Range Status  "  03/09/2019 No growth at 3 days  Preliminary   03/07/2019 Staphylococcus epidermidis (A)  Preliminary   03/07/2019 No growth at 4 days  Preliminary       Condition on Discharge: Improving but still weak    Vital Signs  /68 (BP Location: Left arm, Patient Position: Lying)   Pulse 78   Temp 97.7 °F (36.5 °C) (Oral)   Resp 18   Ht 170.2 cm (67.01\")   Wt 66.9 kg (147 lb 7 oz)   LMP  (LMP Unknown)   SpO2 94%   BMI 23.09 kg/m²     Discharge Disposition  Skilled Nursing Facility (DC - External)    Discharge Medications     Discharge Medications      Continue These Medications      Instructions Start Date   ARNUITY ELLIPTA 100 MCG/ACT aerosol powder   Generic drug:  Fluticasone Furoate   1 puff, Inhalation, Daily      carvedilol 3.125 MG tablet  Commonly known as:  COREG   3.125 mg, Oral, 2 Times Daily      levothyroxine 125 MCG tablet  Commonly known as:  SYNTHROID, LEVOTHROID   125 mcg, Oral, Daily      Mirabegron ER 25 MG tablet sustained-release 24 hour 24 hr tablet  Commonly known as:  MYRBETRIQ   25 mg, Oral, Daily      MUCINEX 600 MG 12 hr tablet  Generic drug:  guaiFENesin   1 capsule, Oral, Daily PRN         ASK your doctor about these medications      Instructions Start Date   ondansetron 8 MG tablet  Commonly known as:  ZOFRAN   8 mg, Oral, Every 8 Hours PRN             Discharge Diet: Regular diet      Activity at Discharge: Increase to normal activity as tolerated.      Follow-up Appointments  Future Appointments   Date Time Provider Department Center   7/8/2019 10:00 AM Olivia Quiroz APRN [facility] None     Follow-up with Dr. Osborn on 3/27/19 at 9:30 AM for reevaluation    Test Results Pending at Discharge   Order Current Status    Blood Culture With REBECA - Blood, Arm, Left Preliminary result    Blood Culture With REBECA - Blood, Blood, Central Line Preliminary result    Blood Culture With REBECA - Blood, Blood, Central Line Preliminary result          Time: Discharge 20 min    The above " documentation occurred with a 15 minute face to face interview and review of medical records per ELLA Jiménez. Time spent on this discharge was 20 minutes.     ELLA Jiménez  3/12/2019  6:26 AM    Awaiting decision by Agency [facility]    I personally saw and examined this patient, performing a face-to-face diagnostic evaluation with plan of care reviewed and developed with  Prashanth Chambers PA-C and nursing staff.   I have addended and/or modified the above history of present illness, physical examination, and assessment and plan to reflect my findings and impressions.   Essential elements of the care plan were discussed with  Prashanth Chambers PA-C .   Agree with findings and assessment/plan as documented above.   Questions were encouraged, asked and answered to their understanding and satisfaction.    Alexander Osborn MD  3/12/2019 7:36 AM

## 2019-03-12 NOTE — PROGRESS NOTES
Continued Stay Note  [facility]     Patient Name: Kami Villanueva  MRN: [id number]  Today's Date: 3/12/2019    Admit Date: 2/27/2019    Discharge Plan     Row Name 03/12/19 0921       Plan    Plan  [facility]    Patient/Family in Agreement with Plan  yes    Final Discharge Disposition Code  03 - skilled nursing facility (SNF)    Final Note  Pt has been discharged. Pt has been accepted at [facility], daughter aware in the room.  Pt will be admitted at SNF level care. Pt will be transported via family vehicle.      [facility] [phone]        Discharge Codes    No documentation.       Expected Discharge Date and Time     Expected Discharge Date Expected Discharge Time    Mar 12, 2019             AARON Rebolledo

## 2019-03-13 LAB
B-LACTAMASE USUAL SUSC ISLT: POSITIVE
BACTERIA SPEC AEROBE CULT: ABNORMAL
GRAM STN SPEC: ABNORMAL
ISOLATED FROM: ABNORMAL
SLIME TEST: NEGATIVE

## 2019-03-14 LAB — BACTERIA SPEC AEROBE CULT: NORMAL

## 2019-03-25 ENCOUNTER — LAB REQUISITION (OUTPATIENT)
Dept: LAB | Facility: HOSPITAL | Age: 84
End: 2019-03-25

## 2019-03-25 DIAGNOSIS — Z00.00 ENCOUNTER FOR GENERAL ADULT MEDICAL EXAMINATION WITHOUT ABNORMAL FINDINGS: ICD-10-CM

## 2019-03-25 LAB
ANION GAP SERPL CALCULATED.3IONS-SCNC: 7 MMOL/L (ref 4–13)
BASOPHILS # BLD AUTO: 0.07 10*3/MM3 (ref 0–0.2)
BASOPHILS NFR BLD AUTO: 1.1 % (ref 0–2)
BUN BLD-MCNC: 14 MG/DL (ref 5–21)
BUN/CREAT SERPL: 25.5 (ref 7–25)
CALCIUM SPEC-SCNC: 8.7 MG/DL (ref 8.4–10.4)
CHLORIDE SERPL-SCNC: 100 MMOL/L (ref 98–110)
CO2 SERPL-SCNC: 29 MMOL/L (ref 24–31)
CREAT BLD-MCNC: 0.55 MG/DL (ref 0.5–1.4)
DEPRECATED RDW RBC AUTO: 52.5 FL (ref 40–54)
EOSINOPHIL # BLD AUTO: 0.19 10*3/MM3 (ref 0–0.7)
EOSINOPHIL NFR BLD AUTO: 3 % (ref 0–4)
ERYTHROCYTE [DISTWIDTH] IN BLOOD BY AUTOMATED COUNT: 16.2 % (ref 12–15)
GFR SERPL CREATININE-BSD FRML MDRD: 105 ML/MIN/1.73
GLUCOSE BLD-MCNC: 83 MG/DL (ref 70–100)
HCT VFR BLD AUTO: 28.9 % (ref 37–47)
HGB BLD-MCNC: 9 G/DL (ref 12–16)
IMM GRANULOCYTES # BLD AUTO: 0.1 10*3/MM3 (ref 0–0.05)
IMM GRANULOCYTES NFR BLD AUTO: 1.6 % (ref 0–5)
LYMPHOCYTES # BLD AUTO: 0.7 10*3/MM3 (ref 0.72–4.86)
LYMPHOCYTES NFR BLD AUTO: 11 % (ref 15–45)
MCH RBC QN AUTO: 27.4 PG (ref 28–32)
MCHC RBC AUTO-ENTMCNC: 31.1 G/DL (ref 33–36)
MCV RBC AUTO: 88.1 FL (ref 82–98)
MONOCYTES # BLD AUTO: 0.78 10*3/MM3 (ref 0.19–1.3)
MONOCYTES NFR BLD AUTO: 12.3 % (ref 4–12)
NEUTROPHILS # BLD AUTO: 4.51 10*3/MM3 (ref 1.87–8.4)
NEUTROPHILS NFR BLD AUTO: 71 % (ref 39–78)
NRBC BLD AUTO-RTO: 0 /100 WBC (ref 0–0)
PLATELET # BLD AUTO: 303 10*3/MM3 (ref 130–400)
PMV BLD AUTO: 10.7 FL (ref 6–12)
POTASSIUM BLD-SCNC: 3.8 MMOL/L (ref 3.5–5.3)
RBC # BLD AUTO: 3.28 10*6/MM3 (ref 4.2–5.4)
SODIUM BLD-SCNC: 136 MMOL/L (ref 135–145)
TSH SERPL DL<=0.05 MIU/L-ACNC: 0.09 MIU/ML (ref 0.47–4.68)
WBC NRBC COR # BLD: 6.35 10*3/MM3 (ref 4.8–10.8)

## 2019-03-25 PROCEDURE — 84443 ASSAY THYROID STIM HORMONE: CPT | Performed by: NURSE PRACTITIONER

## 2019-03-25 PROCEDURE — 36415 COLL VENOUS BLD VENIPUNCTURE: CPT | Performed by: NURSE PRACTITIONER

## 2019-03-25 PROCEDURE — 80048 BASIC METABOLIC PNL TOTAL CA: CPT | Performed by: NURSE PRACTITIONER

## 2019-03-25 PROCEDURE — 85025 COMPLETE CBC W/AUTO DIFF WBC: CPT | Performed by: NURSE PRACTITIONER

## 2019-03-27 ENCOUNTER — HOSPITAL ENCOUNTER (OUTPATIENT)
Dept: GENERAL RADIOLOGY | Age: 84
Discharge: HOME | End: 2019-03-27
Payer: COMMERCIAL

## 2019-03-27 ENCOUNTER — TRANSCRIBE ORDERS (OUTPATIENT)
Dept: ADMINISTRATIVE | Facility: HOSPITAL | Age: 84
End: 2019-03-27

## 2019-03-27 DIAGNOSIS — C83.31 DIFFUSE LARGE B-CELL LYMPHOMA, LYMPH NODES OF HEAD, FACE, AND NECK (HCC): Primary | ICD-10-CM

## 2019-03-27 DIAGNOSIS — J18.9 PNEUMONIA DUE TO INFECTIOUS ORGANISM, UNSPECIFIED LATERALITY, UNSPECIFIED PART OF LUNG: ICD-10-CM

## 2019-03-27 DIAGNOSIS — R05.9 COUGH: ICD-10-CM

## 2019-03-27 PROCEDURE — 71046 X-RAY EXAM CHEST 2 VIEWS: CPT

## 2019-04-01 ENCOUNTER — APPOINTMENT (OUTPATIENT)
Dept: MRI IMAGING | Facility: HOSPITAL | Age: 84
End: 2019-04-01

## 2019-04-01 ENCOUNTER — HOSPITAL ENCOUNTER (OUTPATIENT)
Dept: MRI IMAGING | Facility: HOSPITAL | Age: 84
Discharge: HOME | End: 2019-04-01
Admitting: INTERNAL MEDICINE

## 2019-04-01 ENCOUNTER — HOSPITAL ENCOUNTER (OUTPATIENT)
Dept: MRI IMAGING | Facility: HOSPITAL | Age: 84
Discharge: HOME | End: 2019-04-01

## 2019-04-01 DIAGNOSIS — C83.31 DIFFUSE LARGE B-CELL LYMPHOMA, LYMPH NODES OF HEAD, FACE, AND NECK (HCC): ICD-10-CM

## 2019-04-01 PROCEDURE — A9577 INJ MULTIHANCE: HCPCS | Performed by: INTERNAL MEDICINE

## 2019-04-01 PROCEDURE — 70553 MRI BRAIN STEM W/O & W/DYE: CPT

## 2019-04-01 PROCEDURE — 0 GADOBENATE DIMEGLUMINE 529 MG/ML SOLUTION: Performed by: INTERNAL MEDICINE

## 2019-04-01 PROCEDURE — 70543 MRI ORBT/FAC/NCK W/O &W/DYE: CPT

## 2019-04-01 RX ADMIN — GADOBENATE DIMEGLUMINE 0.1 ML: 529 INJECTION, SOLUTION INTRAVENOUS at 12:20

## 2019-04-01 RX ADMIN — GADOBENATE DIMEGLUMINE 10 ML: 529 INJECTION, SOLUTION INTRAVENOUS at 12:22

## 2019-04-10 ENCOUNTER — INFUSION (OUTPATIENT)
Dept: ONCOLOGY | Facility: HOSPITAL | Age: 84
End: 2019-04-10

## 2019-04-10 ENCOUNTER — APPOINTMENT (OUTPATIENT)
Dept: LAB | Facility: HOSPITAL | Age: 84
End: 2019-04-10

## 2019-04-10 ENCOUNTER — TRANSCRIBE ORDERS (OUTPATIENT)
Dept: ADMINISTRATIVE | Facility: HOSPITAL | Age: 84
End: 2019-04-10

## 2019-04-10 DIAGNOSIS — R60.1 GENERALIZED EDEMA: Primary | ICD-10-CM

## 2019-04-10 DIAGNOSIS — R60.9 PERIPHERAL EDEMA: ICD-10-CM

## 2019-04-10 DIAGNOSIS — R60.9 EDEMA, UNSPECIFIED TYPE: Primary | ICD-10-CM

## 2019-04-10 LAB
ALBUMIN SERPL-MCNC: 3.8 G/DL (ref 3.5–5)
ALBUMIN/GLOB SERPL: 1.8 G/DL (ref 1.1–2.5)
ALP SERPL-CCNC: 149 U/L (ref 24–120)
ALT SERPL W P-5'-P-CCNC: 28 U/L (ref 0–54)
ANION GAP SERPL CALCULATED.3IONS-SCNC: 6 MMOL/L (ref 4–13)
AST SERPL-CCNC: 39 U/L (ref 7–45)
BASOPHILS # BLD AUTO: 0.04 10*3/MM3 (ref 0–0.2)
BASOPHILS NFR BLD AUTO: 0.5 % (ref 0–2)
BILIRUB SERPL-MCNC: 0.6 MG/DL (ref 0.1–1)
BUN BLD-MCNC: 19 MG/DL (ref 5–21)
BUN/CREAT SERPL: 31.1 (ref 7–25)
CALCIUM SPEC-SCNC: 9.4 MG/DL (ref 8.4–10.4)
CHLORIDE SERPL-SCNC: 101 MMOL/L (ref 98–110)
CO2 SERPL-SCNC: 30 MMOL/L (ref 24–31)
CREAT BLD-MCNC: 0.61 MG/DL (ref 0.5–1.4)
DEPRECATED RDW RBC AUTO: 52 FL (ref 40–54)
EOSINOPHIL # BLD AUTO: 0.16 10*3/MM3 (ref 0–0.7)
EOSINOPHIL NFR BLD AUTO: 2.1 % (ref 0–4)
ERYTHROCYTE [DISTWIDTH] IN BLOOD BY AUTOMATED COUNT: 16.2 % (ref 12–15)
GFR SERPL CREATININE-BSD FRML MDRD: 93 ML/MIN/1.73
GLOBULIN UR ELPH-MCNC: 2.1 GM/DL
GLUCOSE BLD-MCNC: 85 MG/DL (ref 70–100)
HCT VFR BLD AUTO: 33.6 % (ref 37–47)
HGB BLD-MCNC: 10.6 G/DL (ref 12–16)
IMM GRANULOCYTES # BLD AUTO: 0.03 10*3/MM3 (ref 0–0.05)
IMM GRANULOCYTES NFR BLD AUTO: 0.4 % (ref 0–5)
LYMPHOCYTES # BLD AUTO: 2.75 10*3/MM3 (ref 0.72–4.86)
LYMPHOCYTES NFR BLD AUTO: 35.6 % (ref 15–45)
MCH RBC QN AUTO: 27.9 PG (ref 28–32)
MCHC RBC AUTO-ENTMCNC: 31.5 G/DL (ref 33–36)
MCV RBC AUTO: 88.4 FL (ref 82–98)
MONOCYTES # BLD AUTO: 0.91 10*3/MM3 (ref 0.19–1.3)
MONOCYTES NFR BLD AUTO: 11.8 % (ref 4–12)
NEUTROPHILS # BLD AUTO: 3.83 10*3/MM3 (ref 1.87–8.4)
NEUTROPHILS NFR BLD AUTO: 49.6 % (ref 39–78)
NRBC BLD AUTO-RTO: 0 /100 WBC (ref 0–0)
NT-PROBNP SERPL-MCNC: 1480 PG/ML (ref 0–1800)
PLATELET # BLD AUTO: 216 10*3/MM3 (ref 130–400)
PMV BLD AUTO: 10.1 FL (ref 6–12)
POTASSIUM BLD-SCNC: 4.2 MMOL/L (ref 3.5–5.3)
PROT SERPL-MCNC: 5.9 G/DL (ref 6.3–8.7)
RBC # BLD AUTO: 3.8 10*6/MM3 (ref 4.2–5.4)
SODIUM BLD-SCNC: 137 MMOL/L (ref 135–145)
WBC NRBC COR # BLD: 7.72 10*3/MM3 (ref 4.8–10.8)

## 2019-04-10 PROCEDURE — 85025 COMPLETE CBC W/AUTO DIFF WBC: CPT | Performed by: PHYSICIAN ASSISTANT

## 2019-04-10 PROCEDURE — 80053 COMPREHEN METABOLIC PANEL: CPT | Performed by: PHYSICIAN ASSISTANT

## 2019-04-10 PROCEDURE — 36591 DRAW BLOOD OFF VENOUS DEVICE: CPT

## 2019-04-10 PROCEDURE — 83880 ASSAY OF NATRIURETIC PEPTIDE: CPT | Performed by: PHYSICIAN ASSISTANT

## 2019-04-10 RX ORDER — HEPARIN SODIUM (PORCINE) LOCK FLUSH IV SOLN 100 UNIT/ML 100 UNIT/ML
500 SOLUTION INTRAVENOUS AS NEEDED
OUTPATIENT
Start: 2019-04-10

## 2019-04-10 RX ORDER — SODIUM CHLORIDE 0.9 % (FLUSH) 0.9 %
10 SYRINGE (ML) INJECTION AS NEEDED
Status: DISCONTINUED | OUTPATIENT
Start: 2019-04-10 | End: 2019-04-10 | Stop reason: HOSPADM

## 2019-04-10 RX ORDER — SODIUM CHLORIDE 0.9 % (FLUSH) 0.9 %
10 SYRINGE (ML) INJECTION AS NEEDED
OUTPATIENT
Start: 2019-04-10

## 2019-04-10 RX ADMIN — Medication 500 UNITS: at 13:30

## 2019-04-10 RX ADMIN — SODIUM CHLORIDE, PRESERVATIVE FREE 10 ML: 5 INJECTION INTRAVENOUS at 13:30

## 2019-04-11 ENCOUNTER — TRANSCRIBE ORDERS (OUTPATIENT)
Dept: ADMINISTRATIVE | Facility: HOSPITAL | Age: 84
End: 2019-04-11

## 2019-04-11 DIAGNOSIS — I10 ESSENTIAL (PRIMARY) HYPERTENSION: Primary | ICD-10-CM

## 2019-04-17 ENCOUNTER — LAB (OUTPATIENT)
Dept: LAB | Facility: HOSPITAL | Age: 84
End: 2019-04-17

## 2019-04-17 DIAGNOSIS — I10 ESSENTIAL (PRIMARY) HYPERTENSION: ICD-10-CM

## 2019-04-17 LAB
ANION GAP SERPL CALCULATED.3IONS-SCNC: 8 MMOL/L (ref 4–13)
BUN BLD-MCNC: 23 MG/DL (ref 5–21)
BUN/CREAT SERPL: 29.5 (ref 7–25)
CALCIUM SPEC-SCNC: 9.4 MG/DL (ref 8.4–10.4)
CHLORIDE SERPL-SCNC: 97 MMOL/L (ref 98–110)
CO2 SERPL-SCNC: 33 MMOL/L (ref 24–31)
CREAT BLD-MCNC: 0.78 MG/DL (ref 0.5–1.4)
GFR SERPL CREATININE-BSD FRML MDRD: 70 ML/MIN/1.73
GLUCOSE BLD-MCNC: 76 MG/DL (ref 70–100)
POTASSIUM BLD-SCNC: 4.1 MMOL/L (ref 3.5–5.3)
SODIUM BLD-SCNC: 138 MMOL/L (ref 135–145)

## 2019-04-17 PROCEDURE — 80048 BASIC METABOLIC PNL TOTAL CA: CPT | Performed by: INTERNAL MEDICINE

## 2019-04-17 PROCEDURE — 36415 COLL VENOUS BLD VENIPUNCTURE: CPT

## 2019-05-06 PROBLEM — Z85.3 HISTORY OF BREAST CANCER: Status: ACTIVE | Noted: 2019-05-06

## 2019-05-06 PROBLEM — Z71.9 ENCOUNTER FOR CONSULTATION: Status: ACTIVE | Noted: 2019-05-06

## 2019-05-08 ENCOUNTER — CONSULT (OUTPATIENT)
Dept: RADIATION ONCOLOGY | Facility: HOSPITAL | Age: 84
End: 2019-05-08

## 2019-05-08 ENCOUNTER — HOSPITAL ENCOUNTER (OUTPATIENT)
Dept: RADIATION ONCOLOGY | Facility: HOSPITAL | Age: 84
Setting detail: RADIATION/ONCOLOGY SERIES
End: 2019-05-08

## 2019-05-08 VITALS
SYSTOLIC BLOOD PRESSURE: 120 MMHG | BODY MASS INDEX: 23.7 KG/M2 | DIASTOLIC BLOOD PRESSURE: 70 MMHG | WEIGHT: 151 LBS | HEIGHT: 67 IN

## 2019-05-08 DIAGNOSIS — C83.31 DIFFUSE LARGE B-CELL LYMPHOMA OF LYMPH NODES OF HEAD (HCC): Primary | ICD-10-CM

## 2019-05-08 DIAGNOSIS — Z85.3 HISTORY OF BREAST CANCER: ICD-10-CM

## 2019-05-08 DIAGNOSIS — Z78.9 NON-SMOKER: ICD-10-CM

## 2019-05-08 DIAGNOSIS — Z92.21 HISTORY OF CHEMOTHERAPY: ICD-10-CM

## 2019-05-08 DIAGNOSIS — Z71.9 ENCOUNTER FOR CONSULTATION: ICD-10-CM

## 2019-05-08 PROCEDURE — G0463 HOSPITAL OUTPT CLINIC VISIT: HCPCS | Performed by: RADIOLOGY

## 2019-05-08 RX ORDER — LEVOTHYROXINE SODIUM 0.1 MG/1
100 TABLET ORAL DAILY
COMMUNITY

## 2019-05-08 RX ORDER — FUROSEMIDE 40 MG/1
40 TABLET ORAL DAILY
COMMUNITY

## 2019-05-08 RX ORDER — POTASSIUM CHLORIDE 20 MEQ/1
20 TABLET, EXTENDED RELEASE ORAL 2 TIMES DAILY
COMMUNITY
End: 2021-03-11 | Stop reason: SDUPTHER

## 2019-05-21 ENCOUNTER — DOCUMENTATION (OUTPATIENT)
Dept: INTERNAL MEDICINE | Facility: HOSPITAL | Age: 84
End: 2019-05-21

## 2019-06-05 ENCOUNTER — TRANSCRIBE ORDERS (OUTPATIENT)
Dept: ADMINISTRATIVE | Facility: HOSPITAL | Age: 84
End: 2019-06-05

## 2019-06-05 ENCOUNTER — HOSPITAL ENCOUNTER (OUTPATIENT)
Age: 84
Discharge: HOME | End: 2019-06-05
Payer: MEDICARE

## 2019-06-05 DIAGNOSIS — C83.31 DIFFUSE LARGE B-CELL LYMPHOMA, LYMPH NODES OF HEAD, FACE, AND NECK (HCC): Primary | ICD-10-CM

## 2019-06-05 PROCEDURE — 83615 LACTATE (LD) (LDH) ENZYME: CPT

## 2019-06-05 PROCEDURE — 36415 COLL VENOUS BLD VENIPUNCTURE: CPT

## 2019-06-05 PROCEDURE — 85025 COMPLETE CBC W/AUTO DIFF WBC: CPT

## 2019-06-05 PROCEDURE — 80053 COMPREHEN METABOLIC PANEL: CPT

## 2019-06-12 ENCOUNTER — HOSPITAL ENCOUNTER (OUTPATIENT)
Dept: RADIATION ONCOLOGY | Facility: HOSPITAL | Age: 84
Setting detail: RADIATION/ONCOLOGY SERIES
End: 2019-06-12

## 2019-06-24 PROBLEM — J18.9 PNEUMONIA OF LEFT LOWER LOBE DUE TO INFECTIOUS ORGANISM: Status: ACTIVE | Noted: 2019-06-24

## 2019-06-24 PROBLEM — J45.20 MILD INTERMITTENT ASTHMA WITHOUT COMPLICATION: Status: ACTIVE | Noted: 2019-06-24

## 2019-07-24 ENCOUNTER — HOSPITAL ENCOUNTER (OUTPATIENT)
Dept: INFUSION THERAPY | Age: 84
Discharge: HOME | End: 2019-07-24
Payer: MEDICARE

## 2019-07-24 PROCEDURE — 96523 IRRIG DRUG DELIVERY DEVICE: CPT

## 2019-07-24 PROCEDURE — 6360000002 HC RX W HCPCS: Performed by: PHYSICIAN ASSISTANT

## 2019-07-24 RX ORDER — HEPARIN SODIUM (PORCINE) LOCK FLUSH IV SOLN 100 UNIT/ML 100 UNIT/ML
500 SOLUTION INTRAVENOUS ONCE
Status: DISCONTINUED | OUTPATIENT
Start: 2019-07-24 | End: 2019-07-26 | Stop reason: HOSPADM

## 2019-08-01 ENCOUNTER — OFFICE VISIT (OUTPATIENT)
Dept: PULMONOLOGY | Facility: CLINIC | Age: 84
End: 2019-08-01

## 2019-08-01 VITALS
BODY MASS INDEX: 23.7 KG/M2 | HEART RATE: 88 BPM | SYSTOLIC BLOOD PRESSURE: 126 MMHG | WEIGHT: 151 LBS | DIASTOLIC BLOOD PRESSURE: 80 MMHG | HEIGHT: 67 IN | OXYGEN SATURATION: 99 %

## 2019-08-01 DIAGNOSIS — C83.31 DIFFUSE LARGE B-CELL LYMPHOMA OF LYMPH NODES OF HEAD (HCC): ICD-10-CM

## 2019-08-01 DIAGNOSIS — J45.20 MILD INTERMITTENT ASTHMA WITHOUT COMPLICATION: Primary | ICD-10-CM

## 2019-08-01 DIAGNOSIS — J38.00 COMPLETE PARALYSIS OF VOCAL CORD: ICD-10-CM

## 2019-08-01 DIAGNOSIS — J34.89 MAXILLARY SINUS MASS: ICD-10-CM

## 2019-08-01 LAB
FEV1/FVC: 70.82 %
FEV1: NORMAL LITERS
FVC VOL RESPIRATORY: NORMAL LITERS

## 2019-08-01 PROCEDURE — 94010 BREATHING CAPACITY TEST: CPT | Performed by: NURSE PRACTITIONER

## 2019-08-01 PROCEDURE — 99214 OFFICE O/P EST MOD 30 MIN: CPT | Performed by: NURSE PRACTITIONER

## 2019-08-01 ASSESSMENT — PULMONARY FUNCTION TESTS: FEV1/FVC: 70.82

## 2019-09-04 ENCOUNTER — HOSPITAL ENCOUNTER (OUTPATIENT)
Dept: MRI IMAGING | Facility: HOSPITAL | Age: 84
Discharge: HOME | End: 2019-09-04

## 2019-09-04 ENCOUNTER — HOSPITAL ENCOUNTER (OUTPATIENT)
Dept: MRI IMAGING | Facility: HOSPITAL | Age: 84
Discharge: HOME | End: 2019-09-04
Admitting: INTERNAL MEDICINE

## 2019-09-04 DIAGNOSIS — C83.31 DIFFUSE LARGE B-CELL LYMPHOMA, LYMPH NODES OF HEAD, FACE, AND NECK (HCC): ICD-10-CM

## 2019-09-04 LAB — CREAT BLDA-MCNC: 1.1 MG/DL (ref 0.6–1.3)

## 2019-09-04 PROCEDURE — 70543 MRI ORBT/FAC/NCK W/O &W/DYE: CPT

## 2019-09-04 PROCEDURE — 0 GADOBENATE DIMEGLUMINE 529 MG/ML SOLUTION: Performed by: INTERNAL MEDICINE

## 2019-09-04 PROCEDURE — 82565 ASSAY OF CREATININE: CPT

## 2019-09-04 PROCEDURE — A9577 INJ MULTIHANCE: HCPCS | Performed by: INTERNAL MEDICINE

## 2019-09-04 PROCEDURE — 70553 MRI BRAIN STEM W/O & W/DYE: CPT

## 2019-09-04 RX ADMIN — GADOBENATE DIMEGLUMINE 14 ML: 529 INJECTION, SOLUTION INTRAVENOUS at 10:03

## 2019-09-06 ENCOUNTER — HOSPITAL ENCOUNTER (OUTPATIENT)
Dept: GENERAL RADIOLOGY | Facility: HOSPITAL | Age: 84
Discharge: HOME | End: 2019-09-06
Admitting: FAMILY MEDICINE

## 2019-09-06 PROCEDURE — 71046 X-RAY EXAM CHEST 2 VIEWS: CPT

## 2019-09-11 ENCOUNTER — OFFICE VISIT (OUTPATIENT)
Dept: HEMATOLOGY | Age: 84
End: 2019-09-11
Payer: MEDICARE

## 2019-09-11 ENCOUNTER — TRANSCRIBE ORDERS (OUTPATIENT)
Dept: ADMINISTRATIVE | Facility: HOSPITAL | Age: 84
End: 2019-09-11

## 2019-09-11 ENCOUNTER — HOSPITAL ENCOUNTER (OUTPATIENT)
Dept: INFUSION THERAPY | Age: 84
Discharge: HOME | End: 2019-09-11
Payer: MEDICARE

## 2019-09-11 VITALS — OXYGEN SATURATION: 100 % | HEIGHT: 68 IN | WEIGHT: 150.3 LBS | HEART RATE: 82 BPM | BODY MASS INDEX: 22.78 KG/M2

## 2019-09-11 DIAGNOSIS — Z12.39 SCREENING FOR MALIGNANT NEOPLASM OF BREAST: ICD-10-CM

## 2019-09-11 DIAGNOSIS — C50.919 MALIGNANT NEOPLASM OF FEMALE BREAST, UNSPECIFIED ESTROGEN RECEPTOR STATUS, UNSPECIFIED LATERALITY, UNSPECIFIED SITE OF BREAST (HCC): ICD-10-CM

## 2019-09-11 DIAGNOSIS — C83.31 DIFFUSE LARGE B-CELL LYMPHOMA, LYMPH NODES OF HEAD, FACE, AND NECK (HCC): Primary | ICD-10-CM

## 2019-09-11 DIAGNOSIS — C50.919 MALIGNANT NEOPLASM OF FEMALE BREAST, UNSPECIFIED ESTROGEN RECEPTOR STATUS, UNSPECIFIED LATERALITY, UNSPECIFIED SITE OF BREAST (HCC): Primary | ICD-10-CM

## 2019-09-11 DIAGNOSIS — R91.8 LUNG MASS: Primary | ICD-10-CM

## 2019-09-11 DIAGNOSIS — Z86.000 HISTORY OF DUCTAL CARCINOMA IN SITU (DCIS) OF BREAST: ICD-10-CM

## 2019-09-11 DIAGNOSIS — R91.8 LUNG NODULES: ICD-10-CM

## 2019-09-11 DIAGNOSIS — T45.1X5A NEUROPATHY DUE TO CHEMOTHERAPEUTIC DRUG (HCC): ICD-10-CM

## 2019-09-11 DIAGNOSIS — G62.0 NEUROPATHY DUE TO CHEMOTHERAPEUTIC DRUG (HCC): ICD-10-CM

## 2019-09-11 PROCEDURE — 85025 COMPLETE CBC W/AUTO DIFF WBC: CPT

## 2019-09-11 PROCEDURE — 36415 COLL VENOUS BLD VENIPUNCTURE: CPT

## 2019-09-11 PROCEDURE — 80053 COMPREHEN METABOLIC PANEL: CPT

## 2019-09-11 PROCEDURE — 83615 LACTATE (LD) (LDH) ENZYME: CPT

## 2019-09-11 PROCEDURE — 96523 IRRIG DRUG DELIVERY DEVICE: CPT

## 2019-09-11 PROCEDURE — 6360000002 HC RX W HCPCS: Performed by: NURSE PRACTITIONER

## 2019-09-11 PROCEDURE — 99214 OFFICE O/P EST MOD 30 MIN: CPT | Performed by: NURSE PRACTITIONER

## 2019-09-11 RX ORDER — POTASSIUM CHLORIDE 1.5 G/1.77G
20 POWDER, FOR SOLUTION ORAL 2 TIMES DAILY
COMMUNITY

## 2019-09-11 RX ORDER — FUROSEMIDE 40 MG/1
40 TABLET ORAL 2 TIMES DAILY
COMMUNITY
End: 2020-03-11 | Stop reason: ALTCHOICE

## 2019-09-11 RX ORDER — HEPARIN SODIUM (PORCINE) LOCK FLUSH IV SOLN 100 UNIT/ML 100 UNIT/ML
500 SOLUTION INTRAVENOUS ONCE
Status: DISCONTINUED | OUTPATIENT
Start: 2019-09-11 | End: 2019-09-13 | Stop reason: HOSPADM

## 2019-09-11 RX ORDER — CARVEDILOL 3.12 MG/1
3.12 TABLET ORAL 2 TIMES DAILY
COMMUNITY
End: 2020-03-11 | Stop reason: ALTCHOICE

## 2019-09-11 NOTE — PROGRESS NOTES
the facial bones on 8/16/2018 did not reveal acute abnormalities   CT scan of facial bones within without contrast on 8/16/2018 documented interval development of an enhancing soft tissue along the right maxilla extending along the anterior right maxillary sinus and along the floor of the right orbit measuring 2.4 cm in width. New erosive bony changes were noted involving the bony structure of the right orbital floor. The findings were concerning for a neoplastic process with erosive changes of the right orbital floor with ENT consultation recommended. Erinn Bain was seen by Jerrilyn Soulier, PA-C with ENT on 8/20/2018 where the above CT scanning clinical findings were evaluated. Arrangements were made for a biopsy with Dr. Patria Staley. On 9/17/2018, Dr. Patria Staley performed a nasal/sinus endoscopy with biopsy. 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 The cells are polymorphic with maintaining medium sized to large cells with irregular nuclear contour. On flow cytometry, the monoclonal kappa B cell population does not express CD5 or CD10, but are low viability. The immunostains, show a diffuse B-cell infiltrate positive for CD20, PAX-5 and BCL 6, but negative for CD5, CD10, BCL-2, cyclin D1 and CD43.  <30% of the cells were positive for Mum-1. Ki-67 showed a high proliferative rate of about 80%. All of the above is consistent with a diffuse large B-cell lymphoma of the GBC type. A CT scan of the soft tissues of the neck with contrast on 10/3/2018 was compared to the CT scan of the face on 8/16/2018 and 5/7/2018. There had been significant progression of disease with interval increase in the size of the right buccal space hyperdense mass measuring approximately at least 7 x 2 cm on axial cuts. There is involvement of the medial and lateral pterigoid muscle.  The right temporalis muscle was also inseparable from the mass with involvement as well of revealed a very enlarged and swollen right maxillary facial area with induration of soft tissues around the upper teeth on the right and face. No palpable peripheral lymphadenopathy was able to be appreciated in the head and neck area, epitrochlear and axillary areas, inguinal areas. No evidence of palpable splenomegaly or other abdominal findings were encountered on exam.  Bone marrow aspirate and biopsy on 10/11/18 showed no evidence of involvement by diffuse large B-cell lymphoma. FLOW cytometry negative. MRI of the brain w/wo contrast 10/11/18 at \[facility]\"" was consistent with abnormal soft tissue mass involving the right  and buccal space extending along the anterior margin of the right maxillary sinus contiguous with the floor of the right orbit. There was no definite invasion into the orbit, nor displacement of the inferior rectus musculature. PET scan 10/12/18 at \[facility]\"" showed hypermetabolic activity associated with the right buccal space and  space mass, SUV 18.5. There is no distant focus of hypermetabolic activity seen to suggest other sites of involvement. Echocardiogram 10/12/18 at \[facility]\"" revealed an estimated LVEF of 60%. Hepatitis B and C were negative. Hepatitis A IgM was positive and discussed with Dr. Babak Ashford. Left chest mediport was placed 10/18/18 by Dr. Lucio Benton. Right ommaya port was placed 10/22/18 by Dr. Tesha Eid. CSF specimen was obtained via ommaya port by Dr. Babak Ashford, sent for cytology and FLOW prior to initiation of Cycle #1 CHOP-R initiated on 10/24/18 was negative. Denis Grajeda completed the final cycle #6 of CHOP/R and Dose #11 of prophylactic intrathecal methotrexate on 2/20/2019. MRI of the right orbit, face and neck with and without contrast at \[facility]\"" on 4/1/2019 concluded  NO solid enhancing mass to suggest residual/recurrent tumor.    The infiltrate of edema and wispy enhancement within the right pterigoids and masseter muscles is thought to be secondary to an

## 2019-09-12 ASSESSMENT — ENCOUNTER SYMPTOMS
SHORTNESS OF BREATH: 0
DIARRHEA: 0
EYE DISCHARGE: 0
TROUBLE SWALLOWING: 0
COUGH: 0
ABDOMINAL PAIN: 0
EYE ITCHING: 0
CONSTIPATION: 0
PHOTOPHOBIA: 0
NAUSEA: 0
VOMITING: 0
EYE REDNESS: 0
SORE THROAT: 0
WHEEZING: 0

## 2019-09-13 DIAGNOSIS — C50.919 MALIGNANT NEOPLASM OF FEMALE BREAST, UNSPECIFIED ESTROGEN RECEPTOR STATUS, UNSPECIFIED LATERALITY, UNSPECIFIED SITE OF BREAST (HCC): Primary | ICD-10-CM

## 2019-09-18 ENCOUNTER — HOSPITAL ENCOUNTER (OUTPATIENT)
Dept: WOMENS IMAGING | Age: 84
Discharge: HOME | End: 2019-09-18
Payer: MEDICARE

## 2019-09-18 ENCOUNTER — HOSPITAL ENCOUNTER (OUTPATIENT)
Dept: CT IMAGING | Facility: HOSPITAL | Age: 84
Discharge: HOME | End: 2019-09-18
Admitting: INTERNAL MEDICINE

## 2019-09-18 ENCOUNTER — TRANSCRIBE ORDERS (OUTPATIENT)
Dept: ADMINISTRATIVE | Facility: HOSPITAL | Age: 84
End: 2019-09-18

## 2019-09-18 DIAGNOSIS — R91.8 LUNG MASS: Primary | ICD-10-CM

## 2019-09-18 DIAGNOSIS — R91.8 LUNG MASS: ICD-10-CM

## 2019-09-18 DIAGNOSIS — Z12.39 SCREENING FOR MALIGNANT NEOPLASM OF BREAST: ICD-10-CM

## 2019-09-18 LAB — CREAT BLDA-MCNC: 1 MG/DL (ref 0.6–1.3)

## 2019-09-18 PROCEDURE — 71260 CT THORAX DX C+: CPT

## 2019-09-18 PROCEDURE — 77063 BREAST TOMOSYNTHESIS BI: CPT

## 2019-09-18 PROCEDURE — 82565 ASSAY OF CREATININE: CPT

## 2019-09-18 PROCEDURE — 25010000002 IOPAMIDOL 61 % SOLUTION: Performed by: NURSE PRACTITIONER

## 2019-09-18 RX ADMIN — IOPAMIDOL 100 ML: 612 INJECTION, SOLUTION INTRAVENOUS at 11:33

## 2019-09-19 DIAGNOSIS — R92.8 ABNORMAL MAMMOGRAM OF LEFT BREAST: Primary | ICD-10-CM

## 2019-09-19 DIAGNOSIS — Z12.31 ENCOUNTER FOR SCREENING MAMMOGRAM FOR MALIGNANT NEOPLASM OF BREAST: ICD-10-CM

## 2019-09-25 DIAGNOSIS — J45.20 MILD INTERMITTENT ASTHMA WITHOUT COMPLICATION: Primary | ICD-10-CM

## 2019-10-01 ENCOUNTER — HOSPITAL ENCOUNTER (OUTPATIENT)
Dept: WOMENS IMAGING | Age: 84
Discharge: HOME | End: 2019-10-01
Payer: MEDICARE

## 2019-10-01 DIAGNOSIS — R92.8 ABNORMAL MAMMOGRAM OF LEFT BREAST: ICD-10-CM

## 2019-10-01 PROCEDURE — G0279 TOMOSYNTHESIS, MAMMO: HCPCS

## 2019-10-21 VITALS
DIASTOLIC BLOOD PRESSURE: 70 MMHG | HEIGHT: 68 IN | SYSTOLIC BLOOD PRESSURE: 118 MMHG | WEIGHT: 149 LBS | BODY MASS INDEX: 22.58 KG/M2 | OXYGEN SATURATION: 98 % | HEART RATE: 78 BPM

## 2019-10-21 DIAGNOSIS — K21.00 GASTRO-ESOPHAGEAL REFLUX DISEASE WITH ESOPHAGITIS: ICD-10-CM

## 2019-10-21 DIAGNOSIS — D50.8 OTHER IRON DEFICIENCY ANEMIAS: ICD-10-CM

## 2019-10-21 DIAGNOSIS — D70.9 NEUTROPENIA, UNSPECIFIED TYPE (HCC): ICD-10-CM

## 2019-10-21 DIAGNOSIS — D05.11 INTRADUCTAL CARCINOMA IN SITU OF RIGHT BREAST: ICD-10-CM

## 2019-10-21 DIAGNOSIS — E03.9 HYPOTHYROIDISM, UNSPECIFIED TYPE: ICD-10-CM

## 2019-10-21 DIAGNOSIS — C83.31 DIFFUSE LARGE B-CELL LYMPHOMA, LYMPH NODES OF HEAD, FACE, AND NECK (HCC): ICD-10-CM

## 2019-10-21 DIAGNOSIS — F32.9 MAJOR DEPRESSIVE DISORDER WITH SINGLE EPISODE, REMISSION STATUS UNSPECIFIED: ICD-10-CM

## 2019-10-22 DIAGNOSIS — Z45.2 ENCOUNTER FOR CENTRAL LINE CARE: ICD-10-CM

## 2019-10-22 RX ORDER — HEPARIN SODIUM (PORCINE) LOCK FLUSH IV SOLN 100 UNIT/ML 100 UNIT/ML
500 SOLUTION INTRAVENOUS PRN
Status: CANCELLED | OUTPATIENT
Start: 2019-10-23

## 2019-10-22 RX ORDER — SODIUM CHLORIDE 0.9 % (FLUSH) 0.9 %
10 SYRINGE (ML) INJECTION PRN
Status: CANCELLED | OUTPATIENT
Start: 2019-10-23

## 2019-10-22 RX ORDER — SODIUM CHLORIDE 0.9 % (FLUSH) 0.9 %
20 SYRINGE (ML) INJECTION PRN
Status: CANCELLED | OUTPATIENT
Start: 2019-10-23

## 2019-10-23 ENCOUNTER — HOSPITAL ENCOUNTER (OUTPATIENT)
Dept: INFUSION THERAPY | Age: 84
Discharge: HOME | End: 2019-10-23
Payer: MEDICARE

## 2019-10-23 DIAGNOSIS — Z45.2 ENCOUNTER FOR CENTRAL LINE CARE: Primary | ICD-10-CM

## 2019-10-23 DIAGNOSIS — D70.9 NEUTROPENIA, UNSPECIFIED TYPE (HCC): Primary | ICD-10-CM

## 2019-10-23 PROCEDURE — 80053 COMPREHEN METABOLIC PANEL: CPT

## 2019-10-23 PROCEDURE — 36591 DRAW BLOOD OFF VENOUS DEVICE: CPT

## 2019-10-23 PROCEDURE — 6360000002 HC RX W HCPCS: Performed by: INTERNAL MEDICINE

## 2019-10-23 PROCEDURE — 85025 COMPLETE CBC W/AUTO DIFF WBC: CPT

## 2019-10-23 PROCEDURE — 96523 IRRIG DRUG DELIVERY DEVICE: CPT

## 2019-10-23 PROCEDURE — 2580000003 HC RX 258: Performed by: INTERNAL MEDICINE

## 2019-10-23 PROCEDURE — 36415 COLL VENOUS BLD VENIPUNCTURE: CPT

## 2019-10-23 RX ORDER — SODIUM CHLORIDE 0.9 % (FLUSH) 0.9 %
10 SYRINGE (ML) INJECTION PRN
Status: DISCONTINUED | OUTPATIENT
Start: 2019-10-23 | End: 2019-10-24 | Stop reason: HOSPADM

## 2019-10-23 RX ORDER — HEPARIN SODIUM (PORCINE) LOCK FLUSH IV SOLN 100 UNIT/ML 100 UNIT/ML
500 SOLUTION INTRAVENOUS PRN
Status: DISCONTINUED | OUTPATIENT
Start: 2019-10-23 | End: 2019-10-24 | Stop reason: HOSPADM

## 2019-10-23 RX ORDER — SODIUM CHLORIDE 0.9 % (FLUSH) 0.9 %
20 SYRINGE (ML) INJECTION PRN
Status: CANCELLED | OUTPATIENT
Start: 2019-10-23

## 2019-10-23 RX ORDER — HEPARIN SODIUM (PORCINE) LOCK FLUSH IV SOLN 100 UNIT/ML 100 UNIT/ML
500 SOLUTION INTRAVENOUS PRN
Status: CANCELLED | OUTPATIENT
Start: 2019-10-23

## 2019-10-23 RX ORDER — SODIUM CHLORIDE 0.9 % (FLUSH) 0.9 %
10 SYRINGE (ML) INJECTION PRN
Status: CANCELLED | OUTPATIENT
Start: 2019-10-23

## 2019-10-23 RX ADMIN — SODIUM CHLORIDE, PRESERVATIVE FREE 10 ML: 5 INJECTION INTRAVENOUS at 11:42

## 2019-10-23 RX ADMIN — SODIUM CHLORIDE, PRESERVATIVE FREE 500 UNITS: 5 INJECTION INTRAVENOUS at 11:43

## 2019-12-11 ENCOUNTER — TRANSCRIBE ORDERS (OUTPATIENT)
Dept: ADMINISTRATIVE | Facility: HOSPITAL | Age: 84
End: 2019-12-11

## 2019-12-11 ENCOUNTER — HOSPITAL ENCOUNTER (OUTPATIENT)
Dept: INFUSION THERAPY | Age: 84
Discharge: HOME | End: 2019-12-11
Payer: MEDICARE

## 2019-12-11 ENCOUNTER — OFFICE VISIT (OUTPATIENT)
Dept: HEMATOLOGY | Age: 84
End: 2019-12-11
Payer: MEDICARE

## 2019-12-11 VITALS
HEART RATE: 71 BPM | SYSTOLIC BLOOD PRESSURE: 140 MMHG | OXYGEN SATURATION: 97 % | DIASTOLIC BLOOD PRESSURE: 80 MMHG | BODY MASS INDEX: 24.46 KG/M2 | WEIGHT: 161.4 LBS | HEIGHT: 68 IN

## 2019-12-11 DIAGNOSIS — D50.8 OTHER IRON DEFICIENCY ANEMIAS: Primary | ICD-10-CM

## 2019-12-11 DIAGNOSIS — C83.31 DIFFUSE LARGE B-CELL LYMPHOMA, LYMPH NODES OF HEAD, FACE, AND NECK (HCC): ICD-10-CM

## 2019-12-11 DIAGNOSIS — C83.30 DIFFUSE LARGE B-CELL LYMPHOMA, UNSPECIFIED BODY REGION (HCC): Primary | ICD-10-CM

## 2019-12-11 PROCEDURE — 36415 COLL VENOUS BLD VENIPUNCTURE: CPT

## 2019-12-11 PROCEDURE — 1036F TOBACCO NON-USER: CPT | Performed by: INTERNAL MEDICINE

## 2019-12-11 PROCEDURE — 83615 LACTATE (LD) (LDH) ENZYME: CPT

## 2019-12-11 PROCEDURE — 1123F ACP DISCUSS/DSCN MKR DOCD: CPT | Performed by: INTERNAL MEDICINE

## 2019-12-11 PROCEDURE — 80053 COMPREHEN METABOLIC PANEL: CPT

## 2019-12-11 PROCEDURE — G8400 PT W/DXA NO RESULTS DOC: HCPCS | Performed by: INTERNAL MEDICINE

## 2019-12-11 PROCEDURE — 4040F PNEUMOC VAC/ADMIN/RCVD: CPT | Performed by: INTERNAL MEDICINE

## 2019-12-11 PROCEDURE — 1090F PRES/ABSN URINE INCON ASSESS: CPT | Performed by: INTERNAL MEDICINE

## 2019-12-11 PROCEDURE — 99214 OFFICE O/P EST MOD 30 MIN: CPT | Performed by: INTERNAL MEDICINE

## 2019-12-11 PROCEDURE — 85025 COMPLETE CBC W/AUTO DIFF WBC: CPT

## 2019-12-11 PROCEDURE — 99211 OFF/OP EST MAY X REQ PHY/QHP: CPT

## 2019-12-11 PROCEDURE — G8420 CALC BMI NORM PARAMETERS: HCPCS | Performed by: INTERNAL MEDICINE

## 2019-12-11 PROCEDURE — G8484 FLU IMMUNIZE NO ADMIN: HCPCS | Performed by: INTERNAL MEDICINE

## 2019-12-11 PROCEDURE — G8427 DOCREV CUR MEDS BY ELIG CLIN: HCPCS | Performed by: INTERNAL MEDICINE

## 2020-01-23 PROBLEM — J18.9 PNEUMONIA OF LEFT LOWER LOBE DUE TO INFECTIOUS ORGANISM: Status: RESOLVED | Noted: 2019-06-24 | Resolved: 2020-01-23

## 2020-02-03 ENCOUNTER — OFFICE VISIT (OUTPATIENT)
Dept: PULMONOLOGY | Facility: CLINIC | Age: 85
End: 2020-02-03

## 2020-02-03 VITALS
HEIGHT: 67 IN | BODY MASS INDEX: 26.06 KG/M2 | WEIGHT: 166 LBS | DIASTOLIC BLOOD PRESSURE: 82 MMHG | HEART RATE: 77 BPM | OXYGEN SATURATION: 98 % | SYSTOLIC BLOOD PRESSURE: 140 MMHG

## 2020-02-03 DIAGNOSIS — J45.20 MILD INTERMITTENT ASTHMA WITHOUT COMPLICATION: Primary | ICD-10-CM

## 2020-02-03 DIAGNOSIS — C83.31 DIFFUSE LARGE B-CELL LYMPHOMA OF LYMPH NODES OF HEAD (HCC): ICD-10-CM

## 2020-02-03 DIAGNOSIS — J38.00 COMPLETE PARALYSIS OF VOCAL CORD: ICD-10-CM

## 2020-02-03 PROBLEM — D70.9 NEUTROPENIA: Status: RESOLVED | Noted: 2018-12-21 | Resolved: 2020-02-03

## 2020-02-03 PROBLEM — D70.9 NEUTROPENIC FEVER (HCC): Status: RESOLVED | Noted: 2019-02-27 | Resolved: 2020-02-03

## 2020-02-03 PROBLEM — D61.810 PANCYTOPENIA DUE TO ANTINEOPLASTIC CHEMOTHERAPY (HCC): Status: RESOLVED | Noted: 2018-12-21 | Resolved: 2020-02-03

## 2020-02-03 PROBLEM — R50.81 NEUTROPENIC FEVER (HCC): Status: RESOLVED | Noted: 2019-02-27 | Resolved: 2020-02-03

## 2020-02-03 PROBLEM — T45.1X5A PANCYTOPENIA DUE TO ANTINEOPLASTIC CHEMOTHERAPY: Status: RESOLVED | Noted: 2018-12-21 | Resolved: 2020-02-03

## 2020-02-03 PROCEDURE — 99214 OFFICE O/P EST MOD 30 MIN: CPT | Performed by: NURSE PRACTITIONER

## 2020-02-03 RX ORDER — OXYBUTYNIN CHLORIDE 5 MG/1
TABLET, EXTENDED RELEASE ORAL
COMMUNITY

## 2020-02-03 RX ORDER — MULTIVITAMIN
1 TABLET ORAL
COMMUNITY

## 2020-02-03 NOTE — PATIENT INSTRUCTIONS

## 2020-03-10 NOTE — PROGRESS NOTES
procedure by ENT and [address] earlier in the year for a vocal cord paralysis issue. It was felt that some of the changes on the CT scan were consistent with that procedure. On 8/3/2018 Samuel Virgen had a right upper posterior tooth pulled by an oral surgeon, Dr. Hailey Stone in [facility] for what was felt to possibly be an infected tooth. Samuel Virgen was again seen at [facility] urgent care on 8/16/2018 with swelling, redness and tenderness in the right maxillary area with associated headaches not responding to antibiotics. Plain film imaging studies and a CT scan of the area was performed. Plain film x-rays of the facial bones on 8/16/2018 did not reveal acute abnormalities     CT scan of facial bones within without contrast on 8/16/2018 documented interval development of an enhancing soft tissue along the right maxilla extending along the anterior right maxillary sinus and along the floor of the right orbit measuring 2.4 cm in width. New erosive bony changes were noted involving the bony structure of the right orbital floor. The findings were concerning for a neoplastic process with erosive changes of the right orbital floor with ENT consultation recommended. Samuel Virgen was seen by Louis Moss PA-C with ENT on 8/20/2018 where the above CT scanning clinical findings were evaluated. Arrangements were made for a biopsy with Dr. Romeo Staley. On 9/17/2018, Dr. Romeo Staley performed a nasal/sinus endoscopy with biopsy. 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 The cells are polymorphic with maintaining medium sized to large cells with irregular nuclear contour. On flow cytometry, the monoclonal kappa B cell population does not express CD5 or CD10, but are low viability.   The immunostains, show a diffuse B-cell infiltrate positive for CD20, PAX-5 and BCL 6, but negative for CD5, CD10, BCL-2, cyclin D1 and CD43.  <30% of the cells were positive Dr. Alexandra Valera. Right ommaya port was placed 10/22/18 by Dr. Jason Lizama. CSF specimen was obtained via ommaya port by Dr. Monisha Sanchez, sent for cytology and FLOW prior to initiation of Cycle #1 CHOP-R initiated on 10/24/18 was negative.     Celina Young completed the final cycle #6 of CHOP/R and Dose #11 of prophylactic intrathecal methotrexate on 2/20/2019. MRI of the right orbit, face and neck with and without contrast at [facility] on 4/1/2019 concluded  · NO solid enhancing mass to suggest residual/recurrent tumor. · The infiltrate of edema and wispy enhancement within the right pterigoids and masseter muscles is thought to be secondary to an evolving denervation atrophy. MRI of the brain with and without contrast on 4/1/2019 concluded:  · Residual abnormal fact signal in the inferior right orbit, right  space and right pterygopalatine fossa, appearing stable   · Diffusion signal abnormality in this region has normalized, suggesting positive lymphoma treatment response. 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 The remaining question of whether Celina Young would benefit from consolidative XRT to the right face/orbit/brain, given the high risk characteristics of her lymphoma, was addressed at her XRT consultation with Dr. Mo Pinto on 5/8/2019. Concerns persist over the fact that she has developed some degree of leukoencephalopathy. Fortunately, clinically she is improving. The downside of XRT is possible further leukoencephalopathy as well as concerns over damage to the right eye, which is her good eye. Yeni's daughter stated that she was very pleased with the appointment with Dr. Valentina Anthony who explained all of the benefits and possible downside problems with XRT.   His recommendation was for 3600 cGy over 18 treatment fractions for consolidation. Huong Navarro opted against consolidation XRT.     MRI of the brain, right orbit, face and neck with and without contrast at [facility] 9/4/2019 showed stable posttreatment changes. No suspicious mass.     TREATMENT SUMMARY:  1. CHOP-R x6 cycles given 10/24/2018 - 2/20/2019.  2. Dose #1 prophylactic intrathecal methotrexate (FLOW cytometry on CSF negative) given 10/29/18. Dose #2 on 11/14/18. 11th and final dose given 2/20/2019.  3. Huong Navarro opted against consolidative XRT.     TARGET BREAST CANCER SITES:  1. Right mastectomy 09/23/04 for DCIS    TUMOR HISTORY: Right DCIS 09/23/04  On 09/23/04, Huong Navarro had a right simple mastectomy by Dr. 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     The ER was 95%, DC was 13%. No further specific therapy was indicated for this cancer other than close follow up of the area and the opposite breast.    TREATMENT SUMMARY:  1. Right mastectomy and 9/23/2004 for DCIS       Allergies:  Lisinopril; Statins; Alendronate; Lexapro [escitalopram oxalate]; Sulfa antibiotics;  Teriparatide (recombinant); and Trazodone and nefazodone    Medicines:  Current Outpatient Medications   Medication Sig Dispense Refill    nystatin (MYCOSTATIN) 774788 UNIT/ML suspension SWISH AND SPIT 2 TEASPOONS BEFORE MEALS AND AT BEDTIME AS NEEDED FOR PAIN. 480 mL 0    Mirabegron ER (MYRBETRIQ) 50 MG TB24 Take by mouth      potassium chloride (KLOR-CON) 20 MEQ packet Take 20 mEq by mouth 2 times daily      hydrochlorothiazide (HYDRODIURIL) 25 MG tablet 12.5mg      Coenzyme Q10 10 MG CAPS 150      Cyanocobalamin (VITAMIN B-12) 5000 MCG LOZG Take by mouth      fluticasone 200 MCG/ACT AEPB Inhale into the lungs      magnesium 30 MG tablet Take 150 mg by mouth 2 times daily      Multiple Vitamins-Minerals (THERAPEUTIC MULTIVITAMIN-MINERALS) tablet Take 1 tablet by mouth daily      omeprazole (PRILOSEC) 20 MG delayed release capsule Take 20 mg by mouth daily      Probiotic Product (PROBIOTIC-10 PO) Take by mouth      levothyroxine (SYNTHROID) 100 MCG tablet Take 125 mcg by mouth Daily       No current facility-administered medications for this visit. 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 Misbah Zaragoza GERD (gastroesophageal reflux disease)     H/O right mastectomy 2004    for DCIS    Hypertension     Hyperthyroidism     Prolonged emergence from general anesthesia     Thyroid disease         Past Surgical History:      Procedure Laterality Date    APPENDECTOMY      BREAST SURGERY      right mastectomy    CHOLECYSTECTOMY  1996    COLECTOMY      COLECTOMY  06/2005    Partial- Dr. Ray Mcdonnell for recurring diverticulitis    HYSTERECTOMY  1966   [address]    for adhesions    MASTECTOMY Right 2004    PORT SURGERY Left 10/18/2018    ommaya port placement Dr. Shobha Guzman Right 10/22/2018    Dr. Ruiz Nearing VAD W/SUBQ PORT AGE 5 YR/> N/A 10/18/2018    PORT INSERTION performed by Maria Alejandra Hogan MD at [address] [employer] THYROID SURGERY      nodule    THYROIDECTOMY Right 1961    with vocal chord injury   Walter Bartholomew Right 2001    Dr. Deuce Sullivan in [facility]        Family History:      Problem Relation Age of Onset    Other Mother         Motor Vehical Accident    Other Father 80        Unknown    Cancer Sister         Breast Cancer        Social History  Social History     Tobacco Use    Smoking status: Never Smoker    Smokeless tobacco: Never Used   Substance Use Topics    Alcohol use: No    Drug use: Not on file          Review of Systems:  Constitutional: Negative for chills, fatigue, fever or significant weight loss. HENT: Negative for congestion, hearing loss, nosebleeds or sore throat.     Eyes: Negative for photophobia, pain, discharge, redness and visual disturbance. Respiratory: Negative for cough, shortness of breath, or wheezing. Cardiovascular: Negative for chest pain, palpitations or leg swelling. Gastrointestinal: Negative for abdominal pain, blood in stool, constipation, diarrhea, nausea or vomiting. Genitourinary: Negative for dysuria, flank pain, frequency, hematuria or urgency. Musculoskeletal: Negative for back pain, joint swelling, myalgias or neck pain. Skin: Negative for rash or petechiae. Neurological: Negative for tremors, seizures, syncope, weakness or headaches. Hematological: No active bruising or bleeding. Psychiatric/Behavioral: Negative for hallucinations. Objective:  Vital Signs: Blood pressure 132/80, pulse 76, height 5' 7.5\" (1.715 m), weight 165 lb 14.4 oz (75.3 kg), SpO2 97 %. Physical Exam   Constitutional: Oriented to person, place, and time. No acute distress. Head: Normocephalic and atraumatic. Nose: Nose normal.   Mouth/Throat: Oropharynx is clear and moist. No oropharyngeal exudate. Eyes: Pupils are equal and round. Conjunctivae and EOM are normal. No scleral icterus. Neck: Normal range of motion. Neck supple. No JVD. No appreciable thyromegaly. Cardiovascular: Normal rate, regular rhythm, normal heart sounds and intact distal pulses. Exam reveals no gallop, murmurs or friction rub. Pulmonary/Chest: Effort normal and breath sounds normal. No respiratory distress. No wheezes. Abdominal: Soft. Bowel sounds are normal. No organomegally or masses. No tenderness. There is no rebound and no guarding. Musculoskeletal: Normal range of motion. No edema or tenderness. Lymphadenopathy: No cervical, axillary or inguinal lymphadenopathy. Neurological: Alert and oriented to person, place, and time. Cranial nerves are intact. Neurological exam is nonfocal  Skin: Skin is warm and dry. No rash noted. No erythema. No pallor.    Psychiatric: Judgment therapy but now is doing well at home eating well, gaining weight and getting back to baseline. She was seen on 12/11/2019, accompanied by her  and daughter, for posttreatment surveillance including review of imaging studies as follows:     MRI of the right orbit, face and neck with and without contrast at [facility] 9/4/2019:  · Redemonstrated wispy enhancement and edema in the right muscles of mastication and  space, likely posttreatment change. No suspicious enhancing mass    MRI of the brain with and without contrast 9/4/2019:  · Similar diffuse T2/flair subcortical and periventricular signal abnormality suggested posttreatment changes, otherwise stable  · No mass. · Partially empty sella     CT chest with contrast on 9/18/2019 documented no nodules or mass lesion. Calcified granuloma are present stable and unchanged. No acute cardiopulmonary process. Symptomatically, Yeni's endurance continues to improve. Her appetite is good and weight stable. She denies headache, visual changes, sinus pain etc.  She continues to have neuropathy more so to the bottom of her feet. Neuropathy grade 1  ECOG grade 1.     She comes today accompanied by her  for review. MRI of the brain and orbits was scheduled for 3/4/2020 but she did not show for that appointment. Serology on 9/11/2019 documented the following:  CMP with a CO2 of 33  LDH- 500    Serology on 12/11/2019 revealed:  CMP with creatinine 1.00  LDH- 518 (753-523)    CBC today reveals a WBC of 7.63 with a hemoglobin of 14.9 and a platelet count of 944,534. Physical examination does not reveal evidence of asymmetry or abnormalities in the pupils or the eyes with extraocular movements. There is no abnormalities in the oral pharyngeal area especially in the right maxilla area.   No palpable lymphadenopathy in the head and neck area including preauricular upper cervical and lower cervical.  Axilla and inguinal areas are negative for palpable pathological lymphadenopathy. Lungs are clear the heart is regular the abdomen is soft and benign without organomegaly masses or tenderness. Neurological exam is intact    So far there is no evidence of recurrent lymphoma. Repeat serology will be performed as well as repeat MRI scanning of the head and neck area to assess for recurrence. Follow-up appointment given    Removal of the Ommaya and port will be addressed in September at her 2-year follow-up    2. Lung nodules  - CT scan of the chest with and without contrast on 10/3/2018 documented tiny, 2 mm nodules in the subpleural left lung, likely to be benign with a 1 year followup recommended   - CT Chest W Contrast; Future    CT chest with contrast on 9/18/2019 documented no nodules or mass lesion. Calcified granuloma are present stable and unchanged. No acute cardiopulmonary process.      3. History of ductal carcinoma in situ (DCIS) of breast  - Mammogram arranged     4. Screening for malignant neoplasm of breast  - MARTIN DIGITAL SCREEN UNILATERAL LEFT; Future    Left mammogram on 10/1/2019 was a BI-RADS 1.      5.  Neuropathy grade 1  - Jessica Gillis states she may have had some degree of neuropathy prior to treatment however more noticeable post chemotherapy   - No intervention at this time, continue to follow     6. Mediport  - Routine flush today and in 8 weeks        Return in about 4 months  Consideration for Mediport and Ommaya port removal to be discussed at that time.       I, Myrlene Kehr am scribing for Kate Faustin MD. Electronically signed by Wally Ornelas LPN on 0/77/2889 at 80:11 AM       Jessica Gillis was seen today for anemia and lymphoma. Diagnoses and all orders for this visit:    Diffuse large b-cell lymphoma, lymph nodes of head, face, and neck ([facility] [address]  -     Comprehensive Metabolic Panel; Future  -     Lactate Dehydrogenase; Future  -     MRI BRAIN W WO CONTRAST;  Future  -     MRI ORBITS FACE NECK W WO CONTRAST; Future    Other orders  -     Misc nursing order (specify)        Orders Placed This Encounter   Procedures    MRI BRAIN W WO CONTRAST     Standing Status:   Future     Standing Expiration Date:   3/11/2021     Scheduling Instructions:      AT [facility]    MRI ORBITS FACE NECK W WO CONTRAST     Standing Status:   Future     Standing Expiration Date:   3/11/2021     Scheduling Instructions:      AT [facility]    Comprehensive Metabolic Panel     Standing Status:   Future     Standing Expiration Date:   3/11/2021    Lactate Dehydrogenase     Standing Status:   Future     Standing Expiration Date:   3/11/2021    Misc nursing order (specify)     Scheduling Instructions:      PORT FLUSH TODAY       Return in about 4 months (around 7/11/2020). I, Dr. Yenny Ojeda, personally performed the services described in this documentation as scribed by Arnoldo Lim LPN in my presence, and it is both accurate and complete.

## 2020-03-11 ENCOUNTER — TRANSCRIBE ORDERS (OUTPATIENT)
Dept: ADMINISTRATIVE | Facility: HOSPITAL | Age: 85
End: 2020-03-11

## 2020-03-11 ENCOUNTER — OFFICE VISIT (OUTPATIENT)
Dept: HEMATOLOGY | Age: 85
End: 2020-03-11
Payer: MEDICARE

## 2020-03-11 ENCOUNTER — HOSPITAL ENCOUNTER (OUTPATIENT)
Dept: INFUSION THERAPY | Age: 85
Discharge: HOME | End: 2020-03-11
Payer: MEDICARE

## 2020-03-11 VITALS
WEIGHT: 165.9 LBS | HEIGHT: 68 IN | BODY MASS INDEX: 25.14 KG/M2 | OXYGEN SATURATION: 97 % | DIASTOLIC BLOOD PRESSURE: 80 MMHG | SYSTOLIC BLOOD PRESSURE: 132 MMHG | HEART RATE: 76 BPM

## 2020-03-11 DIAGNOSIS — C83.31 DIFFUSE LARGE B-CELL LYMPHOMA, LYMPH NODES OF HEAD, FACE, AND NECK (HCC): Primary | ICD-10-CM

## 2020-03-11 DIAGNOSIS — C83.31 DIFFUSE LARGE B-CELL LYMPHOMA, LYMPH NODES OF HEAD, FACE, AND NECK (HCC): ICD-10-CM

## 2020-03-11 DIAGNOSIS — Z45.2 ENCOUNTER FOR CENTRAL LINE CARE: Primary | ICD-10-CM

## 2020-03-11 DIAGNOSIS — D50.8 OTHER IRON DEFICIENCY ANEMIAS: ICD-10-CM

## 2020-03-11 PROCEDURE — G8400 PT W/DXA NO RESULTS DOC: HCPCS | Performed by: INTERNAL MEDICINE

## 2020-03-11 PROCEDURE — 1123F ACP DISCUSS/DSCN MKR DOCD: CPT | Performed by: INTERNAL MEDICINE

## 2020-03-11 PROCEDURE — G8417 CALC BMI ABV UP PARAM F/U: HCPCS | Performed by: INTERNAL MEDICINE

## 2020-03-11 PROCEDURE — G8484 FLU IMMUNIZE NO ADMIN: HCPCS | Performed by: INTERNAL MEDICINE

## 2020-03-11 PROCEDURE — 1090F PRES/ABSN URINE INCON ASSESS: CPT | Performed by: INTERNAL MEDICINE

## 2020-03-11 PROCEDURE — 80053 COMPREHEN METABOLIC PANEL: CPT

## 2020-03-11 PROCEDURE — 96523 IRRIG DRUG DELIVERY DEVICE: CPT

## 2020-03-11 PROCEDURE — 1036F TOBACCO NON-USER: CPT | Performed by: INTERNAL MEDICINE

## 2020-03-11 PROCEDURE — G8427 DOCREV CUR MEDS BY ELIG CLIN: HCPCS | Performed by: INTERNAL MEDICINE

## 2020-03-11 PROCEDURE — 85025 COMPLETE CBC W/AUTO DIFF WBC: CPT

## 2020-03-11 PROCEDURE — 99214 OFFICE O/P EST MOD 30 MIN: CPT | Performed by: INTERNAL MEDICINE

## 2020-03-11 PROCEDURE — 36415 COLL VENOUS BLD VENIPUNCTURE: CPT

## 2020-03-11 PROCEDURE — 4040F PNEUMOC VAC/ADMIN/RCVD: CPT | Performed by: INTERNAL MEDICINE

## 2020-03-11 PROCEDURE — 83615 LACTATE (LD) (LDH) ENZYME: CPT

## 2020-03-11 RX ORDER — SODIUM CHLORIDE 0.9 % (FLUSH) 0.9 %
20 SYRINGE (ML) INJECTION PRN
Status: CANCELLED | OUTPATIENT
Start: 2020-03-11

## 2020-03-11 RX ORDER — SODIUM CHLORIDE 0.9 % (FLUSH) 0.9 %
10 SYRINGE (ML) INJECTION PRN
Status: DISCONTINUED | OUTPATIENT
Start: 2020-03-11 | End: 2020-03-12 | Stop reason: HOSPADM

## 2020-03-11 RX ORDER — SODIUM CHLORIDE 0.9 % (FLUSH) 0.9 %
10 SYRINGE (ML) INJECTION PRN
Status: CANCELLED | OUTPATIENT
Start: 2020-03-11

## 2020-03-11 RX ORDER — HEPARIN SODIUM (PORCINE) LOCK FLUSH IV SOLN 100 UNIT/ML 100 UNIT/ML
500 SOLUTION INTRAVENOUS PRN
Status: DISCONTINUED | OUTPATIENT
Start: 2020-03-11 | End: 2020-03-12 | Stop reason: HOSPADM

## 2020-03-11 RX ORDER — HEPARIN SODIUM (PORCINE) LOCK FLUSH IV SOLN 100 UNIT/ML 100 UNIT/ML
500 SOLUTION INTRAVENOUS PRN
Status: CANCELLED | OUTPATIENT
Start: 2020-03-11

## 2020-03-11 NOTE — TELEPHONE ENCOUNTER
Sent Magic Mouthwash Rx to [facility]. (recd call from [employer] that they do not make Magic MW).

## 2020-03-12 PROCEDURE — 2580000003 HC RX 258: Performed by: INTERNAL MEDICINE

## 2020-03-12 PROCEDURE — 6360000002 HC RX W HCPCS: Performed by: INTERNAL MEDICINE

## 2020-03-12 RX ADMIN — SODIUM CHLORIDE, PRESERVATIVE FREE 10 ML: 5 INJECTION INTRAVENOUS at 16:47

## 2020-03-12 RX ADMIN — HEPARIN 500 UNITS: 100 SYRINGE at 16:47

## 2020-03-17 ENCOUNTER — APPOINTMENT (OUTPATIENT)
Dept: MRI IMAGING | Facility: HOSPITAL | Age: 85
End: 2020-03-17

## 2020-03-18 ENCOUNTER — HOSPITAL ENCOUNTER (OUTPATIENT)
Dept: MRI IMAGING | Facility: HOSPITAL | Age: 85
Discharge: HOME | End: 2020-03-18

## 2020-03-18 ENCOUNTER — HOSPITAL ENCOUNTER (OUTPATIENT)
Dept: MRI IMAGING | Facility: HOSPITAL | Age: 85
Discharge: HOME | End: 2020-03-18
Admitting: INTERNAL MEDICINE

## 2020-03-18 DIAGNOSIS — C83.31 DIFFUSE LARGE B-CELL LYMPHOMA, LYMPH NODES OF HEAD, FACE, AND NECK (HCC): ICD-10-CM

## 2020-03-18 LAB — CREAT BLDA-MCNC: 0.9 MG/DL (ref 0.6–1.3)

## 2020-03-18 PROCEDURE — 70543 MRI ORBT/FAC/NCK W/O &W/DYE: CPT

## 2020-03-18 PROCEDURE — 0 GADOBENATE DIMEGLUMINE 529 MG/ML SOLUTION: Performed by: INTERNAL MEDICINE

## 2020-03-18 PROCEDURE — A9577 INJ MULTIHANCE: HCPCS | Performed by: INTERNAL MEDICINE

## 2020-03-18 PROCEDURE — 70553 MRI BRAIN STEM W/O & W/DYE: CPT

## 2020-03-18 PROCEDURE — 82565 ASSAY OF CREATININE: CPT

## 2020-03-18 RX ADMIN — GADOBENATE DIMEGLUMINE 20 ML: 529 INJECTION, SOLUTION INTRAVENOUS at 15:28

## 2020-04-28 ENCOUNTER — TELEPHONE (OUTPATIENT)
Dept: PULMONOLOGY | Facility: CLINIC | Age: 85
End: 2020-04-28

## 2020-04-28 NOTE — TELEPHONE ENCOUNTER
"Pre-procedure Pulmonary Evaluation for Surgery    Requesting Physician:  Dr. Paulson    Date & Type of Procedure: Tooth extraction possibly requiring oral surgery, planned initially with local lidocaine plus epinephrine    Patient Name: Kami Villanueva  : 1934     Today your patient was assessed by one of our providers and screened for potential complications from anesthesia.  The following tests were performed and interpreted by the provider and a risk assessment has been provided below:    CXR: Most recent CT imaging available on file from 2019 shows a calcified granuloma otherwise no acute abnormalities of the lungs    ABG: Not applicable    PFTs: FEV1 98% or 2.1L    Your patient is considered:  low risk     If you determine after the assessment that the patient will require the above stated procedure then below are the recommendations to optimize their pulmonary status and decrease risk of potential complications from anesthesia.    Pre-procedure recommendations: Compliance with asthma therapy.  If she develops any acute pulmonary symptoms between now and scheduling of procedure she should call for treatment first.  May benefit from nebulized bronchodilator prior to procedure if available    Post-procedure recommendations: Monitor O2 levels closely.  Would recommend monitoring end-tidal CO2 if this is available.    Evaluation performed by: Olivia Quiroz, APRN  Date: 20    *If any additional information is needed please call. Our practice does not provide pulmonary \"clearance\", but the information above should be sufficient.     "

## 2020-04-30 ENCOUNTER — TELEPHONE (OUTPATIENT)
Dept: INFUSION THERAPY | Age: 85
End: 2020-04-30

## 2020-07-07 NOTE — PROGRESS NOTES
Patient:  Jessi Knight  YOB: 1934  Date of Service: 7/8/2020  MRN: [id number]    Primary Care Physician: Inder Villegas MD    Chief Complaint   Patient presents with    Lymphoma     Diffuse large b-cell lymphoma, lymph nodes of head, face, and neck       Patient Seen, Chart, Consults notes, Labs, Radiology studies reviewed. 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 · Right simple mastectomy for DCIS by Dr. Tasha Wick on  9/23/2004  · Tumor screening and health maintenance    She was treated with CHOP-R x6 cycles given 10/24/2018 - 2/20/2019 as well as 11 doses of prophylactic intrathecal methotrexate 11/14/2018 - 2/20/2019. Leda Wong declined consolidative XRT to the right face/orbits/brain. Symptomatically, Yeni's endurance continues to improve. Her appetite is good and weight stable. She denies headache, visual changes, sinus pain etc.  She continues to have neuropathy more so to the bottom of her feet, Neuropathy grade 1. ECOG grade 1.     MRI of the brain and orbits was performed on 3/18/2020. Leda Wong presents for review of scans and continued monitoring. TARGET LYMPHOMA SITES:  1. Right maxillary sinus with local invasion to the floor of the right orbit   2.  on 10/10/18   3. borderline spleen size, 13 x 4.8 x 11.8 cm  4. Bone marrow negative on 10/11/18   5. CSF specimen via Omaya port negative on 10/24/18     TUMOR HISTORY: Right maxillary sinus high-grade diffuse large B-cell lymphoma 9/17/2018  Leda Wong was treated for hyperthyroidism with radioactive iodine at about age 28. About 45 years ago she underwent a thyroidectomy followed subsequently by a vocal cord medialization procedure by Dr. Cathie Degroot in [place], [place].     Several years ago she had right maxillary sinus surgery by Dr. Jamar Muhammad. Yordy Geller developed upper respiratory and sinus symptoms in the spring of 2018. She was seen and treated at [facility] urgent care on 3/13/2018 for bronchitis. She was then seen in followup by her PCP, Dr. Samantha Graham on 4/27/2018. He felt an abnormality in the neck and ordered a CT scan of the soft tissues of the neck and referred her to ENT for further evaluation of this. A CT scan of the soft tissues of the neck with contrast on 5/7/2018 documented extensive mucosal thickening of the right maxillary sinus consistent with chronic sinusitis. There was also opacification of several of the ethmoid air cells and mucosal thickening in the nasal cavity. No neck mass or adenopathy was appreciated. Yordy Geller was evaluated by Dr. Liudmila Kraft on 5/30/2018 for evaluation of right neck pain and swelling. Full ENT exam including nasopharyngeal area was within normal limits. Review of the CT scan from 5/7/2018 was consistent with chronic sinusitis. Yordy Geller had had a medialization procedure by ENT and [address] earlier in the year for a vocal cord paralysis issue. It was felt that some of the changes on the CT scan were consistent with that procedure. On 8/3/2018 Yordy Geller had a right upper posterior tooth pulled by an oral surgeon, Dr. Karie Temple in [address] [place] [address] for what was felt to possibly be an infected tooth. Yordy Geller was again seen at [facility] urgent care on 8/16/2018 with swelling, redness and tenderness in the right maxillary area with associated headaches not responding to antibiotics. Plain film imaging studies and a CT scan of the area was performed.      Plain film x-rays of the facial bones on 8/16/2018 did not reveal acute abnormalities     CT scan of facial bones within without contrast on 8/16/2018 documented interval development of an enhancing soft tissue along the right maxilla extending along the anterior right maxillary sinus and along the floor of the right orbit measuring 2.4 cm in width. New erosive bony changes were noted involving the bony structure of the right orbital floor. The findings were concerning for a neoplastic process with erosive changes of the right orbital floor with ENT consultation recommended. Barbara Gaines was seen by Tania Brennan PA-C with ENT on 8/20/2018 where the above CT scanning clinical findings were evaluated. Arrangements were made for a biopsy with Dr. Pedrito Staley. On 9/17/2018, Dr. Pedrito Staley performed a nasal/sinus endoscopy with biopsy. 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 The cells are polymorphic with maintaining medium sized to large cells with irregular nuclear contour. On flow cytometry, the monoclonal kappa B cell population does not express CD5 or CD10, but are low viability. The immunostains, show a diffuse B-cell infiltrate positive for CD20, PAX-5 and BCL 6, but negative for CD5, CD10, BCL-2, cyclin D1 and CD43.  <30% of the cells were positive for Mum-1. Ki-67 showed a high proliferative rate of about 80%. All of the above is consistent with a diffuse large B-cell lymphoma of the GBC type. A CT scan of the soft tissues of the neck with contrast on 10/3/2018 was compared to the CT scan of the face on 8/16/2018 and 5/7/2018. There had been significant progression of disease with interval increase in the size of the right buccal space hyperdense mass measuring approximately at least 7 x 2 cm on axial cuts. There is involvement of the medial and lateral pterigoid muscle. The right temporalis muscle was also inseparable from the mass with involvement as well of the pterygomaxillary fissure, pterygopalatine fossa and sphenopalatine foramen. Local cortex erosive the structure and was also noted along the maxillary gingival surface.   Involvement of the roof of the right maxillary sinus with progression of the erosive disease along the floor of the right orbit was also again identified. An asymmetric soft tissue density was also noted at the location of the right infraorbital area. There was no evidence of involvement of the extraocular muscles nor do the lacrimal glands demonstrate a discrete mass. The lacrimal glands do not demonstrate a discrete mass. Disease extends along the right lateral nasal cavity and ethmoid air cells. There is now disease along the right sphenoid sinus inseparable from the right foramen rotundum with probable involvement of the maxillary branch of V5. Evidence of prior instrumentation with prior middle turbinectomies, uncinate to measles, antrostomies, and partial ethmoidectomies is also suspected based on the radiographic imaging. The cavernous sinuses are symmetric. [address] are symmetric. There is now a right level one be lymphadenopathy by size criteria, and abnormal clustering with lymph node measuring up to 1.5 cm. Otherwise the rest of the oral cavity and head and neck area did not demonstrate evidence of discrete mass. Apparent right vocal cord paralysis would medialization of the true vocal cord with anteromedial rotation of the right was also described. CT scan of the chest with and without contrast on 10/3/2018 did not reveal evidence of thoracic lymphadenopathy. Tiny 2 mm nodules were noted in the subpleural left lung, likely to be benign with a 1 year followup recommended     CT scan of the abdomen and pelvis with contrast on 10/3/2018 did not reveal evidence of metastatic disease nor abdominal lymphadenopathy. The spleen was of borderline size measuring 13 cm x 4.8 cm x 11.8 cm. Physical examination on 10/10/2018 revealed a very enlarged and swollen right maxillary facial area with induration of soft tissues around the upper teeth on the right and face.   No palpable peripheral lymphadenopathy was able to be appreciated in the head and neck area, epitrochlear and axillary areas, inguinal areas. No evidence of palpable splenomegaly or other abdominal findings were encountered on exam.    Bone marrow aspirate and biopsy on 10/11/18 showed no evidence of involvement by diffuse large B-cell lymphoma. FLOW cytometry negative. MRI of the brain w/wo contrast 10/11/18 at [facility] was consistent with abnormal soft tissue mass involving the right  and buccal space extending along the anterior margin of the right maxillary sinus contiguous with the floor of the right orbit. There was no definite invasion into the orbit, nor displacement of the inferior rectus musculature. PET scan 10/12/18 at [facility] showed hypermetabolic activity associated with the right buccal space and  space mass, SUV 18.5. There is no distant focus of hypermetabolic activity seen to suggest other sites of involvement. Echocardiogram 10/12/18 at [facility] revealed an estimated LVEF of 60%. Hepatitis B and C were negative. Hepatitis A IgM was positive and discussed with Dr. Yocasta Bynum. Left chest mediport was placed 10/18/18 by Dr. Savanna Tong. Right ommaya port was placed 10/22/18 by Dr. Tyronne Brittle. CSF specimen was obtained via ommaya port by Dr. Yocasta Bynum, sent for cytology and FLOW prior to initiation of Cycle #1 CHOP-R initiated on 10/24/18 was negative.     Nora Garcia completed the final cycle #6 of CHOP/R and Dose #11 of prophylactic intrathecal methotrexate on 2/20/2019. MRI of the right orbit, face and neck with and without contrast at [facility] on 4/1/2019 concluded  · NO solid enhancing mass to suggest residual/recurrent tumor. · The infiltrate of edema and wispy enhancement within the right pterigoids and masseter muscles is thought to be secondary to an evolving denervation atrophy.      MRI of the brain with and without contrast on 4/1/2019 concluded:  · Residual abnormal fact signal in the inferior right orbit, right  space and right pterygopalatine fossa, appearing stable   · Diffusion signal abnormality in this region has normalized, suggesting positive lymphoma treatment response. 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 The remaining question of whether Yony Salamanca would benefit from consolidative XRT to the right face/orbit/brain, given the high risk characteristics of her lymphoma, was addressed at her XRT consultation with Dr. Samy Hogan on 5/8/2019. Concerns persist over the fact that she has developed some degree of leukoencephalopathy. Fortunately, clinically she is improving. The downside of XRT is possible further leukoencephalopathy as well as concerns over damage to the right eye, which is her good eye. Yeni's daughter stated that she was very pleased with the appointment with Dr. Taco Torrez who explained all of the benefits and possible downside problems with XRT. His recommendation was for 3600 cGy over 18 treatment fractions for consolidation. Yony Salamanca opted against consolidation XRT.     MRI of the brain, right orbit, face and neck with and without contrast at [facility] 9/4/2019 showed stable posttreatment changes. No suspicious mass.     TREATMENT SUMMARY:  1. CHOP-R x6 cycles given 10/24/2018 - 2/20/2019.  2. Dose #1 prophylactic intrathecal methotrexate (FLOW cytometry on CSF negative) given 10/29/18. Dose #2 on 11/14/18. 11th and final dose given 2/20/2019.  3. Yony Salamanca opted against consolidative XRT.           TARGET BREAST CANCER SITES:  1. Right mastectomy 09/23/04 for DCIS    TUMOR HISTORY: Right DCIS 09/23/2004  On 09/23/04, Yony Salamanca had a right simple mastectomy by Dr. Ariana Hsieh for a low grade DCIS of the right breast.     The ER was 95%, OR was 13%.    No further specific therapy was indicated for this cancer other than close follow up of the area and the opposite breast.    TREATMENT SUMMARY:  1. Right mastectomy and 9/23/2004 for DCIS       Allergies:  Lisinopril; Statins; Alendronate; Lexapro [escitalopram oxalate]; Parathyroid hormone (recomb); Sulfa antibiotics; and Trazodone and nefazodone    Medicines:  Current Outpatient Medications   Medication Sig Dispense Refill    OXYBUTYNIN CHLORIDE PO Take by mouth      potassium chloride (KLOR-CON) 20 MEQ packet Take 20 mEq by mouth 2 times daily      Coenzyme Q10 10 MG CAPS 150      Cyanocobalamin (VITAMIN B-12) 5000 MCG LOZG Take by mouth      magnesium 30 MG tablet Take 150 mg by mouth 2 times daily      Multiple Vitamins-Minerals (THERAPEUTIC MULTIVITAMIN-MINERALS) tablet Take 1 tablet by mouth daily      omeprazole (PRILOSEC) 20 MG delayed release capsule Take 20 mg by mouth daily      Probiotic Product (PROBIOTIC-10 PO) Take by mouth      levothyroxine (SYNTHROID) 100 MCG tablet Take 125 mcg by mouth Daily       No current facility-administered medications for this visit.         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 Uribe Spotted GERD (gastroesophageal reflux disease)     H/O right mastectomy 2004    for DCIS    Hypertension     Hyperthyroidism     Prolonged emergence from general anesthesia     Thyroid disease         Past Surgical History:      Procedure Laterality Date    APPENDECTOMY      BREAST SURGERY      right mastectomy    CHOLECYSTECTOMY  1996    COLECTOMY      COLECTOMY  06/2005    Partial- Dr. Jon Lopez for recurring diverticulitis    [address]    for adhesions    MASTECTOMY Right 2004    PORT SURGERY Left 10/18/2018    alison port placement Dr. Ortega Kemp Right 10/22/2018    Dr. Bob Pena W/SUBQ PORT AGE 5 YR/> N/A 10/18/2018    PORT INSERTION performed by Fermin Reyes MD at [facility] BSO  1968    THYROID SURGERY      nodule    THYROIDECTOMY Right 1961    with vocal chord injury   Joey Vyaser W/RADIESSE INJ      Racquel Gourd Right 2001    Dr. Alisson Sher in [facility]        Family History:      Problem Relation Age of Onset    Other Mother         Motor Vehical Accident    Other Father 80        Unknown    Cancer Sister         Breast Cancer        Social History  Social History     Tobacco Use    Smoking status: Never Smoker    Smokeless tobacco: Never Used   Substance Use Topics    Alcohol use: No    Drug use: Not on file          Review of Systems:  Constitutional: Negative for chills, fatigue, fever or significant weight loss. HENT: Negative for congestion, hearing loss, nosebleeds or sore throat. Eyes: Negative for photophobia, pain, discharge, redness and visual disturbance. Respiratory: Negative for cough, shortness of breath, or wheezing. Cardiovascular: Negative for chest pain, palpitations or leg swelling. Gastrointestinal: Negative for abdominal pain, blood in stool, constipation, diarrhea, nausea or vomiting. Genitourinary: Negative for dysuria, flank pain, frequency, hematuria or urgency. Musculoskeletal: Negative for back pain, joint swelling, myalgias or neck pain. Skin: Negative for rash or petechiae. Neurological: Negative for tremors, seizures, syncope, weakness or headaches. Hematological: No active bruising or bleeding. Psychiatric/Behavioral: Negative for hallucinations. Objective:  Vital Signs: Blood pressure 134/72, pulse 70, temperature 97.9 °F (36.6 °C), height 5' 7.5\" (1.715 m), weight 164 lb 14.4 oz (74.8 kg), SpO2 96 %. Physical Exam   Constitutional: Oriented to person, place, and time. No acute distress. Head: Normocephalic and atraumatic. Nose: Nose normal.   Mouth/Throat: Oropharynx is clear and moist. No oropharyngeal exudate. Eyes: Pupils are equal and round.  Conjunctivae and EOM are last 72 hours. CRP:  No results for input(s): CRP in the last 72 hours. Sed Rate:  No results for input(s): SEDRATE in the last 72 hours. Cultures:   No results for input(s): CULTURE in the last 72 hours. Radiology reports as per the Radiologist  Radiology: No results found. 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 · Right simple mastectomy for DCIS by Dr. Abel Troncoso on  9/23/2004  · Tumor screening and health maintenance    She was treated with CHOP-R x6 cycles given 10/24/2018 - 2/20/2019 as well as 11 doses of prophylactic intrathecal methotrexate 11/14/2018 - 2/20/2019. Pancho Faith declined consolidative XRT to the right face/orbits/brain. Symptomatically, Yeni's endurance continues to improve. Her appetite is good and weight stable. She denies headache, visual changes, sinus pain etc.  She continues to have neuropathy more so to the bottom of her feet, Neuropathy grade 1. ECOG grade 1.     MRI of the brain and orbits was performed on 3/18/2020. Pancho Faith presents for review of scans and continued monitoring. Serology on 9/11/2019 documented the following:  CMP with a CO2 of 33  LDH- 500    Serology on 12/11/2019 revealed:  CMP with creatinine 1.00  LDH- 518 ([phone])    CBC 3/11/2020 revealed a WBC of 7.63 with a hemoglobin of 14.9 and a platelet count of 006,199. CBC today (7/8/2020) reveals a WBC of 6.60. Hgb is 14.6 with an MCV of 92.6 and platelet count of 874,258. Physical examination does not reveal evidence of asymmetry or abnormalities in the pupils or the eyes with extraocular movements. There is no abnormalities in the oral pharyngeal area especially in the right maxilla area.   No palpable lymphadenopathy in the head and neck area including preauricular upper cervical and lower cervical.  Axilla and inguinal areas are negative for palpable pathological lymphadenopathy. Lungs are clear the heart is regular the abdomen is soft and benign without organomegaly masses or tenderness. Neurological exam is intact. CT chest with contrast on 9/18/2019 documented no nodules or mass lesion. Calcified granuloma are present stable and unchanged. No acute cardiopulmonary process. MRI orbit, face, neck with and without contrast on 3/18/2020 at [facility] documented:  · Stable to mildly improved appearance of probable denervation edema and mild enhancement involving the muscles of mastication on the right, likely related to postradiation changes. There is no mass identified to indicate recurrent neoplasm. · Mild chronic sinusitis involving the ethmoid sinuses and base of the right maxillary sinus. MRI brain with and without contrast on 3/18/2020 at [facility] documented:  · Similar positioning of the right transfrontal ventricular shunt with stable ventricular size and configuration. No abnormal intracranial enhancement. · Stable diffuse hyperintense FLAIR signal changes of the white matter symmetrically likely posttreatment change    Issue of removal of the Ommaya and left anterior chest wall port was addressed today (7/8/2020). She wants to just leave these in. She is a bit scared about the Ommaya reservoir. She wants to use the chest wall port for blood draws when she comes . Serology with CMP and LDH will be drawn today. I will see her back in 4 months. Port will be flushed today and every 2 months      #2. Lung nodules  - CT scan of the chest with and without contrast on 10/3/2018 documented tiny, 2 mm nodules in the subpleural left lung, likely to be benign with a 1 year followup recommended     CT chest with contrast on 9/18/2019 documented no nodules or mass lesion. Calcified granuloma are present stable and unchanged.  No acute cardiopulmonary process.        #3  Breast cancer screening    Right simple mastectomy for DCIS by Dr. Jose Ramesh on  9/23/2004    Left mammogram on 10/1/2019 was a BI-RADS 1. This will be rescheduled for this year       #4. Neuropathy grade 1  - Sondra Mendoza states she may have had some degree of neuropathy prior to treatment however more noticeable post chemotherapy   - No intervention at this time, continue to follow     #5. Mediport  Routine flush today and in 8 weeks until it is removed     #6  Bone health    DEXA scan 3/26/2018 was normal      #7  GYN cancer screening    Sondra Mendoza had a ALEXI and BSO in 1968       Return in about 4 months  Port flush today and in 2 months      IArnoldo am scribing for Ursula Gutierrez MD. Electronically signed by Jack Pereyra LPN on 9/8/6060 at 53:07 AM       Sondra Mendoza was seen today for lymphoma. Diagnoses and all orders for this visit:    Diffuse large b-cell lymphoma, lymph nodes of head, face, and neck (HCC)    Intraductal carcinoma in situ of right breast    Encounter for screening mammogram for malignant neoplasm of breast        No orders of the defined types were placed in this encounter. No follow-ups on file. I, Dr. Ely Reed, personally performed the services described in this documentation as scribed by Jack Pereyra LPN in my presence, and it is both accurate and complete.

## 2020-07-08 ENCOUNTER — OFFICE VISIT (OUTPATIENT)
Dept: HEMATOLOGY | Age: 85
End: 2020-07-08
Payer: MEDICARE

## 2020-07-08 ENCOUNTER — HOSPITAL ENCOUNTER (OUTPATIENT)
Dept: INFUSION THERAPY | Age: 85
Discharge: HOME | End: 2020-07-08
Payer: MEDICARE

## 2020-07-08 VITALS
OXYGEN SATURATION: 96 % | TEMPERATURE: 97.9 F | DIASTOLIC BLOOD PRESSURE: 72 MMHG | BODY MASS INDEX: 24.99 KG/M2 | HEART RATE: 70 BPM | WEIGHT: 164.9 LBS | HEIGHT: 68 IN | SYSTOLIC BLOOD PRESSURE: 134 MMHG

## 2020-07-08 DIAGNOSIS — D05.11 INTRADUCTAL CARCINOMA IN SITU OF RIGHT BREAST: ICD-10-CM

## 2020-07-08 DIAGNOSIS — Z12.31 ENCOUNTER FOR SCREENING MAMMOGRAM FOR MALIGNANT NEOPLASM OF BREAST: ICD-10-CM

## 2020-07-08 DIAGNOSIS — C83.31 DIFFUSE LARGE B-CELL LYMPHOMA, LYMPH NODES OF HEAD, FACE, AND NECK (HCC): Primary | ICD-10-CM

## 2020-07-08 DIAGNOSIS — Z45.2 ENCOUNTER FOR CENTRAL LINE CARE: ICD-10-CM

## 2020-07-08 LAB
ALBUMIN SERPL-MCNC: 4.3 G/DL (ref 3.5–5.2)
ALP BLD-CCNC: 67 U/L (ref 35–104)
ALT SERPL-CCNC: 20 U/L (ref 9–52)
ANION GAP SERPL CALCULATED.3IONS-SCNC: 6 MMOL/L (ref 7–19)
AST SERPL-CCNC: 32 U/L (ref 14–36)
BASOPHILS ABSOLUTE: 0.03 K/UL (ref 0.01–0.08)
BASOPHILS RELATIVE PERCENT: 0.5 % (ref 0.1–1.2)
BILIRUB SERPL-MCNC: 0.5 MG/DL (ref 0.2–1.3)
BUN BLDV-MCNC: 26 MG/DL (ref 7–17)
CALCIUM SERPL-MCNC: 9.7 MG/DL (ref 8.4–10.2)
CHLORIDE BLD-SCNC: 105 MMOL/L (ref 98–111)
CO2: 28 MMOL/L (ref 22–29)
CREAT SERPL-MCNC: 0.9 MG/DL (ref 0.5–1)
EOSINOPHILS ABSOLUTE: 0.08 K/UL (ref 0.04–0.54)
EOSINOPHILS RELATIVE PERCENT: 1.2 % (ref 0.7–7)
GFR NON-AFRICAN AMERICAN: 59
GLOBULIN: 2.3 G/DL
GLUCOSE BLD-MCNC: 84 MG/DL (ref 74–106)
HCT VFR BLD CALC: 45.3 % (ref 34.1–44.9)
HEMOGLOBIN: 14.6 G/DL (ref 11.2–15.7)
LACTATE DEHYDROGENASE: 494 U/L (ref 313–618)
LYMPHOCYTES ABSOLUTE: 1.75 K/UL (ref 1.18–3.74)
LYMPHOCYTES RELATIVE PERCENT: 26.5 % (ref 19.3–53.1)
MCH RBC QN AUTO: 29.9 PG (ref 25.6–32.2)
MCHC RBC AUTO-ENTMCNC: 32.2 G/DL (ref 32.3–35.5)
MCV RBC AUTO: 92.6 FL (ref 79.4–94.8)
MONOCYTES ABSOLUTE: 0.75 K/UL (ref 0.24–0.82)
MONOCYTES RELATIVE PERCENT: 11.4 % (ref 4.7–12.5)
NEUTROPHILS ABSOLUTE: 3.99 K/UL (ref 1.56–6.13)
NEUTROPHILS RELATIVE PERCENT: 60.4 % (ref 34–71.1)
PDW BLD-RTO: 14.1 % (ref 11.7–14.4)
PLATELET # BLD: 164 K/UL (ref 182–369)
PMV BLD AUTO: 10.5 FL (ref 7.4–10.4)
POTASSIUM SERPL-SCNC: 4.3 MMOL/L (ref 3.5–5.1)
RBC # BLD: 4.89 M/UL (ref 3.93–5.22)
SODIUM BLD-SCNC: 139 MMOL/L (ref 137–145)
TOTAL PROTEIN: 6.6 G/DL (ref 6.3–8.2)
WBC # BLD: 6.6 K/UL (ref 3.98–10.04)

## 2020-07-08 PROCEDURE — 6360000002 HC RX W HCPCS: Performed by: INTERNAL MEDICINE

## 2020-07-08 PROCEDURE — 1090F PRES/ABSN URINE INCON ASSESS: CPT | Performed by: INTERNAL MEDICINE

## 2020-07-08 PROCEDURE — 85025 COMPLETE CBC W/AUTO DIFF WBC: CPT

## 2020-07-08 PROCEDURE — 83615 LACTATE (LD) (LDH) ENZYME: CPT

## 2020-07-08 PROCEDURE — G8427 DOCREV CUR MEDS BY ELIG CLIN: HCPCS | Performed by: INTERNAL MEDICINE

## 2020-07-08 PROCEDURE — 80053 COMPREHEN METABOLIC PANEL: CPT

## 2020-07-08 PROCEDURE — 2580000003 HC RX 258: Performed by: INTERNAL MEDICINE

## 2020-07-08 PROCEDURE — G8417 CALC BMI ABV UP PARAM F/U: HCPCS | Performed by: INTERNAL MEDICINE

## 2020-07-08 PROCEDURE — 1123F ACP DISCUSS/DSCN MKR DOCD: CPT | Performed by: INTERNAL MEDICINE

## 2020-07-08 PROCEDURE — 1036F TOBACCO NON-USER: CPT | Performed by: INTERNAL MEDICINE

## 2020-07-08 PROCEDURE — 96523 IRRIG DRUG DELIVERY DEVICE: CPT

## 2020-07-08 PROCEDURE — 4040F PNEUMOC VAC/ADMIN/RCVD: CPT | Performed by: INTERNAL MEDICINE

## 2020-07-08 PROCEDURE — 99215 OFFICE O/P EST HI 40 MIN: CPT | Performed by: INTERNAL MEDICINE

## 2020-07-08 RX ORDER — SODIUM CHLORIDE 0.9 % (FLUSH) 0.9 %
20 SYRINGE (ML) INJECTION PRN
Status: DISCONTINUED | OUTPATIENT
Start: 2020-07-08 | End: 2020-07-09 | Stop reason: HOSPADM

## 2020-07-08 RX ORDER — SODIUM CHLORIDE 0.9 % (FLUSH) 0.9 %
10 SYRINGE (ML) INJECTION PRN
Status: CANCELLED | OUTPATIENT
Start: 2020-07-08

## 2020-07-08 RX ORDER — SODIUM CHLORIDE 0.9 % (FLUSH) 0.9 %
20 SYRINGE (ML) INJECTION PRN
Status: CANCELLED | OUTPATIENT
Start: 2020-07-08

## 2020-07-08 RX ORDER — HEPARIN SODIUM (PORCINE) LOCK FLUSH IV SOLN 100 UNIT/ML 100 UNIT/ML
500 SOLUTION INTRAVENOUS PRN
Status: CANCELLED | OUTPATIENT
Start: 2020-07-08

## 2020-07-08 RX ORDER — HEPARIN SODIUM (PORCINE) LOCK FLUSH IV SOLN 100 UNIT/ML 100 UNIT/ML
500 SOLUTION INTRAVENOUS PRN
Status: DISCONTINUED | OUTPATIENT
Start: 2020-07-08 | End: 2020-07-09 | Stop reason: HOSPADM

## 2020-07-08 RX ADMIN — SODIUM CHLORIDE, PRESERVATIVE FREE 20 ML: 5 INJECTION INTRAVENOUS at 12:00

## 2020-07-08 RX ADMIN — HEPARIN 500 UNITS: 100 SYRINGE at 12:00

## 2020-09-01 NOTE — PROGRESS NOTES
GLENDA Greenfield  [facility]   Respiratory Disease Clinic  [address] [place] [address]  Phone: [phone]  Fax: [phone]     Kami Villanueva is a 86 y.o. female.   CC:   Chief Complaint   Patient presents with   • Asthma        HPI: Mrs. Villanueva is being evaluated today for management of her asthma.  She experiences mild intermittent shortness of breath and cough occurring in the chest for many years.  It is worsened by nothing and improved with Mucinex and bronchodilators.  She is supposed to be on Mucinex and Arnuity on a routine basis.  She has not required the inhalers in quite some time.  She manages with Mucinex once daily. 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  Last office visit she indicated Dr. Osborn was discussing removing her port due to improvement in her cancer status.  She reports they did not end up removing the port.  Her only new change in her status is some developing neuropathy in her feet.  Otherwise she feels like she is doing well.  She is wanting a pneumonia shot.    The following portions of the patient's history were reviewed and updated as appropriate: allergies, current medications, past family history, past medical history, past social history, past surgical history and problem list.    Past Medical History:   Diagnosis Date   • Allergic rhinitis    • Arthritis    • Cancer (CMS/HCC)     breast ca and B cell lymphoma   • Disease of thyroid gland     Hypothyroidism   • Diverticulitis    • Frequent urination    • Hard to intubate     vocal cord injury,  Has vocal cord implant   • Heartburn    • History of breast cancer    • Hypertension    • Incontinence    • Mild intermittent asthma without complication 6/24/2019   • Osteoporosis    • Pneumonia of left lower lobe due to infectious organism 6/24/2019        Prior to Admission medications    Medication Sig Start Date End Date Taking? Authorizing Provider   carvedilol (COREG) 3.125 MG tablet Take 1 tablet by mouth 2 (Two) Times a Day. 12/23/18   Roger Tate MD   furosemide (LASIX) 40 MG tablet Take 40 mg by mouth Daily.    Cortez Corbett MD   guaiFENesin (MUCINEX) 600 MG 12 hr tablet Take 1 capsule by mouth Daily As Needed (congestion).    Emergency, Nurse Alejandra, RN   levothyroxine (SYNTHROID, LEVOTHROID) 100 MCG tablet Take 100 mcg by mouth Daily.    Cortez Corbett MD   Multiple Vitamin tablet Take 1 tablet by mouth.    Cortez Corbett MD   nystatin (MYCOSTATIN) 777847 UNIT/ML suspension nystatin 100,000 unit/mL oral suspension 12/13/19   Cortez Corbett MD   oxybutynin XL (DITROPAN-XL) 5 MG 24 hr tablet oxybutynin chloride ER 5 mg tablet,extended release 24 hr    Cortez Corbett MD   potassium chloride (K-DUR,KLOR-CON) 20 MEQ CR tablet Take 20 mEq by mouth 2 (Two) Times a Day.    Cortez Corbett MD       Family History   Problem Relation Age of Onset   • No Known Problems Mother    • No Known Problems Father        Social History     Socioeconomic History   • Marital status:      Spouse name: Not on file   • Number of children: Not on file   • Years of education: Not on file   • Highest education level: Not on file   Tobacco Use   • Smoking status: Never Smoker   • Smokeless tobacco: Never Used   Substance and Sexual Activity   • Alcohol use: No   • Drug use: No   • Sexual activity: Defer       Review of Systems   Constitutional: Negative for activity change, chills, fatigue and fever.   HENT: Positive for congestion. Negative for postnasal drip, rhinorrhea, sinus pressure, sore throat and trouble swallowing.    Eyes: Negative for blurred vision, double vision and pain.   Respiratory: Negative for cough, chest tightness, shortness of breath and wheezing.    Cardiovascular: Negative for chest pain, palpitations and  "leg swelling.   Gastrointestinal: Negative for abdominal distention, constipation, diarrhea, nausea and vomiting.   Endocrine: Negative for polydipsia, polyphagia and polyuria.   Genitourinary: Negative for dysuria, frequency and urgency.   Musculoskeletal: Negative for arthralgias, back pain, gait problem and joint swelling.   Skin: Negative for color change, dry skin, rash and skin lesions.   Allergic/Immunologic: Negative for environmental allergies, food allergies and immunocompromised state.   Neurological: Positive for numbness. Negative for dizziness, seizures, speech difficulty, weakness, light-headedness, memory problem and confusion.   Hematological: Negative for adenopathy. Does not bruise/bleed easily.   Psychiatric/Behavioral: Negative for sleep disturbance, negative for hyperactivity and depressed mood. The patient is not nervous/anxious.        /80   Pulse 92   Ht 170.2 cm (67\")   Wt 74.8 kg (165 lb)   LMP  (LMP Unknown)   SpO2 97% Comment: RA  BMI 25.84 kg/m²     Physical Exam   Constitutional: She is oriented to person, place, and time. She appears well-developed and well-nourished. No distress. Face mask in place.   HENT:   Head: Normocephalic and atraumatic.   Right Ear: External ear normal.   Left Ear: External ear normal.   Nose: Nose normal.   Mouth/Throat: Oropharynx is clear and moist. No oropharyngeal exudate.   PORT THE SCALP   Eyes: Pupils are equal, round, and reactive to light. Conjunctivae and EOM are normal. Right eye exhibits no discharge. Left eye exhibits no discharge.   GLASSES ON    Neck: Normal range of motion. Neck supple. No JVD present.   Cardiovascular: Normal rate and regular rhythm.   No murmur heard.  Pulmonary/Chest: Effort normal and breath sounds normal. No respiratory distress. She has no wheezes.   Abdominal: Soft. Bowel sounds are normal. She exhibits no distension. There is no tenderness.   Musculoskeletal: Normal range of motion. She exhibits no edema " or deformity.   Neurological: She is alert and oriented to person, place, and time. She displays normal reflexes. No cranial nerve deficit. Coordination normal.   Skin: Skin is warm and dry. No rash noted. She is not diaphoretic. No erythema.   Psychiatric: She has a normal mood and affect. Her behavior is normal. Thought content normal.   Nursing note and vitals reviewed.      Pulmonary Functions Testing Results:    PFT Values        Some values may be hidden. Unless noted otherwise, only the newest values recorded on each date are displayed.         Old Values PFT Results 8/1/19   %   FEV1 98%   FEV1/FVC 70.82      Pre Drug PFT Results 8/1/19   No data to display.      Post Drug PFT Results 8/1/19   No data to display.      Other Tests PFT Results 8/1/19   No data to display.           Results for orders placed in visit on 08/01/19   Pulmonary Function Test         No PFTs for this visit    CXR: No imaging for this visit        Problem List Items Addressed This Visit        Respiratory    Complete paralysis of vocal cord    Maxillary sinus mass    Mild intermittent asthma without complication - Primary       Immune and Lymphatic    Diffuse large B-cell lymphoma of lymph nodes of head (CMS/HCC)    Overview     Added automatically from request for surgery [id number]           Other Visit Diagnoses     Encounter for immunization            Patient's Body mass index is 25.84 kg/m². BMI is within normal parameters. No follow-up required..      Assessment/Plan         Asthma stable on no bronchodilator therapy.  She takes Mucinex once daily.  She remains under the care of Dr. Osborn for issues related to her lymphoma, mass of the sinus and vocal cord dysfunction.  She is not undergoing any sort of chemotherapy or radiation at this time.  She is doing well enough they consider removing her port however they did not do so given the pandemic.  She will need updated PFTs for me in regards to her asthma when she returns  in 6 months.  I found no record on file of her ever receiving vaccinations for pneumonia.  We do not have Prevnar 13 available here.  She was given Pneumovax.  She will be able to take Prevnar 13 following her Pneumovax today.  She will call if she develops any issues otherwise we will see her back in 6 months with PFTs.    Olivia Quiroz, APRN  9/10/2020  15:31    Return in about 6 months (around 3/10/2021) for FVL.

## 2020-09-10 ENCOUNTER — OFFICE VISIT (OUTPATIENT)
Dept: PULMONOLOGY | Facility: CLINIC | Age: 85
End: 2020-09-10

## 2020-09-10 VITALS
WEIGHT: 165 LBS | DIASTOLIC BLOOD PRESSURE: 80 MMHG | OXYGEN SATURATION: 97 % | SYSTOLIC BLOOD PRESSURE: 136 MMHG | BODY MASS INDEX: 25.9 KG/M2 | HEIGHT: 67 IN | HEART RATE: 92 BPM

## 2020-09-10 DIAGNOSIS — J38.00 COMPLETE PARALYSIS OF VOCAL CORD: ICD-10-CM

## 2020-09-10 DIAGNOSIS — Z23 ENCOUNTER FOR IMMUNIZATION: ICD-10-CM

## 2020-09-10 DIAGNOSIS — J34.89 MAXILLARY SINUS MASS: ICD-10-CM

## 2020-09-10 DIAGNOSIS — J45.20 MILD INTERMITTENT ASTHMA WITHOUT COMPLICATION: Primary | ICD-10-CM

## 2020-09-10 DIAGNOSIS — C83.31 DIFFUSE LARGE B-CELL LYMPHOMA OF LYMPH NODES OF HEAD (HCC): ICD-10-CM

## 2020-09-10 PROCEDURE — 90732 PPSV23 VACC 2 YRS+ SUBQ/IM: CPT | Performed by: NURSE PRACTITIONER

## 2020-09-10 PROCEDURE — G0009 ADMIN PNEUMOCOCCAL VACCINE: HCPCS | Performed by: NURSE PRACTITIONER

## 2020-09-10 PROCEDURE — 99214 OFFICE O/P EST MOD 30 MIN: CPT | Performed by: NURSE PRACTITIONER

## 2020-10-19 ENCOUNTER — HOSPITAL ENCOUNTER (OUTPATIENT)
Dept: WOMENS IMAGING | Age: 85
Discharge: HOME | End: 2020-10-19
Payer: MEDICARE

## 2020-10-19 PROCEDURE — 77063 BREAST TOMOSYNTHESIS BI: CPT

## 2020-11-11 ENCOUNTER — APPOINTMENT (OUTPATIENT)
Dept: INFUSION THERAPY | Age: 85
End: 2020-11-11
Payer: MEDICARE

## 2020-11-12 ENCOUNTER — HOSPITAL ENCOUNTER (OUTPATIENT)
Dept: INFUSION THERAPY | Age: 85
Discharge: HOME | End: 2020-11-12
Payer: MEDICARE

## 2020-11-12 ENCOUNTER — TRANSCRIBE ORDERS (OUTPATIENT)
Dept: ADMINISTRATIVE | Facility: HOSPITAL | Age: 85
End: 2020-11-12

## 2020-11-12 ENCOUNTER — OFFICE VISIT (OUTPATIENT)
Dept: HEMATOLOGY | Age: 85
End: 2020-11-12
Payer: MEDICARE

## 2020-11-12 VITALS
WEIGHT: 170.7 LBS | HEART RATE: 86 BPM | OXYGEN SATURATION: 95 % | BODY MASS INDEX: 26.79 KG/M2 | HEIGHT: 67 IN | DIASTOLIC BLOOD PRESSURE: 96 MMHG | SYSTOLIC BLOOD PRESSURE: 160 MMHG | TEMPERATURE: 98 F

## 2020-11-12 DIAGNOSIS — Z45.2 ENCOUNTER FOR CENTRAL LINE CARE: ICD-10-CM

## 2020-11-12 DIAGNOSIS — C83.31 DIFFUSE LARGE B-CELL LYMPHOMA, LYMPH NODES OF HEAD, FACE, AND NECK (HCC): Primary | ICD-10-CM

## 2020-11-12 LAB
ALBUMIN SERPL-MCNC: 4.1 G/DL (ref 3.5–5.2)
ALP BLD-CCNC: 63 U/L (ref 35–104)
ALT SERPL-CCNC: 21 U/L (ref 9–52)
ANION GAP SERPL CALCULATED.3IONS-SCNC: 5 MMOL/L (ref 7–19)
AST SERPL-CCNC: 46 U/L (ref 14–36)
BASOPHILS ABSOLUTE: 0.03 K/UL (ref 0.01–0.08)
BASOPHILS RELATIVE PERCENT: 0.4 % (ref 0.1–1.2)
BILIRUB SERPL-MCNC: 0.4 MG/DL (ref 0.2–1.3)
BUN BLDV-MCNC: 26 MG/DL (ref 7–17)
CALCIUM SERPL-MCNC: 9.7 MG/DL (ref 8.4–10.2)
CHLORIDE BLD-SCNC: 107 MMOL/L (ref 98–111)
CO2: 29 MMOL/L (ref 22–29)
CREAT SERPL-MCNC: 0.9 MG/DL (ref 0.5–1)
EOSINOPHILS ABSOLUTE: 0.17 K/UL (ref 0.04–0.54)
EOSINOPHILS RELATIVE PERCENT: 2 % (ref 0.7–7)
GFR NON-AFRICAN AMERICAN: 59
GLOBULIN: 2.9 G/DL
GLUCOSE BLD-MCNC: 92 MG/DL (ref 74–106)
HCT VFR BLD CALC: 48.3 % (ref 34.1–44.9)
HEMOGLOBIN: 15 G/DL (ref 11.2–15.7)
LACTATE DEHYDROGENASE: 508 U/L (ref 313–618)
LYMPHOCYTES ABSOLUTE: 2.63 K/UL (ref 1.18–3.74)
LYMPHOCYTES RELATIVE PERCENT: 31.6 % (ref 19.3–53.1)
MCH RBC QN AUTO: 29.5 PG (ref 25.6–32.2)
MCHC RBC AUTO-ENTMCNC: 31.1 G/DL (ref 32.3–35.5)
MCV RBC AUTO: 94.9 FL (ref 79.4–94.8)
MONOCYTES ABSOLUTE: 0.96 K/UL (ref 0.24–0.82)
MONOCYTES RELATIVE PERCENT: 11.6 % (ref 4.7–12.5)
NEUTROPHILS ABSOLUTE: 4.52 K/UL (ref 1.56–6.13)
NEUTROPHILS RELATIVE PERCENT: 54.4 % (ref 34–71.1)
PDW BLD-RTO: 15 % (ref 11.7–14.4)
PLATELET # BLD: 159 K/UL (ref 182–369)
PMV BLD AUTO: 10.2 FL (ref 7.4–10.4)
POTASSIUM SERPL-SCNC: 4.6 MMOL/L (ref 3.5–5.1)
RBC # BLD: 5.09 M/UL (ref 3.93–5.22)
SODIUM BLD-SCNC: 141 MMOL/L (ref 137–145)
TOTAL PROTEIN: 7 G/DL (ref 6.3–8.2)
WBC # BLD: 8.31 K/UL (ref 3.98–10.04)

## 2020-11-12 PROCEDURE — 83615 LACTATE (LD) (LDH) ENZYME: CPT

## 2020-11-12 PROCEDURE — 1036F TOBACCO NON-USER: CPT | Performed by: NURSE PRACTITIONER

## 2020-11-12 PROCEDURE — 1123F ACP DISCUSS/DSCN MKR DOCD: CPT | Performed by: NURSE PRACTITIONER

## 2020-11-12 PROCEDURE — 85025 COMPLETE CBC W/AUTO DIFF WBC: CPT

## 2020-11-12 PROCEDURE — 6360000002 HC RX W HCPCS: Performed by: NURSE PRACTITIONER

## 2020-11-12 PROCEDURE — 1090F PRES/ABSN URINE INCON ASSESS: CPT | Performed by: NURSE PRACTITIONER

## 2020-11-12 PROCEDURE — G8427 DOCREV CUR MEDS BY ELIG CLIN: HCPCS | Performed by: NURSE PRACTITIONER

## 2020-11-12 PROCEDURE — G8484 FLU IMMUNIZE NO ADMIN: HCPCS | Performed by: NURSE PRACTITIONER

## 2020-11-12 PROCEDURE — 4040F PNEUMOC VAC/ADMIN/RCVD: CPT | Performed by: NURSE PRACTITIONER

## 2020-11-12 PROCEDURE — G8417 CALC BMI ABV UP PARAM F/U: HCPCS | Performed by: NURSE PRACTITIONER

## 2020-11-12 PROCEDURE — 2580000003 HC RX 258: Performed by: NURSE PRACTITIONER

## 2020-11-12 PROCEDURE — 96523 IRRIG DRUG DELIVERY DEVICE: CPT

## 2020-11-12 PROCEDURE — 80053 COMPREHEN METABOLIC PANEL: CPT

## 2020-11-12 PROCEDURE — 99214 OFFICE O/P EST MOD 30 MIN: CPT | Performed by: NURSE PRACTITIONER

## 2020-11-12 RX ORDER — SODIUM CHLORIDE 0.9 % (FLUSH) 0.9 %
10 SYRINGE (ML) INJECTION PRN
Status: CANCELLED | OUTPATIENT
Start: 2020-11-12

## 2020-11-12 RX ORDER — FUROSEMIDE 40 MG/1
TABLET ORAL
COMMUNITY

## 2020-11-12 RX ORDER — SODIUM CHLORIDE 0.9 % (FLUSH) 0.9 %
20 SYRINGE (ML) INJECTION PRN
Status: CANCELLED | OUTPATIENT
Start: 2020-11-12

## 2020-11-12 RX ORDER — SODIUM CHLORIDE 0.9 % (FLUSH) 0.9 %
20 SYRINGE (ML) INJECTION PRN
Status: DISCONTINUED | OUTPATIENT
Start: 2020-11-12 | End: 2020-11-13 | Stop reason: HOSPADM

## 2020-11-12 RX ORDER — HEPARIN SODIUM (PORCINE) LOCK FLUSH IV SOLN 100 UNIT/ML 100 UNIT/ML
500 SOLUTION INTRAVENOUS PRN
Status: CANCELLED | OUTPATIENT
Start: 2020-11-12

## 2020-11-12 RX ORDER — MULTIVITAMIN
1 TABLET ORAL
COMMUNITY

## 2020-11-12 RX ORDER — HEPARIN SODIUM (PORCINE) LOCK FLUSH IV SOLN 100 UNIT/ML 100 UNIT/ML
500 SOLUTION INTRAVENOUS PRN
Status: DISCONTINUED | OUTPATIENT
Start: 2020-11-12 | End: 2020-11-13 | Stop reason: HOSPADM

## 2020-11-12 RX ADMIN — HEPARIN 500 UNITS: 100 SYRINGE at 13:03

## 2020-11-12 RX ADMIN — SODIUM CHLORIDE, PRESERVATIVE FREE 20 ML: 5 INJECTION INTRAVENOUS at 13:03

## 2020-11-12 NOTE — PROGRESS NOTES
OUTPATIENT ONCOLOGY & HEMATOLOGY PROGRESS NOTE    Patient:  Liana Nicholson  YOB: 1934  Date of Service: 11/14/2020  MRN: [id number]    Primary Care Physician: Janine Paez MD    Chief Complaint   Patient presents with    Follow-up     Diffuse large b-cell lymphoma, lymph nodes of head, face, and neck [facility])        Patient Seen, Chart, Consults notes, Labs, Radiology studies reviewed. 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 · Right simple mastectomy for DCIS by Dr. Glendy Vogel on  9/23/2004  · Tumor screening and health maintenance    Cristina Frederick received 6 cycles of CHOP-R 10/24/2018 - 2/20/2019 as well as 11 doses of prophylactic intrathecal methotrexate 11/14/2018 - 2/20/2019. Cristina Frederick declined consolidative XRT to the right face/orbits/brain. She returns to the clinic for continued surveillance. Feeling well. She is without new complaint at this time and denies B symptoms. She denies headache, visual change, sinus complaint, etc.  Cristina Frederick has opted against having her Ommaya port and Mediport removed at this time. Blood pressure is elevated, 160/96. She has plans to address this with Dr. Sangeetha Preston following her appointment in this clinic. TARGET LYMPHOMA SITES:  1. Right maxillary sinus with local invasion to the floor of the right orbit   2.  on 10/10/18   3. borderline spleen size, 13 x 4.8 x 11.8 cm  4. Bone marrow negative on 10/11/18   5. CSF specimen via Omaya port negative on 10/24/18     TUMOR HISTORY: Right maxillary sinus high-grade diffuse large B-cell lymphoma 9/17/2018  Cristina Frederick was treated for hyperthyroidism with radioactive iodine at about age 28.  About 45 years ago she underwent a thyroidectomy followed subsequently by a vocal cord medialization procedure by Dr. Kelly Garcia in [place], [place]. Several years ago she had right maxillary sinus surgery by Dr. Bridgette Fuller. Collni De La Paz developed upper respiratory and sinus symptoms in the spring of 2018. She was seen and treated at [facility] urgent care on 3/13/2018 for bronchitis. She was then seen in followup by her PCP, Dr. Mariana Worthington on 4/27/2018. He felt an abnormality in the neck and ordered a CT scan of the soft tissues of the neck and referred her to ENT for further evaluation of this. A CT scan of the soft tissues of the neck with contrast on 5/7/2018 documented extensive mucosal thickening of the right maxillary sinus consistent with chronic sinusitis. There was also opacification of several of the ethmoid air cells and mucosal thickening in the nasal cavity. No neck mass or adenopathy was appreciated. Collin De La Paz was evaluated by Dr. Kami Hernandez on 5/30/2018 for evaluation of right neck pain and swelling. Full ENT exam including nasopharyngeal area was within normal limits. Review of the CT scan from 5/7/2018 was consistent with chronic sinusitis. Collin De La Paz had had a medialization procedure by ENT and [place] earlier in the year for a vocal cord paralysis issue. It was felt that some of the changes on the CT scan were consistent with that procedure. On 8/3/2018 Collin De La Paz had a right upper posterior tooth pulled by an oral surgeon, Dr. Pamela Atkins in [facility] for what was felt to possibly be an infected tooth. Collin De La Paz was again seen at [facility] urgent care on 8/16/2018 with swelling, redness and tenderness in the right maxillary area with associated headaches not responding to antibiotics. Plain film imaging studies and a CT scan of the area was performed.      Plain film x-rays of the facial bones on 8/16/2018 did not reveal acute abnormalities     CT scan of facial bones within without contrast on 8/16/2018 documented interval development of an enhancing soft tissue along the right maxilla extending along the anterior right maxillary sinus and along the floor of the right orbit measuring 2.4 cm in width. New erosive bony changes were noted involving the bony structure of the right orbital floor. The findings were concerning for a neoplastic process with erosive changes of the right orbital floor with ENT consultation recommended. Daniel Thomas was seen by Miguel Dahl PA-C with ENT on 8/20/2018 where the above CT scanning clinical findings were evaluated. Arrangements were made for a biopsy with Dr. Louisa Staley. On 9/17/2018, Dr. Louisa Staley performed a nasal/sinus endoscopy with biopsy. 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 The cells are polymorphic with maintaining medium sized to large cells with irregular nuclear contour. On flow cytometry, the monoclonal kappa B cell population does not express CD5 or CD10, but are low viability. The immunostains, show a diffuse B-cell infiltrate positive for CD20, PAX-5 and BCL 6, but negative for CD5, CD10, BCL-2, cyclin D1 and CD43.  <30% of the cells were positive for Mum-1. Ki-67 showed a high proliferative rate of about 80%. All of the above is consistent with a diffuse large B-cell lymphoma of the GBC type. A CT scan of the soft tissues of the neck with contrast on 10/3/2018 was compared to the CT scan of the face on 8/16/2018 and 5/7/2018. There had been significant progression of disease with interval increase in the size of the right buccal space hyperdense mass measuring approximately at least 7 x 2 cm on axial cuts. There is involvement of the medial and lateral pterigoid muscle. The right temporalis muscle was also inseparable from the mass with involvement as well of the pterygomaxillary fissure, pterygopalatine fossa and sphenopalatine foramen. Local cortex erosive the structure and was also noted along the maxillary gingival surface.   Involvement of the roof of the right maxillary sinus with progression of the erosive disease along the floor of the right orbit was also again identified. An asymmetric soft tissue density was also noted at the location of the right infraorbital area. There was no evidence of involvement of the extraocular muscles nor do the lacrimal glands demonstrate a discrete mass. The lacrimal glands do not demonstrate a discrete mass. Disease extends along the right lateral nasal cavity and ethmoid air cells. There is now disease along the right sphenoid sinus inseparable from the right foramen rotundum with probable involvement of the maxillary branch of V5. Evidence of prior instrumentation with prior middle turbinectomies, uncinate to measles, antrostomies, and partial ethmoidectomies is also suspected based on the radiographic imaging. The cavernous sinuses are symmetric. [address] are symmetric. There is now a right level one be lymphadenopathy by size criteria, and abnormal clustering with lymph node measuring up to 1.5 cm. Otherwise the rest of the oral cavity and head and neck area did not demonstrate evidence of discrete mass. Apparent right vocal cord paralysis would medialization of the true vocal cord with anteromedial rotation of the right was also described. CT scan of the chest with and without contrast on 10/3/2018 did not reveal evidence of thoracic lymphadenopathy. Tiny 2 mm nodules were noted in the subpleural left lung, likely to be benign with a 1 year followup recommended     CT scan of the abdomen and pelvis with contrast on 10/3/2018 did not reveal evidence of metastatic disease nor abdominal lymphadenopathy. The spleen was of borderline size measuring 13 cm x 4.8 cm x 11.8 cm. Physical examination on 10/10/2018 revealed a very enlarged and swollen right maxillary facial area with induration of soft tissues around the upper teeth on the right and face.   No palpable peripheral lymphadenopathy was able to be appreciated in the head and neck area, epitrochlear and axillary areas, inguinal areas. No evidence of palpable splenomegaly or other abdominal findings were encountered on exam.    Bone marrow aspirate and biopsy on 10/11/18 showed no evidence of involvement by diffuse large B-cell lymphoma. FLOW cytometry negative. MRI of the brain w/wo contrast 10/11/18 at [facility] was consistent with abnormal soft tissue mass involving the right  and buccal space extending along the anterior margin of the right maxillary sinus contiguous with the floor of the right orbit. There was no definite invasion into the orbit, nor displacement of the inferior rectus musculature. PET scan 10/12/18 at [facility] showed hypermetabolic activity associated with the right buccal space and  space mass, SUV 18.5. There is no distant focus of hypermetabolic activity seen to suggest other sites of involvement. Echocardiogram 10/12/18 at [facility] revealed an estimated LVEF of 60%. Hepatitis B and C were negative. Hepatitis A IgM was positive and discussed with Dr. Berta Bloom. Left chest mediport was placed 10/18/18 by Dr. Dalton Bailey. Right ommaya port was placed 10/22/18 by Dr. Gibson Blackwell. CSF specimen was obtained via ommaya port by Dr. Berta Bloom, sent for cytology and FLOW prior to initiation of Cycle #1 CHOP-R initiated on 10/24/18 was negative.     Deliliah Councilman completed the final cycle #6 of CHOP/R and Dose #11 of prophylactic intrathecal methotrexate on 2/20/2019. MRI of the right orbit, face and neck with and without contrast at [facility] on 4/1/2019 concluded  · NO solid enhancing mass to suggest residual/recurrent tumor. · The infiltrate of edema and wispy enhancement within the right pterigoids and masseter muscles is thought to be secondary to an evolving denervation atrophy.      MRI of the brain with and without contrast on 4/1/2019 concluded:  · Residual abnormal fact signal in the inferior right orbit, right  space and right pterygopalatine fossa, appearing stable   · Diffusion signal abnormality in this region has normalized, suggesting positive lymphoma treatment response. 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 The remaining question of whether Leah Ritchie would benefit from consolidative XRT to the right face/orbit/brain, given the high risk characteristics of her lymphoma, was addressed at her XRT consultation with Dr. Krista Haley on 5/8/2019. Concerns persist over the fact that she has developed some degree of leukoencephalopathy. Fortunately, clinically she is improving. The downside of XRT is possible further leukoencephalopathy as well as concerns over damage to the right eye, which is her good eye. Yeni's daughter stated that she was very pleased with the appointment with Dr. Varghese Soriano who explained all of the benefits and possible downside problems with XRT. His recommendation was for 3600 cGy over 18 treatment fractions for consolidation. Leah Ritchie opted against consolidation XRT.     MRI of the brain, right orbit, face and neck with and without contrast at [facility] 9/4/2019 showed stable posttreatment changes. No suspicious mass.     TREATMENT SUMMARY:  1. CHOP-R x6 cycles given 10/24/2018 - 2/20/2019.  2. Dose #1 prophylactic intrathecal methotrexate (FLOW cytometry on CSF negative) given 10/29/18. Dose #2 on 11/14/18. 11th and final dose given 2/20/2019.  3. Leah Ritchie opted against consolidative XRT.            TARGET BREAST CANCER SITES:  1. Right mastectomy 09/23/04 for DCIS    TUMOR HISTORY: Right DCIS 09/23/2004  On 09/23/04, Leah Ritchie had a right simple mastectomy by Dr. Maday Patel for a low grade DCIS of the right breast.     The ER was 95%, NM was 13%.    No further specific therapy was indicated for this for recurring diverticulitis    HYSTERECTOMY  1966   [address]    for adhesions    MASTECTOMY Right 2004    PORT SURGERY Left 10/18/2018    ommaya port placement Dr. Saul Torres Right 10/22/2018    Dr. Srini Tejada AGE 5 YR/> N/A 10/18/2018    PORT INSERTION performed by Grace Powers MD at [address] [address] THYROID SURGERY      nodule    THYROIDECTOMY Right 1961    with vocal chord injury   Toribio Hernandez Right 2001    Dr. Karthik Howell in [facility]        Family History:      Problem Relation Age of Onset    Other Mother         Motor Vehical Accident    Other Father 80        Unknown    Cancer Sister         Breast Cancer        Social History  Social History     Tobacco Use    Smoking status: Never Smoker    Smokeless tobacco: Never Used   Substance Use Topics    Alcohol use: No    Drug use: Not on file   Review of Systems   Constitutional: Negative for fatigue and fever. No night sweats     HENT: Negative for dental problem, hearing loss, mouth sores, nosebleeds, sore throat and trouble swallowing. Eyes: Negative for discharge and itching. Respiratory: Negative for cough, shortness of breath and wheezing. No hemoptysis     Cardiovascular: Negative for chest pain, palpitations and leg swelling. Gastrointestinal: Negative for abdominal pain, constipation, diarrhea, nausea and vomiting. Mild discomfort left inguinal area     Endocrine: Negative for cold intolerance, heat intolerance, polydipsia and polyuria. Genitourinary: Negative for dysuria, frequency, hematuria and urgency. Musculoskeletal: Negative for joint swelling and myalgias. Arthritis     Skin: Negative for pallor and rash. Allergic/Immunologic: Negative for environmental allergies and immunocompromised state.    Neurological: Positive for numbness (Bottom of feet, mild at fingertips). Negative for seizures and syncope. Hematological: Negative for adenopathy. Does not bruise/bleed easily. Psychiatric/Behavioral: Negative for agitation, behavioral problems and confusion. The patient is not nervous/anxious. Objective:  Vital Signs: Blood pressure (!) 160/96, pulse 86, temperature 98 °F (36.7 °C), temperature source Temporal, height 5' 7\" (1.702 m), weight 170 lb 11.2 oz (77.4 kg), SpO2 95 %. Wt Readings from Last 3 Encounters:   11/12/20 170 lb 11.2 oz (77.4 kg)   07/08/20 164 lb 14.4 oz (74.8 kg)   03/11/20 165 lb 14.4 oz (75.3 kg)     Physical Exam  Vitals signs reviewed. Constitutional:       General: She is not in acute distress. Appearance: She is well-developed. She is not diaphoretic. HENT:      Head: Normocephalic and atraumatic. Right Ear: External ear normal.      Left Ear: External ear normal.      Nose: Nose normal.      Mouth/Throat:      Mouth: Mucous membranes are moist.   Eyes:      General: No scleral icterus. Right eye: No discharge. Left eye: No discharge. Conjunctiva/sclera: Conjunctivae normal.   Neck:      Musculoskeletal: Neck supple. Trachea: No tracheal deviation. Cardiovascular:      Rate and Rhythm: Normal rate and regular rhythm. Heart sounds: No murmur. Pulmonary:      Effort: Pulmonary effort is normal. No respiratory distress. Breath sounds: Normal breath sounds. No wheezing or rales. Abdominal:      General: Bowel sounds are normal. There is no distension. Palpations: Abdomen is soft. Tenderness: There is no abdominal tenderness. There is no guarding. Genitourinary:     Comments: Exam deferred    Musculoskeletal:         General: No tenderness or deformity. Comments: Normal ROM all four extremities     Lymphadenopathy:      Cervical: No cervical adenopathy. Right cervical: No superficial cervical adenopathy.      Left cervical: No superficial cervical adenopathy. Upper Body:      Right upper body: No supraclavicular or axillary adenopathy. Left upper body: No supraclavicular or axillary adenopathy. Lower Body: No right inguinal adenopathy. No left inguinal adenopathy. Comments: No bulky palpable cervical, clavicular, axillary or inguinal adenopathies on the left or right. Skin:     General: Skin is warm and dry. Findings: No rash. Comments: Left chest wall Mediport, no sign of infection  Right Ommaya port, no sign of infection     Neurological:      Mental Status: She is alert and oriented to person, place, and time. Comments: follows commands, non-focal     Psychiatric:         Behavior: Behavior normal. Behavior is cooperative. Thought Content: Thought content normal.         Judgment: Judgment normal.      Comments: Alert and oriented to person, place and time. · CMP 7/8/2020: Stable, creatinine 0.9/GFR 59  · LDH 7/8/2020: 494  · CBC 11/12/2020: WBC 8.31, Hgb 15. 0/MCV 94.9, platelets 588,958. ASSESSMENT AND PLAN:  1. Diffuse large b-cell lymphoma, lymph nodes of head, face, and neck ([address]    2. History of breast cancer    3. History of ductal carcinoma in situ (DCIS) of breast    4. Personal history of multiple pulmonary nodules    5. Neutropenia, unspecified type ([address]    6. 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 S/p 6 cycles of CHOP-R given 10/24/2018 - 2/20/2019 as well as 11 doses of prophylactic intrathecal methotrexate 11/14/2018 - 2/20/2019. Dave Marie declined consolidative XRT to the right face/orbits/brain.     CT chest with contrast on 9/18/2019 documented no nodules or mass lesion. Calcified granuloma are present stable and unchanged. No acute cardiopulmonary process.

## 2020-11-14 ASSESSMENT — ENCOUNTER SYMPTOMS
EYE DISCHARGE: 0
NAUSEA: 0
ABDOMINAL PAIN: 0
SHORTNESS OF BREATH: 0
COUGH: 0
TROUBLE SWALLOWING: 0
WHEEZING: 0
VOMITING: 0
DIARRHEA: 0
CONSTIPATION: 0
EYE ITCHING: 0
SORE THROAT: 0

## 2021-03-03 PROBLEM — J45.20 MILD INTERMITTENT ASTHMA WITHOUT COMPLICATION: Chronic | Status: ACTIVE | Noted: 2019-06-24

## 2021-03-03 PROBLEM — J38.00 COMPLETE PARALYSIS OF VOCAL CORD: Chronic | Status: ACTIVE | Noted: 2018-05-30

## 2021-03-03 NOTE — PROGRESS NOTES
Chief Complaint  Asthma    Subjective    History of Present Illness {CC  Problem List  Visit Diagnosis   Encounters  Notes  Medications  Labs  Result Review Imaging  Media     Kami Villanueva presents to [facility] for:    Management of her asthma.  She intermittently has shortness of breath and coughing.  She has benefited from Arnuity and Mucinex for these episodes although she does not require them all of the time.  She would like a rescue inhaler to be sent to her pharmacy.  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  She is followed by Dr. Osborn for this.  She suffers from vocal cord paralysis which also contributes to her coughing.  She has no complaints today other than being out of her potassium replacement.  She is asking for refills today since she lives 60 miles away from her pharmacy which is here in [place].       Prior to Admission medications    Medication Sig Start Date End Date Taking? Authorizing Provider   carvedilol (COREG) 3.125 MG tablet Take 1 tablet by mouth 2 (Two) Times a Day. 12/23/18   Roger Tate MD   furosemide (LASIX) 40 MG tablet Take 40 mg by mouth Daily.    ProviderCortez MD   guaiFENesin (MUCINEX) 600 MG 12 hr tablet Take 1 capsule by mouth Daily As Needed (congestion).    Emergency, Nurse Alejandra, RN   levothyroxine (SYNTHROID, LEVOTHROID) 100 MCG tablet Take 100 mcg by mouth Daily.    ProviderCortez MD   Mirabegron ER (Myrbetriq) 50 MG tablet sustained-release 24 hour 24 hr tablet Take 50 mg by mouth Daily.    ProviderCortez MD   Multiple Vitamin tablet Take 1 tablet by mouth.    ProviderCortez MD   nystatin (MYCOSTATIN) 992857 UNIT/ML suspension nystatin 100,000 unit/mL oral suspension 12/13/19   Cortez Corbett MD   oxybutynin XL (DITROPAN-XL) 5 MG 24 hr tablet oxybutynin chloride ER 5 mg tablet,extended  "release 24 hr    ProviderCortez MD   potassium chloride (K-DUR,KLOR-CON) 20 MEQ CR tablet Take 20 mEq by mouth 2 (Two) Times a Day.    ProviderCortez MD       Social History     Socioeconomic History   • Marital status:      Spouse name: Not on file   • Number of children: Not on file   • Years of education: Not on file   • Highest education level: Not on file   Tobacco Use   • Smoking status: Never Smoker   • Smokeless tobacco: Never Used   Substance and Sexual Activity   • Alcohol use: No   • Drug use: No   • Sexual activity: Defer       Objective   Vital Signs:   /82   Pulse 86   Temp 97.3 °F (36.3 °C)   Ht 170.2 cm (67\")   Wt 77.1 kg (170 lb)   SpO2 98% Comment: RA  BMI 26.63 kg/m²     Physical Exam  Constitutional:       Interventions: Face mask in place.   Eyes:      Comments: Glasses   Cardiovascular:      Rate and Rhythm: Normal rate and regular rhythm.      Heart sounds: No murmur.   Pulmonary:      Effort: Pulmonary effort is normal.      Breath sounds: Normal breath sounds.   Chest:      Comments: Port left chest  Musculoskeletal:      Right lower leg: No edema.      Left lower leg: No edema.   Neurological:      Mental Status: She is alert and oriented to person, place, and time.        Result Review :{ Labs  Result Review  Imaging  Med Tab  Media :    PFT Values        Some values may be hidden. Unless noted otherwise, only the newest values recorded on each date are displayed.         Old Values PFT Results 8/1/19   %   FEV1 98%   FEV1/FVC 70.82      Pre Drug PFT Results 8/1/19   No data to display.      Post Drug PFT Results 8/1/19   No data to display.      Other Tests PFT Results 8/1/19   No data to display.           My interpretation of the PFT: None for this visit    Results for orders placed in visit on 08/01/19    Pulmonary Function Test      My interpretation of imaging: None for this visit    My interpretation of labs: None for this " visit      Assessment and Plan {CC Problem List  Visit Diagnosis  ROS  Review (Popup)  Health Maintenance  Quality  BestPractice  Medications  SmartSets  SnapShot Encounters  Media      Problem List Items Addressed This Visit        ENT    Complete paralysis of vocal cord (Chronic)       Hematology and Neoplasia    Diffuse large B-cell lymphoma of lymph nodes of head (CMS/HCC)    Overview     Added automatically from request for surgery [id number]         Relevant Medications    potassium chloride (K-DUR,KLOR-CON) 20 MEQ CR tablet       Pulmonary and Pneumonias    Mild intermittent asthma without complication - Primary (Chronic)    Relevant Medications    albuterol sulfate  (90 Base) MCG/ACT inhaler    potassium chloride (K-DUR,KLOR-CON) 20 MEQ CR tablet          Patient's Body mass index is 26.63 kg/m². BMI is above normal parameters. Recommendations include: educational material.      Mild intermittent asthma stable.  She is not requiring maintenance therapy.  I will send a rescue inhaler to her pharmacy.  I am also refilling her potassium replacement for convenience sake.  Lymphoma being monitored by Dr. Osborn.  Currently no issues regarding that.  Vocal cord issues stable.  She is not having any choking or voicing problems.  Monitor.  We will update PFTs when she returns in 6 months.  She will call sooner if needed.    Olivia Quiroz, APRN  3/11/2021  14:42 CST    Follow Up {Instructions Charge Capture  Follow-up Communications   Return in about 6 months (around 9/11/2021) for FVL.    Patient was given instructions and counseling regarding her condition or for health maintenance advice. Please see specific information pulled into the AVS if appropriate.

## 2021-03-11 ENCOUNTER — OFFICE VISIT (OUTPATIENT)
Dept: PULMONOLOGY | Facility: CLINIC | Age: 86
End: 2021-03-11

## 2021-03-11 VITALS
TEMPERATURE: 97.3 F | WEIGHT: 170 LBS | DIASTOLIC BLOOD PRESSURE: 82 MMHG | HEIGHT: 67 IN | OXYGEN SATURATION: 98 % | HEART RATE: 86 BPM | SYSTOLIC BLOOD PRESSURE: 122 MMHG | BODY MASS INDEX: 26.68 KG/M2

## 2021-03-11 DIAGNOSIS — J38.00 COMPLETE PARALYSIS OF VOCAL CORD: Chronic | ICD-10-CM

## 2021-03-11 DIAGNOSIS — C83.31 DIFFUSE LARGE B-CELL LYMPHOMA OF LYMPH NODES OF HEAD (HCC): ICD-10-CM

## 2021-03-11 DIAGNOSIS — J45.20 MILD INTERMITTENT ASTHMA WITHOUT COMPLICATION: Primary | ICD-10-CM

## 2021-03-11 PROCEDURE — 99214 OFFICE O/P EST MOD 30 MIN: CPT | Performed by: NURSE PRACTITIONER

## 2021-03-11 RX ORDER — ALBUTEROL SULFATE 90 UG/1
2 AEROSOL, METERED RESPIRATORY (INHALATION) EVERY 4 HOURS PRN
Qty: 18 G | Refills: 2 | Status: SHIPPED | OUTPATIENT
Start: 2021-03-11 | End: 2021-04-10

## 2021-03-11 RX ORDER — POTASSIUM CHLORIDE 20 MEQ/1
20 TABLET, EXTENDED RELEASE ORAL 2 TIMES DAILY
Qty: 60 TABLET | Refills: 3 | Status: SHIPPED | OUTPATIENT
Start: 2021-03-11 | End: 2021-04-10

## 2021-03-11 NOTE — PATIENT INSTRUCTIONS
"BMI for Adults  What is BMI?  Body mass index (BMI) is a number that is calculated from a person's weight and height. BMI can help estimate how much of a person's weight is composed of fat. BMI does not measure body fat directly. Rather, it is an alternative to procedures that directly measure body fat, which can be difficult and expensive.  BMI can help identify people who may be at higher risk for certain medical problems.  What are BMI measurements used for?  BMI is used as a screening tool to identify possible weight problems. It helps determine whether a person is obese, overweight, a healthy weight, or underweight.  BMI is useful for:  · Identifying a weight problem that may be related to a medical condition or may increase the risk for medical problems.  · Promoting changes, such as changes in diet and exercise, to help reach a healthy weight. BMI screening can be repeated to see if these changes are working.  How is BMI calculated?  BMI involves measuring your weight in relation to your height. Both height and weight are measured, and the BMI is calculated from those numbers. This can be done either in English ([place] or metric measurements. Note that charts and online BMI calculators are available to help you find your BMI quickly and easily without having to do these calculations yourself.  To calculate your BMI in English ([place] measurements:    1. Measure your weight in pounds (lb).  2. Multiply the number of pounds by 703.  ? For example, for a person who weighs 180 lb, multiply that number by 703, which equals 126,540.  3. Measure your height in inches. Then multiply that number by itself to get a measurement called \"inches squared.\"  ? For example, for a person who is 70 inches tall, the \"inches squared\" measurement is 70 inches x 70 inches, which equals 4,900 inches squared.  4. Divide the total from step 2 (number of lb x 703) by the total from step 3 (inches squared): 126,540 ÷ 4,900 = 25.8. This is " "your BMI.  To calculate your BMI in metric measurements:  1. Measure your weight in kilograms (kg).  2. Measure your height in meters (m). Then multiply that number by itself to get a measurement called \"meters squared.\"  ? For example, for a person who is 1.75 m tall, the \"meters squared\" measurement is 1.75 m x 1.75 m, which is equal to 3.1 meters squared.  3. Divide the number of kilograms (your weight) by the meters squared number. In this example: 70 ÷ 3.1 = 22.6. This is your BMI.  What do the results mean?  BMI charts are used to identify whether you are underweight, normal weight, overweight, or obese. The following guidelines will be used:  · Underweight: BMI less than 18.5.  · Normal weight: BMI between 18.5 and 24.9.  · Overweight: BMI between 25 and 29.9.  · Obese: BMI of 30 or above.  Keep these notes in mind:  · Weight includes both fat and muscle, so someone with a muscular build, such as an athlete, may have a BMI that is higher than 24.9. In cases like these, BMI is not an accurate measure of body fat.  · To determine if excess body fat is the cause of a BMI of 25 or higher, further assessments may need to be done by a health care provider.  · BMI is usually interpreted in the same way for men and women.  Where to find more information  For more information about BMI, including tools to quickly calculate your BMI, go to these websites:  · Centers for Disease Control and Prevention: www.cdc.gov  · American Heart Association: www.heart.org  · National [employer], [employer] [place]: www.nhlbi.nih.gov  Summary  · Body mass index (BMI) is a number that is calculated from a person's weight and height.  · BMI may help estimate how much of a person's weight is composed of fat. BMI can help identify those who may be at higher risk for certain medical problems.  · BMI can be measured using English measurements or metric measurements.  · BMI charts are used to identify whether you are underweight, normal " weight, overweight, or obese.  This information is not intended to replace advice given to you by your health care provider. Make sure you discuss any questions you have with your health care provider.  Document Revised: 09/09/2020 Document Reviewed: 07/17/2020  [employer] Patient Education © 2020 [employer]

## 2021-03-12 ENCOUNTER — APPOINTMENT (OUTPATIENT)
Dept: MRI IMAGING | Facility: HOSPITAL | Age: 86
End: 2021-03-12

## 2021-03-17 NOTE — PROGRESS NOTES
Patient:  Navdeep Escobedo  YOB: 1934  Date of Service: 3/18/2021  MRN: [id number]    Primary Care Physician: Angelina Peralta MD    Chief Complaint   Patient presents with    Follow-up     Diffuse large b-cell lymphoma, lymph nodes of head, face, and neck [facility])       Patient Seen, Chart, Consults notes, Labs, Radiology studies reviewed. 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 · Right simple mastectomy for DCIS by Dr. Elma Lo on  9/23/2004  · Tumor screening and health maintenance    She was treated with CHOP-R x6 cycles given 10/24/2018 - 2/20/2019 as well as 11 doses of prophylactic intrathecal methotrexate 11/14/2018 - 2/20/2019. Kamila Shields declined consolidative XRT to the right face/orbits/brain. Symptomatically, Yeni's endurance continues to improve. Her appetite is good and weight stable. She denies headache, visual changes, sinus pain etc.  She continues to have neuropathy more so to the bottom of her feet, Neuropathy grade 1. ECOG grade 1. 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       MRI of the brain and orbits was performed on 3/18/2020. Kamila Shields was last seen in this office on 11/12/2020. Arrangements were made for reimaging and follow-up today. Kamila Shields presents for continued monitoring. TARGET LYMPHOMA SITES:  1. Right maxillary sinus with local invasion to the floor of the right orbit   2.  on 10/10/18   3. borderline spleen size, 13 x 4.8 x 11.8 cm  4. Bone marrow negative on 10/11/18   5.  CSF specimen via [place]maya port negative on 10/24/18     TUMOR HISTORY: Right maxillary sinus high-grade diffuse large B-cell lymphoma 9/17/2018  Kamila Shields reveal acute abnormalities     CT scan of facial bones within without contrast on 8/16/2018 documented interval development of an enhancing soft tissue along the right maxilla extending along the anterior right maxillary sinus and along the floor of the right orbit measuring 2.4 cm in width. New erosive bony changes were noted involving the bony structure of the right orbital floor. The findings were concerning for a neoplastic process with erosive changes of the right orbital floor with ENT consultation recommended. Anastacio Gomez was seen by Cami العراقي PA-C with ENT on 8/20/2018 where the above CT scanning clinical findings were evaluated. Arrangements were made for a biopsy with Dr. Demarcus Staley. On 9/17/2018, Dr. Demarcus Staley performed a nasal/sinus endoscopy with biopsy. 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 The cells are polymorphic with maintaining medium sized to large cells with irregular nuclear contour. On flow cytometry, the monoclonal kappa B cell population does not express CD5 or CD10, but are low viability. The immunostains, show a diffuse B-cell infiltrate positive for CD20, PAX5 and BCL 6, but negative for CD5, CD10, BCL2, cyclin D1 and CD43.  <30% of the cells were positive for Mum-1. Ki-67 showed a high proliferative rate of about 80%. All of the above is consistent with a diffuse large B-cell lymphoma of the GBC type. A CT scan of the soft tissues of the neck with contrast on 10/3/2018 was compared to the CT scan of the face on 8/16/2018 and 5/7/2018. There had been significant progression of disease with interval increase in the size of the right buccal space hyperdense mass measuring approximately at least 7 x 2 cm on axial cuts. There is involvement of the medial and lateral pterigoid muscle.  The right temporalis muscle was also inseparable from the mass with involvement as well of the pterygomaxillary fissure, pterygopalatine fossa and sphenopalatine foramen. Local cortex erosive the structure and was also noted along the maxillary gingival surface. Involvement of the roof of the right maxillary sinus with progression of the erosive disease along the floor of the right orbit was also again identified. An asymmetric soft tissue density was also noted at the location of the right infraorbital area. There was no evidence of involvement of the extraocular muscles nor do the lacrimal glands demonstrate a discrete mass. The lacrimal glands do not demonstrate a discrete mass. Disease extends along the right lateral nasal cavity and ethmoid air cells. There is now disease along the right sphenoid sinus inseparable from the right foramen rotundum with probable involvement of the maxillary branch of V5. Evidence of prior instrumentation with prior middle turbinectomies, uncinate to measles, antrostomies, and partial ethmoidectomies is also suspected based on the radiographic imaging. The cavernous sinuses are symmetric. [address] are symmetric. There is now a right level one be lymphadenopathy by size criteria, and abnormal clustering with lymph node measuring up to 1.5 cm. Otherwise the rest of the oral cavity and head and neck area did not demonstrate evidence of discrete mass. Apparent right vocal cord paralysis would medialization of the true vocal cord with anteromedial rotation of the right was also described. CT scan of the chest with and without contrast on 10/3/2018 did not reveal evidence of thoracic lymphadenopathy. Tiny 2 mm nodules were noted in the subpleural left lung, likely to be benign with a 1 year followup recommended     CT scan of the abdomen and pelvis with contrast on 10/3/2018 did not reveal evidence of metastatic disease nor abdominal lymphadenopathy. The spleen was of borderline size measuring 13 cm x 4.8 cm x 11.8 cm.     Physical examination on 10/10/2018 revealed a very enlarged denervation atrophy. MRI of the brain with and without contrast on 4/1/2019 concluded:  · Residual abnormal fact signal in the inferior right orbit, right  space and right pterygopalatine fossa, appearing stable   · Diffusion signal abnormality in this region has normalized, suggesting positive lymphoma treatment response. 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 The remaining question of whether Jason Jones would benefit from consolidative XRT to the right face/orbit/brain, given the high risk characteristics of her lymphoma, was addressed at her XRT consultation with Dr. Gloria Canales on 5/8/2019. Concerns persist over the fact that she has developed some degree of leukoencephalopathy. Fortunately, clinically she is improving. The downside of XRT is possible further leukoencephalopathy as well as concerns over damage to the right eye, which is her good eye. Yeni's daughter stated that she was very pleased with the appointment with Dr. German Georges who explained all of the benefits and possible downside problems with XRT. His recommendation was for 3600 cGy over 18 treatment fractions for consolidation. Jason Jones opted against consolidation XRT.     MRI of the brain, right orbit, face and neck with and without contrast at [facility] 9/4/2019 showed stable posttreatment changes. No suspicious mass.     CT chest with contrast on 9/18/2019 documented no nodules or mass lesion. Calcified granuloma are present stable and unchanged. No acute cardiopulmonary process.      MRI orbit, face, neck with and without contrast on 3/18/2020 at [facility] documented:  · Stable to mildly improved appearance of probable denervation edema and mild enhancement involving the muscles of mastication on the right, likely related to postradiation MULTIVITAMIN-MINERALS) tablet Take 1 tablet by mouth daily      Probiotic Product (PROBIOTIC-10 PO) Take by mouth      levothyroxine (SYNTHROID) 100 MCG tablet Take 125 mcg by mouth Daily      acetaminophen-codeine (TYLENOL #3) 300-30 MG per tablet acetaminophen 300 mg-codeine 30 mg tablet      albuterol sulfate  (90 Base) MCG/ACT inhaler Inhale 2 puffs into the lungs every 4 hours as needed      furosemide (LASIX) 40 MG tablet furosemide 40 mg tablet   TAKE 1 TABLET BY MOUTH TWICE DAILY CHANGE  FOR  FUROSEMIDE  40MG  TABLET      mirabegron (MYRBETRIQ) 50 MG TB24 Take 50 mg by mouth daily      nystatin (MYCOSTATIN) 111604 UNIT/ML suspension nystatin 100,000 unit/mL oral suspension      OXYBUTYNIN CHLORIDE PO Take by mouth      potassium chloride (KLOR-CON) 20 MEQ packet Take 20 mEq by mouth 2 times daily      omeprazole (PRILOSEC) 20 MG delayed release capsule Take 20 mg by mouth daily       No current facility-administered medications for this visit.       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 Radha Guaman GERD (gastroesophageal reflux disease)     H/O right mastectomy 2004    for DCIS    Hypertension     Hyperthyroidism     Prolonged emergence from general anesthesia     Thyroid disease         Past Surgical History:      Procedure Laterality Date    APPENDECTOMY      BREAST SURGERY      right mastectomy    CHOLECYSTECTOMY  1996    COLECTOMY      COLECTOMY  06/2005    Partial- Dr. Dionne Saul for recurring diverticulitis    HYSTERECT[address]Y  [address]   [address]    for adhesions    MASTECTOMY Right 2004    PORT SURGERY Left 10/18/2018    ommaya port placement Dr. Gong Headings Right 10/22/2018    Dr. David Das AGE 5 YR/> N/A 10/18/2018    PORT INSERTION performed by Isabela Jennings MD at [address] [address] THYROID SURGERY      nodule    THYROIDECTOMY Right 1961    with vocal chord injury   Daniele Olivera Right 2001    [facility] Rick Machado [facility]        Family History:      Problem Relation Age of Onset    Other Mother         Motor Vehical Accident    Other Father 80        Unknown    Cancer Sister         Breast Cancer        Social History  Social History     Tobacco Use    Smoking status: Never Smoker    Smokeless tobacco: Never Used   Substance Use Topics    Alcohol use: No    Drug use: Not on file          Review of Systems:  Constitutional: Negative for chills, fatigue, fever or significant weight loss. HENT: Negative for congestion, hearing loss, nosebleeds or sore throat. Eyes: Negative for photophobia, pain, discharge, redness and visual disturbance. Respiratory: Negative for cough, shortness of breath, or wheezing. Cardiovascular: Negative for chest pain, palpitations or leg swelling. Gastrointestinal: Negative for abdominal pain, blood in stool, constipation, diarrhea, nausea or vomiting. Genitourinary: Negative for dysuria, flank pain, frequency, hematuria or urgency. Musculoskeletal: Negative for back pain, joint swelling, myalgias or neck pain. Skin: Negative for rash or petechiae. Neurological: Negative for tremors, seizures, syncope, weakness or headaches. Hematological: No active bruising or bleeding. Psychiatric/Behavioral: Negative for hallucinations.            Objective:  Vital Signs: Blood pressure 130/62, pulse 88, temperature 98 °F (36.7 °C), height 5' 7\" (1.702 m), weight 171 lb 11.2 oz (77.9 kg), SpO2 97 %. Physical Exam   Constitutional: Oriented to person, place, and time. No acute distress. Head: Normocephalic and atraumatic. Nose: Nose normal.   Mouth/Throat: Oropharynx is clear and moist. No oropharyngeal exudate. Eyes: Pupils are equal and round. Conjunctivae and EOM are normal. No scleral icterus. Neck: Normal range of motion. Neck supple. No JVD. No appreciable thyromegaly. Cardiovascular: Normal rate, regular rhythm, normal heart sounds and intact distal pulses. Exam reveals no gallop, murmurs or friction rub. Pulmonary/Chest: Effort normal and breath sounds normal. No respiratory distress. No wheezes. Abdominal: Soft. Bowel sounds are normal. No organomegally or masses. No tenderness. There is no rebound and no guarding. Musculoskeletal: Normal range of motion. No edema or tenderness. Lymphadenopathy: No cervical, axillary or inguinal lymphadenopathy. Neurological: Alert and oriented to person, place, and time. Cranial nerves are intact. Neurological exam is nonfocal  Skin: Skin is warm and dry. No rash noted. No erythema. No pallor. Psychiatric: Judgment normal.     Labs:  BMP:   Recent Labs     03/18/21  1437      K 4.5      CO2 28   BUN 28*   CREATININE 0.9   CALCIUM 9.7     CBC:   Recent Labs     03/18/21  1147   WBC 9.56   HGB 15.2   HCT 47.7*   MCV 92.4   *     LIVER PROFILE:   Recent Labs     03/18/21  1437   AST 30   ALT 22   BILITOT 0.3   ALKPHOS 71     PT/INR: No results for input(s): PROTIME, INR in the last 72 hours. APTT: No results for input(s): APTT in the last 72 hours. BNP:  No results for input(s): BNP in the last 72 hours. Ionized Calcium:No results for input(s): IONCA in the last 72 hours. Magnesium:No results for input(s): MG in the last 72 hours. Phosphorus:No results for input(s): PHOS in the last 72 hours.   HgbA1C: No results for input(s): LABA1C in the last 72 hours. Hepatic:   Recent Labs     03/18/21  1437   ALKPHOS 71   ALT 22   AST 30   PROT 6.7   BILITOT 0.3   LABALBU 4.3     Lactic Acid: No results for input(s): LACTA in the last 72 hours. Troponin: No results for input(s): CKTOTAL, CKMB, TROPONINT in the last 72 hours. ABGs: No results for input(s): PH, PCO2, PO2, HCO3, O2SAT in the last 72 hours. CRP:  No results for input(s): CRP in the last 72 hours. Sed Rate:  No results for input(s): SEDRATE in the last 72 hours. Cultures:   No results for input(s): CULTURE in the last 72 hours. Radiology reports as per the Radiologist  Radiology: No results found. 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 · Right simple mastectomy for DCIS by Dr. Lennox Clark on  9/23/2004  · Tumor screening and health maintenance    She was treated with CHOP-R x6 cycles given 10/24/2018 - 2/20/2019 as well as 11 doses of prophylactic intrathecal methotrexate 11/14/2018 - 2/20/2019. Elton Lu declined consolidative XRT to the right face/orbits/brain. Symptomatically, Yeni's endurance continues to improve. Her appetite is good and weight stable. She denies headache, visual changes, sinus pain etc.  She continues to have neuropathy more so to the bottom of her feet, Neuropathy grade 1. ECOG grade 1. 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     MRI of the brain and orbits was performed on 3/18/2020. Elton Lu was last seen in this office on 11/12/2020. Arrangements were made for reimaging and follow-up today. Elton Lu presents for continued monitoring.     Serology on 9/11/2019 documented the following:  CMP with a CO2 of 33  LDH- 500    Serology on 12/11/2019 revealed:  CMP with creatinine 1.00  LDH- 518 (372-118)    Physical examination does not reveal evidence of asymmetry or abnormalities in the pupils or the eyes with extraocular movements. There is no abnormalities in the oral pharyngeal area especially in the right maxilla area. No palpable lymphadenopathy in the head and neck area including preauricular upper cervical and lower cervical.  Axilla and inguinal areas are negative for palpable pathological lymphadenopathy. Lungs are clear the heart is regular the abdomen is soft and benign without organomegaly masses or tenderness. Neurological exam is intact. CT chest with contrast on 9/18/2019 documented no nodules or mass lesion. Calcified granuloma are present stable and unchanged. No acute cardiopulmonary process. MRI orbit, face, neck with and without contrast on 3/18/2020 at [facility] documented:  · Stable to mildly improved appearance of probable denervation edema and mild enhancement involving the muscles of mastication on the right, likely related to postradiation changes. There is no mass identified to indicate recurrent neoplasm. · Mild chronic sinusitis involving the ethmoid sinuses and base of the right maxillary sinus. MRI brain with and without contrast on 3/18/2020 at [facility] documented:  · Similar positioning of the right transfrontal ventricular shunt with stable ventricular size and configuration. No abnormal intracranial enhancement. · Stable diffuse hyperintense FLAIR signal changes of the white matter symmetrically likely posttreatment change    Issue of removal of the Ommaya and left anterior chest wall port was addressed 7/8/2020. She wants to just leave these in. She is a bit scared about the Omm[place] reservoir. She wants to use the chest wall port for blood draws when she comes .       Repeat MRI orbits, face, neck in March 2021 nodule but she did not keep her appointment for these imaging studies. Her examination and review of systems is completely negative for evidence of local regional or systemic recurrent disease. I did an extensive evaluation of the oropharyngeal area nasal area face head and neck exam.    Port will be flushed today and every 2 months  Imaging studies are again requested at this time, she states she will keep this appointment. I will see her in follow-up in 4 months or sooner if imaging studies document worrisome findings. These will be reviewed when available. #2.   Lung nodules  - CT scan of the chest with and without contrast on 10/3/2018 documented tiny, 2 mm nodules in the subpleural left lung, likely to be benign with a 1 year followup recommended     CT chest with contrast on 9/18/2019 documented no nodules or mass lesion. Calcified granuloma are present stable and unchanged. No acute cardiopulmonary process.      Repeat scans are requested at this time    #3  Breast cancer screening    Right simple mastectomy for DCIS by Dr. Rona Welch on  9/23/2004    Left mammogram on 10/10/2020 was a BI-RADS 1. Mammograms will be scheduled at her next visit     #4. Neuropathy grade 1  - Timbo Jeffers states she may have had some degree of neuropathy prior to treatment however more noticeable post chemotherapy   - No intervention at this time, continue to follow     #5. Mediport  Routine flush today and in 8 weeks until it is removed     #6  Bone health    DEXA scan 3/26/2018 was normal  Due for repeat BMD to PCP  DEXA scan will be scheduled at her next visit    #7  GYN cancer screening    Timbo Jeffers had a ALEXI and BSO in [address]  Return in about 4 months  Port flush today and in 2 months      Gerard TALLEY am scribing for Axel Pate MD. Electronically signed by Shayna James RN on 3/18/2021 at 7:20 PM        Timbo Jeffers was seen today for follow-up.     Diagnoses and all orders for this visit:    Intraductal carcinoma in situ of right breast    Diffuse large b-cell lymphoma, lymph nodes of head, face, and neck (HCC)  -     Lactate Dehydrogenase; Future  -     Comprehensive Metabolic Panel; Future  -     MRI BRAIN W WO CONTRAST; Future  -     MRI ORBITS FACE NECK W WO CONTRAST; Future    Encounter for central line care        Orders Placed This Encounter   Procedures    MRI BRAIN W WO CONTRAST     Standing Status:   Future     Standing Expiration Date:   3/18/2022     Scheduling Instructions:      @ [facility]     Order Specific Question:   Reason for exam:     Answer:   compare to previous scans from March 2020; B-cell lymphoma    MRI ORBITS FACE NECK W WO CONTRAST     Standing Status:   Future     Standing Expiration Date:   3/18/2022     Scheduling Instructions:      @ [facility]     Order Specific Question:   Reason for exam:     Answer:   compare to previous scans from March 2020; B-cell lymphoma     Order Specific Question:   Area(s) of interest?     Answer: ALL 3 AREAS    Lactate Dehydrogenase     Standing Status:   Future     Number of Occurrences:   1     Standing Expiration Date:   3/18/2022    Comprehensive Metabolic Panel     Standing Status:   Future     Number of Occurrences:   1     Standing Expiration Date:   3/18/2022       Return in about 4 months (around 7/18/2021) for follow-up with . I, Dr. Swapnil Sifuentes, personally performed the services described in this documentation [facility] in [facility] presence, and it is both accurate and complete.

## 2021-03-18 ENCOUNTER — OFFICE VISIT (OUTPATIENT)
Dept: HEMATOLOGY | Age: 86
End: 2021-03-18
Payer: MEDICARE

## 2021-03-18 ENCOUNTER — HOSPITAL ENCOUNTER (OUTPATIENT)
Dept: INFUSION THERAPY | Age: 86
Discharge: HOME | End: 2021-03-18
Payer: MEDICARE

## 2021-03-18 ENCOUNTER — TRANSCRIBE ORDERS (OUTPATIENT)
Dept: ADMINISTRATIVE | Facility: HOSPITAL | Age: 86
End: 2021-03-18

## 2021-03-18 VITALS
HEART RATE: 88 BPM | OXYGEN SATURATION: 97 % | HEIGHT: 67 IN | WEIGHT: 171.7 LBS | BODY MASS INDEX: 26.95 KG/M2 | TEMPERATURE: 98 F | DIASTOLIC BLOOD PRESSURE: 62 MMHG | SYSTOLIC BLOOD PRESSURE: 130 MMHG

## 2021-03-18 DIAGNOSIS — D05.11 INTRADUCTAL CARCINOMA IN SITU OF RIGHT BREAST: Primary | ICD-10-CM

## 2021-03-18 DIAGNOSIS — C83.31 DIFFUSE LARGE B-CELL LYMPHOMA, LYMPH NODES OF HEAD, FACE, AND NECK (HCC): Primary | ICD-10-CM

## 2021-03-18 DIAGNOSIS — C83.31 DIFFUSE LARGE B-CELL LYMPHOMA, LYMPH NODES OF HEAD, FACE, AND NECK (HCC): ICD-10-CM

## 2021-03-18 DIAGNOSIS — Z45.2 ENCOUNTER FOR CENTRAL LINE CARE: ICD-10-CM

## 2021-03-18 LAB
ALBUMIN SERPL-MCNC: 4.3 G/DL (ref 3.5–5.2)
ALP BLD-CCNC: 71 U/L (ref 35–104)
ALT SERPL-CCNC: 22 U/L (ref 9–52)
ANION GAP SERPL CALCULATED.3IONS-SCNC: 11 MMOL/L (ref 7–19)
AST SERPL-CCNC: 30 U/L (ref 14–36)
BASOPHILS ABSOLUTE: 0.02 K/UL (ref 0.01–0.08)
BASOPHILS RELATIVE PERCENT: 0.2 % (ref 0.1–1.2)
BILIRUB SERPL-MCNC: 0.3 MG/DL (ref 0.2–1.3)
BUN BLDV-MCNC: 28 MG/DL (ref 7–17)
CALCIUM SERPL-MCNC: 9.7 MG/DL (ref 8.4–10.2)
CHLORIDE BLD-SCNC: 103 MMOL/L (ref 98–111)
CO2: 28 MMOL/L (ref 22–29)
CREAT SERPL-MCNC: 0.9 MG/DL (ref 0.5–1)
EOSINOPHILS ABSOLUTE: 0.17 K/UL (ref 0.04–0.54)
EOSINOPHILS RELATIVE PERCENT: 1.8 % (ref 0.7–7)
GFR NON-AFRICAN AMERICAN: 59
GLOBULIN: 2.4 G/DL
GLUCOSE BLD-MCNC: 82 MG/DL (ref 74–106)
HCT VFR BLD CALC: 47.7 % (ref 34.1–44.9)
HEMOGLOBIN: 15.2 G/DL (ref 11.2–15.7)
LACTATE DEHYDROGENASE: 515 U/L (ref 313–618)
LYMPHOCYTES ABSOLUTE: 3.38 K/UL (ref 1.18–3.74)
LYMPHOCYTES RELATIVE PERCENT: 35.4 % (ref 19.3–53.1)
MCH RBC QN AUTO: 29.5 PG (ref 25.6–32.2)
MCHC RBC AUTO-ENTMCNC: 31.9 G/DL (ref 32.3–35.5)
MCV RBC AUTO: 92.4 FL (ref 79.4–94.8)
MONOCYTES ABSOLUTE: 1.27 K/UL (ref 0.24–0.82)
MONOCYTES RELATIVE PERCENT: 13.3 % (ref 4.7–12.5)
NEUTROPHILS ABSOLUTE: 4.72 K/UL (ref 1.56–6.13)
NEUTROPHILS RELATIVE PERCENT: 49.3 % (ref 34–71.1)
PDW BLD-RTO: 14.3 % (ref 11.7–14.4)
PLATELET # BLD: 162 K/UL (ref 182–369)
PMV BLD AUTO: 10.4 FL (ref 7.4–10.4)
POTASSIUM SERPL-SCNC: 4.5 MMOL/L (ref 3.5–5.1)
RBC # BLD: 5.16 M/UL (ref 3.93–5.22)
SODIUM BLD-SCNC: 142 MMOL/L (ref 137–145)
TOTAL PROTEIN: 6.7 G/DL (ref 6.3–8.2)
WBC # BLD: 9.56 K/UL (ref 3.98–10.04)

## 2021-03-18 PROCEDURE — 1090F PRES/ABSN URINE INCON ASSESS: CPT | Performed by: INTERNAL MEDICINE

## 2021-03-18 PROCEDURE — 4040F PNEUMOC VAC/ADMIN/RCVD: CPT | Performed by: INTERNAL MEDICINE

## 2021-03-18 PROCEDURE — G8417 CALC BMI ABV UP PARAM F/U: HCPCS | Performed by: INTERNAL MEDICINE

## 2021-03-18 PROCEDURE — 83615 LACTATE (LD) (LDH) ENZYME: CPT

## 2021-03-18 PROCEDURE — 6360000002 HC RX W HCPCS: Performed by: NURSE PRACTITIONER

## 2021-03-18 PROCEDURE — 36415 COLL VENOUS BLD VENIPUNCTURE: CPT

## 2021-03-18 PROCEDURE — 2580000003 HC RX 258: Performed by: NURSE PRACTITIONER

## 2021-03-18 PROCEDURE — G8484 FLU IMMUNIZE NO ADMIN: HCPCS | Performed by: INTERNAL MEDICINE

## 2021-03-18 PROCEDURE — G8427 DOCREV CUR MEDS BY ELIG CLIN: HCPCS | Performed by: INTERNAL MEDICINE

## 2021-03-18 PROCEDURE — 80053 COMPREHEN METABOLIC PANEL: CPT

## 2021-03-18 PROCEDURE — 99212 OFFICE O/P EST SF 10 MIN: CPT

## 2021-03-18 PROCEDURE — 99214 OFFICE O/P EST MOD 30 MIN: CPT | Performed by: INTERNAL MEDICINE

## 2021-03-18 PROCEDURE — 1036F TOBACCO NON-USER: CPT | Performed by: INTERNAL MEDICINE

## 2021-03-18 PROCEDURE — 1123F ACP DISCUSS/DSCN MKR DOCD: CPT | Performed by: INTERNAL MEDICINE

## 2021-03-18 PROCEDURE — 85025 COMPLETE CBC W/AUTO DIFF WBC: CPT

## 2021-03-18 PROCEDURE — 96523 IRRIG DRUG DELIVERY DEVICE: CPT

## 2021-03-18 RX ORDER — SODIUM CHLORIDE 0.9 % (FLUSH) 0.9 %
10 SYRINGE (ML) INJECTION PRN
Status: DISCONTINUED | OUTPATIENT
Start: 2021-03-18 | End: 2021-03-19 | Stop reason: HOSPADM

## 2021-03-18 RX ORDER — HEPARIN SODIUM (PORCINE) LOCK FLUSH IV SOLN 100 UNIT/ML 100 UNIT/ML
500 SOLUTION INTRAVENOUS PRN
Status: CANCELLED | OUTPATIENT
Start: 2021-03-18

## 2021-03-18 RX ORDER — ACETAMINOPHEN AND CODEINE PHOSPHATE 300; 30 MG/1; MG/1
TABLET ORAL
COMMUNITY

## 2021-03-18 RX ORDER — POTASSIUM CHLORIDE 20 MEQ/1
20 TABLET, EXTENDED RELEASE ORAL 2 TIMES DAILY
COMMUNITY
Start: 2021-03-11 | End: 2021-04-10

## 2021-03-18 RX ORDER — SODIUM CHLORIDE 0.9 % (FLUSH) 0.9 %
10 SYRINGE (ML) INJECTION PRN
Status: CANCELLED | OUTPATIENT
Start: 2021-03-18

## 2021-03-18 RX ORDER — HEPARIN SODIUM (PORCINE) LOCK FLUSH IV SOLN 100 UNIT/ML 100 UNIT/ML
500 SOLUTION INTRAVENOUS PRN
Status: DISCONTINUED | OUTPATIENT
Start: 2021-03-18 | End: 2021-03-19 | Stop reason: HOSPADM

## 2021-03-18 RX ORDER — SODIUM CHLORIDE 0.9 % (FLUSH) 0.9 %
20 SYRINGE (ML) INJECTION PRN
Status: CANCELLED | OUTPATIENT
Start: 2021-03-18

## 2021-03-18 RX ORDER — ALBUTEROL SULFATE 90 UG/1
2 AEROSOL, METERED RESPIRATORY (INHALATION) EVERY 4 HOURS PRN
COMMUNITY
Start: 2021-03-11 | End: 2021-04-10

## 2021-03-18 RX ADMIN — HEPARIN 500 UNITS: 100 SYRINGE at 17:25

## 2021-03-18 RX ADMIN — SODIUM CHLORIDE, PRESERVATIVE FREE 10 ML: 5 INJECTION INTRAVENOUS at 17:25

## 2021-03-19 ENCOUNTER — TRANSCRIBE ORDERS (OUTPATIENT)
Dept: ADMINISTRATIVE | Facility: HOSPITAL | Age: 86
End: 2021-03-19

## 2021-03-19 DIAGNOSIS — C83.31 DIFFUSE LARGE B-CELL LYMPHOMA OF LYMPH NODES OF HEAD (HCC): Primary | ICD-10-CM

## 2021-04-06 ENCOUNTER — HOSPITAL ENCOUNTER (OUTPATIENT)
Dept: MRI IMAGING | Facility: HOSPITAL | Age: 86
Discharge: HOME | End: 2021-04-06

## 2021-04-06 DIAGNOSIS — C83.31 DIFFUSE LARGE B-CELL LYMPHOMA OF LYMPH NODES OF HEAD (HCC): ICD-10-CM

## 2021-04-06 LAB — CREAT BLDA-MCNC: 1.1 MG/DL (ref 0.6–1.3)

## 2021-04-06 PROCEDURE — A9577 INJ MULTIHANCE: HCPCS | Performed by: INTERNAL MEDICINE

## 2021-04-06 PROCEDURE — 70553 MRI BRAIN STEM W/O & W/DYE: CPT

## 2021-04-06 PROCEDURE — 0 GADOBENATE DIMEGLUMINE 529 MG/ML SOLUTION: Performed by: INTERNAL MEDICINE

## 2021-04-06 PROCEDURE — 82565 ASSAY OF CREATININE: CPT

## 2021-04-06 PROCEDURE — 70543 MRI ORBT/FAC/NCK W/O &W/DYE: CPT

## 2021-04-06 RX ADMIN — GADOBENATE DIMEGLUMINE 15 ML: 529 INJECTION, SOLUTION INTRAVENOUS at 12:48

## 2021-05-20 ENCOUNTER — HOSPITAL ENCOUNTER (OUTPATIENT)
Dept: INFUSION THERAPY | Age: 86
Discharge: HOME | End: 2021-05-20
Payer: MEDICARE

## 2021-05-20 DIAGNOSIS — Z45.2 ENCOUNTER FOR CENTRAL LINE CARE: Primary | ICD-10-CM

## 2021-05-20 PROCEDURE — 2580000003 HC RX 258: Performed by: NURSE PRACTITIONER

## 2021-05-20 PROCEDURE — 6360000002 HC RX W HCPCS: Performed by: NURSE PRACTITIONER

## 2021-05-20 PROCEDURE — 96523 IRRIG DRUG DELIVERY DEVICE: CPT

## 2021-05-20 RX ORDER — SODIUM CHLORIDE 0.9 % (FLUSH) 0.9 %
10 SYRINGE (ML) INJECTION PRN
Status: CANCELLED | OUTPATIENT
Start: 2021-05-20

## 2021-05-20 RX ORDER — SODIUM CHLORIDE 0.9 % (FLUSH) 0.9 %
10 SYRINGE (ML) INJECTION PRN
Status: DISCONTINUED | OUTPATIENT
Start: 2021-05-20 | End: 2021-05-21 | Stop reason: HOSPADM

## 2021-05-20 RX ORDER — SODIUM CHLORIDE 0.9 % (FLUSH) 0.9 %
20 SYRINGE (ML) INJECTION PRN
Status: CANCELLED | OUTPATIENT
Start: 2021-05-20

## 2021-05-20 RX ORDER — HEPARIN SODIUM (PORCINE) LOCK FLUSH IV SOLN 100 UNIT/ML 100 UNIT/ML
500 SOLUTION INTRAVENOUS PRN
Status: CANCELLED | OUTPATIENT
Start: 2021-05-20

## 2021-05-20 RX ORDER — HEPARIN SODIUM (PORCINE) LOCK FLUSH IV SOLN 100 UNIT/ML 100 UNIT/ML
500 SOLUTION INTRAVENOUS PRN
Status: DISCONTINUED | OUTPATIENT
Start: 2021-05-20 | End: 2021-05-21 | Stop reason: HOSPADM

## 2021-05-20 RX ADMIN — SODIUM CHLORIDE, PRESERVATIVE FREE 10 ML: 5 INJECTION INTRAVENOUS at 13:42

## 2021-05-20 RX ADMIN — HEPARIN 500 UNITS: 100 SYRINGE at 13:42

## 2021-07-13 DIAGNOSIS — D05.11 INTRADUCTAL CARCINOMA IN SITU OF RIGHT BREAST: Primary | ICD-10-CM

## 2021-07-15 ENCOUNTER — HOSPITAL ENCOUNTER (OUTPATIENT)
Dept: INFUSION THERAPY | Age: 86
Discharge: HOME | End: 2021-07-15
Payer: MEDICARE

## 2021-07-15 ENCOUNTER — OFFICE VISIT (OUTPATIENT)
Dept: HEMATOLOGY | Age: 86
End: 2021-07-15
Payer: MEDICARE

## 2021-07-15 VITALS
HEIGHT: 67 IN | DIASTOLIC BLOOD PRESSURE: 82 MMHG | SYSTOLIC BLOOD PRESSURE: 146 MMHG | HEART RATE: 76 BPM | OXYGEN SATURATION: 98 % | BODY MASS INDEX: 27.4 KG/M2 | WEIGHT: 174.6 LBS

## 2021-07-15 DIAGNOSIS — C83.31 DIFFUSE LARGE B-CELL LYMPHOMA, LYMPH NODES OF HEAD, FACE, AND NECK (HCC): Primary | ICD-10-CM

## 2021-07-15 DIAGNOSIS — Z45.2 ENCOUNTER FOR CENTRAL LINE CARE: ICD-10-CM

## 2021-07-15 DIAGNOSIS — Z12.31 ENCOUNTER FOR SCREENING MAMMOGRAM FOR BREAST CANCER: ICD-10-CM

## 2021-07-15 DIAGNOSIS — Z78.0 MENOPAUSE: ICD-10-CM

## 2021-07-15 DIAGNOSIS — Z87.898 PERSONAL HISTORY OF MULTIPLE PULMONARY NODULES: ICD-10-CM

## 2021-07-15 DIAGNOSIS — D05.11 INTRADUCTAL CARCINOMA IN SITU OF RIGHT BREAST: Primary | ICD-10-CM

## 2021-07-15 DIAGNOSIS — Z00.00 HEALTH CARE MAINTENANCE: ICD-10-CM

## 2021-07-15 DIAGNOSIS — Z86.000 HISTORY OF DUCTAL CARCINOMA IN SITU (DCIS) OF BREAST: ICD-10-CM

## 2021-07-15 DIAGNOSIS — Z13.820 SCREENING FOR OSTEOPOROSIS: ICD-10-CM

## 2021-07-15 LAB
BASOPHILS ABSOLUTE: 0.02 K/UL (ref 0.01–0.08)
BASOPHILS RELATIVE PERCENT: 0.3 % (ref 0.1–1.2)
EOSINOPHILS ABSOLUTE: 0.11 K/UL (ref 0.04–0.54)
EOSINOPHILS RELATIVE PERCENT: 1.6 % (ref 0.7–7)
HCT VFR BLD CALC: 45.1 % (ref 34.1–44.9)
HEMOGLOBIN: 14.6 G/DL (ref 11.2–15.7)
LYMPHOCYTES ABSOLUTE: 2.28 K/UL (ref 1.18–3.74)
LYMPHOCYTES RELATIVE PERCENT: 33.3 % (ref 19.3–53.1)
MCH RBC QN AUTO: 29.2 PG (ref 25.6–32.2)
MCHC RBC AUTO-ENTMCNC: 32.4 G/DL (ref 32.3–35.5)
MCV RBC AUTO: 90.2 FL (ref 79.4–94.8)
MONOCYTES ABSOLUTE: 0.56 K/UL (ref 0.24–0.82)
MONOCYTES RELATIVE PERCENT: 8.2 % (ref 4.7–12.5)
NEUTROPHILS ABSOLUTE: 3.87 K/UL (ref 1.56–6.13)
NEUTROPHILS RELATIVE PERCENT: 56.6 % (ref 34–71.1)
PDW BLD-RTO: 14.6 % (ref 11.7–14.4)
PLATELET # BLD: 177 K/UL (ref 182–369)
PMV BLD AUTO: 9.8 FL (ref 7.4–10.4)
RBC # BLD: 5 M/UL (ref 3.93–5.22)
WBC # BLD: 6.84 K/UL (ref 3.98–10.04)

## 2021-07-15 PROCEDURE — 1036F TOBACCO NON-USER: CPT | Performed by: INTERNAL MEDICINE

## 2021-07-15 PROCEDURE — G8417 CALC BMI ABV UP PARAM F/U: HCPCS | Performed by: INTERNAL MEDICINE

## 2021-07-15 PROCEDURE — 1090F PRES/ABSN URINE INCON ASSESS: CPT | Performed by: INTERNAL MEDICINE

## 2021-07-15 PROCEDURE — 99213 OFFICE O/P EST LOW 20 MIN: CPT

## 2021-07-15 PROCEDURE — 2580000003 HC RX 258: Performed by: NURSE PRACTITIONER

## 2021-07-15 PROCEDURE — 36415 COLL VENOUS BLD VENIPUNCTURE: CPT

## 2021-07-15 PROCEDURE — G8427 DOCREV CUR MEDS BY ELIG CLIN: HCPCS | Performed by: INTERNAL MEDICINE

## 2021-07-15 PROCEDURE — 99214 OFFICE O/P EST MOD 30 MIN: CPT | Performed by: INTERNAL MEDICINE

## 2021-07-15 PROCEDURE — 96523 IRRIG DRUG DELIVERY DEVICE: CPT

## 2021-07-15 PROCEDURE — 1123F ACP DISCUSS/DSCN MKR DOCD: CPT | Performed by: INTERNAL MEDICINE

## 2021-07-15 PROCEDURE — 4040F PNEUMOC VAC/ADMIN/RCVD: CPT | Performed by: INTERNAL MEDICINE

## 2021-07-15 PROCEDURE — 6360000002 HC RX W HCPCS: Performed by: NURSE PRACTITIONER

## 2021-07-15 PROCEDURE — 85025 COMPLETE CBC W/AUTO DIFF WBC: CPT

## 2021-07-15 RX ORDER — HEPARIN SODIUM (PORCINE) LOCK FLUSH IV SOLN 100 UNIT/ML 100 UNIT/ML
500 SOLUTION INTRAVENOUS PRN
Status: DISCONTINUED | OUTPATIENT
Start: 2021-07-15 | End: 2021-07-16 | Stop reason: HOSPADM

## 2021-07-15 RX ORDER — SODIUM CHLORIDE 0.9 % (FLUSH) 0.9 %
20 SYRINGE (ML) INJECTION PRN
Status: CANCELLED | OUTPATIENT
Start: 2021-07-15

## 2021-07-15 RX ORDER — SODIUM CHLORIDE 0.9 % (FLUSH) 0.9 %
10 SYRINGE (ML) INJECTION PRN
Status: CANCELLED | OUTPATIENT
Start: 2021-07-15

## 2021-07-15 RX ORDER — SODIUM CHLORIDE 0.9 % (FLUSH) 0.9 %
10 SYRINGE (ML) INJECTION PRN
Status: DISCONTINUED | OUTPATIENT
Start: 2021-07-15 | End: 2021-07-16 | Stop reason: HOSPADM

## 2021-07-15 RX ORDER — HEPARIN SODIUM (PORCINE) LOCK FLUSH IV SOLN 100 UNIT/ML 100 UNIT/ML
500 SOLUTION INTRAVENOUS PRN
Status: CANCELLED | OUTPATIENT
Start: 2021-07-15

## 2021-07-15 RX ADMIN — HEPARIN 500 UNITS: 100 SYRINGE at 15:43

## 2021-07-15 RX ADMIN — SODIUM CHLORIDE, PRESERVATIVE FREE 10 ML: 5 INJECTION INTRAVENOUS at 15:43

## 2021-07-15 NOTE — PROGRESS NOTES
Patient:  Rebecca Swanson  YOB: 1934  Date of Service: 7/15/2021  MRN: [id number]    Primary Care Physician: Domingo Habermann, MD    Chief Complaint   Patient presents with    Follow-up     Bcell lymphoma       Patient Seen, Chart, Consults notes, Labs, Radiology studies reviewed. 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 · Right simple mastectomy for DCIS by Dr. Patricia Torres on  2004  · Tumor screening and health maintenance    She was treated with CHOP-R x6 cycles given 10/24/2018 - 2019 as well as 11 doses of prophylactic intrathecal methotrexate 2018 - 2019. Willis Frey declined consolidative XRT to the right face/orbits/brain. Symptomatically, Yeni's endurance continues to improve. Her appetite is good and weight stable. She denies headache, visual changes, sinus pain etc.  She continues to have neuropathy more so to the bottom of her feet, Neuropathy grade 1. ECOG grade 1. 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    Deidra Quintero presents for continued monitoring and to review results of recent imaging at [facility] on 2021    TARGET LYMPHOMA SITES:  1. Right maxillary sinus with local invasion to the floor of the right orbit   2.  on 10/10/18   3. borderline spleen size, 13 x 4.8 x 11.8 cm  4. Bone marrow negative on 10/11/18   5. CSF specimen via Omaya port negative on 10/24/18     TUMOR HISTORY: Right maxillary sinus high-grade diffuse large B-cell lymphoma 2018  Willis Frey was treated for hyperthyroidism with radioactive iodine at about age 28.  About 45 years ago she underwent a thyroidectomy followed subsequently by a vocal cord medialization procedure by Dr. Bel Faustin in [place], [place]. Several years ago she had right maxillary sinus surgery by Dr. David Wolf. Andra Cotton developed upper respiratory and sinus symptoms in the spring of 2018. She was seen and treated at [facility] urgent care on 3/13/2018 for bronchitis. She was then seen in followup by her PCP, Dr. Welton Lombard on 4/27/2018. He felt an abnormality in the neck and ordered a CT scan of the soft tissues of the neck and referred her to ENT for further evaluation of this. A CT scan of the soft tissues of the neck with contrast on 5/7/2018 documented extensive mucosal thickening of the right maxillary sinus consistent with chronic sinusitis. There was also opacification of several of the ethmoid air cells and mucosal thickening in the nasal cavity. No neck mass or adenopathy was appreciated. Andra Cotton was evaluated by Dr. Creighton Carrel on 5/30/2018 for evaluation of right neck pain and swelling. Full ENT exam including nasopharyngeal area was within normal limits. Review of the CT scan from 5/7/2018 was consistent with chronic sinusitis. Andra Cotton had had a medialization procedure by ENT and [address] earlier in the year for a vocal cord paralysis issue. It was felt that some of the changes on the CT scan were consistent with that procedure. On 8/3/2018 Andra Cotton had a right upper posterior tooth pulled by an oral surgeon, Dr. Regla Vega in [facility] for what was felt to possibly be an infected tooth. Andra Cotton was again seen at [facility] urgent care on 8/16/2018 with swelling, redness and tenderness in the right maxillary area with associated headaches not responding to antibiotics. Plain film imaging studies and a CT scan of the area was performed.      Plain film x-rays of the facial bones on 8/16/2018 did not reveal acute abnormalities     CT scan of facial bones within without contrast on 8/16/2018 documented interval development of an enhancing soft tissue along the right maxilla extending along the anterior right maxillary sinus and along the floor of the right orbit measuring 2.4 cm in width. New erosive bony changes were noted involving the bony structure of the right orbital floor. The findings were concerning for a neoplastic process with erosive changes of the right orbital floor with ENT consultation recommended. Jodelle Harada was seen by Michael Abdi PA-C with ENT on 8/20/2018 where the above CT scanning clinical findings were evaluated. Arrangements were made for a biopsy with Dr. Skylar Staley. On 9/17/2018, Dr. Skylar Staley performed a nasal/sinus endoscopy with biopsy. 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 The cells are polymorphic with maintaining medium sized to large cells with irregular nuclear contour. On flow cytometry, the monoclonal kappa B cell population does not express CD5 or CD10, but are low viability. The immunostains, show a diffuse B-cell infiltrate positive for CD20, PAX-5 and BCL 6, but negative for CD5, CD10, BCL-2, cyclin D1 and CD43.  <30% of the cells were positive for Mum-1. Ki-67 showed a high proliferative rate of about 80%. All of the above is consistent with a diffuse large B-cell lymphoma of the GBC type. A CT scan of the soft tissues of the neck with contrast on 10/3/2018 was compared to the CT scan of the face on 8/16/2018 and 5/7/2018. There had been significant progression of disease with interval increase in the size of the right buccal space hyperdense mass measuring approximately at least 7 x 2 cm on axial cuts. There is involvement of the medial and lateral pterigoid muscle. The right temporalis muscle was also inseparable from the mass with involvement as well of the pterygomaxillary fissure, pterygopalatine fossa and sphenopalatine foramen.   Local cortex erosive the structure and was also noted along the maxillary gingival surface. Involvement of the roof of the right maxillary sinus with progression of the erosive disease along the floor of the right orbit was also again identified. An asymmetric soft tissue density was also noted at the location of the right infraorbital area. There was no evidence of involvement of the extraocular muscles nor do the lacrimal glands demonstrate a discrete mass. The lacrimal glands do not demonstrate a discrete mass. Disease extends along the right lateral nasal cavity and ethmoid air cells. There is now disease along the right sphenoid sinus inseparable from the right foramen rotundum with probable involvement of the maxillary branch of V5. Evidence of prior instrumentation with prior middle turbinectomies, uncinate to measles, antrostomies, and partial ethmoidectomies is also suspected based on the radiographic imaging. The cavernous sinuses are symmetric. [address] are symmetric. There is now a right level one be lymphadenopathy by size criteria, and abnormal clustering with lymph node measuring up to 1.5 cm. Otherwise the rest of the oral cavity and head and neck area did not demonstrate evidence of discrete mass. Apparent right vocal cord paralysis would medialization of the true vocal cord with anteromedial rotation of the right was also described. CT scan of the chest with and without contrast on 10/3/2018 did not reveal evidence of thoracic lymphadenopathy. Tiny 2 mm nodules were noted in the subpleural left lung, likely to be benign with a 1 year followup recommended     CT scan of the abdomen and pelvis with contrast on 10/3/2018 did not reveal evidence of metastatic disease nor abdominal lymphadenopathy. The spleen was of borderline size measuring 13 cm x 4.8 cm x 11.8 cm. Physical examination on 10/10/2018 revealed a very enlarged and swollen right maxillary facial area with induration of soft tissues around the upper teeth on the right and face.   No palpable peripheral lymphadenopathy was able to be appreciated in the head and neck area, epitrochlear and axillary areas, inguinal areas. No evidence of palpable splenomegaly or other abdominal findings were encountered on exam.    Bone marrow aspirate and biopsy on 10/11/18 showed no evidence of involvement by diffuse large B-cell lymphoma. FLOW cytometry negative. MRI of the brain w/wo contrast 10/11/18 [facility] was consistent with abnormal soft tissue mass involving the right  and buccal space extending along the anterior margin of the right maxillary sinus contiguous with the floor of the right orbit. There was no definite invasion into the orbit, nor displacement of the inferior rectus musculature. PET scan 10/12/18 at [facility] showed hypermetabolic activity associated with the right buccal space and  space mass, SUV 18.5. There is no distant focus of hypermetabolic activity seen to suggest other sites of involvement. Echocardiogram 10/12/18 at [facility] revealed an estimated LVEF of 60%. Hepatitis B and C were negative. Hepatitis A IgM was positive and discussed with Dr. Nataliia Aragon. Left chest mediport was placed 10/18/18 by Dr. Zac Cotto. Right ommaya port was placed 10/22/18 by Dr. Hesham Douglas. CSF specimen was obtained via ommaya port by Dr. Nataliia Aragon, sent for cytology and FLOW prior to initiation of Cycle #1 CHOP-R initiated on 10/24/18 was negative.     Casper Lan completed the final cycle #6 of CHOP/R and Dose #11 of prophylactic intrathecal methotrexate on 2/20/2019. MRI of the right orbit, face and neck with and without contrast at [facility] on 4/1/2019 concluded  · NO solid enhancing mass to suggest residual/recurrent tumor. · The infiltrate of edema and wispy enhancement within the right pterigoids and masseter muscles is thought to be secondary to an evolving denervation atrophy.      MRI of the brain with and without contrast on 4/1/2019 concluded:  · Residual abnormal fact signal in the inferior right orbit, right  space and right pterygopalatine fossa, appearing stable   · Diffusion signal abnormality in this region has normalized, suggesting positive lymphoma treatment response. 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 The remaining question of whether Deliliah Councilman would benefit from consolidative XRT to the right face/orbit/brain, given the high risk characteristics of her lymphoma, was addressed at her XRT consultation with Dr. Jose Franco on 5/8/2019. Concerns persist over the fact that she has developed some degree of leukoencephalopathy. Fortunately, clinically she is improving. The downside of XRT is possible further leukoencephalopathy as well as concerns over damage to the right eye, which is her good eye. Yeni's daughter stated that she was very pleased with the appointment with Dr. Cayetano Epley who explained all of the benefits and possible downside problems with XRT. His recommendation was for 3600 cGy over 18 treatment fractions for consolidation. Deliliah Councilman opted against consolidation XRT.     MRI of the brain, right orbit, face and neck with and without contrast at [facility] 9/4/2019 showed stable posttreatment changes. No suspicious mass.     CT chest with contrast on 9/18/2019 documented no nodules or mass lesion. Calcified granuloma are present stable and unchanged. No acute cardiopulmonary process. MRI orbit, face, neck with and without contrast on 3/18/2020 at [facility] documented:  · Stable to mildly improved appearance of probable denervation edema and mild enhancement involving the muscles of mastication on the right, likely related to postradiation changes. There is no mass identified to indicate recurrent neoplasm.   · Mild chronic sinusitis involving the ethmoid sinuses and base of the right maxillary sinus. MRI brain with and without contrast on 3/18/2020 at [facility] documented:  · Similar positioning of the right transfrontal ventricular shunt with stable ventricular size and configuration. No abnormal intracranial enhancement. · Stable diffuse hyperintense FLAIR signal changes of the white matter symmetrically likely posttreatment change    MRI BRAIN W WO CONTRAST-, MRI ORBIT FACE NECK W WO CONTRASTon 4/6/2021 at [facility], compared to 3/18/20, documented:  · Stable MR appearance the brain. No acute intracranial process. No abnormal contrast enhancement. · Decreasing enhancement of the right  muscles described on the previous study, benign post therapy changes suspected. No developing face/orbital abnormalities. · Mild paranasal sinus mucosal thickening. TREATMENT SUMMARY:  1. CHOP-R x6 cycles given 10/24/2018 - 2/20/2019.  2. Dose #1 prophylactic intrathecal methotrexate (FLOW cytometry on CSF negative) given 10/29/18. Dose #2 on 11/14/18. 11th and final dose given 2/20/2019.  3. Ashkan Hernandez opted against consolidative XRT.           TARGET BREAST CANCER SITES:  1. Right mastectomy 09/23/04 for DCIS    TUMOR HISTORY: Right DCIS 09/23/2004  On 09/23/04, Ashkan Hernandez had a right simple mastectomy by Dr. Arely Grover for a low grade DCIS of the right breast.     The ER was 95%, NH was 13%. No further specific therapy was indicated for this cancer other than close follow up of the area and the opposite breast.    TREATMENT SUMMARY:  1.  Right mastectomy and 9/23/2004 for DCIS       Allergies:  Lisinopril, Statins, Alendronate, Lexapro [escitalopram oxalate], Parathyroid hormone (recomb), Sulfa antibiotics, and Trazodone and nefazodone    Medicines:  Current Outpatient Medications   Medication Sig Dispense Refill    Magic Mouthwash (MIRACLE MOUTHWASH) 1/3 benadryl, 1/3 viscous lidocaine, 1/3 maalox 240 mL 1    Multiple Vitamin TABS Take 1 tablet by mouth      potassium chloride (KLOR-CON) 20 MEQ packet Take 20 mEq by mouth 2 times daily      Coenzyme Q10 10 MG CAPS 150      Cyanocobalamin (VITAMIN B-12) 5000 MCG LOZG Take by mouth      Multiple Vitamins-Minerals (THERAPEUTIC MULTIVITAMIN-MINERALS) tablet Take 1 tablet by mouth daily      Probiotic Product (PROBIOTIC-10 PO) Take by mouth      levothyroxine (SYNTHROID) 100 MCG tablet Take 125 mcg by mouth Daily      acetaminophen-codeine (TYLENOL #3) 300-30 MG per tablet acetaminophen 300 mg-codeine 30 mg tablet (Patient not taking: Reported on 7/15/2021)      albuterol sulfate  (90 Base) MCG/ACT inhaler Inhale 2 puffs into the lungs every 4 hours as needed      potassium chloride (KLOR-CON M) 20 MEQ extended release tablet Take 20 mEq by mouth 2 times daily      furosemide (LASIX) 40 MG tablet furosemide 40 mg tablet   TAKE 1 TABLET BY MOUTH TWICE DAILY CHANGE  FOR  FUROSEMIDE  40MG  TABLET (Patient not taking: Reported on 7/15/2021)      mirabegron (MYRBETRIQ) 50 MG TB24 Take 50 mg by mouth daily (Patient not taking: Reported on 7/15/2021)      nystatin (MYCOSTATIN) 326861 UNIT/ML suspension nystatin 100,000 unit/mL oral suspension (Patient not taking: Reported on 7/15/2021)      OXYBUTYNIN CHLORIDE PO Take by mouth (Patient not taking: Reported on 7/15/2021)      magnesium 30 MG tablet Take 150 mg by mouth 2 times daily (Patient not taking: Reported on 7/15/2021)      omeprazole (PRILOSEC) 20 MG delayed release capsule Take 20 mg by mouth daily (Patient not taking: Reported on 7/15/2021)       No current facility-administered medications for this visit.      Facility-Administered Medications Ordered in Other Visits   Medication Dose Route Frequency Provider Last Rate Last Admin    sodium chloride flush 0.9 % injection 10 mL  10 mL Intravenous PRN ELY Rock   10 mL at 07/15/21 1543    heparin flush 100 UNIT/ML injection 500 Units  500 Units Intracatheter PRN ELY Rock   500 Units palpitations or leg swelling. Gastrointestinal: Negative for abdominal pain, blood in stool, constipation, diarrhea, nausea or vomiting. Genitourinary: Negative for dysuria, flank pain, frequency, hematuria or urgency. Musculoskeletal: Negative for back pain, joint swelling, myalgias or neck pain. Skin: Negative for rash or petechiae. Neurological: Negative for tremors, seizures, syncope, weakness or headaches. Hematological: No active bruising or bleeding. Psychiatric/Behavioral: Negative for hallucinations. Objective:  Vital Signs: Blood pressure (!) 146/82, pulse 76, height 5' 7\" (1.702 m), weight 174 lb 9.6 oz (79.2 kg), SpO2 98 %. Physical Exam   Constitutional: Oriented to person, place, and time. No acute distress. Head: Normocephalic and atraumatic. Nose: Nose normal.   Mouth/Throat: Oropharynx is clear and moist. No oropharyngeal exudate. Eyes: Pupils are equal and round. Conjunctivae and EOM are normal. No scleral icterus. Neck: Normal range of motion. Neck supple. No JVD. No appreciable thyromegaly. Cardiovascular: Normal rate, regular rhythm, normal heart sounds and intact distal pulses. Exam reveals no gallop, murmurs or friction rub. Pulmonary/Chest: Effort normal and breath sounds normal. No respiratory distress. No wheezes. Abdominal: Soft. Bowel sounds are normal. No organomegally or masses. No tenderness. There is no rebound and no guarding. Musculoskeletal: Normal range of motion. No edema or tenderness. Lymphadenopathy: No cervical, axillary or inguinal lymphadenopathy. Neurological: Alert and oriented to person, place, and time. Cranial nerves are intact. Neurological exam is nonfocal  Skin: Skin is warm and dry. No rash noted. No erythema. No pallor. Psychiatric: Judgment normal.     Labs:  BMP:   No results for input(s): NA, K, CL, CO2, PHOS, BUN, CREATININE, CALCIUM in the last 72 hours.   CBC:   Recent Labs     07/15/21  1426   WBC 6.84   HGB 14.6   HCT 45.1*   MCV 90.2   *     LIVER PROFILE:   No results for input(s): AST, ALT, LIPASE, BILIDIR, BILITOT, ALKPHOS in the last 72 hours. Invalid input(s): AMYLASE,  ALB  PT/INR: No results for input(s): PROTIME, INR in the last 72 hours. APTT: No results for input(s): APTT in the last 72 hours. BNP:  No results for input(s): BNP in the last 72 hours. Ionized Calcium:No results for input(s): IONCA in the last 72 hours. Magnesium:No results for input(s): MG in the last 72 hours. Phosphorus:No results for input(s): PHOS in the last 72 hours. HgbA1C: No results for input(s): LABA1C in the last 72 hours. Hepatic:   No results for input(s): ALKPHOS, ALT, AST, PROT, BILITOT, BILIDIR, LABALBU in the last 72 hours. Lactic Acid: No results for input(s): LACTA in the last 72 hours. Troponin: No results for input(s): CKTOTAL, CKMB, TROPONINT in the last 72 hours. ABGs: No results for input(s): PH, PCO2, PO2, HCO3, O2SAT in the last 72 hours. CRP:  No results for input(s): CRP in the last 72 hours. Sed Rate:  No results for input(s): SEDRATE in the last 72 hours. Cultures:   No results for input(s): CULTURE in the last 72 hours. Radiology reports as per the Radiologist  Radiology: No results found. 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 · Right simple mastectomy for DCIS by Dr. Ranjana Angelo on  9/23/2004  · Tumor screening and health maintenance    She was treated with CHOP-R x6 cycles given 10/24/2018 - 2/20/2019 as well as 11 doses of prophylactic intrathecal methotrexate 11/14/2018 - 2/20/2019. Michelle Chahal declined consolidative XRT to the right face/orbits/brain. Symptomatically, Yeni's endurance continues to improve.   Her appetite is good and weight stable. She denies headache, visual changes, sinus pain etc.  She continues to have neuropathy more so to the bottom of her feet, Neuropathy grade 1. ECOG grade 1. 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    Adan Gonzalez presents for continued monitoring and to review results of recent imaging at [facility] on 4/6/2021    Serology on 3/18/2021 revealed:  CMP -unremarkable  LDH - 515 ([phone] ref range)    Physical examination today, 7/15/2021 does not reveal evidence of asymmetry or abnormalities in the pupils or the eyes with extraocular movements. There are no abnormalities in the oral pharyngeal area especially in the right maxilla area. No palpable lymphadenopathy in the head and neck area including preauricular upper cervical and lower cervical.  Axilla and inguinal areas are negative for palpable pathological lymphadenopathy. Right anterior chest wall exam is completely negative. The left breast and left axilla are both examined and are normal.  Lungs are clear the heart is regular the abdomen is soft and benign without organomegaly masses or tenderness. Neurological exam is intact. MRI BRAIN W WO CONTRAST-, MRI ORBIT FACE NECK W WO CONTRASTon 4/6/2021 at [facility], compared to 3/18/20, documented:  · Stable MR appearance the brain. No acute intracranial process. No abnormal contrast enhancement. · Decreasing enhancement of the right  muscles described on the previous study, benign post therapy changes suspected. No developing face/orbital abnormalities. · Mild paranasal sinus mucosal thickening. CBC today 7/15/2021 reveals a WBC of 6.84. Hgb is 14.6 with an MCV of 90.2 and platelet count of 653,740. Issue of removal of the Ommaya and left anterior chest wall port was addressed 7/8/2020 and again on 7/15/2021. She wants to just leave these in.   She is a bit scared about the Ommaya reservoir. She wants to use the chest wall port for blood draws when she comes . Her examination, review of systems and imaging studies are completely negative for evidence of local regional or systemic recurrent disease. I did an extensive evaluation of the oropharyngeal area nasal area face head and neck exam.    Port will be flushed today and every 2 months  Follow-up arrangements have been made    #2. Lung nodules  - CT scan of the chest with and without contrast on 10/3/2018 documented tiny, 2 mm nodules in the subpleural left lung, likely to be benign with a 1 year followup recommended     CT chest with contrast on 9/18/2019 documented no nodules or mass lesion. Calcified granuloma are present stable and unchanged. No acute cardiopulmonary process. A repeat CT scan of the chest will be done prior to her return visit, see orders below. #3  Breast cancer screening    Right simple mastectomy for DCIS by Dr. Jasmyn Anton on  9/23/2004    Left mammogram on 10/10/2020 was a BI-RADS 1. Mammograms will be scheduled at her next visit for October of this year.     #4. Neuropathy grade 1  - Sophia Kathleen states she may have had some degree of neuropathy prior to treatment however more noticeable post chemotherapy   - No intervention at this time, continue to follow     #5. Mediport  Routine flush today and in 8 weeks until it is removed     #6  Bone health    DEXA scan 3/26/2018 was normal  Due for repeat BMD to PCP  DEXA scan will be scheduled at her next visit    #7  GYN cancer screening    Sophia Kathleen had a ALEXI and BSO in [address]  Return in about 4 months  Port flush today and in 2 months      I, Sera Conrad am scribing for Talmage Halsted, MD. Electronically signed by Johann Joe RN on 7/15/2021 at 6:17 PM        Sophia Kathleen was seen today for follow-up.     Diagnoses and all orders for this visit:    Diffuse large b-cell lymphoma, lymph nodes of head, face, and neck ([facility] [email or web address] 75.)  -     [address]; Future  -     Cancer Antigen 15-3; Future  -     Comprehensive Metabolic Panel; Future    History of ductal carcinoma in situ (DCIS) of breast    Encounter for central line care    Personal history of multiple pulmonary nodules  -     CT CHEST W CONTRAST; Future    Health care maintenance    Screening for osteoporosis  -     DEXA BONE DENSITY 2 SITES; Future    Encounter for screening mammogram for breast cancer  -     MARTIN DIGITAL SCREEN UNILATERAL LEFT; Future    Menopause  -     DEXA BONE DENSITY 2 SITES; Future  -     MARTIN DIGITAL SCREEN UNILATERAL LEFT; Future        Orders Placed This Encounter   Procedures    CT CHEST W CONTRAST     Standing Status:   Future     Standing Expiration Date:   7/15/2022     Order Specific Question:   Reason for exam:     Answer:   pulmonary nodules, hx lymphoma    DEXA BONE DENSITY 2 SITES     Standing Status:   Future     Standing Expiration Date:   7/15/2022     Order Specific Question:   Reason for exam:     Answer:   screenign for osteoporosis    MARTIN DIGITAL SCREEN UNILATERAL LEFT     Standing Status:   Future     Standing Expiration Date:   9/15/2022     Order Specific Question:   Reason for exam:     Answer:   screening for breast cancer    Cancer Antigen 15-3     Standing Status:   Future     Standing Expiration Date:   7/15/2022    Comprehensive Metabolic Panel     Standing Status:   Future     Standing Expiration Date:   7/15/2022       Return in about 4 months (around 11/15/2021) for follow-up with . I, Dr. eBl Reyes, personally performed the services described in this documentation as scribed by [facility] LPN in my presence, and it is both accurate and complete.

## 2021-08-20 ENCOUNTER — APPOINTMENT (OUTPATIENT)
Dept: GENERAL RADIOLOGY | Facility: HOSPITAL | Age: 86
End: 2021-08-20

## 2021-08-20 ENCOUNTER — APPOINTMENT (OUTPATIENT)
Dept: CT IMAGING | Facility: HOSPITAL | Age: 86
End: 2021-08-20

## 2021-08-20 ENCOUNTER — HOSPITAL ENCOUNTER (EMERGENCY)
Facility: HOSPITAL | Age: 86
Discharge: HOME | End: 2021-08-20
Attending: EMERGENCY MEDICINE | Admitting: EMERGENCY MEDICINE

## 2021-08-20 VITALS
RESPIRATION RATE: 16 BRPM | DIASTOLIC BLOOD PRESSURE: 78 MMHG | OXYGEN SATURATION: 100 % | HEART RATE: 79 BPM | TEMPERATURE: 98.4 F | HEIGHT: 68 IN | WEIGHT: 172 LBS | BODY MASS INDEX: 26.07 KG/M2 | SYSTOLIC BLOOD PRESSURE: 122 MMHG

## 2021-08-20 DIAGNOSIS — R42 DIZZINESS: ICD-10-CM

## 2021-08-20 DIAGNOSIS — R05.9 COUGH: Primary | ICD-10-CM

## 2021-08-20 LAB
ALBUMIN SERPL-MCNC: 4.6 G/DL (ref 3.5–5.2)
ALBUMIN/GLOB SERPL: 1.8 G/DL
ALP SERPL-CCNC: 73 U/L (ref 39–117)
ALT SERPL W P-5'-P-CCNC: 17 U/L (ref 1–33)
ANION GAP SERPL CALCULATED.3IONS-SCNC: 19 MMOL/L (ref 5–15)
AST SERPL-CCNC: 22 U/L (ref 1–32)
BASOPHILS # BLD AUTO: 0.05 10*3/MM3 (ref 0–0.2)
BASOPHILS NFR BLD AUTO: 0.6 % (ref 0–1.5)
BILIRUB SERPL-MCNC: 0.5 MG/DL (ref 0–1.2)
BUN SERPL-MCNC: 23 MG/DL (ref 8–23)
BUN/CREAT SERPL: 25.8 (ref 7–25)
CALCIUM SPEC-SCNC: 9.3 MG/DL (ref 8.6–10.5)
CHLORIDE SERPL-SCNC: 101 MMOL/L (ref 98–107)
CO2 SERPL-SCNC: 23 MMOL/L (ref 22–29)
CREAT SERPL-MCNC: 0.89 MG/DL (ref 0.57–1)
CRP SERPL-MCNC: 0.48 MG/DL (ref 0–0.5)
D DIMER PPP FEU-MCNC: 0.86 MG/L (FEU) (ref 0–0.5)
D-LACTATE SERPL-SCNC: 1.4 MMOL/L (ref 0.5–2)
DEPRECATED RDW RBC AUTO: 45.8 FL (ref 37–54)
EOSINOPHIL # BLD AUTO: 0.04 10*3/MM3 (ref 0–0.4)
EOSINOPHIL NFR BLD AUTO: 0.5 % (ref 0.3–6.2)
ERYTHROCYTE [DISTWIDTH] IN BLOOD BY AUTOMATED COUNT: 14.2 % (ref 12.3–15.4)
GFR SERPL CREATININE-BSD FRML MDRD: 60 ML/MIN/1.73
GLOBULIN UR ELPH-MCNC: 2.6 GM/DL
GLUCOSE SERPL-MCNC: 111 MG/DL (ref 65–99)
HCT VFR BLD AUTO: 44.8 % (ref 34–46.6)
HGB BLD-MCNC: 14.6 G/DL (ref 12–15.9)
IMM GRANULOCYTES # BLD AUTO: 0.06 10*3/MM3 (ref 0–0.05)
IMM GRANULOCYTES NFR BLD AUTO: 0.7 % (ref 0–0.5)
LYMPHOCYTES # BLD AUTO: 1.54 10*3/MM3 (ref 0.7–3.1)
LYMPHOCYTES NFR BLD AUTO: 18.2 % (ref 19.6–45.3)
MCH RBC QN AUTO: 28.3 PG (ref 26.6–33)
MCHC RBC AUTO-ENTMCNC: 32.6 G/DL (ref 31.5–35.7)
MCV RBC AUTO: 87 FL (ref 79–97)
MONOCYTES # BLD AUTO: 0.64 10*3/MM3 (ref 0.1–0.9)
MONOCYTES NFR BLD AUTO: 7.6 % (ref 5–12)
NEUTROPHILS NFR BLD AUTO: 6.14 10*3/MM3 (ref 1.7–7)
NEUTROPHILS NFR BLD AUTO: 72.4 % (ref 42.7–76)
NRBC BLD AUTO-RTO: 0 /100 WBC (ref 0–0.2)
NT-PROBNP SERPL-MCNC: 1712 PG/ML (ref 0–1800)
PLATELET # BLD AUTO: 183 10*3/MM3 (ref 140–450)
PMV BLD AUTO: 10.5 FL (ref 6–12)
POTASSIUM SERPL-SCNC: 4.3 MMOL/L (ref 3.5–5.2)
PROCALCITONIN SERPL-MCNC: 0.06 NG/ML (ref 0–0.25)
PROT SERPL-MCNC: 7.2 G/DL (ref 6–8.5)
RBC # BLD AUTO: 5.15 10*6/MM3 (ref 3.77–5.28)
SARS-COV-2 RNA PNL SPEC NAA+PROBE: NOT DETECTED
SODIUM SERPL-SCNC: 143 MMOL/L (ref 136–145)
TROPONIN T SERPL-MCNC: <0.01 NG/ML (ref 0–0.03)
WBC # BLD AUTO: 8.47 10*3/MM3 (ref 3.4–10.8)

## 2021-08-20 PROCEDURE — 87635 SARS-COV-2 COVID-19 AMP PRB: CPT | Performed by: EMERGENCY MEDICINE

## 2021-08-20 PROCEDURE — 83880 ASSAY OF NATRIURETIC PEPTIDE: CPT | Performed by: EMERGENCY MEDICINE

## 2021-08-20 PROCEDURE — 86140 C-REACTIVE PROTEIN: CPT | Performed by: EMERGENCY MEDICINE

## 2021-08-20 PROCEDURE — 93005 ELECTROCARDIOGRAM TRACING: CPT | Performed by: EMERGENCY MEDICINE

## 2021-08-20 PROCEDURE — 84484 ASSAY OF TROPONIN QUANT: CPT | Performed by: EMERGENCY MEDICINE

## 2021-08-20 PROCEDURE — 85025 COMPLETE CBC W/AUTO DIFF WBC: CPT | Performed by: EMERGENCY MEDICINE

## 2021-08-20 PROCEDURE — 71045 X-RAY EXAM CHEST 1 VIEW: CPT

## 2021-08-20 PROCEDURE — 84145 PROCALCITONIN (PCT): CPT | Performed by: EMERGENCY MEDICINE

## 2021-08-20 PROCEDURE — 83605 ASSAY OF LACTIC ACID: CPT | Performed by: EMERGENCY MEDICINE

## 2021-08-20 PROCEDURE — 99283 EMERGENCY DEPT VISIT LOW MDM: CPT

## 2021-08-20 PROCEDURE — 0 IOPAMIDOL PER 1 ML: Performed by: EMERGENCY MEDICINE

## 2021-08-20 PROCEDURE — 80053 COMPREHEN METABOLIC PANEL: CPT | Performed by: EMERGENCY MEDICINE

## 2021-08-20 PROCEDURE — 85379 FIBRIN DEGRADATION QUANT: CPT | Performed by: EMERGENCY MEDICINE

## 2021-08-20 PROCEDURE — 93010 ELECTROCARDIOGRAM REPORT: CPT | Performed by: INTERNAL MEDICINE

## 2021-08-20 PROCEDURE — 71275 CT ANGIOGRAPHY CHEST: CPT

## 2021-08-20 PROCEDURE — 70450 CT HEAD/BRAIN W/O DYE: CPT

## 2021-08-20 RX ADMIN — IOPAMIDOL 100 ML: 755 INJECTION, SOLUTION INTRAVENOUS at 14:26

## 2021-08-20 NOTE — ED PROVIDER NOTES
Emergency Medicine Provider Note    Subjective:    HISTORY OF PRESENT ILLNESS     This is a very pleasant 87 y.o. female with a past medical history of breast cancer in remission,  shunt who presents to the emergency department today with a chief complaint of cough.  Gradual in onset over the past 3 days.  Constant.  Mild.  No exacerbating relieving factors.  Associated with the dizziness which she states is only present after prolonged coughing fits and she describes as lightheadedness.  She denies any dizziness and is now.  She has no headache or vision changes.  No chest pain or shortness of breath.  No abdominal pain, nausea, vomiting, numbness, weakness, or paresthesias.          History is obtained from the patient and collateral history obtained from her partner at bedside.       Review of Systems: All other systems are reviewed and are negative other than noted in the HPI.    Past Medical History:  Past Medical History:   Diagnosis Date   • Allergic rhinitis    • Arthritis    • Cancer (CMS/HCC)     breast ca and B cell lymphoma   • Disease of thyroid gland     Hypothyroidism   • Diverticulitis    • Frequent urination    • Hard to intubate     vocal cord injury,  Has vocal cord implant   • Heartburn    • History of breast cancer    • Hypertension    • Incontinence    • Mild intermittent asthma without complication 6/24/2019   • Osteoporosis    • Pneumonia of left lower lobe due to infectious organism 6/24/2019       Allergies:  Allergies   Allergen Reactions   • Alendronate Other (See Comments)     Pt does not know  FOSAMAX   • Lisinopril Cough   • Parathyroid Hormone (Recomb) Other (See Comments)     Pt does not know  FORTEO   • Statins Other (See Comments) and Myalgia     Other     • Sulfa Antibiotics Other (See Comments)     Pt does not remember       Past Surgical History:  Past Surgical History:   Procedure Laterality Date   • BREAST SURGERY Right     mastectomy   • CATARACT EXTRACTION WITH INTRAOCULAR  LENS IMPLANT     • COLON RESECTION     • ENDOSCOPIC FUNCTIONAL SINUS SURGERY (FESS) Right 9/17/2018    Procedure: ENDOSCOPIC FUNCTIONAL SINUS SURGERY W TRACKING OF RIGHT MAXILLARY SINUS WITH BIOPSY.;  Surgeon: Mirza Felix MD;  Location: [facility] OR;  Service: ENT   • HYSTERECTOMY     • OMAYA RESERVOIR Right 10/22/2018    Procedure: OMAYA RESERVOIR WITH STEREOTACTIC NAVIGATION;  Surgeon: Graham Greer MD;  Location: [facility] OR;  Service: Neurosurgery   • THROAT SURGERY      vocal cord implant   • THYROID SURGERY         Family History:  Family History   Problem Relation Age of Onset   • No Known Problems Mother    • No Known Problems Father        Social History:  Social History     Socioeconomic History   • Marital status:      Spouse name: Not on file   • Number of children: Not on file   • Years of education: Not on file   • Highest education level: Not on file   Tobacco Use   • Smoking status: Never Smoker   • Smokeless tobacco: Never Used   Substance and Sexual Activity   • Alcohol use: No   • Drug use: No   • Sexual activity: Defer       Home Medications:  Prior to Admission medications    Medication Sig Start Date End Date Taking? Authorizing Provider   carvedilol (COREG) 3.125 MG tablet Take 1 tablet by mouth 2 (Two) Times a Day. 12/23/18   Roger Tate MD   furosemide (LASIX) 40 MG tablet Take 40 mg by mouth Daily.    ProviderCortez MD   guaiFENesin (MUCINEX) 600 MG 12 hr tablet Take 1 capsule by mouth Daily As Needed (congestion).    Emergency, Nurse Alejandra, RN   levothyroxine (SYNTHROID, LEVOTHROID) 100 MCG tablet Take 100 mcg by mouth Daily.    ProviderCortez MD   Mirabegron ER (Myrbetriq) 50 MG tablet sustained-release 24 hour 24 hr tablet Take 50 mg by mouth Daily.    ProviderCortez MD   Multiple Vitamin tablet Take 1 tablet by mouth.    ProviderCortez MD   nystatin (MYCOSTATIN) 616330 UNIT/ML suspension nystatin 100,000 unit/mL oral suspension 12/13/19    Provider, MD Cortez   oxybutynin XL (DITROPAN-XL) 5 MG 24 hr tablet oxybutynin chloride ER 5 mg tablet,extended release 24 hr    Provider, MD Cortez         Objective:    PHYSICAL EXAM     Vitals:   Vitals:    08/20/21 1537   BP: 122/78   Pulse: 79   Resp: 16   Temp: 98.4 °F (36.9 °C)   SpO2: 100%     GENERAL: Well appearing, in no acute distress.   HEENT: Moist mucous membranes, oropharynx clear without lesions, exudates, thrush.   EYES: No scleral icterus, conjunctivae clear.   NECK: No cervical lymphadenopathy, no stiffness.  CARDIAC: Normal rate, regular rhythm, no murmurs, 2+ peripheral pulses in all four extremities, normal capillary refill.   PULMONARY: Normal work of breathing on room air, lungs are clear to auscultation bilaterally without wheezes, crackles, rhonchi.  ABDOMINAL: Normal bowel sounds, abdomen is soft, non-tender, non-distended, no hepatomegaly or splenomegaly.   MUSCULOSKELETAL: Normal range of motion, no lower extremity edema.  NEUROLOGIC: Alert and oriented x 3, extraocular movements intact, pupils equal round and reactive to light bilaterally, cranial nerves II through XII are intact, 5 out of 5 strength in all 4 extremities, normal sensation to light touch in all 4 extremities  SKIN: Warm and dry without rashes.   PSYCHIATRIC: Mood and affect are normal.     PROCEDURES     Procedures    LAB AND RADIOLOGY RESULTS     Lab Results (last 24 hours)     Procedure Component Value Units Date/Time    CBC & Differential [[id number]]  (Abnormal) Collected: 08/20/21 1200    Specimen: Blood from Arm, Left Updated: 08/20/21 1222    Narrative:      The following orders were created for panel order CBC & Differential.  Procedure                               Abnormality         Status                     ---------                               -----------         ------                     CBC Auto Differential[[id number]]        Abnormal            Final result                 Please view  results for these tests on the individual orders.    Comprehensive Metabolic Panel [[id number]]  (Abnormal) Collected: 08/20/21 1200    Specimen: Blood from Arm, Left Updated: 08/20/21 1247     Glucose 111 mg/dL      BUN 23 mg/dL      Creatinine 0.89 mg/dL      Sodium 143 mmol/L      Potassium 4.3 mmol/L      Chloride 101 mmol/L      CO2 23.0 mmol/L      Calcium 9.3 mg/dL      Total Protein 7.2 g/dL      Albumin 4.60 g/dL      ALT (SGPT) 17 U/L      AST (SGOT) 22 U/L      Alkaline Phosphatase 73 U/L      Total Bilirubin 0.5 mg/dL      eGFR Non African Amer 60 mL/min/1.73      Globulin 2.6 gm/dL      A/G Ratio 1.8 g/dL      BUN/Creatinine Ratio 25.8     Anion Gap 19.0 mmol/L     Narrative:      GFR Normal >60  Chronic Kidney Disease <60  Kidney Failure <15      BNP [[id number]]  (Normal) Collected: 08/20/21 1200    Specimen: Blood from Arm, Left Updated: 08/20/21 1244     proBNP 1,712.0 pg/mL     Narrative:      Among patients with dyspnea, NT-proBNP is highly sensitive for the detection of acute congestive heart failure. In addition NT-proBNP of <300 pg/ml effectively rules out acute congestive heart failure with 99% negative predictive value.    Results may be falsely decreased if patient taking Biotin.      D-dimer, Quantitative [[id number]]  (Abnormal) Collected: 08/20/21 1200    Specimen: Blood from Arm, Left Updated: 08/20/21 1231     D-Dimer, Quantitative 0.86 mg/L (FEU)     Narrative:      Reference Range is 0-0.50 mg/L FEU. However, results <0.50 mg/L FEU tends to rule out DVT or PE. Results >0.50 mg/L FEU are not useful in predicting absence or presence of DVT or PE.      Troponin [[id number]]  (Normal) Collected: 08/20/21 1200    Specimen: Blood from Arm, Left Updated: 08/20/21 1244     Troponin T <0.010 ng/mL     Narrative:      Troponin T Reference Range:  <= 0.03 ng/mL-   Negative for AMI  >0.03 ng/mL-     Abnormal for myocardial necrosis.  Clinicians would have to utilize clinical acumen, EKG, Troponin and  "serial changes to determine if it is an Acute Myocardial Infarction or myocardial injury due to an underlying chronic condition.       Results may be falsely decreased if patient taking Biotin.      Lactic Acid, Plasma [[id number]]  (Normal) Collected: 08/20/21 1200    Specimen: Blood from Arm, Left Updated: 08/20/21 1243     Lactate 1.4 mmol/L     C-reactive Protein [[id number]]  (Normal) Collected: 08/20/21 1200    Specimen: Blood from Arm, Left Updated: 08/20/21 1251     C-Reactive Protein 0.48 mg/dL     Procalcitonin [[id number]]  (Normal) Collected: 08/20/21 1200    Specimen: Blood from Arm, Left Updated: 08/20/21 1251     Procalcitonin 0.06 ng/mL     Narrative:      As a Marker for Sepsis (Non-Neonates):     1. <0.5 ng/mL represents a low risk of severe sepsis and/or septic shock.  2. >2 ng/mL represents a high risk of severe sepsis and/or septic shock.    As a Marker for Lower Respiratory Tract Infections that require antibiotic therapy:  PCT on Admission     Antibiotic Therapy             6-12 Hrs later  >0.5                          Strongly Recommended            >0.25 - <0.5             Recommended  0.1 - 0.25                  Discouraged                       Remeasure/reassess PCT  <0.1                         Strongly Discouraged         Remeasure/reassess PCT      As 28 day mortality risk marker: \"Change in Procalcitonin Result\" (>80% or <=80%) if Day 0 (or Day 1) and Day 4 values are available. Refer to http://www.[facility]-pct-calculator.com/    Change in PCT <=80 %   A decrease of PCT levels below or equal to 80% defines a positive change in PCT test result representing a higher risk for 28-day all-cause mortality of patients diagnosed with severe sepsis or septic shock.    Change in PCT >80 %   A decrease of PCT levels of more than 80% defines a negative change in PCT result representing a lower risk for 28-day all-cause mortality of patients diagnosed with severe sepsis or septic shock.                " CBC Auto Differential [[id number]]  (Abnormal) Collected: 08/20/21 1200    Specimen: Blood from Arm, Left Updated: 08/20/21 1222     WBC 8.47 10*3/mm3      RBC 5.15 10*6/mm3      Hemoglobin 14.6 g/dL      Hematocrit 44.8 %      MCV 87.0 fL      MCH 28.3 pg      MCHC 32.6 g/dL      RDW 14.2 %      RDW-SD 45.8 fl      MPV 10.5 fL      Platelets 183 10*3/mm3      Neutrophil % 72.4 %      Lymphocyte % 18.2 %      Monocyte % 7.6 %      Eosinophil % 0.5 %      Basophil % 0.6 %      Immature Grans % 0.7 %      Neutrophils, Absolute 6.14 10*3/mm3      Lymphocytes, Absolute 1.54 10*3/mm3      Monocytes, Absolute 0.64 10*3/mm3      Eosinophils, Absolute 0.04 10*3/mm3      Basophils, Absolute 0.05 10*3/mm3      Immature Grans, Absolute 0.06 10*3/mm3      nRBC 0.0 /100 WBC     COVID-19,[facility] IN-HOUSE,Nasal Swab No Transport Media 3-4 HR TAT - Swab, Nasal Cavity [[id number]]  (Normal) Collected: 08/20/21 1201    Specimen: Swab from Nasal Cavity Updated: 08/20/21 1300     COVID19 Not Detected    Narrative:      Fact sheet for providers: https://www.fda.gov/media/070654/download     Fact sheet for patients: https://www.fda.gov/media/483868/download    Test performed by PCR.    Consider negative results in combination with clinical observations, patient history, and epidemiological information.          CT Head Without Contrast    Result Date: 8/20/2021  Narrative: EXAM: CT HEAD WO CONTRAST-  INDICATION: Dizziness, non-specific; R05-Cough; R42-Dizziness and giddiness  COMPARISON: 10/22/2018  DOSE LENGTH PRODUCT: 701 mGy cm. Automated exposure control was also utilized to decrease patient radiation dose.  FINDINGS:  No acute intracranial hemorrhage. No loss of gray-white differentiation. Chronic microvascular ischemic white matter change. Mild global cerebral volume loss. No midline shift or mass effect.  RIGHT frontal approach ventriculostomy catheter with tip in the RIGHT lateral ventricle abutting septum pellucidum. Stable  ventricular caliber with third ventricle measuring 8 mm transverse dimension. No evidence of hydrocephalus. Basilar cisterns are patent.  No acute orbital finding. Mastoid air cells and paranasal sinuses are clear. No acute osseous finding.      Impression: 1.  No acute intracranial findings. 2.  Stable position of RIGHT frontal  shunt. Stable ventricular caliber.  This report was finalized on 08/20/2021 14:43 by Dr. Pascual Rodriguez MD.    XR Chest 1 View    Result Date: 8/20/2021  Narrative: EXAM: XR CHEST 1 VW- 8/20/2021 12:09 PM CDT  HISTORY: cough; R05-Cough; R42-Dizziness and giddiness   COMPARISON: September 6, 2019.  TECHNIQUE: Frontal radiograph of the chest  FINDINGS: Chronic interstitial changes present in the pulmonary parenchyma. There is no consolidation.. The cardiac silhouette is normal. Vascular calcifications present aortic arch and descending thoracic aorta. Left subclavian Port-A-Cath is present..  The osseous structures and surrounding soft tissues demonstrate no acute abnormality.       Impression: 1. No radiographic evidence of acute cardiopulmonary process.   This report was finalized on 08/20/2021 12:19 by Dr. Emeka Michelle MD.    CT Angiogram Chest    Result Date: 8/20/2021  Narrative: EXAM: CT ANGIOGRAM CHEST- - 8/20/2021 2:19 PM CDT  HISTORY: PE suspected, low/intermediate prob, positive D-dimer; R05-Cough; R42-Dizziness and giddiness   COMPARISON: 9/18/2019.  DOSE LENGTH PRODUCT: 323 mGy cm. Automatic exposure control was utilized to make radiation dose as low as reasonably achievable.  TECHNIQUE: Enhanced axial CT images obtained with multiplanar reformats. 3D postprocessing, including MIPs, performed and images saved to PACS.  FINDINGS: AIRWAYS/PULMONARY PARENCHYMA: The central airways are midline and patent.  No focal consolidation. Right apical scarring is similar to prior.  Stable 4 mm right lower lobe pulmonary nodule on axial series 5, image 87 compared to 9/18/2019, which is  nearly 2 years of stability. No pleural effusion or pneumothorax.  VESSELS: Contrast bolus is adequate. No pulmonary artery filling defect to suggest pulmonary embolus. Aorta normal in course and caliber with calcified atherosclerosis. Left subclavian artery with severe stenosis on axial series 4, image 16.  CARDIAC:  Normal heart size.  No pericardial effusion.  Scattered coronary artery calcifications.  MEDIASTINUM: There is no mediastinal or hilar lymphadenopathy by CT size criteria. Esophagus has normal coarse, caliber and wall thickness.  EXTRATHORACIC: Diminutive or absent right thyroid lobe. Left thyroid lobe appears within normal limits. No supraclavicular or axillary adenopathy. Changes of right mastectomy. Port in the left chest wall, catheter tip in the lower SVC. Partially visualized left breast tissue grossly within normal limits, not optimally evaluated by CT.  INCLUDED UPPER ABDOMEN: Visualized portion of the liver, spleen, pancreas, adrenal glands appear within normal limits. Cholecystectomy clips.  OSSEOUS: No acute or suspicious bony finding.       Impression: 1. No acute cardiopulmonary finding. Specifically, no evidence of pulmonary embolism. 2. 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 3. Right mastectomy. This report was finalized on 08/20/2021 15:00 by Dr Valentina Feldman MD.      ED course:    Medications   iopamidol (ISOVUE-370) 76 % injection 100 mL (100 mL Intravenous Given 8/20/21 1426)     ED Course as of Aug 20 2035   Fri Aug 20, 2021   1509 Discussed results with the patient.   at bedside.  Offered admission however they prefer to go home.    [CF]      ED Course User Index  [CF] James Huerta MD       Amount and/or complexity of data reviewed:    • Clinical lab tests ordered and reviewed.  • Tests in the radiology section ordered and reviewed.  • Independent visualization of imaging, tracing, or specimen is remarkable for an EKG with left bundle branch block  pattern, rate of 80, no sgarbossa for STEMI.      Risk of significant complications, morbidity, and/or mortality.    •  Presenting problem: high  •  Diagnostic procedures: moderate  •  Management options: high        MEDICAL DECISION MAKING     Patient presents with cough. Upon arrival to the Emergency Department she is in no acute distress vital signs are reassuring.  IV access is obtained and labs are sent.Lab work shows a CBC that is unremarkable, lactic acid within normal limits, troponin within normal limits, CRP and pro calcitonin within normal limits, COVID-19 testing is negative, BNP is mildly elevated, D-dimer is elevated, CMP with no gross electrolyte abnormalities, no signs of kidney injury, there is elevation of the anion gap but bicarb is normal.  Due to positive D-dimer CT angiogram of the chest is performed which shows stenosis of the left subclavian artery but no acute findings.  Patient was made aware of this finding.  CT scan of the head shows no acute findings and stable position of the right frontal  shunt.  Low concern for shunt malfunction with no constant dizziness, no headache, no nausea, no vomiting, no fevers, no chills.  She has reassuring inflammatory markers here.  No signs of trauma obstruction has a completely reassuring neurologic exam with no headache.  Low concern for stroke as the dizziness is not constant.  She describes only after prolonged coughing fits and it seems to be more postural.  Low concern for pulmonary embolism or aortic dissection with reassuring CT.  She has no pneumonia on CT.  She has no pneumothorax on CT.  I believe she likely has a cough related to allergies versus virus.  I discussed this with her and her .  I have offered her admission due to age and comorbidities.  They both prefer to go home. I discussed all of the findings with the patient and she verbalized understanding. I explained that there is always diagnostic uncertainty in the ER and this  could be an early presentation of a process not detected at this time so she should return for any new or worsening symptoms and should follow up rapidly with her primary care provider for further evaluation and management. she is discharged in good condition with reassuring vitals and is given commonsense return precautions which she verbalizes understanding of.          Diagnosis:    Final diagnoses:   Cough   Dizziness         ED Disposition:     ED Disposition     ED Disposition Condition Comment    Discharge Stable           Juani Strong, [place]  [address] [address]  [phone]    Schedule an appointment as soon as possible for a visit in 2 days           Medication List      No changes were made to your prescriptions during this visit.              James Huerta MD  08/20/21 2030

## 2021-08-21 LAB
QT INTERVAL: 404 MS
QTC INTERVAL: 465 MS

## 2021-08-24 NOTE — ANESTHESIA PROCEDURE NOTES
Airway  Urgency: elective    Airway not difficult    General Information and Staff    Patient location during procedure: OR  CRNA: ROBYN KURTZ    Indications and Patient Condition  Indications for airway management: airway protection    Preoxygenated: yes  Mask difficulty assessment: 1 - vent by mask    Final Airway Details  Final airway type: endotracheal airway      Successful airway: ETT  Cuffed: yes   Successful intubation technique: video laryngoscopy  Intubation device: Glidescope stylet.  Endotracheal tube insertion site: oral  Blade: Sedrick (GLIDESCOPE)  Blade size: 3  ETT size: 6.5 mm  Cormack-Lehane Classification: grade IIa - partial view of glottis  Placement verified by: chest auscultation and capnometry   Cuff volume (mL): 7  Measured from: lips  ETT to lips (cm): 20  Number of attempts at approach: 1    Additional Comments  Atraumatic intubation. Limited mouth opening,  Able to place 6.5ETT on 1st attempt with circulator assisting with external laryngeal manipulation.             
Discharged

## 2021-09-01 ENCOUNTER — TRANSCRIBE ORDERS (OUTPATIENT)
Dept: LAB | Facility: HOSPITAL | Age: 86
End: 2021-09-01

## 2021-09-01 DIAGNOSIS — Z01.818 PREOPERATIVE TESTING: Primary | ICD-10-CM

## 2021-09-01 PROBLEM — J32.0 CHRONIC MAXILLARY SINUSITIS: Chronic | Status: ACTIVE | Noted: 2018-08-20

## 2021-09-01 NOTE — PROGRESS NOTES
Chief Complaint  Mild intermittent asthma without complication (fvl today, per daughter was in er at [facility] for soa and coughing Aug 24)    Subjective    History of Present Illness {CC  Problem List  Visit Diagnosis   Encounters  Notes  Medications  Labs  Result Review Imaging  Media     Kami Villanueva presents to [facility] for:    Management of her asthma.  She intermittently has shortness of breath and coughing.  She has benefited from Arnuity and Mucinex for these episodes although she does not require them all of the time.  She is needing refills.  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  She is followed by Dr. Osborn for this.  She will see him in November.  Patient reports an episode recently while waiting at her dentist office for repair of a broken tooth.  She had sudden onset shooting chest pain across her chest from left to right.  She reports she laid down on the couch in the dentist office for a few moments.  She then went out to the car with her  and waited for her appointment time.  Symptoms did subside.  She has had increased coughing lately.  Mostly with activity.         Prior to Admission medications    Medication Sig Start Date End Date Taking? Authorizing Provider   carvedilol (COREG) 3.125 MG tablet Take 1 tablet by mouth 2 (Two) Times a Day. 12/23/18   Roger Tate MD   furosemide (LASIX) 40 MG tablet Take 40 mg by mouth Daily.    ProviderCortez MD   guaiFENesin (MUCINEX) 600 MG 12 hr tablet Take 1 capsule by mouth Daily As Needed (congestion).    Emergency, Nurse Alejandra, RN   levothyroxine (SYNTHROID, LEVOTHROID) 100 MCG tablet Take 100 mcg by mouth Daily.    ProviderCortez MD   Mirabegron ER (Myrbetriq) 50 MG tablet sustained-release 24 hour 24 hr tablet Take 50 mg by mouth Daily.    ProviderCortez MD   Multiple Vitamin tablet Take 1 tablet by mouth.     "Provider, MD Cortez   nystatin (MYCOSTATIN) 814508 UNIT/ML suspension nystatin 100,000 unit/mL oral suspension 12/13/19   ProviderCortez MD   oxybutynin XL (DITROPAN-XL) 5 MG 24 hr tablet oxybutynin chloride ER 5 mg tablet,extended release 24 hr    ProviderCortez MD       Social History     Socioeconomic History   • Marital status:      Spouse name: Not on file   • Number of children: Not on file   • Years of education: Not on file   • Highest education level: Not on file   Tobacco Use   • Smoking status: Never Smoker   • Smokeless tobacco: Never Used   Substance and Sexual Activity   • Alcohol use: No   • Drug use: No   • Sexual activity: Defer       Objective   Vital Signs:   /88   Pulse 80   Ht 171.5 cm (67.5\")   Wt 78 kg (172 lb)   SpO2 97%   BMI 26.54 kg/m²     Physical Exam  Constitutional:       Appearance: She is overweight.      Interventions: Face mask in place.   HENT:      Head:      Comments: Shunt reservoir right scalp  Eyes:      Comments: Glasses   Cardiovascular:      Rate and Rhythm: Normal rate and regular rhythm.      Heart sounds: No murmur heard.     Pulmonary:      Effort: Pulmonary effort is normal.      Breath sounds: Normal breath sounds.   Chest:      Comments: Port left chest  Musculoskeletal:      Right lower leg: No edema.      Left lower leg: No edema.   Neurological:      Mental Status: She is alert and oriented to person, place, and time.        Result Review :{ Labs  Result Review  Imaging  Med Tab  Media :      My interpretation of the PFT: none for this visit    Results for orders placed in visit on 08/01/19    Pulmonary Function Test      My interpretation of imaging:  None for this visit    My interpretation of labs: none for this visit      Assessment and Plan {CC Problem List  Visit Diagnosis  ROS  Review (Popup)  Health Maintenance  Quality  BestPractice  Medications  SmartSets  SnapShot Encounters  Media      Diagnoses and " all orders for this visit:    1. Mild intermittent asthma without complication (Primary)  Comments:  Increased cough.  Not on inhaler.  Restart Arnuity.  Update PFTs with follow-up.  Prescription sent to pharmacy.  If no better try Advair  Orders:  -     Pulmonary Function Test  -     Fluticasone Furoate (Arnuity Ellipta) 100 MCG/ACT aerosol powder ; Inhale 1 puff Daily for 30 days.  Dispense: 1 each; Refill: 5    2. Essential hypertension  Comments:  Education material provided about monitoring blood pressure at home and reporting to PCP.  Blood pressure stable compared to visit this time last year    3. Chest pain, atypical  Comments:  Patient instructed to call PCP for work-up and discussion        Patient's Body mass index is 26.54 kg/m². indicating that she is overweight (BMI 25-29.9).  Education material provided after visit summary about elevated BMI    Olivia Quiroz, GLENDA  9/9/2021  15:36 CDT    Follow Up {Instructions Charge Capture  Follow-up Communications   Return in about 6 months (around 3/9/2022) for FVL.    Patient was given instructions and counseling regarding her condition or for health maintenance advice. Please see specific information pulled into the AVS if appropriate.

## 2021-09-02 NOTE — PATIENT INSTRUCTIONS
PATIENT TO CONTINUE TO FOLLOW UP WITH HER PRIMARY CARE PROVIDER FOR YEARLY PHYSICAL EXAMS TO ENSURE COMPLETE HEALTH MAINTENANCE     For information on Fall & injury Prevention, visit [email or web address]

## 2021-09-09 ENCOUNTER — OFFICE VISIT (OUTPATIENT)
Dept: PULMONOLOGY | Facility: CLINIC | Age: 86
End: 2021-09-09

## 2021-09-09 VITALS
HEIGHT: 68 IN | WEIGHT: 172 LBS | BODY MASS INDEX: 26.07 KG/M2 | HEART RATE: 80 BPM | DIASTOLIC BLOOD PRESSURE: 88 MMHG | SYSTOLIC BLOOD PRESSURE: 134 MMHG | OXYGEN SATURATION: 97 %

## 2021-09-09 DIAGNOSIS — R07.89 CHEST PAIN, ATYPICAL: Chronic | ICD-10-CM

## 2021-09-09 DIAGNOSIS — I10 ESSENTIAL HYPERTENSION: Chronic | ICD-10-CM

## 2021-09-09 DIAGNOSIS — J45.20 MILD INTERMITTENT ASTHMA WITHOUT COMPLICATION: Primary | Chronic | ICD-10-CM

## 2021-09-09 PROCEDURE — 99214 OFFICE O/P EST MOD 30 MIN: CPT | Performed by: NURSE PRACTITIONER

## 2021-09-09 RX ORDER — POTASSIUM CHLORIDE 1500 MG/1
20 TABLET, FILM COATED, EXTENDED RELEASE ORAL 2 TIMES DAILY
COMMUNITY
Start: 2021-07-15

## 2021-09-09 RX ORDER — FLUTICASONE FUROATE 100 UG/1
1 POWDER RESPIRATORY (INHALATION) DAILY
Qty: 1 EACH | Refills: 5 | Status: SHIPPED | OUTPATIENT
Start: 2021-09-09 | End: 2021-10-09

## 2021-09-09 RX ORDER — PYRAZINAMIDE 500 MG/1
TABLET ORAL
COMMUNITY

## 2021-09-09 NOTE — PATIENT INSTRUCTIONS
Hypertension, Adult  Hypertension is another name for high blood pressure. High blood pressure forces your heart to work harder to pump blood. This can cause problems over time.  There are two numbers in a blood pressure reading. There is a top number (systolic) over a bottom number (diastolic). It is best to have a blood pressure that is below 120/80. Healthy choices can help lower your blood pressure, or you may need medicine to help lower it.  What are the causes?  The cause of this condition is not known. Some conditions may be related to high blood pressure.  What increases the risk?  · Smoking.  · Having type 2 diabetes mellitus, high cholesterol, or both.  · Not getting enough exercise or physical activity.  · Being overweight.  · Having too much fat, sugar, calories, or salt (sodium) in your diet.  · Drinking too much alcohol.  · Having long-term (chronic) kidney disease.  · Having a family history of high blood pressure.  · Age. Risk increases with age.  · Race. You may be at higher risk if you are .  · Gender. Men are at higher risk than women before age 45. After age 65, women are at higher risk than men.  · Having obstructive sleep apnea.  · Stress.  What are the signs or symptoms?  · High blood pressure may not cause symptoms. Very high blood pressure (hypertensive crisis) may cause:  ? Headache.  ? Feelings of worry or nervousness (anxiety).  ? Shortness of breath.  ? Nosebleed.  ? A feeling of being sick to your stomach (nausea).  ? Throwing up (vomiting).  ? Changes in how you see.  ? Very bad chest pain.  ? Seizures.  How is this treated?  · This condition is treated by making healthy lifestyle changes, such as:  ? Eating healthy foods.  ? Exercising more.  ? Drinking less alcohol.  · Your health care provider may prescribe medicine if lifestyle changes are not enough to get your blood pressure under control, and if:  ? Your top number is above 130.  ? Your bottom number is above  80.  · Your personal target blood pressure may vary.  Follow these instructions at home:  Eating and drinking    · If told, follow the DASH eating plan. To follow this plan:  ? Fill one half of your plate at each meal with fruits and vegetables.  ? Fill one fourth of your plate at each meal with whole grains. Whole grains include whole-wheat pasta, brown rice, and whole-grain bread.  ? Eat or drink low-fat dairy products, such as skim milk or low-fat yogurt.  ? Fill one fourth of your plate at each meal with low-fat (lean) proteins. Low-fat proteins include fish, chicken without skin, eggs, beans, and tofu.  ? Avoid fatty meat, cured and processed meat, or chicken with skin.  ? Avoid pre-made or processed food.  · Eat less than 1,500 mg of salt each day.  · Do not drink alcohol if:  ? Your doctor tells you not to drink.  ? You are pregnant, may be pregnant, or are planning to become pregnant.  · If you drink alcohol:  ? Limit how much you use to:  § 0-1 drink a day for women.  § 0-2 drinks a day for men.  ? Be aware of how much alcohol is in your drink. In the [place] one drink equals one 12 oz bottle of beer (355 mL), one 5 oz glass of wine (148 mL), or one 1½ oz glass of hard liquor (44 mL).    Lifestyle    · Work with your doctor to stay at a healthy weight or to lose weight. Ask your doctor what the best weight is for you.  · Get at least 30 minutes of exercise most days of the week. This may include walking, swimming, or biking.  · Get at least 30 minutes of exercise that strengthens your muscles (resistance exercise) at least 3 days a week. This may include lifting weights or doing Pilates.  · Do not use any products that contain nicotine or tobacco, such as cigarettes, e-cigarettes, and chewing tobacco. If you need help quitting, ask your doctor.  · Check your blood pressure at home as told by your doctor.  · Keep all follow-up visits as told by your doctor. This is important.    Medicines  · Take  over-the-counter and prescription medicines only as told by your doctor. Follow directions carefully.  · Do not skip doses of blood pressure medicine. The medicine does not work as well if you skip doses. Skipping doses also puts you at risk for problems.  · Ask your doctor about side effects or reactions to medicines that you should watch for.  Contact a doctor if you:  · Think you are having a reaction to the medicine you are taking.  · Have headaches that keep coming back (recurring).  · Feel dizzy.  · Have swelling in your ankles.  · Have trouble with your vision.  Get help right away if you:  · Get a very bad headache.  · Start to feel mixed up (confused).  · Feel weak or numb.  · Feel faint.  · Have very bad pain in your:  ? Chest.  ? Belly (abdomen).  · Throw up more than once.  · Have trouble breathing.  Summary  · Hypertension is another name for high blood pressure.  · High blood pressure forces your heart to work harder to pump blood.  · For most people, a normal blood pressure is less than 120/80.  · Making healthy choices can help lower blood pressure. If your blood pressure does not get lower with healthy choices, you may need to take medicine.  This information is not intended to replace advice given to you by your health care provider. Make sure you discuss any questions you have with your health care provider.  Document Revised: 08/28/2019 Document Reviewed: 08/28/2019  [employer] Patient Education © 2021 [employer]  How to Take Your Blood Pressure  Blood pressure measures how strongly your blood is pressing against the walls of your arteries. Arteries are blood vessels that carry blood from your heart throughout your body. You can take your blood pressure at home with a machine.  You may need to check your blood pressure at home:  · To check if you have high blood pressure (hypertension).  · To check your blood pressure over time.  · To make sure your blood pressure medicine is working.  Supplies  "needed:  · Blood pressure machine, or monitor.  · Dining room chair to sit in.  · Table or desk.  · Small notebook.  · Pencil or pen.  How to prepare  Avoid these things for 30 minutes before checking your blood pressure:  · Having drinks with caffeine in them, such as coffee or tea.  · Drinking alcohol.  · Eating.  · Smoking.  · Exercising.  Do these things five minutes before checking your blood pressure:  · Go to the bathroom and pee (urinate).  · Sit in a dining chair. Do not sit in a soft couch or an armchair.  · Be quiet. Do not talk.  How to take your blood pressure  Follow the instructions that came with your machine. If you have a digital blood pressure monitor, these may be the instructions:  1. Sit up straight.  2. Place your feet on the floor. Do not cross your ankles or legs.  3. Rest your left arm at the level of your heart. You may rest it on a table, desk, or chair.  4. Pull up your shirt sleeve.  5. Wrap the blood pressure cuff around the upper part of your left arm. The cuff should be 1 inch (2.5 cm) above your elbow. It is best to wrap the cuff around bare skin.  6. Fit the cuff snugly around your arm. You should be able to place only one finger between the cuff and your arm.  7. Place the cord so that it rests in the bend of your elbow.  8. Press the power button.  9. Sit quietly while the cuff fills with air and loses air.  10. Write down the numbers on the screen.  11. Wait 2-3 minutes and then repeat steps 1-10.  What do the numbers mean?  Two numbers make up your blood pressure. The first number is called systolic pressure. The second is called diastolic pressure. An example of a blood pressure reading is \"120 over 80\" (or 120/80).  If you are an adult and do not have a medical condition, use this guide to find out if your blood pressure is normal:  Normal  · First number: below 120.  · Second number: below 80.  Elevated  · First number: 120-129.  · Second number: below 80.  Hypertension " stage 1  · First number: 130-139.  · Second number: 80-89.  Hypertension stage 2  · First number: 140 or above.  · Second number: 90 or above.  Your blood pressure is above normal even if only the top or bottom number is above normal.  Follow these instructions at home:  · Check your blood pressure as often as your doctor tells you to.  · Check your blood pressure at the same time every day.  · Take your monitor to your next doctor's appointment. Your doctor will:  ? Make sure you are using it correctly.  ? Make sure it is working right.  · Make sure you understand what your blood pressure numbers should be.  · Tell your doctor if your medicine is causing side effects.  · Keep all follow-up visits as told by your doctor. This is important.  General tips:  · You will need a blood pressure machine, or monitor. Your doctor can suggest a monitor. You can buy one at a [employer] or online. When choosing one:  ? Choose one with an arm cuff.  ? Choose one that wraps around your upper arm. Only one finger should fit between your arm and the cuff.  ? Do not choose one that measures your blood pressure from your wrist or finger.  Where to find more information  American Heart Association: www.heart.org  Contact a doctor if:  · Your blood pressure keeps being high.  Get help right away if:  · Your first blood pressure number is higher than 180.  · Your second blood pressure number is higher than 120.  Summary  · Check your blood pressure at the same time every day.  · Avoid caffeine, alcohol, smoking, and exercise for 30 minutes before checking your blood pressure.  · Make sure you understand what your blood pressure numbers should be.  This information is not intended to replace advice given to you by your health care provider. Make sure you discuss any questions you have with your health care provider.  Document Revised: 12/11/2020 Document Reviewed: 12/11/2020  [employer] Patient Education © 2021 [employer]

## 2021-10-20 ENCOUNTER — HOSPITAL ENCOUNTER (OUTPATIENT)
Dept: WOMENS IMAGING | Age: 86
Discharge: HOME | End: 2021-10-20
Payer: MEDICARE

## 2021-10-20 DIAGNOSIS — Z13.820 SCREENING FOR OSTEOPOROSIS: ICD-10-CM

## 2021-10-20 DIAGNOSIS — Z78.0 MENOPAUSE: ICD-10-CM

## 2021-10-20 DIAGNOSIS — Z12.31 ENCOUNTER FOR SCREENING MAMMOGRAM FOR BREAST CANCER: ICD-10-CM

## 2021-10-20 PROCEDURE — 77067 SCR MAMMO BI INCL CAD: CPT

## 2021-10-20 PROCEDURE — 77080 DXA BONE DENSITY AXIAL: CPT

## 2021-11-11 NOTE — PROGRESS NOTES
Patient:  Cordelia Mckeon  YOB: 1934  Date of Service: 11/16/2021  MRN: [id number]    Primary Care Physician: Nahun Llamas MD    Chief Complaint   Patient presents with    Follow-up     Diffuse large b-cell lymphoma, lymph nodes of head, face, and neck [facility]       Patient Seen, Chart, Consults notes, Labs, Radiology studies reviewed. 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 · Right simple mastectomy for DCIS by Dr. Олег Arriaga on  9/23/2004  · Tumor screening and health maintenance    She was treated with CHOP-R x6 cycles given 10/24/2018 - 2/20/2019 as well as 11 doses of prophylactic intrathecal methotrexate 11/14/2018 - 2/20/2019. Eryn Diez declined consolidative XRT to the right face/orbits/brain. Symptomatically, Yeni's endurance continues to improve. Her appetite is good and weight stable. She denies headache, visual changes, sinus pain etc.  She continues to have neuropathy more so to the bottom of her feet, Neuropathy grade 1. ECOG grade 1.    Meghann Weir presents accompanied by her  for continued monitoring. TARGET LYMPHOMA SITES:  1. Right maxillary sinus with local invasion to the floor of the right orbit   2.  on 10/10/18   3. borderline spleen size, 13 x 4.8 x 11.8 cm  4. Bone marrow negative on 10/11/18   5. CSF specimen via Omaya port negative on 10/24/18     TUMOR HISTORY: Right maxillary sinus high-grade diffuse large B-cell lymphoma 9/17/2018  Eryn Diez was treated for hyperthyroidism with radioactive iodine at about age 28. About 45 years ago she underwent a thyroidectomy followed subsequently by a vocal cord medialization procedure by Dr. Bernie Ibarra in [place], [place].     Several years ago she had right maxillary sinus surgery by  Lesley. Edenilson Willett developed upper respiratory and sinus symptoms in the spring of 2018. She was seen and treated at [facility] urgent care on 3/13/2018 for bronchitis. She was then seen in followup by her PCP, Dr. Enedina Roberts on 4/27/2018. He felt an abnormality in the neck and ordered a CT scan of the soft tissues of the neck and referred her to ENT for further evaluation of this. A CT scan of the soft tissues of the neck with contrast on 5/7/2018 documented extensive mucosal thickening of the right maxillary sinus consistent with chronic sinusitis. There was also opacification of several of the ethmoid air cells and mucosal thickening in the nasal cavity. No neck mass or adenopathy was appreciated. Edenilson Willett was evaluated by Dr. Preethi Bradshaw on 5/30/2018 for evaluation of right neck pain and swelling. Full ENT exam including nasopharyngeal area was within normal limits. Review of the CT scan from 5/7/2018 was consistent with chronic sinusitis. Edenilson Willett had had a medialization procedure by ENT and [address] earlier in the year for a vocal cord paralysis issue. It was felt that some of the changes on the CT scan were consistent with that procedure. On 8/3/2018 Edenilson Willett had a right upper posterior tooth pulled by an oral surgeon, Dr. Ismael Painter in [facility] for what was felt to possibly be an infected tooth. Edenilson Willett was again seen at [facility] urgent care on 8/16/2018 with swelling, redness and tenderness in the right maxillary area with associated headaches not responding to antibiotics. Plain film imaging studies and a CT scan of the area was performed.      Plain film x-rays of the facial bones on 8/16/2018 did not reveal acute abnormalities     CT scan of facial bones within without contrast on 8/16/2018 documented interval development of an enhancing soft tissue along the right maxilla extending along the anterior right maxillary sinus and along the floor of the right orbit measuring 2.4 cm in width. New erosive bony changes were noted involving the bony structure of the right orbital floor. The findings were concerning for a neoplastic process with erosive changes of the right orbital floor with ENT consultation recommended. Anitra Shin was seen by Esteban Lizama PA-C with ENT on 8/20/2018 where the above CT scanning clinical findings were evaluated. Arrangements were made for a biopsy with Dr. Elsie Staley. On 9/17/2018, Dr. Elsie Staley performed a nasal/sinus endoscopy with biopsy. 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 The cells are polymorphic with maintaining medium sized to large cells with irregular nuclear contour. On flow cytometry, the monoclonal kappa B cell population does not express CD5 or CD10, but are low viability. The immunostains, show a diffuse B-cell infiltrate positive for CD20, PAX5 and BCL 6, but negative for CD5, CD10, BCL2, cyclin D1 and CD43.  <30% of the cells were positive for Mum-1. Ki-67 showed a high proliferative rate of about 80%. All of the above is consistent with a diffuse large B-cell lymphoma of the GBC type. A CT scan of the soft tissues of the neck with contrast on 10/3/2018 was compared to the CT scan of the face on 8/16/2018 and 5/7/2018. There had been significant progression of disease with interval increase in the size of the right buccal space hyperdense mass measuring approximately at least 7 x 2 cm on axial cuts. There is involvement of the medial and lateral pterigoid muscle. The right temporalis muscle was also inseparable from the mass with involvement as well of the pterygomaxillary fissure, pterygopalatine fossa and sphenopalatine foramen. Local cortex erosive the structure and was also noted along the maxillary gingival surface.   Involvement of the roof of the right maxillary sinus with progression of the erosive disease along the floor of the right orbit was also palpable splenomegaly or other abdominal findings were encountered on exam.    Bone marrow aspirate and biopsy on 10/11/18 showed no evidence of involvement by diffuse large B-cell lymphoma. FLOW cytometry negative. MRI of the brain w/wo contrast 10/11/18 at [facility] was consistent with abnormal soft tissue mass involving the right  and buccal space extending along the anterior margin of the right maxillary sinus contiguous with the floor of the right orbit. There was no definite invasion into the orbit, nor displacement of the inferior rectus musculature. PET scan 10/12/18 at [facility] showed hypermetabolic activity associated with the right buccal space and  space mass, SUV 18.5. There is no distant focus of hypermetabolic activity seen to suggest other sites of involvement. Echocardiogram 10/12/18 at [facility] revealed an estimated LVEF of 60%. Hepatitis B and C were negative. Hepatitis A IgM was positive and discussed with Dr. Amos Craig. Left chest mediport was placed 10/18/18 by Dr. Radha Manley. Right ommaya port was placed 10/22/18 by Dr. Tremayne Jj. CSF specimen was obtained via ommaya port by Dr. Amos Craig, sent for cytology and FLOW prior to initiation of Cycle #1 CHOP-R initiated on 10/24/18 was negative.     Anneliese Stewart completed the final cycle #6 of CHOP/R and Dose #11 of prophylactic intrathecal methotrexate on 2/20/2019. MRI of the right orbit, face and neck with and without contrast at [facility] on 4/1/2019 concluded  · NO solid enhancing mass to suggest residual/recurrent tumor. · The infiltrate of edema and wispy enhancement within the right pterigoids and masseter muscles is thought to be secondary to an evolving denervation atrophy.      MRI of the brain with and without contrast on 4/1/2019 concluded:  · Residual abnormal fact signal in the inferior right orbit, right  space and right pterygopalatine fossa, appearing stable   · Diffusion signal abnormality in this region has normalized, suggesting positive lymphoma treatment response. 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 The remaining question of whether Kay Espitia would benefit from consolidative XRT to the right face/orbit/brain, given the high risk characteristics of her lymphoma, was addressed at her XRT consultation with Dr. Madi Sharp on 5/8/2019. Concerns persist over the fact that she has developed some degree of leukoencephalopathy. Fortunately, clinically she is improving. The downside of XRT is possible further leukoencephalopathy as well as concerns over damage to the right eye, which is her good eye. Yeni's daughter stated that she was very pleased with the appointment with Dr. Corine Sloan who explained all of the benefits and possible downside problems with XRT. His recommendation was for 3600 cGy over 18 treatment fractions for consolidation. Kay Espitia opted against consolidation XRT.     MRI of the brain, right orbit, face and neck with and without contrast at [facility] 9/4/2019 showed stable posttreatment changes. No suspicious mass.     CT chest with contrast on 9/18/2019 documented no nodules or mass lesion. Calcified granuloma are present stable and unchanged. No acute cardiopulmonary process. MRI orbit, face, neck with and without contrast on 3/18/2020 at [facility] documented:  · Stable to mildly improved appearance of probable denervation edema and mild enhancement involving the muscles of mastication on the right, likely related to postradiation changes. There is no mass identified to indicate recurrent neoplasm. · Mild chronic sinusitis involving the ethmoid sinuses and base of the right maxillary sinus.     MRI brain with and without contrast on 3/18/2020 at [facility] documented:  · Similar positioning of the right transfrontal ventricular shunt with stable ventricular size and configuration. No abnormal intracranial enhancement. · Stable diffuse hyperintense FLAIR signal changes of the white matter symmetrically likely posttreatment change    MRI BRAIN W WO CONTRAST-, MRI ORBIT FACE NECK W WO CONTRASTon 4/6/2021 at [facility], compared to 3/18/20, documented:  · Stable MR appearance the brain. No acute intracranial process. No abnormal contrast enhancement. · Decreasing enhancement of the right  muscles described on the previous study, benign post therapy changes suspected. No developing face/orbital abnormalities. · Mild paranasal sinus mucosal thickening. TREATMENT SUMMARY:  1. CHOP-R x6 cycles given 10/24/2018 - 2/20/2019.  2. Dose #1 prophylactic intrathecal methotrexate (FLOW cytometry on CSF negative) given 10/29/18. Dose #2 on 11/14/18. 11th and final dose given 2/20/2019.  3. Lilliam Carrion opted against consolidative XRT.           TARGET BREAST CANCER SITES:  1. Right mastectomy 09/23/04 for DCIS    TUMOR HISTORY: Right DCIS 09/23/2004  On 09/23/04, Lilliam Carrion had a right simple mastectomy by Dr. Reyna Ramos for a low grade DCIS of the right breast.     The [place] was 95%, [place] was 13%. No further specific therapy was indicated for this cancer other than close follow up of the area and the opposite breast.    TREATMENT SUMMARY:  1.  Right mastectomy and 9/23/2004 for DCIS       Allergies:  Lisinopril, Statins, Alendronate, Lexapro [escitalopram oxalate], Parathyroid hormone (recomb), Sulfa antibiotics, and Trazodone and nefazodone    Medicines:  Current Outpatient Medications   Medication Sig Dispense Refill    Magic Mouthwash (MIRACLE MOUTHWASH) 1/3 benadryl, 1/3 viscous lidocaine, 1/3 maalox 240 mL 1    furosemide (LASIX) 40 MG tablet furosemide 40 mg tablet   TAKE 1 TABLET BY MOUTH TWICE DAILY CHANGE  FOR  FUROSEMIDE  40MG  TABLET      mirabegron (MYRBETRIQ) 50 MG TB24 Take 50 mg by mouth daily       Multiple Vitamin TABS Take 1 tablet by mouth      nystatin (MYCOSTATIN) 587854 UNIT/ML suspension nystatin 100,000 unit/mL oral suspension      OXYBUTYNIN CHLORIDE PO Take by mouth       potassium chloride (KLOR-CON) 20 MEQ packet Take 20 mEq by mouth 2 times daily      Coenzyme Q10 10 MG CAPS 150      Cyanocobalamin (VITAMIN B-12) 5000 MCG LOZG Take by mouth      magnesium 30 MG tablet Take 150 mg by mouth 2 times daily       Multiple Vitamins-Minerals (THERAPEUTIC MULTIVITAMIN-MINERALS) tablet Take 1 tablet by mouth daily      omeprazole (PRILOSEC) 20 MG delayed release capsule Take 20 mg by mouth daily       Probiotic Product (PROBIOTIC-10 PO) Take by mouth      levothyroxine (SYNTHROID) 100 MCG tablet Take 125 mcg by mouth Daily      acetaminophen-codeine (TYLENOL #3) 300-30 MG per tablet acetaminophen 300 mg-codeine 30 mg tablet (Patient not taking: Reported on 7/15/2021)      albuterol sulfate  (90 Base) MCG/ACT inhaler Inhale 2 puffs into the lungs every 4 hours as needed      potassium chloride (KLOR-CON M) 20 MEQ extended release tablet Take 20 mEq by mouth 2 times daily       No current facility-administered medications for this visit.      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 Enmanuel Sheikh GERD (gastroesophageal reflux disease)     H/O right mastectomy 2004    for DCIS    Hypertension     Hyperthyroidism     Prolonged emergence from general anesthesia     Thyroid disease         Past Surgical History:      Procedure Laterality Date    APPENDECTOMY      BREAST SURGERY      right mastectomy    CHOLECYSTECTOMY  1996    COLECTOMY      COLECTOMY  06/2005    Partial- Dr. Yoon Mascorro for recurring diverticulitis    HYSTERECTOMY  1966   [address]    for adhesions    MASTECTOMY Right 2004    PORT SURGERY Left 10/18/2018    ommaya port placement Dr. Michael Elizabeth Right 10/22/2018    Dr. Eugene Ayers AGE 5 YR/> N/A 10/18/2018    PORT INSERTION performed by Danay Grace MD at [address] THYROID SURGERY      nodule    THYROIDECTOMY Right 1961    with vocal chord injury   [place] Face Right 2001    Dr. Joel Cole in [facility]        Family History:      Problem Relation Age of Onset    Other Mother         Motor Vehical Accident    Other Father 80        Unknown    Breast Cancer Sister         Breast Cancer        Social History  Social History     Tobacco Use    Smoking status: Never Smoker    Smokeless tobacco: Never Used   Substance Use Topics    Alcohol use: No    Drug use: Not on file              Objective:  Vital Signs: Blood pressure 100/68, pulse 98, height 5' 7\" (1.702 m), weight 176 lb (79.8 kg), SpO2 95 %. Labs:  BMP:   No results for input(s): NA, K, CL, CO2, PHOS, BUN, CREATININE, CALCIUM in the last 72 hours. CBC:   No results for input(s): WBC, HGB, HCT, MCV, PLT in the last 72 hours. LIVER PROFILE:   No results for input(s): AST, ALT, LIPASE, BILIDIR, BILITOT, ALKPHOS in the last 72 hours. Invalid input(s): AMYLASE,  ALB  PT/INR: No results for input(s): PROTIME, INR in the last 72 hours. APTT: No results for input(s): APTT in the last 72 hours. BNP:  No results for input(s): BNP in the last 72 hours.   Ionized Calcium:No results for input(s): IONCA in the last 72 hours. Magnesium:No results for input(s): MG in the last 72 hours. Phosphorus:No results for input(s): PHOS in the last 72 hours. HgbA1C: No results for input(s): LABA1C in the last 72 hours. Hepatic:   No results for input(s): ALKPHOS, ALT, AST, PROT, BILITOT, BILIDIR, LABALBU in the last 72 hours. Lactic Acid: No results for input(s): LACTA in the last 72 hours. Troponin: No results for input(s): CKTOTAL, CKMB, TROPONINT in the last 72 hours. ABGs: No results for input(s): PH, PCO2, PO2, HCO3, O2SAT in the last 72 hours. CRP:  No results for input(s): CRP in the last 72 hours. Sed Rate:  No results for input(s): SEDRATE in the last 72 hours. Cultures:   No results for input(s): CULTURE in the last 72 hours. Radiology reports as per the Radiologist  Radiology: No results found. 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 · Right simple mastectomy for DCIS by Dr. Poppy Booth on  9/23/2004  · Tumor screening and health maintenance    She was treated with CHOP-R x6 cycles given 10/24/2018 - 2/20/2019 as well as 11 doses of prophylactic intrathecal methotrexate 11/14/2018 - 2/20/2019. Iris Juarez declined consolidative XRT to the right face/orbits/brain. Symptomatically, Yeni's endurance continues to improve. Her appetite is good and weight stable. She denies headache, visual changes, sinus pain etc.  She continues to have neuropathy more so to the bottom of her feet, Neuropathy grade 1. ECOG grade 1. Iris Juarez presents accompanied by her  for continued monitoring.     Serology on 3/18/2021 revealed:  CMP -unremarkable  LDH - 515 ([phone] ref range)    Physical examination today, 11/16/2021 does not reveal evidence of asymmetry or abnormalities in the pupils or the eyes with extraocular movements. There are no abnormalities in the oral pharyngeal area especially in the right maxilla area. No palpable lymphadenopathy in the head and neck area including preauricular upper cervical and lower cervical.  Axilla and inguinal areas are negative for palpable pathological lymphadenopathy. Lungs are clear the heart is regular the abdomen is soft and benign without organomegaly masses or tenderness. Neurological exam is intact. MRI BRAIN W WO CONTRAST-, MRI ORBIT FACE NECK W WO CONTRASTon 4/6/2021 at [facility], compared to 3/18/20, documented:  · Stable MR appearance the brain. No acute intracranial process. No abnormal contrast enhancement. · Decreasing enhancement of the right  muscles described on the previous study, benign post therapy changes suspected. No developing face/orbital abnormalities. · Mild paranasal sinus mucosal thickening. CBC today 11/16/2021 reveals a WBC of 8.11  Hgb is 13.6 with an MCV of 89.7  and platelet count of 817,467. Issue of removal of the Ommaya and left anterior chest wall port was addressed 7/8/2020, on 7/15/2021 and again today, 11/16/2021. She does not desire to have the Ommaya reservoir removed. She refuses to have her port removed. She wants to use the chest wall port for blood draws when she comes to the clinic periodically. Her examination and review of systems are completely negative for evidence of local regional or systemic recurrent disease. I did an extensive evaluation of the oropharyngeal area nasal area face head and neck exam.  There is no evidence of new or recurrent disease. Port will be flushed today and every 2 months  Follow-up arrangements have been made    #2.    Lung nodules  - CT scan of the chest with and without contrast on 10/3/2018 documented tiny, 2 mm nodules in the subpleural left lung, likely to be benign with a 1 year followup recommended     CT chest with contrast on 9/18/2019 documented no nodules or mass lesion. Calcified granuloma are present stable and unchanged. No acute cardiopulmonary process. A repeat CT scan of the chest will be done prior to her return visit, see orders below. #3  Breast cancer screening    Right simple mastectomy for DCIS by Dr. Nino Ormond on  9/23/2004    Left mammogram on 10/20/2021 was a BI-RADS 2    Mammograms will be scheduled at her next visit for October of this year.     #4. Neuropathy grade 1  - Debbie Dumont states she may have had some degree of neuropathy prior to treatment however more noticeable post chemotherapy   - No intervention at this time, continue to follow     #5. Mediport  Routine flush today and in 8 weeks until it is removed     #6  Bone health    DEXA scan 10/20/2021-osteopenia      #7  GYN cancer screening    Debbie Dumont had a ALEXI and BSO in 1968       Return in about 4 months  Port flush today and in 2 months          Debbie Dumont was seen today for follow-up. Diagnoses and all orders for this visit:    Diffuse large b-cell lymphoma, lymph nodes of head, face, and neck (HCC)    History of ductal carcinoma in situ (DCIS) of breast    Encounter for central line care    Personal history of multiple pulmonary nodules    Health care maintenance        No orders of the defined types were placed in this encounter. No follow-ups on file.

## 2021-11-16 ENCOUNTER — OFFICE VISIT (OUTPATIENT)
Dept: HEMATOLOGY | Age: 86
End: 2021-11-16
Payer: MEDICARE

## 2021-11-16 ENCOUNTER — HOSPITAL ENCOUNTER (OUTPATIENT)
Dept: INFUSION THERAPY | Age: 86
Discharge: HOME | End: 2021-11-16
Payer: MEDICARE

## 2021-11-16 VITALS
DIASTOLIC BLOOD PRESSURE: 68 MMHG | BODY MASS INDEX: 27.62 KG/M2 | WEIGHT: 176 LBS | OXYGEN SATURATION: 95 % | HEART RATE: 98 BPM | SYSTOLIC BLOOD PRESSURE: 100 MMHG | HEIGHT: 67 IN

## 2021-11-16 DIAGNOSIS — Z87.898 PERSONAL HISTORY OF MULTIPLE PULMONARY NODULES: ICD-10-CM

## 2021-11-16 DIAGNOSIS — Z00.00 HEALTH CARE MAINTENANCE: ICD-10-CM

## 2021-11-16 DIAGNOSIS — Z45.2 ENCOUNTER FOR CENTRAL LINE CARE: ICD-10-CM

## 2021-11-16 DIAGNOSIS — C83.31 DIFFUSE LARGE B-CELL LYMPHOMA, LYMPH NODES OF HEAD, FACE, AND NECK (HCC): ICD-10-CM

## 2021-11-16 DIAGNOSIS — Z86.000 HISTORY OF DUCTAL CARCINOMA IN SITU (DCIS) OF BREAST: ICD-10-CM

## 2021-11-16 DIAGNOSIS — D05.11 INTRADUCTAL CARCINOMA IN SITU OF RIGHT BREAST: Primary | ICD-10-CM

## 2021-11-16 DIAGNOSIS — C83.31 DIFFUSE LARGE B-CELL LYMPHOMA, LYMPH NODES OF HEAD, FACE, AND NECK (HCC): Primary | ICD-10-CM

## 2021-11-16 LAB
ALBUMIN SERPL-MCNC: 3.9 G/DL (ref 3.5–5.2)
ALP BLD-CCNC: 55 U/L (ref 35–104)
ALT SERPL-CCNC: 19 U/L (ref 9–52)
ANION GAP SERPL CALCULATED.3IONS-SCNC: 5 MMOL/L (ref 7–19)
AST SERPL-CCNC: 25 U/L (ref 14–36)
BASOPHILS ABSOLUTE: 0.02 K/UL (ref 0.01–0.08)
BASOPHILS RELATIVE PERCENT: 0.2 % (ref 0.1–1.2)
BILIRUB SERPL-MCNC: 0.9 MG/DL (ref 0.2–1.3)
BUN BLDV-MCNC: 25 MG/DL (ref 7–17)
CALCIUM SERPL-MCNC: 9.5 MG/DL (ref 8.4–10.2)
CHLORIDE BLD-SCNC: 106 MMOL/L (ref 98–111)
CO2: 28 MMOL/L (ref 22–29)
CREAT SERPL-MCNC: 0.9 MG/DL (ref 0.5–1)
EOSINOPHILS ABSOLUTE: 0.1 K/UL (ref 0.04–0.54)
EOSINOPHILS RELATIVE PERCENT: 1.2 % (ref 0.7–7)
GFR NON-AFRICAN AMERICAN: 59
GLOBULIN: 2.4 G/DL
GLUCOSE BLD-MCNC: 106 MG/DL (ref 74–106)
HCT VFR BLD CALC: 41.7 % (ref 34.1–44.9)
HEMOGLOBIN: 13.6 G/DL (ref 11.2–15.7)
LACTATE DEHYDROGENASE: 531 U/L (ref 313–618)
LYMPHOCYTES ABSOLUTE: 1.82 K/UL (ref 1.18–3.74)
LYMPHOCYTES RELATIVE PERCENT: 22.4 % (ref 19.3–53.1)
MCH RBC QN AUTO: 29.2 PG (ref 25.6–32.2)
MCHC RBC AUTO-ENTMCNC: 32.6 G/DL (ref 32.3–35.5)
MCV RBC AUTO: 89.7 FL (ref 79.4–94.8)
MONOCYTES ABSOLUTE: 0.84 K/UL (ref 0.24–0.82)
MONOCYTES RELATIVE PERCENT: 10.4 % (ref 4.7–12.5)
NEUTROPHILS ABSOLUTE: 5.33 K/UL (ref 1.56–6.13)
NEUTROPHILS RELATIVE PERCENT: 65.8 % (ref 34–71.1)
PDW BLD-RTO: 14.5 % (ref 11.7–14.4)
PLATELET # BLD: 185 K/UL (ref 182–369)
PMV BLD AUTO: 10.3 FL (ref 7.4–10.4)
POTASSIUM SERPL-SCNC: 4.2 MMOL/L (ref 3.5–5.1)
RBC # BLD: 4.65 M/UL (ref 3.93–5.22)
SODIUM BLD-SCNC: 139 MMOL/L (ref 137–145)
TOTAL PROTEIN: 6.3 G/DL (ref 6.3–8.2)
WBC # BLD: 8.11 K/UL (ref 3.98–10.04)

## 2021-11-16 PROCEDURE — 96523 IRRIG DRUG DELIVERY DEVICE: CPT

## 2021-11-16 PROCEDURE — 83615 LACTATE (LD) (LDH) ENZYME: CPT

## 2021-11-16 PROCEDURE — 6360000002 HC RX W HCPCS: Performed by: INTERNAL MEDICINE

## 2021-11-16 PROCEDURE — G8484 FLU IMMUNIZE NO ADMIN: HCPCS | Performed by: INTERNAL MEDICINE

## 2021-11-16 PROCEDURE — G8427 DOCREV CUR MEDS BY ELIG CLIN: HCPCS | Performed by: INTERNAL MEDICINE

## 2021-11-16 PROCEDURE — 1123F ACP DISCUSS/DSCN MKR DOCD: CPT | Performed by: INTERNAL MEDICINE

## 2021-11-16 PROCEDURE — 4040F PNEUMOC VAC/ADMIN/RCVD: CPT | Performed by: INTERNAL MEDICINE

## 2021-11-16 PROCEDURE — 99211 OFF/OP EST MAY X REQ PHY/QHP: CPT

## 2021-11-16 PROCEDURE — 80053 COMPREHEN METABOLIC PANEL: CPT

## 2021-11-16 PROCEDURE — 1036F TOBACCO NON-USER: CPT | Performed by: INTERNAL MEDICINE

## 2021-11-16 PROCEDURE — G8417 CALC BMI ABV UP PARAM F/U: HCPCS | Performed by: INTERNAL MEDICINE

## 2021-11-16 PROCEDURE — 1090F PRES/ABSN URINE INCON ASSESS: CPT | Performed by: INTERNAL MEDICINE

## 2021-11-16 PROCEDURE — 2580000003 HC RX 258: Performed by: INTERNAL MEDICINE

## 2021-11-16 PROCEDURE — 36415 COLL VENOUS BLD VENIPUNCTURE: CPT

## 2021-11-16 PROCEDURE — 85025 COMPLETE CBC W/AUTO DIFF WBC: CPT

## 2021-11-16 PROCEDURE — 99213 OFFICE O/P EST LOW 20 MIN: CPT | Performed by: INTERNAL MEDICINE

## 2021-11-16 RX ORDER — HEPARIN SODIUM (PORCINE) LOCK FLUSH IV SOLN 100 UNIT/ML 100 UNIT/ML
500 SOLUTION INTRAVENOUS PRN
Status: CANCELLED | OUTPATIENT
Start: 2021-11-16

## 2021-11-16 RX ORDER — HEPARIN SODIUM (PORCINE) LOCK FLUSH IV SOLN 100 UNIT/ML 100 UNIT/ML
500 SOLUTION INTRAVENOUS PRN
Status: DISCONTINUED | OUTPATIENT
Start: 2021-11-16 | End: 2021-11-17 | Stop reason: HOSPADM

## 2021-11-16 RX ORDER — SODIUM CHLORIDE 0.9 % (FLUSH) 0.9 %
10 SYRINGE (ML) INJECTION PRN
Status: DISCONTINUED | OUTPATIENT
Start: 2021-11-16 | End: 2021-11-17 | Stop reason: HOSPADM

## 2021-11-16 RX ORDER — SODIUM CHLORIDE 0.9 % (FLUSH) 0.9 %
10 SYRINGE (ML) INJECTION PRN
Status: CANCELLED | OUTPATIENT
Start: 2021-11-16

## 2021-11-16 RX ORDER — SODIUM CHLORIDE 0.9 % (FLUSH) 0.9 %
20 SYRINGE (ML) INJECTION PRN
Status: CANCELLED | OUTPATIENT
Start: 2021-11-16

## 2021-11-16 RX ORDER — SODIUM CHLORIDE 0.9 % (FLUSH) 0.9 %
20 SYRINGE (ML) INJECTION PRN
Status: DISCONTINUED | OUTPATIENT
Start: 2021-11-16 | End: 2021-11-17 | Stop reason: HOSPADM

## 2021-11-16 RX ADMIN — HEPARIN 500 UNITS: 100 SYRINGE at 11:22

## 2021-11-16 RX ADMIN — SODIUM CHLORIDE, PRESERVATIVE FREE 20 ML: 5 INJECTION INTRAVENOUS at 11:22

## 2021-11-17 ENCOUNTER — TELEPHONE (OUTPATIENT)
Dept: HEMATOLOGY | Age: 86
End: 2021-11-17

## 2021-11-17 ENCOUNTER — TRANSCRIBE ORDERS (OUTPATIENT)
Dept: ADMINISTRATIVE | Facility: HOSPITAL | Age: 86
End: 2021-11-17

## 2021-11-17 DIAGNOSIS — C83.31 DIFFUSE LARGE B-CELL LYMPHOMA OF LYMPH NODES OF NECK (HCC): Primary | ICD-10-CM

## 2021-11-17 NOTE — TELEPHONE ENCOUNTER
Scheduled MRI Orbits/Brain scheduled at [facility] enter Dr. Adams Never 2. December 1st 2021 arrive @ 3:30pm. NPO 4 hours prior.

## 2021-12-01 ENCOUNTER — HOSPITAL ENCOUNTER (OUTPATIENT)
Dept: MRI IMAGING | Facility: HOSPITAL | Age: 86
Discharge: HOME | End: 2021-12-01

## 2021-12-01 DIAGNOSIS — C83.31 DIFFUSE LARGE B-CELL LYMPHOMA, LYMPH NODES OF HEAD, FACE, AND NECK (HCC): ICD-10-CM

## 2021-12-01 DIAGNOSIS — C83.31 DIFFUSE LARGE B-CELL LYMPHOMA OF LYMPH NODES OF NECK (HCC): ICD-10-CM

## 2021-12-01 LAB — CREAT BLDA-MCNC: 1.2 MG/DL (ref 0.6–1.3)

## 2021-12-01 PROCEDURE — 82565 ASSAY OF CREATININE: CPT

## 2021-12-01 PROCEDURE — A9577 INJ MULTIHANCE: HCPCS | Performed by: INTERNAL MEDICINE

## 2021-12-01 PROCEDURE — 70543 MRI ORBT/FAC/NCK W/O &W/DYE: CPT

## 2021-12-01 PROCEDURE — 0 GADOBENATE DIMEGLUMINE 529 MG/ML SOLUTION: Performed by: INTERNAL MEDICINE

## 2021-12-01 PROCEDURE — 70553 MRI BRAIN STEM W/O & W/DYE: CPT

## 2021-12-01 RX ADMIN — GADOBENATE DIMEGLUMINE 15 ML: 529 INJECTION, SOLUTION INTRAVENOUS at 17:00

## 2021-12-02 ENCOUNTER — TRANSCRIBE ORDERS (OUTPATIENT)
Dept: ADMINISTRATIVE | Facility: HOSPITAL | Age: 86
End: 2021-12-02

## 2021-12-02 DIAGNOSIS — E89.0 POSTSURGICAL HYPOTHYROIDISM: Primary | ICD-10-CM

## 2022-01-05 RX ORDER — FLUTICASONE FUROATE 100 UG/1
1 POWDER RESPIRATORY (INHALATION) DAILY
Qty: 30 EACH | Refills: 11 | Status: SHIPPED | OUTPATIENT
Start: 2022-01-05 | End: 2022-09-20

## 2022-01-05 NOTE — TELEPHONE ENCOUNTER
[facility] don't have any arnuity and she is requesting to get it from [facility] in [place].  I called to make sure they had and it and they can get it in the morning for her.      Rx Refill Note  Requested Prescriptions     Signed Prescriptions Disp Refills   • Fluticasone Furoate (Arnuity Ellipta) 100 MCG/ACT aerosol powder  30 each 11     Sig: Inhale 1 puff Daily.     Authorizing Provider: SITA LEI     Ordering User: EMELINA SIGALA      Last office visit with prescribing clinician: 9/9/2021      Next office visit with prescribing clinician: 3/8/2022            Emelina Sigala CMA  01/05/22, 14:43 CST

## 2022-03-15 ENCOUNTER — OFFICE VISIT (OUTPATIENT)
Dept: PULMONOLOGY | Facility: CLINIC | Age: 87
End: 2022-03-15

## 2022-03-15 VITALS
HEART RATE: 87 BPM | SYSTOLIC BLOOD PRESSURE: 142 MMHG | BODY MASS INDEX: 29.89 KG/M2 | DIASTOLIC BLOOD PRESSURE: 80 MMHG | HEIGHT: 65 IN | OXYGEN SATURATION: 97 % | WEIGHT: 179.4 LBS

## 2022-03-15 DIAGNOSIS — J45.20 MILD INTERMITTENT ASTHMA WITHOUT COMPLICATION: Primary | Chronic | ICD-10-CM

## 2022-03-15 DIAGNOSIS — C83.31 DIFFUSE LARGE B-CELL LYMPHOMA OF LYMPH NODES OF HEAD: Chronic | ICD-10-CM

## 2022-03-15 DIAGNOSIS — Z01.812 ENCOUNTER FOR PREOPERATIVE SCREENING LABORATORY TESTING FOR COVID-19 VIRUS: ICD-10-CM

## 2022-03-15 DIAGNOSIS — Z20.822 ENCOUNTER FOR PREOPERATIVE SCREENING LABORATORY TESTING FOR COVID-19 VIRUS: ICD-10-CM

## 2022-03-15 DIAGNOSIS — J38.00 COMPLETE PARALYSIS OF VOCAL CORD: Chronic | ICD-10-CM

## 2022-03-15 PROCEDURE — 94010 BREATHING CAPACITY TEST: CPT | Performed by: NURSE PRACTITIONER

## 2022-03-15 PROCEDURE — 99214 OFFICE O/P EST MOD 30 MIN: CPT | Performed by: NURSE PRACTITIONER

## 2022-03-15 RX ORDER — VITAMIN B COMPLEX
100 TABLET ORAL DAILY
COMMUNITY

## 2022-03-15 RX ORDER — ALBUTEROL SULFATE 90 UG/1
2 AEROSOL, METERED RESPIRATORY (INHALATION) EVERY 4 HOURS PRN
Qty: 18 G | Refills: 0 | Status: SHIPPED | OUTPATIENT
Start: 2022-03-15 | End: 2022-04-14

## 2022-03-15 RX ORDER — CYANOCOBALAMIN (VITAMIN B-12) 500 MCG
500 TABLET ORAL 2 TIMES DAILY
COMMUNITY

## 2022-03-15 NOTE — PROCEDURES
Pulmonary Function Test  Performed by: Emelina Avery, RRT  Authorized by: Olivia Quiroz APRN      Pre Drug % Predicted    FVC: 106%   FEV1: 102%   FEF 25-75%: 107%   FEV1/FVC: 72%    Interpretation   Spirometry   Spirometry shows normal results.   Review of FVL curve   Patient's effort is normal. There is a flattening of the inspiratory curve, suggesting variable extrathoracic obstruction.   Overall comments: Patient has known vocal cord dysfunction

## 2022-03-29 NOTE — PROGRESS NOTES
Patient:  Faye Bernard  YOB: 1934  Date of Service: 3/30/2022  MRN: [id number]    Primary Care Physician: Arlene Moreland MD    Chief Complaint   Patient presents with    Follow-up     Diffuse large b-cell lymphoma, lymph nodes of head, face, and neck [facility])       Patient Seen, Chart, Consults notes, Labs, Radiology studies reviewed. 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 · Right simple mastectomy for DCIS by Dr. Delmy Godoy on  9/23/2004  · Tumor screening and health maintenance    She was treated with CHOP-R x6 cycles given 10/24/2018 - 2/20/2019 as well as 11 doses of prophylactic intrathecal methotrexate 11/14/2018 - 2/20/2019. Sidney Starkey declined consolidative XRT to the right face/orbits/brain. Symptomatically, Yeni's endurance continues to improve. Her appetite is good and weight stable, 175 pounds. She denies headache, visual changes, sinus pain etc.  She continues to have neuropathy more so to the bottom of her feet, Neuropathy grade 1. ECOG grade 1. She was able to take her 's test and pass her 's test and get a new 's license last week in [address] [place] [address] accompanied by her  for continued monitoring. TARGET LYMPHOMA SITES:  1. Right maxillary sinus with local invasion to the floor of the right orbit   2.  on 10/10/18   3. borderline spleen size, 13 x 4.8 x 11.8 cm  4. Bone marrow negative on 10/11/18   5. CSF specimen via Omaya port negative on 10/24/18     TUMOR HISTORY: Right maxillary sinus high-grade diffuse large B-cell lymphoma 9/17/2018  Sidney Starkey was treated for hyperthyroidism with radioactive iodine at about age 28.  About 45 years ago she underwent a thyroidectomy followed subsequently by a cary cord medialization procedure by Dr. Tammy Kumar in [place], [place]. Several years ago she had right maxillary sinus surgery by Dr. Jerad Danielle. Amalia Vigil developed upper respiratory and sinus symptoms in the spring of 2018. She was seen and treated at [facility] urgent care on 3/13/2018 for bronchitis. She was then seen in followup by her PCP, Dr. Mane Vigil on 4/27/2018. He felt an abnormality in the neck and ordered a CT scan of the soft tissues of the neck and referred her to ENT for further evaluation of this. A CT scan of the soft tissues of the neck with contrast on 5/7/2018 documented extensive mucosal thickening of the right maxillary sinus consistent with chronic sinusitis. There was also opacification of several of the ethmoid air cells and mucosal thickening in the nasal cavity. No neck mass or adenopathy was appreciated. Amalia Vigil was evaluated by Dr. Lizz Haines on 5/30/2018 for evaluation of right neck pain and swelling. Full ENT exam including nasopharyngeal area was within normal limits. Review of the CT scan from 5/7/2018 was consistent with chronic sinusitis. Amalia Vigil had had a medialization procedure by ENT and [address] earlier in the year for a vocal cord paralysis issue. It was felt that some of the changes on the CT scan were consistent with that procedure. On 8/3/2018 Amalia Vigil had a right upper posterior tooth pulled by an oral surgeon, Dr. Mariana Jones in [facility] for what was felt to possibly be an infected tooth. Amalia Vigil was again seen [facility] urgent care on 8/16/2018 with swelling, redness and tenderness in the right maxillary area with associated headaches not responding to antibiotics. Plain film imaging studies and a CT scan of the area was performed.      Plain film x-rays of the facial bones on 8/16/2018 did not reveal acute abnormalities     CT scan of facial bones within without contrast on 8/16/2018 documented interval development of an enhancing soft tissue along the right maxilla extending along the anterior right maxillary sinus and along the floor of the right orbit measuring 2.4 cm in width. New erosive bony changes were noted involving the bony structure of the right orbital floor. The findings were concerning for a neoplastic process with erosive changes of the right orbital floor with ENT consultation recommended. Constanza Kaiser was seen by Niall Aguila PA-C with ENT on 8/20/2018 where the above CT scanning clinical findings were evaluated. Arrangements were made for a biopsy with Dr. Annamaria Staley. On 9/17/2018, Dr. Annamaria Staley performed a nasal/sinus endoscopy with biopsy. 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 The cells are polymorphic with maintaining medium sized to large cells with irregular nuclear contour. On flow cytometry, the monoclonal kappa B cell population does not express CD5 or CD10, but are low viability. The immunostains, show a diffuse B-cell infiltrate positive for CD20, PAX-5 and BCL 6, but negative for CD5, CD10, BCL-2, cyclin D1 and CD43.  <30% of the cells were positive for Mum-1. Ki-67 showed a high proliferative rate of about 80%. All of the above is consistent with a diffuse large B-cell lymphoma of the GBC type. A CT scan of the soft tissues of the neck with contrast on 10/3/2018 was compared to the CT scan of the face on 8/16/2018 and 5/7/2018. There had been significant progression of disease with interval increase in the size of the right buccal space hyperdense mass measuring approximately at least 7 x 2 cm on axial cuts. There is involvement of the medial and lateral pterigoid muscle. The right temporalis muscle was also inseparable from the mass with involvement as well of the pterygomaxillary fissure, pterygopalatine fossa and sphenopalatine foramen. Local cortex erosive the structure and was also noted along the maxillary gingival surface.   Involvement of the roof of the right maxillary sinus with progression of the erosive disease along the floor of the right orbit was also again identified. An asymmetric soft tissue density was also noted at the location of the right infraorbital area. There was no evidence of involvement of the extraocular muscles nor do the lacrimal glands demonstrate a discrete mass. The lacrimal glands do not demonstrate a discrete mass. Disease extends along the right lateral nasal cavity and ethmoid air cells. There is now disease along the right sphenoid sinus inseparable from the right foramen rotundum with probable involvement of the maxillary branch of V5. Evidence of prior instrumentation with prior middle turbinectomies, uncinate to measles, antrostomies, and partial ethmoidectomies is also suspected based on the radiographic imaging. The cavernous sinuses are symmetric. [address] are symmetric. There is now a right level one be lymphadenopathy by size criteria, and abnormal clustering with lymph node measuring up to 1.5 cm. Otherwise the rest of the oral cavity and head and neck area did not demonstrate evidence of discrete mass. Apparent right vocal cord paralysis would medialization of the true vocal cord with anteromedial rotation of the right was also described. CT scan of the chest with and without contrast on 10/3/2018 did not reveal evidence of thoracic lymphadenopathy. Tiny 2 mm nodules were noted in the subpleural left lung, likely to be benign with a 1 year followup recommended     CT scan of the abdomen and pelvis with contrast on 10/3/2018 did not reveal evidence of metastatic disease nor abdominal lymphadenopathy. The spleen was of borderline size measuring 13 cm x 4.8 cm x 11.8 cm. Physical examination on 10/10/2018 revealed a very enlarged and swollen right maxillary facial area with induration of soft tissues around the upper teeth on the right and face.   No palpable peripheral lymphadenopathy was able to be appreciated in the head and neck area, epitrochlear and axillary areas, inguinal areas. No evidence of palpable splenomegaly or other abdominal findings were encountered on exam.    Bone marrow aspirate and biopsy on 10/11/18 showed no evidence of involvement by diffuse large B-cell lymphoma. FLOW cytometry negative. MRI of the brain w/wo contrast 10/11/18 at [facility] was consistent with abnormal soft tissue mass involving the right  and buccal space extending along the anterior margin of the right maxillary sinus contiguous with the floor of the right orbit. There was no definite invasion into the orbit, nor displacement of the inferior rectus musculature. PET scan 10/12/18 at [facility] showed hypermetabolic activity associated with the right buccal space and  space mass, SUV 18.5. There is no distant focus of hypermetabolic activity seen to suggest other sites of involvement. Echocardiogram 10/12/18 at [facility] revealed an estimated LVEF of 60%. Hepatitis B and C were negative. Hepatitis A IgM was positive and discussed with Dr. Iris Menenedz. Left chest mediport was placed 10/18/18 by Dr. Albina Zendejas. Right ommaya port was placed 10/22/18 by Dr. Ramon Melvin. CSF specimen was obtained via ommaya port by Dr. Iris Menendez, sent for cytology and FLOW prior to initiation of Cycle #1 CHOP-R initiated on 10/24/18 was negative.     Shannan Castro completed the final cycle #6 of CHOP/R and Dose #11 of prophylactic intrathecal methotrexate on 2/20/2019. MRI of the right orbit, face and neck with and without contrast at [facility] on 4/1/2019 concluded  · NO solid enhancing mass to suggest residual/recurrent tumor. · The infiltrate of edema and wispy enhancement within the right pterigoids and masseter muscles is thought to be secondary to an evolving denervation atrophy.      MRI of the brain with and without contrast on 4/1/2019 concluded:  · Residual abnormal fact signal in the inferior right orbit, right  space and right pterygopalatine fossa, appearing stable   · Diffusion signal abnormality in this region has normalized, suggesting positive lymphoma treatment response. 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 The remaining question of whether Bryan Robles would benefit from consolidative XRT to the right face/orbit/brain, given the high risk characteristics of her lymphoma, was addressed at her XRT consultation with Dr. Nicole Colby on 5/8/2019. Concerns persist over the fact that she has developed some degree of leukoencephalopathy. Fortunately, clinically she is improving. The downside of XRT is possible further leukoencephalopathy as well as concerns over damage to the right eye, which is her good eye. Yeni's daughter stated that she was very pleased with the appointment with Dr. Tj Allred who explained all of the benefits and possible downside problems with XRT. His recommendation was for 3600 cGy over 18 treatment fractions for consolidation. Bryan Robles opted against consolidation XRT.     MRI of the brain, right orbit, face and neck with and without contrast at [facility] 9/4/2019 showed stable posttreatment changes. No suspicious mass.     CT chest with contrast on 9/18/2019 documented no nodules or mass lesion. Calcified granuloma are present stable and unchanged. No acute cardiopulmonary process. MRI orbit, face, neck with and without contrast on 3/18/2020 at [facility] documented:  · Stable to mildly improved appearance of probable denervation edema and mild enhancement involving the muscles of mastication on the right, likely related to postradiation changes. There is no mass identified to indicate recurrent neoplasm.   · Mild chronic sinusitis involving the ethmoid sinuses and base of the right maxillary sinus.    MRI brain with and without contrast on 3/18/2020 at [facility] documented:  · Similar positioning of the right transfrontal ventricular shunt with stable ventricular size and configuration. No abnormal intracranial enhancement. · Stable diffuse hyperintense FLAIR signal changes of the white matter symmetrically likely posttreatment change    MRI BRAIN W WO CONTRAST-, MRI ORBIT FACE NECK W WO CONTRASTon 4/6/2021 at [facility], compared to 3/18/20, documented:  · Stable MR appearance the brain. No acute intracranial process. No abnormal contrast enhancement. · Decreasing enhancement of the right  muscles described on the previous study, benign post therapy changes suspected. No developing face/orbital abnormalities. · Mild paranasal sinus mucosal thickening. MRI Brain With & Without Contrast MRI Orbit Face Neck With & Without Contrast at [facility] on 12/2/2021: COMPARISON: 4/6/2021, 3/18/2020   · Stable exam.   · No evidence of residual or recurrent RIGHT facial mass. · No change in mild residual edema and enhancement in the RIGHT pterygoid and temporalis musculature. No abnormal intracranial enhancement. · Stable diffuse confluent FLAIR hyperintense signal in the periventricular white matter which may reflect posttreatment change    Issue of removal of the Ommaya and left anterior chest wall port was addressed 7/8/2020, on 7/15/2021, 11/16/2021 and again 3/30/2022. She does not desire to have the Ommaya reservoir removed. She refuses to have her port removed. She wants to use the chest wall port for blood draws when she comes to the clinic periodically. TREATMENT SUMMARY:  1. CHOP-R x6 cycles given 10/24/2018 - 2/20/2019.  2. Dose #1 prophylactic intrathecal methotrexate (FLOW cytometry on CSF negative) given 10/29/18. Dose #2 on 11/14/18. 11th and final dose given 2/20/2019.  3. Sidney Starkey opted against consolidative XRT.           TARGET BREAST CANCER SITES:  1.  Right mastectomy 09/23/04 for DCIS    TUMOR HISTORY: Right DCIS 09/23/2004  On 09/23/04, Amalia Vigil had a right simple mastectomy by Dr. Milton Dye for a low grade DCIS of the right breast.     The ER was 95%, VA was 13%. No further specific therapy was indicated for this cancer other than close follow up of the area and the opposite breast.    TREATMENT SUMMARY:  1.  Right mastectomy and 9/23/2004 for DCIS       Allergies:  Lisinopril, Statins, Alendronate, Lexapro [escitalopram oxalate], Parathyroid hormone (recomb), Sulfa antibiotics, and Trazodone and nefazodone    Medicines:  Current Outpatient Medications   Medication Sig Dispense Refill    Magic Mouthwash (MIRACLE MOUTHWASH) 1/3 benadryl, 1/3 viscous lidocaine, 1/3 maalox 240 mL 1    furosemide (LASIX) 40 MG tablet furosemide 40 mg tablet   TAKE 1 TABLET BY MOUTH TWICE DAILY CHANGE  FOR  FUROSEMIDE  40MG  TABLET      mirabegron (MYRBETRIQ) 50 MG TB24 Take 50 mg by mouth daily       Multiple Vitamin TABS Take 1 tablet by mouth      nystatin (MYCOSTATIN) 300920 UNIT/ML suspension nystatin 100,000 unit/mL oral suspension      OXYBUTYNIN CHLORIDE PO Take by mouth       potassium chloride (KLOR-CON) 20 MEQ packet Take 20 mEq by mouth 2 times daily      Coenzyme Q10 10 MG CAPS 150      Cyanocobalamin (VITAMIN B-12) 5000 MCG LOZG Take by mouth      magnesium 30 MG tablet Take 150 mg by mouth 2 times daily       Multiple Vitamins-Minerals (THERAPEUTIC MULTIVITAMIN-MINERALS) tablet Take 1 tablet by mouth daily      omeprazole (PRILOSEC) 20 MG delayed release capsule Take 20 mg by mouth daily       Probiotic Product (PROBIOTIC-10 PO) Take by mouth      levothyroxine (SYNTHROID) 100 MCG tablet Take 125 mcg by mouth Daily      acetaminophen-codeine (TYLENOL #3) 300-30 MG per tablet acetaminophen 300 mg-codeine 30 mg tablet (Patient not taking: Reported on 7/15/2021)      albuterol sulfate  (90 Base) MCG/ACT inhaler Inhale 2 puffs into the lungs every 4 hours as needed      potassium chloride (KLOR-CON M) 20 MEQ extended release tablet Take 20 mEq by mouth 2 times daily       No current facility-administered medications for this visit.      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 Franky Boothe GERD (gastroesophageal reflux disease)     H/O right mastectomy 2004    for DCIS    Hypertension     Hyperthyroidism     Prolonged emergence from general anesthesia     Thyroid disease         Past Surgical History:      Procedure Laterality Date    APPENDECTOMY      BREAST SURGERY      right mastectomy    CHOLECYSTECTOMY  1996    COLECTOMY      COLECTOMY  06/2005    Partial- Dr. Hunter Hernadez for recurring diverticulitis    HYSTERECTOMY  1966   [address]    for adhesions    MASTECTOMY Right 2004    PORT SURGERY Left 10/18/2018    ommaya port placement Dr. Espinoza Perdomo Right 10/22/2018    Dr. Gonsalo Silva W/SUBQ PORT AGE 5 YR/> N/A 10/18/2018    PORT INSERTION performed by Zhanna Emerson MD at [address] THYROID SURGERY      nodule    THYROIDECTOMY Right 1961    with vocal chord injury   Lisa Mend Right 2001    [facility]. Camryn Braswell [facility]        Family History:      Problem Relation Age of Onset    Other Mother         Motor Vehical Accident    Other Father 80        Unknown    Breast Cancer Sister         Breast Cancer        Social History  Social History     Tobacco Use    Smoking status: Never Smoker    Smokeless tobacco: Never Used   Substance Use Topics    Alcohol use: No    Drug use: Not on file              Objective:  Vital Signs: Blood pressure (!) 140/90, pulse 76, height 5' 7\" (1.702 m), weight 175 lb (79.4 kg), SpO2 98 %. Labs:  BMP:   No results for input(s): NA, K, CL, CO2, PHOS, BUN, CREATININE, CALCIUM in the last 72 hours. CBC:   Recent Labs     03/30/22  1133   WBC 6.37   HGB 13.9   HCT 42.6   MCV 87.3        LIVER PROFILE:   No results for input(s): AST, ALT, LIPASE, BILIDIR, BILITOT, ALKPHOS in the last 72 hours. Invalid input(s): AMYLASE,  ALB  PT/INR: No results for input(s): PROTIME, INR in the last 72 hours. APTT: No results for input(s): APTT in the last 72 hours. BNP:  No results for input(s): BNP in the last 72 hours. Ionized Calcium:No results for input(s): IONCA in the last 72 hours. Magnesium:No results for input(s): MG in the last 72 hours. Phosphorus:No results for input(s): PHOS in the last 72 hours. HgbA1C: No results for input(s): LABA1C in the last 72 hours. Hepatic:   No results for input(s): ALKPHOS, ALT, AST, PROT, BILITOT, BILIDIR, LABALBU in the last 72 hours. Lactic Acid: No results for input(s): LACTA in the last 72 hours. Troponin: No results for input(s): CKTOTAL, CKMB, TROPONINT in the last 72 hours. ABGs: No results for input(s): PH, PCO2, PO2, HCO3, O2SAT in the last 72 hours. CRP:  No results for input(s): CRP in the last 72 hours. Sed Rate:  No results for input(s): SEDRATE in the last 72 hours. Cultures:   No results for input(s): CULTURE in the last 72 hours. Radiology reports as per the Radiologist  Radiology: No results found.      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 · Right simple mastectomy for DCIS by Dr. Steven Browning on  9/23/2004  · Tumor screening and health maintenance    She was treated with CHOP-R x6 cycles given 10/24/2018 - 2/20/2019 as well as 11 doses of prophylactic intrathecal methotrexate 11/14/2018 - 2/20/2019. Jovanny Earlr declined consolidative XRT to the right face/orbits/brain. Symptomatically, Yeni's endurance continues to improve. Her appetite is good and weight stable, 175 pounds. She denies headache, visual changes, sinus pain etc.  She continues to have neuropathy more so to the bottom of her feet, Neuropathy grade 1. ECOG grade 1. She was able to take her 's test and pass her 's test and get a new 's license last week in [place]. Jovanny Courser presents accompanied by her  for continued monitoring. Serology on 3/18/2021 revealed:  CMP -unremarkable  LDH - 515 ([phone] ref range)    Physical examination today, 3/30/2022 does not reveal evidence of asymmetry or abnormalities in the pupils or the eyes with extraocular movements. There are no abnormalities in the oral pharyngeal area especially in the right maxilla area. No palpable lymphadenopathy in the head and neck area including preauricular upper cervical and lower cervical.  Axilla and inguinal areas are negative for palpable pathological lymphadenopathy. Lungs are clear the heart is regular the abdomen is soft and benign without organomegaly masses or tenderness. Neurological exam is intact. CBC today 3/30/2022  reveals a WBC of 6.37 Hgb is 13.9 with an MCV of  87.3 and platelet count of [phone],458. MRI Brain With & Without Contrast MRI Orbit Face Neck With & Without Contrast at [facility] on 12/2/2021: COMPARISON: 4/6/2021, 3/18/2020   · Stable exam.   · No evidence of residual or recurrent RIGHT facial mass. · No change in mild residual edema and enhancement in the RIGHT pterygoid and temporalis musculature. No abnormal intracranial enhancement. · Stable diffuse confluent FLAIR hyperintense signal in the periventricular white matter which may reflect posttreatment change    Issue of removal of the Ommaya and left anterior chest wall port was addressed 7/8/2020, on 7/15/2021, 11/16/2021 and again today, 3/30/2022. She does not desire to have the Ommaya reservoir removed. She refuses to have her port removed. She wants to use the chest wall port for blood draws when she comes to the clinic periodically. Her examination and review of systems are completely negative for evidence of local regional or systemic recurrent disease. I did an extensive evaluation of the oropharyngeal area nasal area face head and neck exam.  There is no evidence of new or recurrent disease. Port will be flushed today and every 2 months  Follow-up arrangements have been made    #2. Lung nodules  - CT scan of the chest with and without contrast on 10/3/2018 documented tiny, 2 mm nodules in the subpleural left lung, likely to be benign with a 1 year followup recommended     CT chest with contrast on 9/18/2019 documented no nodules or mass lesion. Calcified granuloma are present stable and unchanged. No acute cardiopulmonary process. A repeat CT scan of the chest will be done prior to her return visit, see orders below. #3  Breast cancer screening    Right simple mastectomy for DCIS by Dr. Joseph La on  9/23/2004    Left mammogram on 10/20/2021 was a BI-RADS 2    Mammograms will be scheduled at her next visit for October of this year.     #4. Neuropathy grade 1  - Bryan Robles states she may have had some degree of neuropathy prior to treatment however more noticeable post chemotherapy   - No intervention at this time, continue to follow     #5.     Mediport  Routine flush today and in 8 weeks until it is removed     #6  Bone health    DEXA scan 10/20/2021-osteopenia      #7  GYN cancer screening    Bryan Robles had a ALEXI and BSO in 26       Return in about 4 months  Port flush today and in 2 months    #8  Immunizations:  Immunization History   Administered Date(s) Administered    COVID-19, Logan Stack, Primary or Immunocompromised, PF, 100mcg/0.5mL 03/10/2021, 04/14/2021               Amalia Vigil was seen today for follow-up. Diagnoses and all orders for this visit:    Diffuse large b-cell lymphoma, lymph nodes of head, face, and neck ([facility] [address]  -     Comprehensive Metabolic Panel; Future  -     Lactate Dehydrogenase; Future  -     Comprehensive Metabolic Panel; Future  -     Lactate Dehydrogenase; Future    History of ductal carcinoma in situ (DCIS) of breast    Health care maintenance        Orders Placed This Encounter   Procedures    Comprehensive Metabolic Panel     Standing Status:   Future     Standing Expiration Date:   3/29/2023    Lactate Dehydrogenase     Standing Status:   Future     Standing Expiration Date:   3/29/2023    Comprehensive Metabolic Panel     Standing Status:   Future     Standing Expiration Date:   3/30/2023    Lactate Dehydrogenase     Standing Status:   Future     Standing Expiration Date:   3/30/2023       Return in about 4 months (around 7/30/2022) for Follow Up with Bob shirley prior.

## 2022-03-30 ENCOUNTER — HOSPITAL ENCOUNTER (OUTPATIENT)
Dept: INFUSION THERAPY | Age: 87
Discharge: HOME | End: 2022-03-30
Payer: MEDICARE

## 2022-03-30 ENCOUNTER — OFFICE VISIT (OUTPATIENT)
Dept: HEMATOLOGY | Age: 87
End: 2022-03-30
Payer: MEDICARE

## 2022-03-30 VITALS
HEIGHT: 67 IN | WEIGHT: 175 LBS | BODY MASS INDEX: 27.47 KG/M2 | DIASTOLIC BLOOD PRESSURE: 90 MMHG | SYSTOLIC BLOOD PRESSURE: 140 MMHG | OXYGEN SATURATION: 98 % | HEART RATE: 76 BPM

## 2022-03-30 DIAGNOSIS — Z45.2 ENCOUNTER FOR CENTRAL LINE CARE: ICD-10-CM

## 2022-03-30 DIAGNOSIS — D05.11 INTRADUCTAL CARCINOMA IN SITU OF RIGHT BREAST: Primary | ICD-10-CM

## 2022-03-30 DIAGNOSIS — Z86.000 HISTORY OF DUCTAL CARCINOMA IN SITU (DCIS) OF BREAST: ICD-10-CM

## 2022-03-30 DIAGNOSIS — C83.31 DIFFUSE LARGE B-CELL LYMPHOMA, LYMPH NODES OF HEAD, FACE, AND NECK (HCC): Primary | ICD-10-CM

## 2022-03-30 DIAGNOSIS — C83.31 DIFFUSE LARGE B-CELL LYMPHOMA, LYMPH NODES OF HEAD, FACE, AND NECK (HCC): ICD-10-CM

## 2022-03-30 DIAGNOSIS — Z00.00 HEALTH CARE MAINTENANCE: ICD-10-CM

## 2022-03-30 LAB
ALBUMIN SERPL-MCNC: 3.9 G/DL (ref 3.5–5.2)
ALP BLD-CCNC: 58 U/L (ref 35–104)
ALT SERPL-CCNC: 30 U/L (ref 9–52)
ANION GAP SERPL CALCULATED.3IONS-SCNC: 11 MMOL/L (ref 7–19)
AST SERPL-CCNC: 33 U/L (ref 14–36)
BASOPHILS ABSOLUTE: 0.02 K/UL (ref 0.01–0.08)
BASOPHILS RELATIVE PERCENT: 0.3 % (ref 0.1–1.2)
BILIRUB SERPL-MCNC: 0.6 MG/DL (ref 0.2–1.3)
BUN BLDV-MCNC: 23 MG/DL (ref 7–17)
CALCIUM SERPL-MCNC: 9.5 MG/DL (ref 8.4–10.2)
CHLORIDE BLD-SCNC: 106 MMOL/L (ref 98–111)
CO2: 28 MMOL/L (ref 22–29)
CREAT SERPL-MCNC: 0.9 MG/DL (ref 0.5–1)
EOSINOPHILS ABSOLUTE: 0.09 K/UL (ref 0.04–0.54)
EOSINOPHILS RELATIVE PERCENT: 1.4 % (ref 0.7–7)
GFR NON-AFRICAN AMERICAN: 59
GLOBULIN: 2.7 G/DL
GLUCOSE BLD-MCNC: 83 MG/DL (ref 74–106)
HCT VFR BLD CALC: 42.6 % (ref 34.1–44.9)
HEMOGLOBIN: 13.9 G/DL (ref 11.2–15.7)
LACTATE DEHYDROGENASE: 517 U/L (ref 313–618)
LYMPHOCYTES ABSOLUTE: 1.83 K/UL (ref 1.18–3.74)
LYMPHOCYTES RELATIVE PERCENT: 28.7 % (ref 19.3–53.1)
MCH RBC QN AUTO: 28.5 PG (ref 25.6–32.2)
MCHC RBC AUTO-ENTMCNC: 32.6 G/DL (ref 32.3–35.5)
MCV RBC AUTO: 87.3 FL (ref 79.4–94.8)
MONOCYTES ABSOLUTE: 0.73 K/UL (ref 0.24–0.82)
MONOCYTES RELATIVE PERCENT: 11.5 % (ref 4.7–12.5)
NEUTROPHILS ABSOLUTE: 3.7 K/UL (ref 1.56–6.13)
NEUTROPHILS RELATIVE PERCENT: 58.1 % (ref 34–71.1)
PDW BLD-RTO: 14.6 % (ref 11.7–14.4)
PLATELET # BLD: 189 K/UL (ref 182–369)
PMV BLD AUTO: 10.2 FL (ref 7.4–10.4)
POTASSIUM SERPL-SCNC: 4.6 MMOL/L (ref 3.5–5.1)
RBC # BLD: 4.88 M/UL (ref 3.93–5.22)
SODIUM BLD-SCNC: 145 MMOL/L (ref 137–145)
TOTAL PROTEIN: 6.6 G/DL (ref 6.3–8.2)
WBC # BLD: 6.37 K/UL (ref 3.98–10.04)

## 2022-03-30 PROCEDURE — 80053 COMPREHEN METABOLIC PANEL: CPT

## 2022-03-30 PROCEDURE — 2580000003 HC RX 258: Performed by: INTERNAL MEDICINE

## 2022-03-30 PROCEDURE — 85025 COMPLETE CBC W/AUTO DIFF WBC: CPT

## 2022-03-30 PROCEDURE — G8427 DOCREV CUR MEDS BY ELIG CLIN: HCPCS | Performed by: INTERNAL MEDICINE

## 2022-03-30 PROCEDURE — 1123F ACP DISCUSS/DSCN MKR DOCD: CPT | Performed by: INTERNAL MEDICINE

## 2022-03-30 PROCEDURE — 1090F PRES/ABSN URINE INCON ASSESS: CPT | Performed by: INTERNAL MEDICINE

## 2022-03-30 PROCEDURE — 83615 LACTATE (LD) (LDH) ENZYME: CPT

## 2022-03-30 PROCEDURE — 36415 COLL VENOUS BLD VENIPUNCTURE: CPT

## 2022-03-30 PROCEDURE — 99214 OFFICE O/P EST MOD 30 MIN: CPT | Performed by: INTERNAL MEDICINE

## 2022-03-30 PROCEDURE — 99212 OFFICE O/P EST SF 10 MIN: CPT

## 2022-03-30 PROCEDURE — 4040F PNEUMOC VAC/ADMIN/RCVD: CPT | Performed by: INTERNAL MEDICINE

## 2022-03-30 PROCEDURE — 6360000002 HC RX W HCPCS: Performed by: INTERNAL MEDICINE

## 2022-03-30 PROCEDURE — G8417 CALC BMI ABV UP PARAM F/U: HCPCS | Performed by: INTERNAL MEDICINE

## 2022-03-30 PROCEDURE — 1036F TOBACCO NON-USER: CPT | Performed by: INTERNAL MEDICINE

## 2022-03-30 PROCEDURE — G8484 FLU IMMUNIZE NO ADMIN: HCPCS | Performed by: INTERNAL MEDICINE

## 2022-03-30 PROCEDURE — 96523 IRRIG DRUG DELIVERY DEVICE: CPT

## 2022-03-30 RX ORDER — SODIUM CHLORIDE 0.9 % (FLUSH) 0.9 %
10 SYRINGE (ML) INJECTION PRN
Status: DISCONTINUED | OUTPATIENT
Start: 2022-03-30 | End: 2022-03-31 | Stop reason: HOSPADM

## 2022-03-30 RX ORDER — SODIUM CHLORIDE 0.9 % (FLUSH) 0.9 %
20 SYRINGE (ML) INJECTION PRN
Status: CANCELLED | OUTPATIENT
Start: 2022-03-30

## 2022-03-30 RX ORDER — HEPARIN SODIUM (PORCINE) LOCK FLUSH IV SOLN 100 UNIT/ML 100 UNIT/ML
500 SOLUTION INTRAVENOUS PRN
Status: CANCELLED | OUTPATIENT
Start: 2022-03-30

## 2022-03-30 RX ORDER — SODIUM CHLORIDE 0.9 % (FLUSH) 0.9 %
10 SYRINGE (ML) INJECTION PRN
Status: CANCELLED | OUTPATIENT
Start: 2022-03-30

## 2022-03-30 RX ORDER — HEPARIN SODIUM (PORCINE) LOCK FLUSH IV SOLN 100 UNIT/ML 100 UNIT/ML
500 SOLUTION INTRAVENOUS PRN
Status: DISCONTINUED | OUTPATIENT
Start: 2022-03-30 | End: 2022-03-31 | Stop reason: HOSPADM

## 2022-03-30 RX ADMIN — SODIUM CHLORIDE, PRESERVATIVE FREE 10 ML: 5 INJECTION INTRAVENOUS at 11:09

## 2022-03-30 RX ADMIN — HEPARIN 500 UNITS: 100 SYRINGE at 11:09

## 2022-03-31 ENCOUNTER — HOSPITAL ENCOUNTER (OUTPATIENT)
Dept: INFUSION THERAPY | Age: 87
End: 2022-03-31

## 2022-04-25 ENCOUNTER — TRANSCRIBE ORDERS (OUTPATIENT)
Dept: ADMINISTRATIVE | Facility: HOSPITAL | Age: 87
End: 2022-04-25

## 2022-04-25 DIAGNOSIS — R10.9 ABDOMINAL PAIN, UNSPECIFIED ABDOMINAL LOCATION: Primary | ICD-10-CM

## 2022-04-26 ENCOUNTER — LAB (OUTPATIENT)
Dept: LAB | Facility: HOSPITAL | Age: 87
End: 2022-04-26

## 2022-04-26 DIAGNOSIS — R10.9 ABDOMINAL PAIN, UNSPECIFIED ABDOMINAL LOCATION: ICD-10-CM

## 2022-04-26 PROCEDURE — 87177 OVA AND PARASITES SMEARS: CPT

## 2022-04-26 PROCEDURE — 87209 SMEAR COMPLEX STAIN: CPT

## 2022-05-03 LAB
O+P SPEC MICRO: NORMAL
O+P STL CONC: NORMAL

## 2022-05-26 NOTE — OUTREACH NOTE
Prep Survey      Responses   Facility patient discharged from?  [facility]   Is patient eligible?  Yes   Discharge diagnosis  positive blood cultures,   pancytopenic, neutropenic, and have severe hypokalemia.   Does the patient have one of the following disease processes/diagnoses(primary or secondary)?  Other   Does the patient have Home health ordered?  No   Is there a DME ordered?  No   Comments regarding appointments  office to call   General alerts for this patient  chemo   Prep survey completed?  Yes          Cele Sneed RN         Statement Selected

## 2022-07-29 DIAGNOSIS — C83.31 DIFFUSE LARGE B-CELL LYMPHOMA, LYMPH NODES OF HEAD, FACE, AND NECK (HCC): Primary | ICD-10-CM

## 2022-07-29 NOTE — PROGRESS NOTES
Patient:  Bonnie Holland  YOB: 1934  Date of Service: 8/1/2022  MRN: [id number]    Primary Care Physician: Veronica Harvey MD    Chief Complaint   Patient presents with    Follow-up     Diffuse large b-cell lymphoma, lymph nodes of head, face, and neck [facility])         Patient Seen, Chart, Consults notes, Labs, Radiology studies reviewed. 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 Right simple mastectomy for DCIS by Dr. Aida Garcia on  9/23/2004  Tumor screening and health maintenance    She was treated with CHOP-R x6 cycles given 10/24/2018 - 2/20/2019 as well as 11 doses of prophylactic intrathecal methotrexate 11/14/2018 - 2/20/2019. Loretta Lopez declined consolidative XRT to the right face/orbits/brain. Symptomatically, Yeni's endurance continues to improve. Her appetite is good and weight stable, 175 pounds. She denies headache, visual changes, sinus pain etc.  She continues to have neuropathy more so to the bottom of her feet, Neuropathy grade 1. ECOG grade 1. Loretta Lopez presents accompanied by her  for continued monitoring. She has had no new developments since her last visit, she has gained 2 pounds since March of this year. She has no B symptoms. TARGET LYMPHOMA SITES:  Right maxillary sinus with local invasion to the floor of the right orbit    on 10/10/18   borderline spleen size, 13 x 4.8 x 11.8 cm  Bone marrow negative on 10/11/18   CSF specimen via Omaya port negative on 10/24/18     TUMOR HISTORY: Right maxillary sinus high-grade diffuse large B-cell lymphoma 9/17/2018  Loretta Lopez was treated for hyperthyroidism with radioactive iodine at about age 28.  About 45 years ago she underwent a thyroidectomy followed subsequently by a vocal cord medialization procedure by Dr. Jorge Lopez in [place], [place]. Several years ago she had right maxillary sinus surgery by Dr. Angelina Romero. Orly Sutherland developed upper respiratory and sinus symptoms in the spring of 2018. She was seen and treated at [facility] urgent care on 3/13/2018 for bronchitis. She was then seen in followup by her PCP, Dr. Teagan Penn on 4/27/2018. He felt an abnormality in the neck and ordered a CT scan of the soft tissues of the neck and referred her to ENT for further evaluation of this. A CT scan of the soft tissues of the neck with contrast on 5/7/2018 documented extensive mucosal thickening of the right maxillary sinus consistent with chronic sinusitis. There was also opacification of several of the ethmoid air cells and mucosal thickening in the nasal cavity. No neck mass or adenopathy was appreciated. Orly Sutherland was evaluated by Dr. Celina Medellin on 5/30/2018 for evaluation of right neck pain and swelling. Full ENT exam including nasopharyngeal area was within normal limits. Review of the CT scan from 5/7/2018 was consistent with chronic sinusitis. Orly Sutherland had had a medialization procedure by ENT and [address] earlier in the year for a vocal cord paralysis issue. It was felt that some of the changes on the CT scan were consistent with that procedure. On 8/3/2018 Orly Sutherland had a right upper posterior tooth pulled by an oral surgeon, Dr. Rebecca Guerra in [facility] for what was felt to possibly be an infected tooth. Orly Sutherland was again seen at [facility] urgent care on 8/16/2018 with swelling, redness and tenderness in the right maxillary area with associated headaches not responding to antibiotics. Plain film imaging studies and a CT scan of the area was performed.      Plain film x-rays of the facial bones on 8/16/2018 did not reveal acute abnormalities     CT scan of facial bones within without contrast on 8/16/2018 documented interval development of an enhancing soft tissue along the right maxilla extending along the anterior right maxillary sinus and along the floor of the right orbit measuring 2.4 cm in width. New erosive bony changes were noted involving the bony structure of the right orbital floor. The findings were concerning for a neoplastic process with erosive changes of the right orbital floor with ENT consultation recommended. Silvia Caruso was seen by Sri Gary PA-C with ENT on 8/20/2018 where the above CT scanning clinical findings were evaluated. Arrangements were made for a biopsy with Dr. Per Staley. On 9/17/2018, Dr. Per Staley performed a nasal/sinus endoscopy with biopsy. 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 The cells are polymorphic with maintaining medium sized to large cells with irregular nuclear contour. On flow cytometry, the monoclonal kappa B cell population does not express CD5 or CD10, but are low viability. The immunostains, show a diffuse B-cell infiltrate positive for CD20, PAX-5 and BCL 6, but negative for CD5, CD10, BCL-2, cyclin D1 and CD43.  <30% of the cells were positive for Mum-1. Ki-67 showed a high proliferative rate of about 80%. All of the above is consistent with a diffuse large B-cell lymphoma of the GBC type. A CT scan of the soft tissues of the neck with contrast on 10/3/2018 was compared to the CT scan of the face on 8/16/2018 and 5/7/2018. There had been significant progression of disease with interval increase in the size of the right buccal space hyperdense mass measuring approximately at least 7 x 2 cm on axial cuts. There is involvement of the medial and lateral pterigoid muscle. The right temporalis muscle was also inseparable from the mass with involvement as well of the pterygomaxillary fissure, pterygopalatine fossa and sphenopalatine foramen. Local cortex erosive the structure and was also noted along the maxillary gingival surface.   Involvement of the roof of the right maxillary sinus with progression of the erosive disease along the floor of the right orbit was also again identified. An asymmetric soft tissue density was also noted at the location of the right infraorbital area. There was no evidence of involvement of the extraocular muscles nor do the lacrimal glands demonstrate a discrete mass. The lacrimal glands do not demonstrate a discrete mass. Disease extends along the right lateral nasal cavity and ethmoid air cells. There is now disease along the right sphenoid sinus inseparable from the right foramen rotundum with probable involvement of the maxillary branch of V5. Evidence of prior instrumentation with prior middle turbinectomies, uncinate to measles, antrostomies, and partial ethmoidectomies is also suspected based on the radiographic imaging. The cavernous sinuses are symmetric. [address] are symmetric. There is now a right level one be lymphadenopathy by size criteria, and abnormal clustering with lymph node measuring up to 1.5 cm. Otherwise the rest of the oral cavity and head and neck area did not demonstrate evidence of discrete mass. Apparent right vocal cord paralysis would medialization of the true vocal cord with anteromedial rotation of the right was also described. CT scan of the chest with and without contrast on 10/3/2018 did not reveal evidence of thoracic lymphadenopathy. Tiny 2 mm nodules were noted in the subpleural left lung, likely to be benign with a 1 year followup recommended     CT scan of the abdomen and pelvis with contrast on 10/3/2018 did not reveal evidence of metastatic disease nor abdominal lymphadenopathy. The spleen was of borderline size measuring 13 cm x 4.8 cm x 11.8 cm. Physical examination on 10/10/2018 revealed a very enlarged and swollen right maxillary facial area with induration of soft tissues around the upper teeth on the right and face.   No palpable peripheral lymphadenopathy was able to be appreciated in the head and neck area, epitrochlear and axillary areas, inguinal areas. No evidence of palpable splenomegaly or other abdominal findings were encountered on exam.    Bone marrow aspirate and biopsy on 10/11/18 showed no evidence of involvement by diffuse large B-cell lymphoma. FLOW cytometry negative. MRI of the brain w/wo contrast 10/11/18 at [facility] was consistent with abnormal soft tissue mass involving the right  and buccal space extending along the anterior margin of the right maxillary sinus contiguous with the floor of the right orbit. There was no definite invasion into the orbit, nor displacement of the inferior rectus musculature. PET scan 10/12/18 at [facility] showed hypermetabolic activity associated with the right buccal space and  space mass, SUV 18.5. There is no distant focus of hypermetabolic activity seen to suggest other sites of involvement. Echocardiogram 10/12/18 at [facility] revealed an estimated LVEF of 60%. Hepatitis B and C were negative. Hepatitis A IgM was positive and discussed with Dr. Dipak Lawson. Left chest mediport was placed 10/18/18 by Dr. Radha Jurado. Right ommaya port was placed 10/22/18 by Dr. Gillian Hernandez. CSF specimen was obtained via ommaya port by Dr. Dipak Lawson, sent for cytology and FLOW prior to initiation of Cycle #1 CHOP-R initiated on 10/24/18 was negative. Addie Dukes completed the final cycle #6 of CHOP/R and Dose #11 of prophylactic intrathecal methotrexate on 2/20/2019. MRI of the right orbit, face and neck with and without contrast at [facility] on 4/1/2019 concluded  NO solid enhancing mass to suggest residual/recurrent tumor. The infiltrate of edema and wispy enhancement within the right pterigoids and masseter muscles is thought to be secondary to an evolving denervation atrophy.      MRI of the brain with and without contrast on 4/1/2019 concluded:  Residual abnormal fact signal in the inferior right orbit, right  and without contrast on 3/18/2020 at [facility] documented:  Similar positioning of the right transfrontal ventricular shunt with stable ventricular size and configuration. No abnormal intracranial enhancement. Stable diffuse hyperintense FLAIR signal changes of the white matter symmetrically likely posttreatment change    MRI BRAIN W WO CONTRAST-, MRI ORBIT FACE NECK W WO CONTRASTon 4/6/2021 at [facility], compared to 3/18/20, documented:  Stable MR appearance the brain. No acute intracranial process. No abnormal contrast enhancement. Decreasing enhancement of the right  muscles described on the previous study, benign post therapy changes suspected. No developing face/orbital abnormalities. Mild paranasal sinus mucosal thickening. MRI Brain With & Without Contrast MRI Orbit Face Neck With & Without Contrast at [facility] on 12/2/2021: COMPARISON: 4/6/2021, 3/18/2020   Stable exam.   No evidence of residual or recurrent RIGHT facial mass. No change in mild residual edema and enhancement in the RIGHT pterygoid and temporalis musculature. No abnormal intracranial enhancement. Stable diffuse confluent FLAIR hyperintense signal in the periventricular white matter which may reflect posttreatment change    Issue of removal of the Ommaya and left anterior chest wall port was addressed 7/8/2020, on 7/15/2021, 11/16/2021 and again 3/30/2022. She does not desire to have the Ommaya reservoir removed. She refuses to have her port removed. She wants to use the chest wall port for blood draws when she comes to the clinic periodically. TREATMENT SUMMARY:  CHOP-R x6 cycles given 10/24/2018 - 2/20/2019. Dose #1 prophylactic intrathecal methotrexate (FLOW cytometry on CSF negative) given 10/29/18. Dose #2 on 11/14/18. 11th and final dose given 2/20/2019. Addie Dukes opted against consolidative XRT.            TARGET BREAST CANCER SITES:  Right mastectomy 09/23/04 for DCIS    TUMOR HISTORY: Right DCIS 09/23/2004  On 09/23/04, Priyanka Payan had a right simple mastectomy by Dr. Isabel Josue for a low grade DCIS of the right breast.     The ER was 95%, GA was 13%.    No further specific therapy was indicated for this cancer other than close follow up of the area and the opposite breast.    TREATMENT SUMMARY:  Right mastectomy and 9/23/2004 for DCIS       Allergies:  Lisinopril, Statins, Alendronate, Lexapro [escitalopram oxalate], Parathyroid hormone (recomb), Sulfa antibiotics, and Trazodone and nefazodone    Medicines:  Current Outpatient Medications   Medication Sig Dispense Refill    acetaminophen-codeine (TYLENOL #3) 300-30 MG per tablet acetaminophen 300 mg-codeine 30 mg tablet      Magic Mouthwash (MIRACLE MOUTHWASH) 1/3 benadryl, 1/3 viscous lidocaine, 1/3 maalox 240 mL 1    furosemide (LASIX) 40 MG tablet furosemide 40 mg tablet   TAKE 1 TABLET BY MOUTH TWICE DAILY CHANGE  FOR  FUROSEMIDE  40MG  TABLET      mirabegron (MYRBETRIQ) 50 MG TB24 Take 50 mg by mouth daily       Multiple Vitamin TABS Take 1 tablet by mouth      nystatin (MYCOSTATIN) 008910 UNIT/ML suspension nystatin 100,000 unit/mL oral suspension      OXYBUTYNIN CHLORIDE PO Take by mouth       potassium chloride (KLOR-CON) 20 MEQ packet Take 20 mEq by mouth 2 times daily      Coenzyme Q10 10 MG CAPS 150      Cyanocobalamin (VITAMIN B-12) 5000 MCG LOZG Take by mouth      magnesium 30 MG tablet Take 150 mg by mouth 2 times daily       Multiple Vitamins-Minerals (THERAPEUTIC MULTIVITAMIN-MINERALS) tablet Take 1 tablet by mouth daily      omeprazole (PRILOSEC) 20 MG delayed release capsule Take 20 mg by mouth daily       Probiotic Product (PROBIOTIC-10 PO) Take by mouth      levothyroxine (SYNTHROID) 100 MCG tablet Take 125 mcg by mouth Daily      albuterol sulfate  (90 Base) MCG/ACT inhaler Inhale 2 puffs into the lungs every 4 hours as needed      potassium chloride (KLOR-CON M) 20 MEQ extended release tablet Take 20 mEq by mouth 2 times daily       No current facility-administered medications for this visit.      Facility-Administered Medications Ordered in Other Visits   Medication Dose Route Frequency Provider Last Rate Last Admin    sodium chloride flush 0.9 % injection 10 mL  10 mL IntraVENous PRN Elijah Snyder MD        sodium chloride flush 0.9 % injection 20 mL  20 mL IntraVENous PRN Elijah Snyder MD   20 mL at 08/01/22 1247    heparin flush 100 UNIT/ML injection 500 Units  500 Units IntraCATHeter PRN Elijah Snyder MD   500 Units at 08/01/22 1247       Past Medical History:      Diagnosis Date    Asthma     Breast cancer ([address] 2004    Cancer [facility])     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 Mildred Cabrera    GERD (gastroesophageal reflux disease)     H/O right mastectomy 2004    for DCIS    Hypertension     Hyperthyroidism     Prolonged emergence from general anesthesia     Thyroid disease         Past Surgical History:      Procedure Laterality Date    APPENDECTOMY      BREAST SURGERY      right mastectomy    [facility] [address] [address]  06/2005    Partial- Dr. Georgi Naidu for recurring diverticulitis    [facility]    for adhesions    MASTECTOMY Right 2004    PORT SURGERY Left 10/18/2018    ommaya port placement Dr. Andriy Caballero Right 10/22/2018    Dr. Judit Almodovar [address] CTR VAD W/SUBQ PORT AGE 5 YR/> N/A 10/18/2018    PORT INSERTION performed by Ilda Villa MD at [address] [place] [address]      THYROID SURGERY      nodule    THYROIDECTOMY Right 1961    with vocal chord injury    TOTAL ABDOMINAL HYSTERECTOMY W/ BILATERAL SALPINGOOPHORECTOMY  Alexander Conti W/MARIO Ching Right 2001    Dr. Oscar Duque in [facility]        Family History:      Problem Relation Age of Onset    Other Mother         Motor Vehical Accident    Other Father 80        Unknown    Breast Cancer Sister         Breast Cancer        Social History  Social History     Tobacco Use    Smoking status: Never    Smokeless tobacco: Never   Substance Use Topics    Alcohol use: No              Objective:  Vital Signs: Blood pressure 120/62, pulse 77, height 5' 7\" (1.702 m), weight 177 lb 8 oz (80.5 kg), SpO2 95 %. Labs:  BMP:   No results for input(s): NA, K, CL, CO2, PHOS, BUN, CREATININE, CALCIUM in the last 72 hours. CBC:   Recent Labs     08/01/22  1117   WBC 8.45   HGB 15.1   HCT 48.5*   MCV 91.9   *       LIVER PROFILE:   No results for input(s): AST, ALT, LIPASE, BILIDIR, BILITOT, ALKPHOS in the last 72 hours. Invalid input(s): AMYLASE,  ALB  PT/INR: No results for input(s): PROTIME, INR in the last 72 hours. APTT: No results for input(s): APTT in the last 72 hours. BNP:  No results for input(s): BNP in the last 72 hours. Ionized Calcium:No results for input(s): IONCA in the last 72 hours. Magnesium:No results for input(s): MG in the last 72 hours. Phosphorus:No results for input(s): PHOS in the last 72 hours. HgbA1C: No results for input(s): LABA1C in the last 72 hours. Hepatic:   No results for input(s): ALKPHOS, ALT, AST, PROT, BILITOT, BILIDIR, LABALBU in the last 72 hours. Lactic Acid: No results for input(s): LACTA in the last 72 hours. Troponin: No results for input(s): CKTOTAL, CKMB, TROPONINT in the last 72 hours. ABGs: No results for input(s): PH, PCO2, PO2, HCO3, O2SAT in the last 72 hours. CRP:  No results for input(s): CRP in the last 72 hours. Sed Rate:  No results for input(s): SEDRATE in the last 72 hours. Cultures:   No results for input(s): CULTURE in the last 72 hours. Radiology reports as per the Radiologist  Radiology: No results found.      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 Right simple mastectomy for DCIS by Dr. Tatiana Deleon on  9/23/2004  Tumor screening and health maintenance    She was treated with CHOP-R x6 cycles given 10/24/2018 - 2/20/2019 as well as 11 doses of prophylactic intrathecal methotrexate 11/14/2018 - 2/20/2019. Favian Jj declined consolidative XRT to the right face/orbits/brain. Symptomatically, Yeni's endurance continues to improve. Her appetite is good and weight stable, 175 pounds. She denies headache, visual changes, sinus pain etc.  She continues to have neuropathy more so to the bottom of her feet, Neuropathy grade 1. ECOG grade 1. Favian Jj presents accompanied by her  for continued monitoring. She has had no new developments since her last visit, she has gained 2 pounds since March of this year. She has no B symptoms. Physical examination today, 8/1/2022 does not reveal evidence of asymmetry or abnormalities in the pupils or the eyes with extraocular movements. There are no abnormalities in the oral pharyngeal area especially in the right maxilla area. No palpable lymphadenopathy in the head and neck area including preauricular upper cervical and lower cervical.  Axilla and inguinal areas are negative for palpable pathological lymphadenopathy. Lungs are clear the heart is regular the abdomen is soft and benign without organomegaly masses or tenderness. Neurological exam is intact. CBC today 8/1/2022  reveals a WBC of 8.45 Hgb is 15.1 with an MCV of  91.9 and platelet count of 373,777. MRI Brain With & Without Contrast MRI Orbit Face Neck With & Without Contrast at [facility] on 12/2/2021: COMPARISON: 4/6/2021, 3/18/2020   Stable exam.   No evidence of residual or recurrent RIGHT facial mass. No change in mild residual edema and enhancement in the RIGHT pterygoid and temporalis musculature. No abnormal intracranial enhancement.    Stable diffuse confluent FLAIR hyperintense signal in the periventricular white matter which may reflect posttreatment change    Issue of removal of the Ommaya and left anterior chest wall port was addressed 7/8/2020, on 7/15/2021, 11/16/2021, 3/30/2022 and again today 8/1/2022. No she states that after MRI imaging next month, she may consider having the Ommaya removed. She does not desire to have the Ommaya reservoir removed. She refuses to have her port removed. She wants to use the chest wall port for blood draws when she comes to the clinic periodically. Her examination and review of systems are completely negative for evidence of local regional or systemic recurrent disease. I did an extensive evaluation of the oropharyngeal area nasal area face head and neck exam.  There is no evidence of new or recurrent disease. Port will be flushed today and every 2 months  Follow-up arrangements have been made    #2. Lung nodules  - CT scan of the chest with and without contrast on 10/3/2018 documented tiny, 2 mm nodules in the subpleural left lung, likely to be benign with a 1 year followup recommended     CT chest with contrast on 9/18/2019 documented no nodules or mass lesion. Calcified granuloma are present stable and unchanged. No acute cardiopulmonary process. A repeat CT scan of the chest will be done prior to her return visit, see orders below. #3  Breast cancer screening    Right simple mastectomy for DCIS by Dr. Zen Martinez on  9/23/2004    Left mammogram on 10/20/2021 was a BI-RADS 2    Mammograms will be scheduled at her next visit for October of this year. #4.    Neuropathy grade 1  - Anneliese Terry states she may have had some degree of neuropathy prior to treatment however more noticeable post chemotherapy   - No intervention at this time, continue to follow     #5.     Mediport  Routine flush today and in 8 weeks until it is removed     #6  Bone health    DEXA scan 10/20/2021-osteopenia      #7  GYN cancer screening    [facility] [address] Stewart had a ALEXI and BSO in 1968 Return in about 4 months  Port flush today and in 2 months    #8  Immunizations:  Immunization History   Administered Date(s) Administered    COVID-19, MODERNA BLUE border, Primary or Immunocompromised, (age 12y+), IM, 100 mcg/0.5mL 03/10/2021, 04/14/2021               Alexander Kowalski was seen today for follow-up. Diagnoses and all orders for this visit:    Encounter for screening mammogram for malignant neoplasm of breast  -     MARTIN DIGITAL SCREEN UNILATERAL LEFT; Future    Diffuse large b-cell lymphoma, lymph nodes of head, face, and neck (HCC)  -     Comprehensive Metabolic Panel; Future  -     Lactate Dehydrogenase; Future  -     MRI ORBITS FACE NECK W WO CONTRAST; Future    Dizziness  -     VL DUP CAROTID BILATERAL; Future        Orders Placed This Encounter   Procedures    MARTIN DIGITAL SCREEN UNILATERAL LEFT     Standing Status:   Future     Standing Expiration Date:   9/29/2023     Order Specific Question:   Reason for exam:     Answer:   Routine mammogram due after 10/20/2022    MRI ORBITS FACE NECK W WO CONTRAST     Standing Status:   Future     Standing Expiration Date:   8/1/2023     Scheduling Instructions:      At [facility]     Order Specific Question:   STAT Creatinine as needed:     Answer:   No     Order Specific Question:   Reason for exam:     Answer:   Hx of large b cell lymphoma     Order Specific Question:   What is the sedation requirement? Answer:   None     Order Specific Question:   Area(s) of interest?     Answer:    ALL 3 AREAS    VL DUP CAROTID BILATERAL     Standing Status:   Future     Standing Expiration Date:   8/1/2023     Order Specific Question:   Reason for exam:     Answer:   dizzines    Comprehensive Metabolic Panel     Standing Status:   Future     Number of Occurrences:   1     Standing Expiration Date:   7/29/2023    Lactate Dehydrogenase     Standing Status:   Future     Number of Occurrences:   1     Standing Expiration Date:   7/29/2023         Return in about 2 months (around 10/1/2022) for follow-up with .

## 2022-08-01 ENCOUNTER — TRANSCRIBE ORDERS (OUTPATIENT)
Dept: ADMINISTRATIVE | Facility: HOSPITAL | Age: 87
End: 2022-08-01

## 2022-08-01 ENCOUNTER — OFFICE VISIT (OUTPATIENT)
Dept: HEMATOLOGY | Age: 87
End: 2022-08-01
Payer: MEDICARE

## 2022-08-01 ENCOUNTER — HOSPITAL ENCOUNTER (OUTPATIENT)
Dept: INFUSION THERAPY | Age: 87
Discharge: HOME | End: 2022-08-01
Payer: MEDICARE

## 2022-08-01 VITALS
HEIGHT: 67 IN | BODY MASS INDEX: 27.86 KG/M2 | WEIGHT: 177.5 LBS | SYSTOLIC BLOOD PRESSURE: 120 MMHG | HEART RATE: 77 BPM | DIASTOLIC BLOOD PRESSURE: 62 MMHG | OXYGEN SATURATION: 95 %

## 2022-08-01 DIAGNOSIS — Z45.2 ENCOUNTER FOR CENTRAL LINE CARE: ICD-10-CM

## 2022-08-01 DIAGNOSIS — Z12.31 BREAST CANCER SCREENING BY MAMMOGRAM: Primary | ICD-10-CM

## 2022-08-01 DIAGNOSIS — C83.31 DIFFUSE LARGE B-CELL LYMPHOMA, LYMPH NODES OF HEAD, FACE, AND NECK (HCC): Primary | ICD-10-CM

## 2022-08-01 DIAGNOSIS — R55 SYNCOPE AND COLLAPSE: ICD-10-CM

## 2022-08-01 DIAGNOSIS — R42 DIZZINESS: Primary | ICD-10-CM

## 2022-08-01 DIAGNOSIS — C83.31 DIFFUSE LARGE B-CELL LYMPHOMA, LYMPH NODES OF HEAD, FACE, AND NECK (HCC): ICD-10-CM

## 2022-08-01 DIAGNOSIS — C83.31 RETICULOSARCOMA OF LYMPH NODES OF HEAD, FACE, AND NECK: Primary | ICD-10-CM

## 2022-08-01 DIAGNOSIS — R55 SYNCOPE, UNSPECIFIED SYNCOPE TYPE: ICD-10-CM

## 2022-08-01 DIAGNOSIS — R42 DIZZINESS: ICD-10-CM

## 2022-08-01 DIAGNOSIS — Z12.31 ENCOUNTER FOR SCREENING MAMMOGRAM FOR MALIGNANT NEOPLASM OF BREAST: Primary | ICD-10-CM

## 2022-08-01 LAB
ALBUMIN SERPL-MCNC: 4.6 G/DL (ref 3.5–5.2)
ALP BLD-CCNC: 67 U/L (ref 35–104)
ALT SERPL-CCNC: 19 U/L (ref 5–33)
ANION GAP SERPL CALCULATED.3IONS-SCNC: 11 MMOL/L (ref 7–19)
AST SERPL-CCNC: 24 U/L (ref 5–32)
BASOPHILS ABSOLUTE: 0.07 K/UL (ref 0.01–0.08)
BASOPHILS RELATIVE PERCENT: 0.8 % (ref 0.1–1.2)
BILIRUB SERPL-MCNC: 0.5 MG/DL (ref 0.2–1.2)
BUN BLDV-MCNC: 21 MG/DL (ref 8–23)
CALCIUM SERPL-MCNC: 9.5 MG/DL (ref 8.8–10.2)
CHLORIDE BLD-SCNC: 100 MMOL/L (ref 98–111)
CO2: 29 MMOL/L (ref 22–29)
CREAT SERPL-MCNC: 1.1 MG/DL (ref 0.5–0.9)
EOSINOPHILS ABSOLUTE: 0.08 K/UL (ref 0.04–0.54)
EOSINOPHILS RELATIVE PERCENT: 0.9 % (ref 0.7–7)
GFR AFRICAN AMERICAN: 57
GFR NON-AFRICAN AMERICAN: 47
GLUCOSE BLD-MCNC: 100 MG/DL (ref 74–109)
HCT VFR BLD CALC: 48.5 % (ref 34.1–44.9)
HEMOGLOBIN: 15.1 G/DL (ref 11.2–15.7)
LACTATE DEHYDROGENASE: 217 U/L (ref 91–215)
LYMPHOCYTES ABSOLUTE: 2.08 K/UL (ref 1.18–3.74)
LYMPHOCYTES RELATIVE PERCENT: 24.6 % (ref 19.3–53.1)
MCH RBC QN AUTO: 28.6 PG (ref 25.6–32.2)
MCHC RBC AUTO-ENTMCNC: 31.1 G/DL (ref 32.3–35.5)
MCV RBC AUTO: 91.9 FL (ref 79.4–94.8)
MONOCYTES ABSOLUTE: 0.76 K/UL (ref 0.24–0.82)
MONOCYTES RELATIVE PERCENT: 9 % (ref 4.7–12.5)
NEUTROPHILS ABSOLUTE: 5.41 K/UL (ref 1.56–6.13)
NEUTROPHILS RELATIVE PERCENT: 64.1 % (ref 34–71.1)
PDW BLD-RTO: 14.8 % (ref 11.7–14.4)
PLATELET # BLD: 125 K/UL (ref 182–369)
PMV BLD AUTO: 11.2 FL (ref 7.4–10.4)
POTASSIUM SERPL-SCNC: 4.3 MMOL/L (ref 3.5–5)
RBC # BLD: 5.28 M/UL (ref 3.93–5.22)
SODIUM BLD-SCNC: 140 MMOL/L (ref 136–145)
TOTAL PROTEIN: 6.9 G/DL (ref 6.6–8.7)
WBC # BLD: 8.45 K/UL (ref 3.98–10.04)

## 2022-08-01 PROCEDURE — 96523 IRRIG DRUG DELIVERY DEVICE: CPT

## 2022-08-01 PROCEDURE — 1090F PRES/ABSN URINE INCON ASSESS: CPT | Performed by: INTERNAL MEDICINE

## 2022-08-01 PROCEDURE — 85025 COMPLETE CBC W/AUTO DIFF WBC: CPT

## 2022-08-01 PROCEDURE — 99214 OFFICE O/P EST MOD 30 MIN: CPT | Performed by: INTERNAL MEDICINE

## 2022-08-01 PROCEDURE — G8427 DOCREV CUR MEDS BY ELIG CLIN: HCPCS | Performed by: INTERNAL MEDICINE

## 2022-08-01 PROCEDURE — 1123F ACP DISCUSS/DSCN MKR DOCD: CPT | Performed by: INTERNAL MEDICINE

## 2022-08-01 PROCEDURE — 1036F TOBACCO NON-USER: CPT | Performed by: INTERNAL MEDICINE

## 2022-08-01 PROCEDURE — 6360000002 HC RX W HCPCS: Performed by: INTERNAL MEDICINE

## 2022-08-01 PROCEDURE — G8417 CALC BMI ABV UP PARAM F/U: HCPCS | Performed by: INTERNAL MEDICINE

## 2022-08-01 PROCEDURE — 2580000003 HC RX 258: Performed by: INTERNAL MEDICINE

## 2022-08-01 PROCEDURE — 36415 COLL VENOUS BLD VENIPUNCTURE: CPT | Performed by: INTERNAL MEDICINE

## 2022-08-01 PROCEDURE — 99213 OFFICE O/P EST LOW 20 MIN: CPT

## 2022-08-01 RX ORDER — HEPARIN SODIUM (PORCINE) LOCK FLUSH IV SOLN 100 UNIT/ML 100 UNIT/ML
500 SOLUTION INTRAVENOUS PRN
OUTPATIENT
Start: 2022-08-01

## 2022-08-01 RX ORDER — HEPARIN SODIUM (PORCINE) LOCK FLUSH IV SOLN 100 UNIT/ML 100 UNIT/ML
500 SOLUTION INTRAVENOUS PRN
Status: DISCONTINUED | OUTPATIENT
Start: 2022-08-01 | End: 2022-08-02 | Stop reason: HOSPADM

## 2022-08-01 RX ORDER — SODIUM CHLORIDE 0.9 % (FLUSH) 0.9 %
20 SYRINGE (ML) INJECTION PRN
Status: DISCONTINUED | OUTPATIENT
Start: 2022-08-01 | End: 2022-08-02 | Stop reason: HOSPADM

## 2022-08-01 RX ORDER — SODIUM CHLORIDE 0.9 % (FLUSH) 0.9 %
10 SYRINGE (ML) INJECTION PRN
Status: DISCONTINUED | OUTPATIENT
Start: 2022-08-01 | End: 2022-08-02 | Stop reason: HOSPADM

## 2022-08-01 RX ORDER — SODIUM CHLORIDE 0.9 % (FLUSH) 0.9 %
20 SYRINGE (ML) INJECTION PRN
OUTPATIENT
Start: 2022-08-01

## 2022-08-01 RX ORDER — SODIUM CHLORIDE 0.9 % (FLUSH) 0.9 %
10 SYRINGE (ML) INJECTION PRN
OUTPATIENT
Start: 2022-08-01

## 2022-08-01 RX ADMIN — HEPARIN 500 UNITS: 100 SYRINGE at 12:47

## 2022-08-01 RX ADMIN — SODIUM CHLORIDE, PRESERVATIVE FREE 20 ML: 5 INJECTION INTRAVENOUS at 12:47

## 2022-08-08 ENCOUNTER — HOSPITAL ENCOUNTER (OUTPATIENT)
Dept: ULTRASOUND IMAGING | Facility: HOSPITAL | Age: 87
Discharge: HOME | End: 2022-08-08
Admitting: INTERNAL MEDICINE

## 2022-08-08 DIAGNOSIS — R55 SYNCOPE AND COLLAPSE: ICD-10-CM

## 2022-08-08 PROCEDURE — 93880 EXTRACRANIAL BILAT STUDY: CPT

## 2022-09-07 NOTE — PROGRESS NOTES
Chief Complaint  Asthma    Subjective    History of Present Illness     Kami Villanueva presents to [facility] for:    History of Present Illness  Management of her asthma.  She is a never smoker.  She is followed by Dr. 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  She indicates her most recent checkup went well.  She has occasional shortness of breath and coughing.  She has Arnuity available to use as needed.  She indicates she has not been requiring it. She is worried this may be affecting her vocal dysfunction.        Management of her asthma.  She is a never smoker.  She is followed by Dr. 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  She indicates her most recent checkup went well.  She has occasional shortness of breath and coughing.  She has Arnuity available to use as needed.  She indicates she has not been requiring it. She is worried this may be affecting her vocal dysfunction.             Prior to Admission medications    Medication Sig Start Date End Date Taking? Authorizing Provider   acetaminophen-codeine (TYLENOL with CODEINE #3) 300-30 MG per tablet acetaminophen 300 mg-codeine 30 mg tablet    ProviderCortez MD   carvedilol (COREG) 3.125 MG tablet Take 1 tablet by mouth 2 (Two) Times a Day. 12/23/18   Roger Tate MD   Coenzyme Q10 (CoQ10) 100 MG capsule Take 100 mg by mouth Daily.    ProviderCortez MD   Cyanocobalamin (Vitamin B 12) 500 MCG tablet Take 500 mcg by mouth 2 (Two) Times a Day.    ProviderCortez MD   Fluticasone Furoate (Arnuity Ellipta) 100 MCG/ACT aerosol powder  Inhale 1 puff Daily. 1/5/22   Olivia Quiroz APRN   furosemide (LASIX) 40 MG tablet Take 40 mg by mouth Daily.    ProviderCortez MD   guaiFENesin (MUCINEX) 600 MG 12 hr tablet Take 1 capsule by mouth Daily As Needed (congestion).    Emergency, Nurse Alejandra, RN  "  levothyroxine (SYNTHROID, LEVOTHROID) 100 MCG tablet Take 100 mcg by mouth Daily.    Cortez Corbett MD   Mirabegron ER (Myrbetriq) 50 MG tablet sustained-release 24 hour 24 hr tablet Take 50 mg by mouth Daily.    Cortez Corbett MD   Multiple Vitamin tablet Take 1 tablet by mouth.    Cortez Corbett MD   nystatin (MYCOSTATIN) 312397 UNIT/ML suspension nystatin 100,000 unit/mL oral suspension 12/13/19   Cortez Corbett MD   oxybutynin XL (DITROPAN-XL) 5 MG 24 hr tablet oxybutynin chloride ER 5 mg tablet,extended release 24 hr    Cortez Corbett MD   potassium chloride ER (K-TAB) 20 MEQ tablet controlled-release ER tablet Take 20 mEq by mouth 2 (Two) Times a Day. 7/15/21   Cortez Corbett MD   Probiotic Product (PROBIOTIC-10 PO) Take 10 mg by mouth Daily.    Cortez Corbett MD       Social History     Socioeconomic History   • Marital status:    Tobacco Use   • Smoking status: Never Smoker   • Smokeless tobacco: Never Used   Substance and Sexual Activity   • Alcohol use: No   • Drug use: No   • Sexual activity: Defer       Objective   Vital Signs:   /68   Pulse 89   Ht 165.1 cm (65\")   Wt 72.6 kg (160 lb)   SpO2 95% Comment: RA  BMI 26.63 kg/m²     Physical Exam  Constitutional:       Interventions: Face mask in place.   Eyes:      Comments: glasses   Cardiovascular:      Rate and Rhythm: Normal rate and regular rhythm.      Heart sounds: No murmur heard.  Pulmonary:      Effort: Pulmonary effort is normal.      Breath sounds: Normal breath sounds.   Musculoskeletal:      Right lower leg: No edema.      Left lower leg: No edema.   Neurological:      Mental Status: She is alert and oriented to person, place, and time.        Result Review :    PFT Values        Some values may be hidden. Unless noted otherwise, only the newest values recorded on each date are displayed.         Old Values PFT Results 3/15/22   No data to display.      Pre Drug PFT Results " 3/15/22      FEV1 102   FEF 25-75% 107   FEV1/FVC 72      Post Drug PFT Results 3/15/22   No data to display.      Other Tests PFT Results 3/15/22   No data to display.           My interpretation of the PFT: none for this visit    Results for orders placed in visit on 03/15/22    Pulmonary Function Test    Narrative  Pulmonary Function Test  Performed by: Emelina Avery, RRT  Authorized by: Olivia Quiroz APRN    Pre Drug % Predicted  FVC: 106%  FEV1: 102%  FEF 25-75%: 107%  FEV1/FVC: 72%    Interpretation  Spirometry  Spirometry shows normal results.  Review of FVL curve  Patient's effort is normal. There is a flattening of the inspiratory curve, suggesting variable extrathoracic obstruction.  Overall comments: Patient has known vocal cord dysfunction      Results for orders placed in visit on 08/01/19    Pulmonary Function Test    My interpretation of imaging:  None for this visit    My interpretation of labs: none for this visit      Assessment and Plan     Diagnoses and all orders for this visit:    1. Mild intermittent asthma without complication (Primary)    2. Complete paralysis of vocal cord    3. Chronic maxillary sinusitis      Body mass index is 26.63 kg/m².      GLENDA Greenfield  9/20/2022  15:43 CDT    Follow Up   Return in about 6 months (around 3/20/2023) for FVL.    Patient was given instructions and counseling regarding her condition or for health maintenance advice. Please see specific information pulled into the AVS if appropriate.

## 2022-09-20 ENCOUNTER — OFFICE VISIT (OUTPATIENT)
Dept: PULMONOLOGY | Facility: CLINIC | Age: 87
End: 2022-09-20

## 2022-09-20 VITALS
BODY MASS INDEX: 26.66 KG/M2 | OXYGEN SATURATION: 95 % | DIASTOLIC BLOOD PRESSURE: 68 MMHG | SYSTOLIC BLOOD PRESSURE: 120 MMHG | WEIGHT: 160 LBS | HEIGHT: 65 IN | HEART RATE: 89 BPM

## 2022-09-20 DIAGNOSIS — J38.00 COMPLETE PARALYSIS OF VOCAL CORD: Chronic | ICD-10-CM

## 2022-09-20 DIAGNOSIS — J32.0 CHRONIC MAXILLARY SINUSITIS: Chronic | ICD-10-CM

## 2022-09-20 DIAGNOSIS — J45.20 MILD INTERMITTENT ASTHMA WITHOUT COMPLICATION: Primary | Chronic | ICD-10-CM

## 2022-09-20 PROCEDURE — 99212 OFFICE O/P EST SF 10 MIN: CPT | Performed by: NURSE PRACTITIONER

## 2022-09-20 RX ORDER — LEVOTHYROXINE SODIUM 112 UG/1
TABLET ORAL
COMMUNITY
End: 2023-03-21 | Stop reason: SDUPTHER

## 2022-09-28 ENCOUNTER — TELEPHONE (OUTPATIENT)
Dept: HEMATOLOGY | Age: 87
End: 2022-09-28

## 2022-09-28 NOTE — TELEPHONE ENCOUNTER
Attempted to contact patient to advised of R/s MRI that she missed and r/s of Dr. Darcy Kocher appointment. Patient did not answer, did not have a voicemail.

## 2022-11-01 DIAGNOSIS — C83.31 DIFFUSE LARGE B-CELL LYMPHOMA, LYMPH NODES OF HEAD, FACE, AND NECK (HCC): Primary | ICD-10-CM

## 2022-12-13 ENCOUNTER — HOSPITAL ENCOUNTER (OUTPATIENT)
Dept: INFUSION THERAPY | Age: 87
End: 2022-12-13

## 2022-12-13 NOTE — PROGRESS NOTES
Patient:  Rafa Ordoñez  YOB: 1934  Date of Service: 12/14/2022  MRN: [id number]    Primary Care Physician: Migel Ortiz MD    Chief Complaint   Patient presents with    Follow-up     Diffuse large b-cell lymphoma, lymph nodes of head, face, and neck [facility])           Patient Seen, Chart, Consults notes, Labs, Radiology studies reviewed. 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 Right simple mastectomy for DCIS by Dr. Katrin Mcleod on  9/23/2004  Tumor screening and health maintenance    She was treated with CHOP-R x6 cycles given 10/24/2018 - 2/20/2019 as well as 11 doses of prophylactic intrathecal methotrexate 11/14/2018 - 2/20/2019. Zohreh Meyer declined consolidative XRT to the right face/orbits/brain. Symptomatically, Yeni's endurance continues to improve. Her appetite is good and weight stable, 175 pounds. She denies headache, visual changes, sinus pain etc.  She continues to have neuropathy more so to the bottom of her feet, Neuropathy grade 1. ECOG grade 1. Zohreh Meyer presents accompanied by her  for continued monitoring. She has had no new developments since her last visit, she has lost 3 pounds since August of this year. She has no B symptoms. He did not show for imaging studies and was scheduled for other appointments. Zohreh Meyer presents accompanied by her  for continued monitoring.       TARGET LYMPHOMA SITES:  Right maxillary sinus with local invasion to the floor of the right orbit    on 10/10/18   borderline spleen size, 13 x 4.8 x 11.8 cm  Bone marrow negative on 10/11/18   CSF specimen via Omaya port negative on 10/24/18     TUMOR HISTORY: Right maxillary sinus high-grade diffuse large B-cell lymphoma 9/17/2018  Zohreh Meyer was treated for hyperthyroidism with radioactive iodine at about age 28. About 45 years ago she underwent a thyroidectomy followed subsequently by a vocal cord medialization procedure by Dr. Alphonso Coleman in [place], [place]. Several years ago she had right maxillary sinus surgery by Dr. Kwame Fraga. Esme Rodriguez developed upper respiratory and sinus symptoms in the spring of 2018. She was seen and treated at [facility] urgent care on 3/13/2018 for bronchitis. She was then seen in followup by her PCP, Dr. Diego Rosa on 4/27/2018. He felt an abnormality in the neck and ordered a CT scan of the soft tissues of the neck and referred her to ENT for further evaluation of this. A CT scan of the soft tissues of the neck with contrast on 5/7/2018 documented extensive mucosal thickening of the right maxillary sinus consistent with chronic sinusitis. There was also opacification of several of the ethmoid air cells and mucosal thickening in the nasal cavity. No neck mass or adenopathy was appreciated. Esme Rodriguez was evaluated by Dr. Wicho Orona on 5/30/2018 for evaluation of right neck pain and swelling. Full ENT exam including nasopharyngeal area was within normal limits. Review of the CT scan from 5/7/2018 was consistent with chronic sinusitis. Esme Rodriguez had had a medialization procedure by ENT and [address] earlier in the year for a vocal cord paralysis issue. It was felt that some of the changes on the CT scan were consistent with that procedure. On 8/3/2018 Esme Rodriguez had a right upper posterior tooth pulled by an oral surgeon, Dr. Alessia Moreira in [facility] for what was felt to possibly be an infected tooth. Esme Rodriguez was again seen at [facility]"" urgent care on 8/16/2018 with swelling, redness and tenderness in the right maxillary area with associated headaches not responding to antibiotics. Plain film imaging studies and a CT scan of the area was performed.      Plain film x-rays of the facial bones on 8/16/2018 did not reveal acute abnormalities     CT scan of facial bones within without contrast on 8/16/2018 documented interval development of an enhancing soft tissue along the right maxilla extending along the anterior right maxillary sinus and along the floor of the right orbit measuring 2.4 cm in width. New erosive bony changes were noted involving the bony structure of the right orbital floor. The findings were concerning for a neoplastic process with erosive changes of the right orbital floor with ENT consultation recommended. Marcelo Rodriguez was seen by Christopher Aguilera PA-C with ENT on 8/20/2018 where the above CT scanning clinical findings were evaluated. Arrangements were made for a biopsy with Dr. Tigist Staley. On 9/17/2018, Dr. Tigist Staley performed a nasal/sinus endoscopy with biopsy. 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 The cells are polymorphic with maintaining medium sized to large cells with irregular nuclear contour. On flow cytometry, the monoclonal kappa B cell population does not express CD5 or CD10, but are low viability. The immunostains, show a diffuse B-cell infiltrate positive for CD20, PAX-5 and BCL 6, but negative for CD5, CD10, BCL-2, cyclin D1 and CD43.  <30% of the cells were positive for Mum-1. Ki-67 showed a high proliferative rate of about 80%. All of the above is consistent with a diffuse large B-cell lymphoma of the GBC type. A CT scan of the soft tissues of the neck with contrast on 10/3/2018 was compared to the CT scan of the face on 8/16/2018 and 5/7/2018. There had been significant progression of disease with interval increase in the size of the right buccal space hyperdense mass measuring approximately at least 7 x 2 cm on axial cuts. There is involvement of the medial and lateral pterigoid muscle.  The right temporalis muscle was also inseparable from the mass with involvement as well of the pterygomaxillary fissure, pterygopalatine fossa and sphenopalatine foramen. Local cortex erosive the structure and was also noted along the maxillary gingival surface. Involvement of the roof of the right maxillary sinus with progression of the erosive disease along the floor of the right orbit was also again identified. An asymmetric soft tissue density was also noted at the location of the right infraorbital area. There was no evidence of involvement of the extraocular muscles nor do the lacrimal glands demonstrate a discrete mass. The lacrimal glands do not demonstrate a discrete mass. Disease extends along the right lateral nasal cavity and ethmoid air cells. There is now disease along the right sphenoid sinus inseparable from the right foramen rotundum with probable involvement of the maxillary branch of V5. Evidence of prior instrumentation with prior middle turbinectomies, uncinate to measles, antrostomies, and partial ethmoidectomies is also suspected based on the radiographic imaging. The cavernous sinuses are symmetric. [address] are symmetric. There is now a right level one be lymphadenopathy by size criteria, and abnormal clustering with lymph node measuring up to 1.5 cm. Otherwise the rest of the oral cavity and head and neck area did not demonstrate evidence of discrete mass. Apparent right vocal cord paralysis would medialization of the true vocal cord with anteromedial rotation of the right was also described. CT scan of the chest with and without contrast on 10/3/2018 did not reveal evidence of thoracic lymphadenopathy. Tiny 2 mm nodules were noted in the subpleural left lung, likely to be benign with a 1 year followup recommended     CT scan of the abdomen and pelvis with contrast on 10/3/2018 did not reveal evidence of metastatic disease nor abdominal lymphadenopathy. The spleen was of borderline size measuring 13 cm x 4.8 cm x 11.8 cm.     Physical examination on 10/10/2018 revealed a very enlarged and swollen right maxillary facial area with induration of soft tissues around the upper teeth on the right and face. No palpable peripheral lymphadenopathy was able to be appreciated in the head and neck area, epitrochlear and axillary areas, inguinal areas. No evidence of palpable splenomegaly or other abdominal findings were encountered on exam.    Bone marrow aspirate and biopsy on 10/11/18 showed no evidence of involvement by diffuse large B-cell lymphoma. FLOW cytometry negative. MRI of the brain w/wo contrast 10/11/18 at [facility] was consistent with abnormal soft tissue mass involving the right  and buccal space extending along the anterior margin of the right maxillary sinus contiguous with the floor of the right orbit. There was no definite invasion into the orbit, nor displacement of the inferior rectus musculature. PET scan 10/12/18 at [facility] showed hypermetabolic activity associated with the right buccal space and  space mass, SUV 18.5. There is no distant focus of hypermetabolic activity seen to suggest other sites of involvement. Echocardiogram 10/12/18 at [facility] revealed an estimated LVEF of 60%. Hepatitis B and C were negative. Hepatitis A IgM was positive and discussed with Dr. Kathie Mckeon. Left chest mediport was placed 10/18/18 by Dr. J Carlos Gomez. Right ommaya port was placed 10/22/18 by Dr. Sumit Dweitt. CSF specimen was obtained via ommaya port by Dr. Kathie Mckeon, sent for cytology and FLOW prior to initiation of Cycle #1 CHOP-R initiated on 10/24/18 was negative. Fernando Bowie completed the final cycle #6 of CHOP/R and Dose #11 of prophylactic intrathecal methotrexate on 2/20/2019. MRI of the right orbit, face and neck with and without contrast at [facility] on 4/1/2019 concluded  NO solid enhancing mass to suggest residual/recurrent tumor.    The infiltrate of edema and wispy enhancement within the right pterigoids and masseter muscles is thought to be secondary to an evolving denervation atrophy. MRI of the brain with and without contrast on 4/1/2019 concluded:  Residual abnormal fact signal in the inferior right orbit, right  space and right pterygopalatine fossa, appearing stable   Diffusion signal abnormality in this region has normalized, suggesting positive lymphoma treatment response. 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 The remaining question of whether Dany Hogan would benefit from consolidative XRT to the right face/orbit/brain, given the high risk characteristics of her lymphoma, was addressed at her XRT consultation with Dr. Naylor Session on 5/8/2019. Concerns persist over the fact that she has developed some degree of leukoencephalopathy. Fortunately, clinically she is improving. The downside of XRT is possible further leukoencephalopathy as well as concerns over damage to the right eye, which is her good eye. Yeni's daughter stated that she was very pleased with the appointment with Dr. Randolph Kincaid who explained all of the benefits and possible downside problems with XRT. His recommendation was for 3600 cGy over 18 treatment fractions for consolidation. Dany Hogan opted against consolidation XRT. MRI of the brain, right orbit, face and neck with and without contrast at [facility] 9/4/2019 showed stable posttreatment changes. No suspicious mass. CT chest with contrast on 9/18/2019 documented no nodules or mass lesion. Calcified granuloma are present stable and unchanged. No acute cardiopulmonary process. MRI orbit, face, neck with and without contrast on 3/18/2020 at [facility] documented:  Stable to mildly improved appearance of probable denervation edema and mild enhancement involving the muscles of mastication on the right, likely related to postradiation changes. There is no mass identified to indicate recurrent neoplasm. Mild chronic sinusitis involving the ethmoid sinuses and base of the right maxillary sinus. MRI brain with and without contrast on 3/18/2020 at [facility] documented:  Similar positioning of the right transfrontal ventricular shunt with stable ventricular size and configuration. No abnormal intracranial enhancement. Stable diffuse hyperintense FLAIR signal changes of the white matter symmetrically likely posttreatment change    MRI BRAIN W WO CONTRAST-, MRI ORBIT FACE NECK W WO CONTRASTon 4/6/2021 at [facility], compared to 3/18/20, documented:  Stable MR appearance the brain. No acute intracranial process. No abnormal contrast enhancement. Decreasing enhancement of the right  muscles described on the previous study, benign post therapy changes suspected. No developing face/orbital abnormalities. Mild paranasal sinus mucosal thickening. MRI Brain With & Without Contrast MRI Orbit Face Neck With & Without Contrast at [facility] on 12/2/2021: COMPARISON: 4/6/2021, 3/18/2020   Stable exam.   No evidence of residual or recurrent RIGHT facial mass. No change in mild residual edema and enhancement in the RIGHT pterygoid and temporalis musculature. No abnormal intracranial enhancement. Stable diffuse confluent FLAIR hyperintense signal in the periventricular white matter which may reflect posttreatment change    Issue of removal of the Ommaya and left anterior chest wall port was addressed 7/8/2020, on 7/15/2021, 11/16/2021 and again 3/30/2022. She does not desire to have the Ommaya reservoir removed. She refuses to have her port removed. She wants to use the chest wall port for blood draws when she comes to the clinic periodically. TREATMENT SUMMARY:  CHOP-R x6 cycles given 10/24/2018 - 2/20/2019. Dose #1 prophylactic intrathecal methotrexate (FLOW cytometry on CSF negative) given 10/29/18. Dose #2 on 11/14/18.   11th and final dose given 2/20/2019. Kane Sparks opted against consolidative XRT. TARGET BREAST CANCER SITES:  Right mastectomy 09/23/04 for DCIS    TUMOR HISTORY: Right DCIS 09/23/2004  On 09/23/04, Kane Sparks had a right simple mastectomy by Dr. Robert Martel for a low grade DCIS of the right breast.     The ER was 95%, NC was 13%.    No further specific therapy was indicated for this cancer other than close follow up of the area and the opposite breast.    TREATMENT SUMMARY:  Right mastectomy and 9/23/2004 for DCIS       Allergies:  Lisinopril, Statins, Alendronate, Lexapro [escitalopram oxalate], Parathyroid hormone (recomb), Sulfa antibiotics, and Trazodone and nefazodone    Medicines:  Current Outpatient Medications   Medication Sig Dispense Refill    acetaminophen-codeine (TYLENOL #3) 300-30 MG per tablet acetaminophen 300 mg-codeine 30 mg tablet      albuterol sulfate  (90 Base) MCG/ACT inhaler Inhale 2 puffs into the lungs every 4 hours as needed      potassium chloride (KLOR-CON M) 20 MEQ extended release tablet Take 20 mEq by mouth 2 times daily      Magic Mouthwash (MIRACLE MOUTHWASH) 1/3 benadryl, 1/3 viscous lidocaine, 1/3 maalox 240 mL 1    furosemide (LASIX) 40 MG tablet furosemide 40 mg tablet   TAKE 1 TABLET BY MOUTH TWICE DAILY CHANGE  FOR  FUROSEMIDE  40MG  TABLET      mirabegron (MYRBETRIQ) 50 MG TB24 Take 50 mg by mouth daily       Multiple Vitamin TABS Take 1 tablet by mouth      nystatin (MYCOSTATIN) 676273 UNIT/ML suspension nystatin 100,000 unit/mL oral suspension      OXYBUTYNIN CHLORIDE PO Take by mouth       potassium chloride (KLOR-CON) 20 MEQ packet Take 20 mEq by mouth 2 times daily      Coenzyme Q10 10 MG CAPS 150      Cyanocobalamin (VITAMIN B-12) 5000 MCG LOZG Take by mouth      magnesium 30 MG tablet Take 150 mg by mouth 2 times daily       Multiple Vitamins-Minerals (THERAPEUTIC MULTIVITAMIN-MINERALS) tablet Take 1 tablet by mouth daily      omeprazole (PRILOSEC) 20 MG delayed release capsule Take 20 mg by mouth daily       Probiotic Product (PROBIOTIC-10 PO) Take by mouth      levothyroxine (SYNTHROID) 100 MCG tablet Take 125 mcg by mouth Daily       No current facility-administered medications for this visit.      Facility-Administered Medications Ordered in Other Visits   Medication Dose Route Frequency Provider Last Rate Last Admin    sodium chloride flush 0.9 % injection 20 mL  20 mL IntraVENous PRN Yunier Ventura MD   20 mL at 12/14/22 1210    heparin flush 100 UNIT/ML injection 500 Units  500 Units IntraCATHeter PRN Yunier Ventura MD   500 Units at 12/14/22 1210    alteplase (CATHFLO) injection 2 mg  2 mg IntraCATHeter Once Yunier Ventura MD           Past Medical History:      Diagnosis Date    Asthma     Breast cancer ([facility] [address] 2004    Cancer [facility])     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 Maki Esparza    GERD (gastroesophageal reflux disease)     H/O right mastectomy 2004    for DCIS    Hypertension     Hyperthyroidism     Prolonged emergence from general anesthesia     Thyroid disease         Past Surgical History:      Procedure Laterality Date    APPENDECTOMY      BREAST SURGERY      right mastectomy    [facility]  06/2005    Partial- Dr. Dietrich Or for recurring diverticulitis    [facility]    for adhesions    MASTECTOMY Right 2004    PORT SURGERY Left 10/18/2018    ommaya port placement Dr. Lesly Abdi Right 10/22/2018    Dr. Minerva Tuttle [address] [facility] CTR VAD W/SUBQ PORT AGE 5 YR/> N/A 10/18/2018    PORT INSERTION performed by Phuong Sweeney MD at [address] [facility]      THYROID SURGERY      nodule    THYROIDECTOMY Right 1961    with vocal chord injury    TOTAL ABDOMINAL HYSTERECTOMY W/ BILATERAL SALPINGOOPHORECTOMY  1968    VOCAL CORD AUGMENTATION W/MARIO Cabrales Right 2001    Dr. Donna Nelson in [place]        Family History:      Problem Relation Age of Onset    Other Mother         Motor Vehical Accident    Other Father 80        Unknown    Breast Cancer Sister         Breast Cancer        Social History  Social History     Tobacco Use    Smoking status: Never    Smokeless tobacco: Never   Substance Use Topics    Alcohol use: No              Objective:  Vital Signs: Blood pressure (!) 142/90, pulse 77, height 5' 7\" (1.702 m), weight 174 lb 4.8 oz (79.1 kg), SpO2 97 %. Labs:  BMP:   No results for input(s): NA, K, CL, CO2, PHOS, BUN, CREATININE, CALCIUM in the last 72 hours. CBC:   Recent Labs     12/14/22  1148   WBC 6.51   HGB 14.0   HCT 43.7   MCV 89.2   *         LIVER PROFILE:   No results for input(s): AST, ALT, LIPASE, BILIDIR, BILITOT, ALKPHOS in the last 72 hours. Invalid input(s): AMYLASE,  ALB  PT/INR: No results for input(s): PROTIME, INR in the last 72 hours. APTT: No results for input(s): APTT in the last 72 hours. BNP:  No results for input(s): BNP in the last 72 hours. Ionized Calcium:No results for input(s): IONCA in the last 72 hours. Magnesium:No results for input(s): MG in the last 72 hours. Phosphorus:No results for input(s): PHOS in the last 72 hours. HgbA1C: No results for input(s): LABA1C in the last 72 hours. Hepatic:   No results for input(s): ALKPHOS, ALT, AST, PROT, BILITOT, BILIDIR, LABALBU in the last 72 hours. Lactic Acid: No results for input(s): LACTA in the last 72 hours. Troponin: No results for input(s): CKTOTAL, CKMB, TROPONINT in the last 72 hours. ABGs: No results for input(s): PH, PCO2, PO2, HCO3, O2SAT in the last 72 hours. CRP:  No results for input(s): CRP in the last 72 hours. Sed Rate:  No results for input(s): SEDRATE in the last 72 hours. Cultures:   No results for input(s): CULTURE in the last 72 hours. Radiology reports as per the Radiologist  Radiology: No results found.      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 Right simple mastectomy for DCIS by Dr. Elvira Cervantes on  9/23/2004  Tumor screening and health maintenance    She was treated with CHOP-R x6 cycles given 10/24/2018 - 2/20/2019 as well as 11 doses of prophylactic intrathecal methotrexate 11/14/2018 - 2/20/2019. Kathie Childs declined consolidative XRT to the right face/orbits/brain. Symptomatically, Yeni's endurance continues to improve. Her appetite is good and weight stable, 175 pounds. She denies headache, visual changes, sinus pain etc.  She continues to have neuropathy more so to the bottom of her feet, Neuropathy grade 1. ECOG grade 1. Kathie Childs presents accompanied by her  for continued monitoring. She has had no new developments since her last visit, she has lost 3 pounds since August of this year. She has no B symptoms. He did not show for imaging studies and was scheduled for other appointments. Kathie Childs presents accompanied by her  for continued monitoring. Physical examination today, 12/14/2022 does not reveal evidence of asymmetry or abnormalities in the pupils or the eyes with extraocular movements. There are no abnormalities in the oral pharyngeal area especially in the right maxilla area. No palpable lymphadenopathy in the head and neck area including preauricular upper cervical and lower cervical.  Axilla and inguinal areas are negative for palpable pathological lymphadenopathy. Lungs are clear the heart is regular the abdomen is soft and benign without organomegaly masses or tenderness. Neurological exam is intact. CBC today 12/14/2022  reveals a WBC of  6.51 Hgb is 14.0 with an MCV of 89.2 and platelet count of 736,187.       MRI Brain With & Without Contrast MRI Orbit Face Neck With & Without Contrast at [facility] on 12/2/2021: COMPARISON: 4/6/2021, 3/18/2020   Stable exam.   No evidence of residual or recurrent RIGHT facial mass. No change in mild residual edema and enhancement in the RIGHT pterygoid and temporalis musculature. No abnormal intracranial enhancement. Stable diffuse confluent FLAIR hyperintense signal in the periventricular white matter which may reflect posttreatment change    Issue of removal of the Ommaya and left anterior chest wall port was addressed 7/8/2020, on 7/15/2021, 11/16/2021, 3/30/2022, 8/1/2022 and again today 12/14/2022. She states that after next MRI imaging, she may consider having the Ommaya removed. She does not desire to have the Ommaya reservoir removed yet. She refuses to have her port removed. She wants to use the chest wall port for blood draws when she comes to the clinic periodically. Her examination and review of systems are completely negative for evidence of local regional or systemic recurrent disease. I did an extensive evaluation of the oropharyngeal area nasal area face head and neck exam.  There is no evidence of new or recurrent disease. Zabrina Ojeda has been getting very forgetful, but her  who is with her today is writing everything down and states that he will make sure she makes it to her scheduled appointments so that she does not miss these as she has in the past.    Daralyn Fossa will be flushed today and every 2 months  Follow-up arrangements have been made in 4 months. MRI is rescheduled    #2. Lung nodules  - CT scan of the chest with and without contrast on 10/3/2018 documented tiny, 2 mm nodules in the subpleural left lung, likely to be benign with a 1 year followup recommended     CT chest with contrast on 9/18/2019 documented no nodules or mass lesion. Calcified granuloma are present stable and unchanged. No acute cardiopulmonary process. A repeat CT scan of the chest will be done prior to her return visit, see orders below.     #3  Breast cancer screening    Right simple mastectomy for DCIS by Dr. Mariah Rouse on  9/23/2004    Left mammogram on 10/20/2021 was a BI-RADS 2    Mammograms will be scheduled at her next visit for October of this year. #4.    Neuropathy grade 1  - Tyler Fried states she may have had some degree of neuropathy prior to treatment however more noticeable post chemotherapy   - No intervention at this time, continue to follow     #5. Mediport  Routine flush today and in 8 weeks until it is removed     #6  Bone health    DEXA scan 10/20/2021-osteopenia      #7  GYN cancer screening    Tyler Fried had a ALEXI and BSO in 1968       Return in about 4 months  Port flush today and in 2 months    #8  Immunizations:  Immunization History   Administered Date(s) Administered    COVID-19, MODERNA BLUE border, Primary or Immunocompromised, (age 12y+), IM, 100 mcg/0.5mL 03/10/2021, 04/14/2021               Tyler Fried was seen today for follow-up. Diagnoses and all orders for this visit:    Diffuse large b-cell lymphoma, lymph nodes of head, face, and neck ([facility] [address]  -     Comprehensive Metabolic Panel; Future  -     Lactate Dehydrogenase; Future          Orders Placed This Encounter   Procedures    Comprehensive Metabolic Panel     Standing Status:   Future     Standing Expiration Date:   12/14/2023    Lactate Dehydrogenase     Standing Status:   Future     Standing Expiration Date:   12/14/2023           Return in about 4 months (around 4/14/2023) for Follow Up with Gerhard Gerard

## 2022-12-14 ENCOUNTER — OFFICE VISIT (OUTPATIENT)
Dept: HEMATOLOGY | Age: 87
End: 2022-12-14
Payer: MEDICARE

## 2022-12-14 ENCOUNTER — HOSPITAL ENCOUNTER (OUTPATIENT)
Dept: INFUSION THERAPY | Age: 87
Discharge: HOME | End: 2022-12-14
Payer: MEDICARE

## 2022-12-14 VITALS
DIASTOLIC BLOOD PRESSURE: 90 MMHG | OXYGEN SATURATION: 97 % | HEART RATE: 77 BPM | BODY MASS INDEX: 27.36 KG/M2 | SYSTOLIC BLOOD PRESSURE: 142 MMHG | WEIGHT: 174.3 LBS | HEIGHT: 67 IN

## 2022-12-14 DIAGNOSIS — C83.31 DIFFUSE LARGE B-CELL LYMPHOMA, LYMPH NODES OF HEAD, FACE, AND NECK (HCC): Primary | ICD-10-CM

## 2022-12-14 DIAGNOSIS — Z45.2 ENCOUNTER FOR CENTRAL LINE CARE: ICD-10-CM

## 2022-12-14 LAB
ALBUMIN SERPL-MCNC: 4.2 G/DL (ref 3.5–5.2)
ALP BLD-CCNC: 63 U/L (ref 35–104)
ALT SERPL-CCNC: 25 U/L (ref 9–52)
ANION GAP SERPL CALCULATED.3IONS-SCNC: 4 MMOL/L (ref 7–19)
AST SERPL-CCNC: 31 U/L (ref 14–36)
BASOPHILS ABSOLUTE: 0.04 K/UL (ref 0.01–0.08)
BASOPHILS RELATIVE PERCENT: 0.6 % (ref 0.1–1.2)
BILIRUB SERPL-MCNC: 0.6 MG/DL (ref 0.2–1.3)
BUN BLDV-MCNC: 30 MG/DL (ref 7–17)
CALCIUM SERPL-MCNC: 9.4 MG/DL (ref 8.4–10.2)
CHLORIDE BLD-SCNC: 107 MMOL/L (ref 98–111)
CO2: 31 MMOL/L (ref 22–29)
CREAT SERPL-MCNC: 1.1 MG/DL (ref 0.5–1)
EOSINOPHILS ABSOLUTE: 0.08 K/UL (ref 0.04–0.54)
EOSINOPHILS RELATIVE PERCENT: 1.2 % (ref 0.7–7)
GFR SERPL CREATININE-BSD FRML MDRD: 48 ML/MIN/{1.73_M2}
GLOBULIN: 3 G/DL
GLUCOSE BLD-MCNC: 92 MG/DL (ref 74–106)
HCT VFR BLD CALC: 43.7 % (ref 34.1–44.9)
HEMOGLOBIN: 14 G/DL (ref 11.2–15.7)
LACTATE DEHYDROGENASE: 225 U/L (ref 120–246)
LYMPHOCYTES ABSOLUTE: 1.8 K/UL (ref 1.18–3.74)
LYMPHOCYTES RELATIVE PERCENT: 27.6 % (ref 19.3–53.1)
MCH RBC QN AUTO: 28.6 PG (ref 25.6–32.2)
MCHC RBC AUTO-ENTMCNC: 32 G/DL (ref 32.3–35.5)
MCV RBC AUTO: 89.2 FL (ref 79.4–94.8)
MONOCYTES ABSOLUTE: 0.78 K/UL (ref 0.24–0.82)
MONOCYTES RELATIVE PERCENT: 12 % (ref 4.7–12.5)
NEUTROPHILS ABSOLUTE: 3.77 K/UL (ref 1.56–6.13)
NEUTROPHILS RELATIVE PERCENT: 58 % (ref 34–71.1)
PDW BLD-RTO: 15.4 % (ref 11.7–14.4)
PLATELET # BLD: 166 K/UL (ref 182–369)
PMV BLD AUTO: 10.7 FL (ref 7.4–10.4)
POTASSIUM SERPL-SCNC: 4.3 MMOL/L (ref 3.5–5.1)
RBC # BLD: 4.9 M/UL (ref 3.93–5.22)
SODIUM BLD-SCNC: 142 MMOL/L (ref 137–145)
TOTAL PROTEIN: 7.2 G/DL (ref 6.3–8.2)
WBC # BLD: 6.51 K/UL (ref 3.98–10.04)

## 2022-12-14 PROCEDURE — 1036F TOBACCO NON-USER: CPT | Performed by: INTERNAL MEDICINE

## 2022-12-14 PROCEDURE — 1123F ACP DISCUSS/DSCN MKR DOCD: CPT | Performed by: INTERNAL MEDICINE

## 2022-12-14 PROCEDURE — 1090F PRES/ABSN URINE INCON ASSESS: CPT | Performed by: INTERNAL MEDICINE

## 2022-12-14 PROCEDURE — 99214 OFFICE O/P EST MOD 30 MIN: CPT | Performed by: INTERNAL MEDICINE

## 2022-12-14 PROCEDURE — G8427 DOCREV CUR MEDS BY ELIG CLIN: HCPCS | Performed by: INTERNAL MEDICINE

## 2022-12-14 PROCEDURE — 2580000003 HC RX 258: Performed by: INTERNAL MEDICINE

## 2022-12-14 PROCEDURE — G8417 CALC BMI ABV UP PARAM F/U: HCPCS | Performed by: INTERNAL MEDICINE

## 2022-12-14 PROCEDURE — 96523 IRRIG DRUG DELIVERY DEVICE: CPT

## 2022-12-14 PROCEDURE — 6360000002 HC RX W HCPCS: Performed by: INTERNAL MEDICINE

## 2022-12-14 PROCEDURE — 36415 COLL VENOUS BLD VENIPUNCTURE: CPT

## 2022-12-14 PROCEDURE — G8484 FLU IMMUNIZE NO ADMIN: HCPCS | Performed by: INTERNAL MEDICINE

## 2022-12-14 PROCEDURE — 99212 OFFICE O/P EST SF 10 MIN: CPT

## 2022-12-14 PROCEDURE — 80053 COMPREHEN METABOLIC PANEL: CPT

## 2022-12-14 PROCEDURE — 85025 COMPLETE CBC W/AUTO DIFF WBC: CPT

## 2022-12-14 PROCEDURE — 83615 LACTATE (LD) (LDH) ENZYME: CPT

## 2022-12-14 RX ORDER — HEPARIN SODIUM (PORCINE) LOCK FLUSH IV SOLN 100 UNIT/ML 100 UNIT/ML
500 SOLUTION INTRAVENOUS PRN
OUTPATIENT
Start: 2022-12-14

## 2022-12-14 RX ORDER — WATER 1000 ML/1000ML
2.2 INJECTION, SOLUTION INTRAVENOUS ONCE
OUTPATIENT
Start: 2022-12-14 | End: 2022-12-14

## 2022-12-14 RX ORDER — SODIUM CHLORIDE 0.9 % (FLUSH) 0.9 %
20 SYRINGE (ML) INJECTION PRN
Status: DISCONTINUED | OUTPATIENT
Start: 2022-12-14 | End: 2022-12-15 | Stop reason: HOSPADM

## 2022-12-14 RX ORDER — SODIUM CHLORIDE 0.9 % (FLUSH) 0.9 %
20 SYRINGE (ML) INJECTION PRN
OUTPATIENT
Start: 2022-12-14

## 2022-12-14 RX ORDER — SODIUM CHLORIDE 0.9 % (FLUSH) 0.9 %
10 SYRINGE (ML) INJECTION PRN
OUTPATIENT
Start: 2022-12-14

## 2022-12-14 RX ORDER — HEPARIN SODIUM (PORCINE) LOCK FLUSH IV SOLN 100 UNIT/ML 100 UNIT/ML
500 SOLUTION INTRAVENOUS PRN
Status: DISCONTINUED | OUTPATIENT
Start: 2022-12-14 | End: 2022-12-15 | Stop reason: HOSPADM

## 2022-12-14 RX ADMIN — Medication 500 UNITS: at 12:10

## 2022-12-14 RX ADMIN — Medication 20 ML: at 12:10

## 2023-01-12 ENCOUNTER — HOSPITAL ENCOUNTER (OUTPATIENT)
Dept: MRI IMAGING | Facility: HOSPITAL | Age: 88
Discharge: HOME | End: 2023-01-12
Payer: MEDICARE

## 2023-01-12 DIAGNOSIS — C83.31 RETICULOSARCOMA OF LYMPH NODES OF HEAD, FACE, AND NECK: ICD-10-CM

## 2023-01-12 LAB — CREAT BLDA-MCNC: 1.2 MG/DL (ref 0.6–1.3)

## 2023-01-12 PROCEDURE — 82565 ASSAY OF CREATININE: CPT

## 2023-01-12 PROCEDURE — 70543 MRI ORBT/FAC/NCK W/O &W/DYE: CPT

## 2023-01-12 PROCEDURE — A9577 INJ MULTIHANCE: HCPCS | Performed by: INTERNAL MEDICINE

## 2023-01-12 PROCEDURE — 0 GADOBENATE DIMEGLUMINE 529 MG/ML SOLUTION: Performed by: INTERNAL MEDICINE

## 2023-01-12 PROCEDURE — 70553 MRI BRAIN STEM W/O & W/DYE: CPT

## 2023-01-12 RX ADMIN — GADOBENATE DIMEGLUMINE 14 ML: 529 INJECTION, SOLUTION INTRAVENOUS at 16:23

## 2023-01-16 DIAGNOSIS — C83.31 DIFFUSE LARGE B-CELL LYMPHOMA, LYMPH NODES OF HEAD, FACE, AND NECK (HCC): ICD-10-CM

## 2023-01-16 DIAGNOSIS — C83.31 DIFFUSE LARGE B-CELL LYMPHOMA, LYMPH NODES OF HEAD, FACE, AND NECK (HCC): Primary | ICD-10-CM

## 2023-02-08 ENCOUNTER — HOSPITAL ENCOUNTER (OUTPATIENT)
Dept: INFUSION THERAPY | Age: 88
Discharge: HOME | End: 2023-02-08
Payer: MEDICARE

## 2023-02-08 DIAGNOSIS — Z45.2 ENCOUNTER FOR CENTRAL LINE CARE: Primary | ICD-10-CM

## 2023-02-08 LAB
ALBUMIN SERPL-MCNC: 4.5 G/DL (ref 3.5–5.2)
ALP BLD-CCNC: 60 U/L (ref 35–104)
ALT SERPL-CCNC: 17 U/L (ref 5–33)
ANION GAP SERPL CALCULATED.3IONS-SCNC: 12 MMOL/L (ref 7–19)
AST SERPL-CCNC: 23 U/L (ref 5–32)
BILIRUB SERPL-MCNC: 0.6 MG/DL (ref 0.2–1.2)
BUN BLDV-MCNC: 18 MG/DL (ref 8–23)
CALCIUM SERPL-MCNC: 9 MG/DL (ref 8.8–10.2)
CHLORIDE BLD-SCNC: 104 MMOL/L (ref 98–111)
CO2: 28 MMOL/L (ref 22–29)
CREAT SERPL-MCNC: 1 MG/DL (ref 0.5–0.9)
GFR SERPL CREATININE-BSD FRML MDRD: 54 ML/MIN/{1.73_M2}
GLUCOSE BLD-MCNC: 77 MG/DL (ref 74–109)
POTASSIUM SERPL-SCNC: 4 MMOL/L (ref 3.5–5)
SODIUM BLD-SCNC: 144 MMOL/L (ref 136–145)
TOTAL PROTEIN: 6.6 G/DL (ref 6.6–8.7)
TSH SERPL DL<=0.05 MIU/L-ACNC: 8.87 UIU/ML (ref 0.27–4.2)

## 2023-02-08 PROCEDURE — 96523 IRRIG DRUG DELIVERY DEVICE: CPT

## 2023-02-08 PROCEDURE — 2580000003 HC RX 258: Performed by: INTERNAL MEDICINE

## 2023-02-08 PROCEDURE — 6360000002 HC RX W HCPCS: Performed by: INTERNAL MEDICINE

## 2023-02-08 RX ORDER — WATER 1000 ML/1000ML
2.2 INJECTION, SOLUTION INTRAVENOUS ONCE
OUTPATIENT
Start: 2023-02-08 | End: 2023-02-08

## 2023-02-08 RX ORDER — HEPARIN SODIUM (PORCINE) LOCK FLUSH IV SOLN 100 UNIT/ML 100 UNIT/ML
500 SOLUTION INTRAVENOUS PRN
OUTPATIENT
Start: 2023-02-08

## 2023-02-08 RX ORDER — SODIUM CHLORIDE 0.9 % (FLUSH) 0.9 %
10 SYRINGE (ML) INJECTION PRN
Status: DISCONTINUED | OUTPATIENT
Start: 2023-02-08 | End: 2023-02-09 | Stop reason: HOSPADM

## 2023-02-08 RX ORDER — SODIUM CHLORIDE 0.9 % (FLUSH) 0.9 %
10 SYRINGE (ML) INJECTION PRN
OUTPATIENT
Start: 2023-02-08

## 2023-02-08 RX ORDER — SODIUM CHLORIDE 0.9 % (FLUSH) 0.9 %
20 SYRINGE (ML) INJECTION PRN
OUTPATIENT
Start: 2023-02-08

## 2023-02-08 RX ORDER — HEPARIN SODIUM (PORCINE) LOCK FLUSH IV SOLN 100 UNIT/ML 100 UNIT/ML
500 SOLUTION INTRAVENOUS PRN
Status: DISCONTINUED | OUTPATIENT
Start: 2023-02-08 | End: 2023-02-09 | Stop reason: HOSPADM

## 2023-02-08 RX ADMIN — SODIUM CHLORIDE, PRESERVATIVE FREE 10 ML: 5 INJECTION INTRAVENOUS at 14:10

## 2023-02-08 RX ADMIN — HEPARIN 500 UNITS: 100 SYRINGE at 14:10

## 2023-03-21 ENCOUNTER — OFFICE VISIT (OUTPATIENT)
Dept: PULMONOLOGY | Facility: CLINIC | Age: 88
End: 2023-03-21
Payer: MEDICARE

## 2023-03-21 VITALS
SYSTOLIC BLOOD PRESSURE: 124 MMHG | BODY MASS INDEX: 29.82 KG/M2 | HEIGHT: 65 IN | WEIGHT: 179 LBS | OXYGEN SATURATION: 96 % | DIASTOLIC BLOOD PRESSURE: 72 MMHG | HEART RATE: 92 BPM

## 2023-03-21 DIAGNOSIS — C83.31 DIFFUSE LARGE B-CELL LYMPHOMA OF LYMPH NODES OF HEAD: ICD-10-CM

## 2023-03-21 DIAGNOSIS — J45.20 MILD INTERMITTENT ASTHMA WITHOUT COMPLICATION: Primary | Chronic | ICD-10-CM

## 2023-03-21 DIAGNOSIS — C83.31 DIFFUSE LARGE B-CELL LYMPHOMA, LYMPH NODES OF HEAD, FACE, AND NECK (HCC): ICD-10-CM

## 2023-03-21 DIAGNOSIS — J38.00 COMPLETE PARALYSIS OF VOCAL CORD: Chronic | ICD-10-CM

## 2023-03-21 PROCEDURE — 99214 OFFICE O/P EST MOD 30 MIN: CPT | Performed by: NURSE PRACTITIONER

## 2023-03-21 PROCEDURE — 94375 RESPIRATORY FLOW VOLUME LOOP: CPT | Performed by: NURSE PRACTITIONER

## 2023-03-21 PROCEDURE — 1160F RVW MEDS BY RX/DR IN RCRD: CPT | Performed by: NURSE PRACTITIONER

## 2023-03-21 PROCEDURE — 1159F MED LIST DOCD IN RCRD: CPT | Performed by: NURSE PRACTITIONER

## 2023-03-21 NOTE — PROCEDURES
Pulmonary Function Test  Performed by: Emelina Avery, RRT  Authorized by: Olivia Quiroz APRN      Pre Drug % Predicted    FVC: 103%   FEV1: 98%   FEF 25-75%: 108%   FEV1/FVC: 71%    Interpretation   Spirometry   Spirometry shows normal results. Post-bronchodilator the ratio shows normal results.   Review of FVL curve   Patient's effort is normal.

## 2023-04-12 ENCOUNTER — HOSPITAL ENCOUNTER (OUTPATIENT)
Dept: INFUSION THERAPY | Age: 88
Discharge: HOME | End: 2023-04-12
Payer: MEDICARE

## 2023-04-12 DIAGNOSIS — Z45.2 ENCOUNTER FOR CENTRAL LINE CARE: Primary | ICD-10-CM

## 2023-04-12 PROCEDURE — 96523 IRRIG DRUG DELIVERY DEVICE: CPT

## 2023-04-12 PROCEDURE — 6360000002 HC RX W HCPCS: Performed by: INTERNAL MEDICINE

## 2023-04-12 PROCEDURE — 2580000003 HC RX 258: Performed by: INTERNAL MEDICINE

## 2023-04-12 RX ORDER — SODIUM CHLORIDE 0.9 % (FLUSH) 0.9 %
10 SYRINGE (ML) INJECTION PRN
OUTPATIENT
Start: 2023-04-12

## 2023-04-12 RX ORDER — SODIUM CHLORIDE 0.9 % (FLUSH) 0.9 %
10 SYRINGE (ML) INJECTION PRN
Status: DISCONTINUED | OUTPATIENT
Start: 2023-04-12 | End: 2023-04-13 | Stop reason: HOSPADM

## 2023-04-12 RX ORDER — HEPARIN SODIUM (PORCINE) LOCK FLUSH IV SOLN 100 UNIT/ML 100 UNIT/ML
500 SOLUTION INTRAVENOUS PRN
Status: DISCONTINUED | OUTPATIENT
Start: 2023-04-12 | End: 2023-04-13 | Stop reason: HOSPADM

## 2023-04-12 RX ORDER — WATER 1000 ML/1000ML
2.2 INJECTION, SOLUTION INTRAVENOUS ONCE
OUTPATIENT
Start: 2023-04-12 | End: 2023-04-12

## 2023-04-12 RX ORDER — SODIUM CHLORIDE 0.9 % (FLUSH) 0.9 %
20 SYRINGE (ML) INJECTION PRN
OUTPATIENT
Start: 2023-04-12

## 2023-04-12 RX ORDER — HEPARIN SODIUM (PORCINE) LOCK FLUSH IV SOLN 100 UNIT/ML 100 UNIT/ML
500 SOLUTION INTRAVENOUS PRN
OUTPATIENT
Start: 2023-04-12

## 2023-04-12 RX ADMIN — Medication 500 UNITS: at 14:55

## 2023-04-12 RX ADMIN — SODIUM CHLORIDE, PRESERVATIVE FREE 10 ML: 5 INJECTION INTRAVENOUS at 14:55

## 2023-04-27 ENCOUNTER — HOSPITAL ENCOUNTER (OUTPATIENT)
Dept: INFUSION THERAPY | Age: 88
Discharge: HOME | End: 2023-04-27
Payer: MEDICARE

## 2023-04-27 ENCOUNTER — OFFICE VISIT (OUTPATIENT)
Dept: HEMATOLOGY | Age: 88
End: 2023-04-27
Payer: MEDICARE

## 2023-04-27 VITALS
WEIGHT: 174.6 LBS | BODY MASS INDEX: 27.4 KG/M2 | HEIGHT: 67 IN | OXYGEN SATURATION: 94 % | DIASTOLIC BLOOD PRESSURE: 76 MMHG | SYSTOLIC BLOOD PRESSURE: 128 MMHG | HEART RATE: 76 BPM

## 2023-04-27 DIAGNOSIS — C83.31 DIFFUSE LARGE B-CELL LYMPHOMA, LYMPH NODES OF HEAD, FACE, AND NECK (HCC): ICD-10-CM

## 2023-04-27 DIAGNOSIS — Z13.820 SCREENING FOR OSTEOPOROSIS: Primary | ICD-10-CM

## 2023-04-27 DIAGNOSIS — Z78.0 POST-MENOPAUSE: ICD-10-CM

## 2023-04-27 DIAGNOSIS — Z12.31 ENCOUNTER FOR SCREENING MAMMOGRAM FOR MALIGNANT NEOPLASM OF BREAST: ICD-10-CM

## 2023-04-27 LAB
BASOPHILS # BLD: 0.06 K/UL (ref 0.01–0.08)
BASOPHILS NFR BLD: 0.8 % (ref 0.1–1.2)
EOSINOPHIL # BLD: 0.13 K/UL (ref 0.04–0.54)
EOSINOPHIL NFR BLD: 1.6 % (ref 0.7–7)
ERYTHROCYTE [DISTWIDTH] IN BLOOD BY AUTOMATED COUNT: 14.5 % (ref 11.7–14.4)
HCT VFR BLD AUTO: 48 % (ref 34.1–44.9)
HGB BLD-MCNC: 14.9 G/DL (ref 11.2–15.7)
LYMPHOCYTES # BLD: 2.9 K/UL (ref 1.18–3.74)
LYMPHOCYTES NFR BLD: 36.4 % (ref 19.3–53.1)
MCH RBC QN AUTO: 28.6 PG (ref 25.6–32.2)
MCHC RBC AUTO-ENTMCNC: 31 G/DL (ref 32.3–35.5)
MCV RBC AUTO: 92.1 FL (ref 79.4–94.8)
MONOCYTES # BLD: 0.97 K/UL (ref 0.24–0.82)
MONOCYTES NFR BLD: 12.2 % (ref 4.7–12.5)
NEUTROPHILS # BLD: 3.86 K/UL (ref 1.56–6.13)
NEUTS SEG NFR BLD: 48.5 % (ref 34–71.1)
PLATELET # BLD AUTO: 158 K/UL (ref 182–369)
PMV BLD AUTO: 10.8 FL (ref 7.4–10.4)
RBC # BLD AUTO: 5.21 M/UL (ref 3.93–5.22)
WBC # BLD AUTO: 7.96 K/UL (ref 3.98–10.04)

## 2023-04-27 PROCEDURE — G8428 CUR MEDS NOT DOCUMENT: HCPCS | Performed by: INTERNAL MEDICINE

## 2023-04-27 PROCEDURE — 1123F ACP DISCUSS/DSCN MKR DOCD: CPT | Performed by: INTERNAL MEDICINE

## 2023-04-27 PROCEDURE — 1036F TOBACCO NON-USER: CPT | Performed by: INTERNAL MEDICINE

## 2023-04-27 PROCEDURE — G8417 CALC BMI ABV UP PARAM F/U: HCPCS | Performed by: INTERNAL MEDICINE

## 2023-04-27 PROCEDURE — 99212 OFFICE O/P EST SF 10 MIN: CPT

## 2023-04-27 PROCEDURE — 36415 COLL VENOUS BLD VENIPUNCTURE: CPT

## 2023-04-27 PROCEDURE — 99214 OFFICE O/P EST MOD 30 MIN: CPT | Performed by: INTERNAL MEDICINE

## 2023-04-27 PROCEDURE — 1090F PRES/ABSN URINE INCON ASSESS: CPT | Performed by: INTERNAL MEDICINE

## 2023-04-27 PROCEDURE — 85025 COMPLETE CBC W/AUTO DIFF WBC: CPT

## 2023-06-01 ENCOUNTER — HOSPITAL ENCOUNTER (OUTPATIENT)
Dept: INFUSION THERAPY | Age: 88
Discharge: HOME | End: 2023-06-01
Payer: MEDICARE

## 2023-06-01 DIAGNOSIS — C83.31 DIFFUSE LARGE B-CELL LYMPHOMA, LYMPH NODES OF HEAD, FACE, AND NECK (HCC): ICD-10-CM

## 2023-06-01 DIAGNOSIS — Z45.2 ENCOUNTER FOR CENTRAL LINE CARE: Primary | ICD-10-CM

## 2023-06-01 LAB
ALBUMIN SERPL-MCNC: 4 G/DL (ref 3.5–5.2)
ALP SERPL-CCNC: 59 U/L (ref 35–104)
ALT SERPL-CCNC: 27 U/L (ref 9–52)
ANION GAP SERPL CALCULATED.3IONS-SCNC: 7 MMOL/L (ref 7–19)
AST SERPL-CCNC: 29 U/L (ref 14–36)
BILIRUB SERPL-MCNC: 0.5 MG/DL (ref 0.2–1.3)
BUN SERPL-MCNC: 27 MG/DL (ref 7–17)
CALCIUM SERPL-MCNC: 9.4 MG/DL (ref 8.4–10.2)
CHLORIDE SERPL-SCNC: 104 MMOL/L (ref 98–111)
CO2 SERPL-SCNC: 27 MMOL/L (ref 22–29)
CREAT SERPL-MCNC: 1.1 MG/DL (ref 0.5–1)
ERYTHROCYTE [DISTWIDTH] IN BLOOD BY AUTOMATED COUNT: 14.2 % (ref 11.7–14.4)
GLOBULIN: 3.1 G/DL
GLUCOSE SERPL-MCNC: 98 MG/DL (ref 74–106)
HCT VFR BLD AUTO: 43.6 % (ref 34.1–44.9)
HGB BLD-MCNC: 13.7 G/DL (ref 11.2–15.7)
LDH SERPL-CCNC: 200 U/L (ref 120–246)
LYMPHOCYTES # BLD: 1.84 K/UL (ref 1.18–3.74)
LYMPHOCYTES NFR BLD: 25.7 % (ref 19.3–53.1)
MCH RBC QN AUTO: 28.4 PG (ref 25.6–32.2)
MCHC RBC AUTO-ENTMCNC: 31.4 G/DL (ref 32.3–35.5)
MCV RBC AUTO: 90.5 FL (ref 79.4–94.8)
MONOCYTES # BLD: 0.79 K/UL (ref 0.24–0.82)
MONOCYTES NFR BLD: 11 % (ref 4.7–12.5)
NEUTROPHILS # BLD: 4.25 K/UL (ref 1.56–6.13)
NEUTS SEG NFR BLD: 59.5 % (ref 34–71.1)
PLATELET # BLD AUTO: 203 K/UL (ref 182–369)
PMV BLD AUTO: 10.2 FL (ref 7.4–10.4)
POTASSIUM SERPL-SCNC: 4.3 MMOL/L (ref 3.5–5.1)
PROT SERPL-MCNC: 7.1 G/DL (ref 6.3–8.2)
RBC # BLD AUTO: 4.82 M/UL (ref 3.93–5.22)
SODIUM SERPL-SCNC: 138 MMOL/L (ref 137–145)
WBC # BLD AUTO: 7.15 K/UL (ref 3.98–10.04)

## 2023-06-01 PROCEDURE — 2580000003 HC RX 258: Performed by: INTERNAL MEDICINE

## 2023-06-01 PROCEDURE — 96523 IRRIG DRUG DELIVERY DEVICE: CPT

## 2023-06-01 PROCEDURE — 83615 LACTATE (LD) (LDH) ENZYME: CPT

## 2023-06-01 PROCEDURE — 85025 COMPLETE CBC W/AUTO DIFF WBC: CPT

## 2023-06-01 PROCEDURE — 6360000002 HC RX W HCPCS: Performed by: INTERNAL MEDICINE

## 2023-06-01 PROCEDURE — 36415 COLL VENOUS BLD VENIPUNCTURE: CPT

## 2023-06-01 PROCEDURE — 80053 COMPREHEN METABOLIC PANEL: CPT

## 2023-06-01 RX ORDER — SODIUM CHLORIDE 0.9 % (FLUSH) 0.9 %
10 SYRINGE (ML) INJECTION PRN
OUTPATIENT
Start: 2023-06-01

## 2023-06-01 RX ORDER — SODIUM CHLORIDE 0.9 % (FLUSH) 0.9 %
20 SYRINGE (ML) INJECTION PRN
OUTPATIENT
Start: 2023-06-01

## 2023-06-01 RX ORDER — HEPARIN SODIUM (PORCINE) LOCK FLUSH IV SOLN 100 UNIT/ML 100 UNIT/ML
500 SOLUTION INTRAVENOUS PRN
Status: CANCELLED | OUTPATIENT
Start: 2023-06-01

## 2023-06-01 RX ORDER — SODIUM CHLORIDE 0.9 % (FLUSH) 0.9 %
10 SYRINGE (ML) INJECTION PRN
Status: DISCONTINUED | OUTPATIENT
Start: 2023-06-01 | End: 2023-06-02 | Stop reason: HOSPADM

## 2023-06-01 RX ORDER — HEPARIN SODIUM (PORCINE) LOCK FLUSH IV SOLN 100 UNIT/ML 100 UNIT/ML
500 SOLUTION INTRAVENOUS PRN
Status: DISCONTINUED | OUTPATIENT
Start: 2023-06-01 | End: 2023-06-02 | Stop reason: HOSPADM

## 2023-06-01 RX ORDER — WATER 1000 ML/1000ML
2.2 INJECTION, SOLUTION INTRAVENOUS ONCE
OUTPATIENT
Start: 2023-06-01 | End: 2023-06-01

## 2023-06-01 RX ADMIN — HEPARIN 500 UNITS: 100 SYRINGE at 11:08

## 2023-06-01 RX ADMIN — SODIUM CHLORIDE, PRESERVATIVE FREE 10 ML: 5 INJECTION INTRAVENOUS at 11:08

## 2023-08-17 ENCOUNTER — HOSPITAL ENCOUNTER (OUTPATIENT)
Dept: INFUSION THERAPY | Age: 88
Discharge: HOME | End: 2023-08-17
Payer: MEDICARE

## 2023-08-17 ENCOUNTER — OFFICE VISIT (OUTPATIENT)
Dept: HEMATOLOGY | Age: 88
End: 2023-08-17
Payer: MEDICARE

## 2023-08-17 ENCOUNTER — TRANSCRIBE ORDERS (OUTPATIENT)
Dept: ADMINISTRATIVE | Facility: HOSPITAL | Age: 88
End: 2023-08-17
Payer: MEDICARE

## 2023-08-17 VITALS
HEART RATE: 81 BPM | DIASTOLIC BLOOD PRESSURE: 82 MMHG | BODY MASS INDEX: 26.52 KG/M2 | WEIGHT: 175 LBS | SYSTOLIC BLOOD PRESSURE: 130 MMHG | OXYGEN SATURATION: 97 % | HEIGHT: 68 IN

## 2023-08-17 DIAGNOSIS — Z78.0 POST-MENOPAUSE: ICD-10-CM

## 2023-08-17 DIAGNOSIS — C83.31 DIFFUSE LARGE B-CELL LYMPHOMA, LYMPH NODES OF HEAD, FACE, AND NECK (HCC): Primary | ICD-10-CM

## 2023-08-17 DIAGNOSIS — Z86.000 HISTORY OF DUCTAL CARCINOMA IN SITU (DCIS) OF BREAST: ICD-10-CM

## 2023-08-17 DIAGNOSIS — Z45.2 ENCOUNTER FOR CENTRAL LINE CARE: ICD-10-CM

## 2023-08-17 DIAGNOSIS — C83.31 DIFFUSE LARGE B-CELL LYMPHOMA, LYMPH NODES OF HEAD, FACE, AND NECK: Primary | ICD-10-CM

## 2023-08-17 DIAGNOSIS — Z12.31 ENCOUNTER FOR SCREENING MAMMOGRAM FOR MALIGNANT NEOPLASM OF BREAST: ICD-10-CM

## 2023-08-17 DIAGNOSIS — Z00.00 HEALTH CARE MAINTENANCE: ICD-10-CM

## 2023-08-17 DIAGNOSIS — Z13.820 SCREENING FOR OSTEOPOROSIS: ICD-10-CM

## 2023-08-17 LAB
ALBUMIN SERPL-MCNC: 4.2 G/DL (ref 3.5–5.2)
ALP SERPL-CCNC: 73 U/L (ref 35–104)
ALT SERPL-CCNC: 23 U/L (ref 9–52)
ANION GAP SERPL CALCULATED.3IONS-SCNC: 11 MMOL/L (ref 7–19)
AST SERPL-CCNC: 31 U/L (ref 14–36)
BASOPHILS # BLD: 0.06 K/UL (ref 0.01–0.08)
BASOPHILS NFR BLD: 0.9 % (ref 0.1–1.2)
BILIRUB SERPL-MCNC: 0.6 MG/DL (ref 0.2–1.3)
BUN SERPL-MCNC: 26 MG/DL (ref 7–17)
CALCIUM SERPL-MCNC: 9.3 MG/DL (ref 8.4–10.2)
CHLORIDE SERPL-SCNC: 102 MMOL/L (ref 98–111)
CO2 SERPL-SCNC: 27 MMOL/L (ref 22–29)
CREAT SERPL-MCNC: 0.9 MG/DL (ref 0.5–1)
EOSINOPHIL # BLD: 0.11 K/UL (ref 0.04–0.54)
EOSINOPHIL NFR BLD: 1.6 % (ref 0.7–7)
ERYTHROCYTE [DISTWIDTH] IN BLOOD BY AUTOMATED COUNT: 15 % (ref 11.7–14.4)
GLUCOSE SERPL-MCNC: 96 MG/DL (ref 74–106)
HCT VFR BLD AUTO: 43.5 % (ref 34.1–44.9)
HGB BLD-MCNC: 14.1 G/DL (ref 11.2–15.7)
LDH SERPL-CCNC: 206 U/L (ref 120–246)
LYMPHOCYTES # BLD: 2.11 K/UL (ref 1.18–3.74)
LYMPHOCYTES NFR BLD: 30.7 % (ref 19.3–53.1)
MCH RBC QN AUTO: 28.1 PG (ref 25.6–32.2)
MCHC RBC AUTO-ENTMCNC: 32.4 G/DL (ref 32.3–35.5)
MCV RBC AUTO: 86.8 FL (ref 79.4–94.8)
MONOCYTES # BLD: 0.68 K/UL (ref 0.24–0.82)
MONOCYTES NFR BLD: 9.9 % (ref 4.7–12.5)
NEUTROPHILS # BLD: 3.87 K/UL (ref 1.56–6.13)
NEUTS SEG NFR BLD: 56.3 % (ref 34–71.1)
PLATELET # BLD AUTO: 219 K/UL (ref 182–369)
PMV BLD AUTO: 10.5 FL (ref 7.4–10.4)
POTASSIUM SERPL-SCNC: 4.2 MMOL/L (ref 3.5–5.1)
PROT SERPL-MCNC: 7.7 G/DL (ref 6.3–8.2)
RBC # BLD AUTO: 5.01 M/UL (ref 3.93–5.22)
SODIUM SERPL-SCNC: 140 MMOL/L (ref 137–145)
WBC # BLD AUTO: 6.87 K/UL (ref 3.98–10.04)

## 2023-08-17 PROCEDURE — G8427 DOCREV CUR MEDS BY ELIG CLIN: HCPCS | Performed by: INTERNAL MEDICINE

## 2023-08-17 PROCEDURE — 80053 COMPREHEN METABOLIC PANEL: CPT

## 2023-08-17 PROCEDURE — 2580000003 HC RX 258: Performed by: INTERNAL MEDICINE

## 2023-08-17 PROCEDURE — 85025 COMPLETE CBC W/AUTO DIFF WBC: CPT

## 2023-08-17 PROCEDURE — 83615 LACTATE (LD) (LDH) ENZYME: CPT

## 2023-08-17 PROCEDURE — 6360000002 HC RX W HCPCS: Performed by: INTERNAL MEDICINE

## 2023-08-17 PROCEDURE — 99214 OFFICE O/P EST MOD 30 MIN: CPT | Performed by: INTERNAL MEDICINE

## 2023-08-17 PROCEDURE — 99212 OFFICE O/P EST SF 10 MIN: CPT

## 2023-08-17 PROCEDURE — 96523 IRRIG DRUG DELIVERY DEVICE: CPT

## 2023-08-17 PROCEDURE — G8417 CALC BMI ABV UP PARAM F/U: HCPCS | Performed by: INTERNAL MEDICINE

## 2023-08-17 PROCEDURE — 1090F PRES/ABSN URINE INCON ASSESS: CPT | Performed by: INTERNAL MEDICINE

## 2023-08-17 PROCEDURE — 36415 COLL VENOUS BLD VENIPUNCTURE: CPT

## 2023-08-17 PROCEDURE — 1036F TOBACCO NON-USER: CPT | Performed by: INTERNAL MEDICINE

## 2023-08-17 PROCEDURE — 1123F ACP DISCUSS/DSCN MKR DOCD: CPT | Performed by: INTERNAL MEDICINE

## 2023-08-17 RX ORDER — SODIUM CHLORIDE 0.9 % (FLUSH) 0.9 %
10 SYRINGE (ML) INJECTION PRN
OUTPATIENT
Start: 2023-08-17

## 2023-08-17 RX ORDER — WATER 1000 ML/1000ML
2.2 INJECTION, SOLUTION INTRAVENOUS ONCE
OUTPATIENT
Start: 2023-08-17 | End: 2023-08-17

## 2023-08-17 RX ORDER — SODIUM CHLORIDE 0.9 % (FLUSH) 0.9 %
20 SYRINGE (ML) INJECTION PRN
Status: DISCONTINUED | OUTPATIENT
Start: 2023-08-17 | End: 2023-08-18 | Stop reason: HOSPADM

## 2023-08-17 RX ORDER — HEPARIN 100 UNIT/ML
500 SYRINGE INTRAVENOUS PRN
Status: DISCONTINUED | OUTPATIENT
Start: 2023-08-17 | End: 2023-08-18 | Stop reason: HOSPADM

## 2023-08-17 RX ORDER — SODIUM CHLORIDE 0.9 % (FLUSH) 0.9 %
20 SYRINGE (ML) INJECTION PRN
OUTPATIENT
Start: 2023-08-17

## 2023-08-17 RX ORDER — HEPARIN 100 UNIT/ML
500 SYRINGE INTRAVENOUS PRN
OUTPATIENT
Start: 2023-08-17

## 2023-08-17 RX ORDER — SODIUM CHLORIDE 0.9 % (FLUSH) 0.9 %
10 SYRINGE (ML) INJECTION PRN
Status: DISCONTINUED | OUTPATIENT
Start: 2023-08-17 | End: 2023-08-18 | Stop reason: HOSPADM

## 2023-08-17 RX ADMIN — SODIUM CHLORIDE, PRESERVATIVE FREE 20 ML: 5 INJECTION INTRAVENOUS at 12:22

## 2023-08-17 RX ADMIN — HEPARIN 500 UNITS: 100 SYRINGE at 12:23

## 2023-08-18 ENCOUNTER — HOSPITAL ENCOUNTER (OUTPATIENT)
Dept: WOMENS IMAGING | Age: 88
Discharge: HOME | End: 2023-08-18
Attending: INTERNAL MEDICINE
Payer: MEDICARE

## 2023-08-18 DIAGNOSIS — Z12.31 ENCOUNTER FOR SCREENING MAMMOGRAM FOR MALIGNANT NEOPLASM OF BREAST: ICD-10-CM

## 2023-08-18 PROCEDURE — 77063 BREAST TOMOSYNTHESIS BI: CPT

## 2023-12-05 ENCOUNTER — HOSPITAL ENCOUNTER (OUTPATIENT)
Dept: INFUSION THERAPY | Age: 88
Discharge: HOME | End: 2023-12-05
Payer: MEDICARE

## 2023-12-05 DIAGNOSIS — Z45.2 ENCOUNTER FOR CENTRAL LINE CARE: Primary | ICD-10-CM

## 2023-12-05 PROCEDURE — 96523 IRRIG DRUG DELIVERY DEVICE: CPT

## 2023-12-05 PROCEDURE — 6360000002 HC RX W HCPCS

## 2023-12-05 PROCEDURE — 2580000003 HC RX 258

## 2023-12-05 RX ORDER — SODIUM CHLORIDE 0.9 % (FLUSH) 0.9 %
10 SYRINGE (ML) INJECTION PRN
Status: DISCONTINUED | OUTPATIENT
Start: 2023-12-05 | End: 2023-12-06 | Stop reason: HOSPADM

## 2023-12-05 RX ORDER — HEPARIN 100 UNIT/ML
500 SYRINGE INTRAVENOUS PRN
Status: DISCONTINUED | OUTPATIENT
Start: 2023-12-05 | End: 2023-12-06 | Stop reason: HOSPADM

## 2023-12-05 RX ORDER — SODIUM CHLORIDE 0.9 % (FLUSH) 0.9 %
10 SYRINGE (ML) INJECTION PRN
OUTPATIENT
Start: 2023-12-05

## 2023-12-05 RX ORDER — HEPARIN 100 UNIT/ML
500 SYRINGE INTRAVENOUS PRN
OUTPATIENT
Start: 2023-12-05

## 2023-12-05 RX ORDER — SODIUM CHLORIDE 0.9 % (FLUSH) 0.9 %
20 SYRINGE (ML) INJECTION PRN
OUTPATIENT
Start: 2023-12-05

## 2023-12-05 RX ORDER — WATER 10 ML/10ML
2.2 INJECTION INTRAMUSCULAR; INTRAVENOUS; SUBCUTANEOUS ONCE
OUTPATIENT
Start: 2023-12-05 | End: 2023-12-05

## 2023-12-05 RX ADMIN — HEPARIN 500 UNITS: 100 SYRINGE at 10:23

## 2023-12-05 RX ADMIN — SODIUM CHLORIDE, PRESERVATIVE FREE 10 ML: 5 INJECTION INTRAVENOUS at 10:23

## 2023-12-15 NOTE — PROGRESS NOTES
along the floor of the right orbit was also again identified. An asymmetric soft tissue density was also noted at the location of the right infraorbital area.  There was no evidence of involvement of the extraocular muscles nor do the lacrimal glands demonstrate a discrete mass.  The lacrimal glands do not demonstrate a discrete mass.  Disease extends along the right lateral nasal cavity and ethmoid air cells. There is now disease along the right sphenoid sinus inseparable from the right foramen rotundum with probable involvement of the maxillary branch of V5.  Evidence of prior instrumentation with prior middle turbinectomies, uncinate to measles, antrostomies, and partial ethmoidectomies is also suspected based on the radiographic imaging.  The cavernous sinuses are symmetric.  Meckel's caves are symmetric.   There is now a right level one be lymphadenopathy by size criteria, and abnormal clustering with lymph node measuring up to 1.5 cm.  Otherwise the rest of the oral cavity and head and neck area did not demonstrate evidence of discrete mass.  Apparent right vocal cord paralysis would medialization of the true vocal cord with anteromedial rotation of the right was also described.     CT scan of the chest with and without contrast on 10/3/2018 did not reveal evidence of thoracic lymphadenopathy.  Tiny 2 mm nodules were noted in the subpleural left lung, likely to be benign with a 1 year followup recommended     CT scan of the abdomen and pelvis with contrast on 10/3/2018 did not reveal evidence of metastatic disease nor abdominal lymphadenopathy. The spleen was of borderline size measuring 13 cm x 4.8 cm x 11.8 cm.    Physical examination on 10/10/2018 revealed a very enlarged and swollen right maxillary facial area with induration of soft tissues around the upper teeth on the right and face.  No palpable peripheral lymphadenopathy was able to be appreciated in the head and neck area, epitrochlear and axillary

## 2024-01-18 ENCOUNTER — TELEPHONE (OUTPATIENT)
Dept: HEMATOLOGY | Age: 89
End: 2024-01-18

## 2024-01-18 NOTE — TELEPHONE ENCOUNTER
Called pt. to remind them of appointment on 1/22/2024 and had to leave a detailed voicemail with appointment date and time.

## 2024-01-22 ENCOUNTER — HOSPITAL ENCOUNTER (OUTPATIENT)
Dept: INFUSION THERAPY | Age: 89
Discharge: HOME | End: 2024-01-22
Payer: MEDICARE

## 2024-01-22 ENCOUNTER — OFFICE VISIT (OUTPATIENT)
Dept: HEMATOLOGY | Age: 89
End: 2024-01-22
Payer: MEDICARE

## 2024-01-22 ENCOUNTER — TRANSCRIBE ORDERS (OUTPATIENT)
Dept: ADMINISTRATIVE | Facility: HOSPITAL | Age: 89
End: 2024-01-22
Payer: MEDICARE

## 2024-01-22 VITALS
HEIGHT: 68 IN | TEMPERATURE: 97.9 F | OXYGEN SATURATION: 94 % | DIASTOLIC BLOOD PRESSURE: 92 MMHG | BODY MASS INDEX: 27.38 KG/M2 | SYSTOLIC BLOOD PRESSURE: 160 MMHG | HEART RATE: 95 BPM | WEIGHT: 180.7 LBS

## 2024-01-22 DIAGNOSIS — Z78.0 POSTMENOPAUSE: Primary | ICD-10-CM

## 2024-01-22 DIAGNOSIS — C83.31 DIFFUSE LARGE B-CELL LYMPHOMA, LYMPH NODES OF HEAD, FACE, AND NECK: ICD-10-CM

## 2024-01-22 DIAGNOSIS — Z78.0 POST-MENOPAUSE: ICD-10-CM

## 2024-01-22 DIAGNOSIS — Z45.2 ENCOUNTER FOR CENTRAL LINE CARE: ICD-10-CM

## 2024-01-22 DIAGNOSIS — Z00.00 HEALTH CARE MAINTENANCE: ICD-10-CM

## 2024-01-22 DIAGNOSIS — Z86.000 HISTORY OF DUCTAL CARCINOMA IN SITU (DCIS) OF BREAST: ICD-10-CM

## 2024-01-22 DIAGNOSIS — Z45.2 ENCOUNTER FOR CENTRAL LINE CARE: Primary | ICD-10-CM

## 2024-01-22 DIAGNOSIS — C83.31 DIFFUSE LARGE B-CELL LYMPHOMA, LYMPH NODES OF HEAD, FACE, AND NECK (HCC): ICD-10-CM

## 2024-01-22 DIAGNOSIS — C83.31 DIFFUSE LARGE B-CELL LYMPHOMA, LYMPH NODES OF HEAD, FACE, AND NECK (HCC): Primary | ICD-10-CM

## 2024-01-22 LAB
ALBUMIN SERPL-MCNC: 3.9 G/DL (ref 3.5–5.2)
ALP SERPL-CCNC: 62 U/L (ref 35–104)
ALT SERPL-CCNC: 22 U/L (ref 9–52)
ANION GAP SERPL CALCULATED.3IONS-SCNC: 12 MMOL/L (ref 7–19)
AST SERPL-CCNC: 29 U/L (ref 14–36)
BASOPHILS # BLD: 0.04 K/UL (ref 0.01–0.08)
BASOPHILS NFR BLD: 0.7 % (ref 0.1–1.2)
BILIRUB SERPL-MCNC: 0.3 MG/DL (ref 0.2–1.3)
BUN SERPL-MCNC: 24 MG/DL (ref 7–17)
CALCIUM SERPL-MCNC: 9.3 MG/DL (ref 8.4–10.2)
CHLORIDE SERPL-SCNC: 100 MMOL/L (ref 98–111)
CO2 SERPL-SCNC: 29 MMOL/L (ref 22–29)
CREAT SERPL-MCNC: 1 MG/DL (ref 0.5–1)
EOSINOPHIL # BLD: 0.13 K/UL (ref 0.04–0.54)
EOSINOPHIL NFR BLD: 2.2 % (ref 0.7–7)
ERYTHROCYTE [DISTWIDTH] IN BLOOD BY AUTOMATED COUNT: 14.1 % (ref 11.7–14.4)
GLOBULIN: 3.1 G/DL
GLUCOSE SERPL-MCNC: 95 MG/DL (ref 74–106)
HCT VFR BLD AUTO: 42.7 % (ref 34.1–44.9)
HGB BLD-MCNC: 13.7 G/DL (ref 11.2–15.7)
LDH SERPL-CCNC: 192 U/L (ref 120–246)
LYMPHOCYTES # BLD: 1.63 K/UL (ref 1.18–3.74)
LYMPHOCYTES NFR BLD: 28 % (ref 19.3–53.1)
MCH RBC QN AUTO: 28.7 PG (ref 25.6–32.2)
MCHC RBC AUTO-ENTMCNC: 32.1 G/DL (ref 32.3–35.5)
MCV RBC AUTO: 89.5 FL (ref 79.4–94.8)
MONOCYTES # BLD: 0.66 K/UL (ref 0.24–0.82)
MONOCYTES NFR BLD: 11.3 % (ref 4.7–12.5)
NEUTROPHILS # BLD: 3.34 K/UL (ref 1.56–6.13)
NEUTS SEG NFR BLD: 57.5 % (ref 34–71.1)
PLATELET # BLD AUTO: 190 K/UL (ref 182–369)
PMV BLD AUTO: 9.7 FL (ref 7.4–10.4)
POTASSIUM SERPL-SCNC: 4.6 MMOL/L (ref 3.5–5.1)
PROT SERPL-MCNC: 7 G/DL (ref 6.3–8.2)
RBC # BLD AUTO: 4.77 M/UL (ref 3.93–5.22)
SODIUM SERPL-SCNC: 141 MMOL/L (ref 137–145)
WBC # BLD AUTO: 5.82 K/UL (ref 3.98–10.04)

## 2024-01-22 PROCEDURE — 1036F TOBACCO NON-USER: CPT | Performed by: INTERNAL MEDICINE

## 2024-01-22 PROCEDURE — G8427 DOCREV CUR MEDS BY ELIG CLIN: HCPCS | Performed by: INTERNAL MEDICINE

## 2024-01-22 PROCEDURE — 1123F ACP DISCUSS/DSCN MKR DOCD: CPT | Performed by: INTERNAL MEDICINE

## 2024-01-22 PROCEDURE — 36415 COLL VENOUS BLD VENIPUNCTURE: CPT

## 2024-01-22 PROCEDURE — 1090F PRES/ABSN URINE INCON ASSESS: CPT | Performed by: INTERNAL MEDICINE

## 2024-01-22 PROCEDURE — 83615 LACTATE (LD) (LDH) ENZYME: CPT

## 2024-01-22 PROCEDURE — G8417 CALC BMI ABV UP PARAM F/U: HCPCS | Performed by: INTERNAL MEDICINE

## 2024-01-22 PROCEDURE — 6360000002 HC RX W HCPCS

## 2024-01-22 PROCEDURE — G8484 FLU IMMUNIZE NO ADMIN: HCPCS | Performed by: INTERNAL MEDICINE

## 2024-01-22 PROCEDURE — 85025 COMPLETE CBC W/AUTO DIFF WBC: CPT

## 2024-01-22 PROCEDURE — 36591 DRAW BLOOD OFF VENOUS DEVICE: CPT

## 2024-01-22 PROCEDURE — 80053 COMPREHEN METABOLIC PANEL: CPT

## 2024-01-22 PROCEDURE — 99212 OFFICE O/P EST SF 10 MIN: CPT

## 2024-01-22 PROCEDURE — 99214 OFFICE O/P EST MOD 30 MIN: CPT | Performed by: INTERNAL MEDICINE

## 2024-01-22 PROCEDURE — 2580000003 HC RX 258

## 2024-01-22 RX ORDER — WATER 10 ML/10ML
2.2 INJECTION INTRAMUSCULAR; INTRAVENOUS; SUBCUTANEOUS ONCE
OUTPATIENT
Start: 2024-01-22 | End: 2024-01-22

## 2024-01-22 RX ORDER — HEPARIN 100 UNIT/ML
500 SYRINGE INTRAVENOUS PRN
OUTPATIENT
Start: 2024-01-22

## 2024-01-22 RX ORDER — LEVOTHYROXINE SODIUM 0.12 MG/1
125 TABLET ORAL DAILY
COMMUNITY

## 2024-01-22 RX ORDER — SODIUM CHLORIDE 0.9 % (FLUSH) 0.9 %
20 SYRINGE (ML) INJECTION PRN
OUTPATIENT
Start: 2024-01-22

## 2024-01-22 RX ORDER — HEPARIN 100 UNIT/ML
500 SYRINGE INTRAVENOUS PRN
Status: DISCONTINUED | OUTPATIENT
Start: 2024-01-22 | End: 2024-01-23 | Stop reason: HOSPADM

## 2024-01-22 RX ORDER — SODIUM CHLORIDE 0.9 % (FLUSH) 0.9 %
10 SYRINGE (ML) INJECTION PRN
Status: DISCONTINUED | OUTPATIENT
Start: 2024-01-22 | End: 2024-01-23 | Stop reason: HOSPADM

## 2024-01-22 RX ORDER — SODIUM CHLORIDE 0.9 % (FLUSH) 0.9 %
20 SYRINGE (ML) INJECTION PRN
Status: DISCONTINUED | OUTPATIENT
Start: 2024-01-22 | End: 2024-01-23 | Stop reason: HOSPADM

## 2024-01-22 RX ORDER — SODIUM CHLORIDE 0.9 % (FLUSH) 0.9 %
10 SYRINGE (ML) INJECTION PRN
OUTPATIENT
Start: 2024-01-22

## 2024-01-22 RX ADMIN — HEPARIN 500 UNITS: 100 SYRINGE at 15:26

## 2024-01-22 RX ADMIN — SODIUM CHLORIDE, PRESERVATIVE FREE 20 ML: 5 INJECTION INTRAVENOUS at 15:26

## 2024-02-05 ENCOUNTER — HOSPITAL ENCOUNTER (OUTPATIENT)
Dept: BONE DENSITY | Facility: HOSPITAL | Age: 89
Discharge: HOME | End: 2024-02-05
Payer: MEDICARE

## 2024-02-05 ENCOUNTER — HOSPITAL ENCOUNTER (OUTPATIENT)
Dept: MRI IMAGING | Facility: HOSPITAL | Age: 89
Discharge: HOME | End: 2024-02-05
Payer: MEDICARE

## 2024-02-05 DIAGNOSIS — C83.31 DIFFUSE LARGE B-CELL LYMPHOMA, LYMPH NODES OF HEAD, FACE, AND NECK: ICD-10-CM

## 2024-02-05 DIAGNOSIS — Z78.0 POSTMENOPAUSE: ICD-10-CM

## 2024-02-05 LAB — CREAT BLDA-MCNC: 1.2 MG/DL (ref 0.6–1.3)

## 2024-02-05 PROCEDURE — A9577 INJ MULTIHANCE: HCPCS | Performed by: INTERNAL MEDICINE

## 2024-02-05 PROCEDURE — 82565 ASSAY OF CREATININE: CPT

## 2024-02-05 PROCEDURE — 77080 DXA BONE DENSITY AXIAL: CPT

## 2024-02-05 PROCEDURE — 0 GADOBENATE DIMEGLUMINE 529 MG/ML SOLUTION: Performed by: INTERNAL MEDICINE

## 2024-02-05 PROCEDURE — 70543 MRI ORBT/FAC/NCK W/O &W/DYE: CPT

## 2024-02-05 RX ADMIN — GADOBENATE DIMEGLUMINE 17 ML: 529 INJECTION, SOLUTION INTRAVENOUS at 15:09

## 2024-03-25 ENCOUNTER — HOSPITAL ENCOUNTER (OUTPATIENT)
Dept: INFUSION THERAPY | Age: 89
Discharge: HOME | End: 2024-03-25
Payer: MEDICARE

## 2024-03-25 DIAGNOSIS — C83.31 DIFFUSE LARGE B-CELL LYMPHOMA, LYMPH NODES OF HEAD, FACE, AND NECK (HCC): ICD-10-CM

## 2024-03-25 DIAGNOSIS — Z45.2 ENCOUNTER FOR CENTRAL LINE CARE: Primary | ICD-10-CM

## 2024-03-25 PROCEDURE — 2580000003 HC RX 258: Performed by: INTERNAL MEDICINE

## 2024-03-25 PROCEDURE — 96523 IRRIG DRUG DELIVERY DEVICE: CPT

## 2024-03-25 PROCEDURE — 6360000002 HC RX W HCPCS: Performed by: INTERNAL MEDICINE

## 2024-03-25 RX ORDER — SODIUM CHLORIDE 0.9 % (FLUSH) 0.9 %
10 SYRINGE (ML) INJECTION PRN
Status: DISCONTINUED | OUTPATIENT
Start: 2024-03-25 | End: 2024-03-26 | Stop reason: HOSPADM

## 2024-03-25 RX ORDER — SODIUM CHLORIDE 0.9 % (FLUSH) 0.9 %
20 SYRINGE (ML) INJECTION PRN
OUTPATIENT
Start: 2024-03-25

## 2024-03-25 RX ORDER — HEPARIN 100 UNIT/ML
500 SYRINGE INTRAVENOUS PRN
Status: DISCONTINUED | OUTPATIENT
Start: 2024-03-25 | End: 2024-03-26 | Stop reason: HOSPADM

## 2024-03-25 RX ORDER — SODIUM CHLORIDE 0.9 % (FLUSH) 0.9 %
10 SYRINGE (ML) INJECTION PRN
OUTPATIENT
Start: 2024-03-25

## 2024-03-25 RX ORDER — HEPARIN 100 UNIT/ML
500 SYRINGE INTRAVENOUS PRN
OUTPATIENT
Start: 2024-03-25

## 2024-03-25 RX ORDER — WATER 10 ML/10ML
2.2 INJECTION INTRAMUSCULAR; INTRAVENOUS; SUBCUTANEOUS ONCE
OUTPATIENT
Start: 2024-03-25 | End: 2024-03-25

## 2024-03-25 RX ADMIN — HEPARIN 500 UNITS: 100 SYRINGE at 10:22

## 2024-03-25 RX ADMIN — SODIUM CHLORIDE, PRESERVATIVE FREE 10 ML: 5 INJECTION INTRAVENOUS at 10:22

## 2024-03-28 ENCOUNTER — TELEPHONE (OUTPATIENT)
Dept: PULMONOLOGY | Facility: CLINIC | Age: 89
End: 2024-03-28
Payer: MEDICARE

## 2024-03-28 NOTE — TELEPHONE ENCOUNTER
Called patient to reschedule their no show appointment that was originally scheduled on 03/21/2024 .      Unable to contact patient after trying all available phone numbers. No Show letter was mailed, which includes [facility] No Show Policy.

## 2024-04-08 PROBLEM — C83.31 DIFFUSE LARGE B-CELL LYMPHOMA OF LYMPH NODES OF HEAD: Chronic | Status: ACTIVE | Noted: 2018-10-18

## 2024-04-08 NOTE — PROGRESS NOTES
"Chief Complaint  Asthma    Subjective    History of Present Illness {  Problem List  Visit Diagnosis   Encounters  Notes  Medications  Labs  Result Review Imaging  Media: 23}    Kami Villanueva presents to [facility] for:    History of Present Illness  Management of her asthma.  She is a never smoker.  She is followed by Dr. 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  She indicates her most recent checkup went well.  She has occasional shortness of breath and coughing.  She has Arnuity available to use as needed.  She has not needed it in quite some time.  She still has it available.  She does not take it routinely due to hoarseness and problems with her vocal cords.      Her only complaint today is \"burning sensation down my right arm\".  This began over the last few days.  She has been adding Salonpas patches to her arm and also propping it up at night.  So far it has not helped.       Prior to Admission medications    Medication Sig Start Date End Date Taking? Authorizing Provider   acetaminophen-codeine (TYLENOL with CODEINE #3) 300-30 MG per tablet acetaminophen 300 mg-codeine 30 mg tablet    Cortez Corbett MD   carvedilol (COREG) 3.125 MG tablet Take 1 tablet by mouth 2 (Two) Times a Day. 12/23/18   Roger Tate MD   Coenzyme Q10 (CoQ10) 100 MG capsule Take 100 mg by mouth Daily.    ProviderCortez MD   Cyanocobalamin (Vitamin B 12) 500 MCG tablet Take 1 tablet by mouth 2 (Two) Times a Day.    Cortez Corbett MD   furosemide (LASIX) 40 MG tablet Take 1 tablet by mouth Daily.    Cortez Corbett MD   guaiFENesin (MUCINEX) 600 MG 12 hr tablet Take 1 capsule by mouth Daily As Needed (congestion).    Emergency, Nurse Alejandra, RN   levothyroxine (SYNTHROID, LEVOTHROID) 100 MCG tablet Take 1 tablet by mouth Daily.    Cortez Crobett MD   Mirabegron ER (MYRBETRIQ) 50 MG tablet " "sustained-release 24 hour 24 hr tablet Take 50 mg by mouth Daily.    Cortez Corbett MD   Multiple Vitamin tablet Take 1 tablet by mouth.    Cortez Corbett MD   nystatin (MYCOSTATIN) 959369 UNIT/ML suspension nystatin 100,000 unit/mL oral suspension 12/13/19   Cortez Corbett MD   oxybutynin XL (DITROPAN-XL) 5 MG 24 hr tablet oxybutynin chloride ER 5 mg tablet,extended release 24 hr    Cortez Corbett MD   potassium chloride ER (K-TAB) 20 MEQ tablet controlled-release ER tablet Take 1 tablet by mouth 2 (Two) Times a Day. 7/15/21   Cortez Corbett MD   Probiotic Product (PROBIOTIC-10 PO) Take 10 mg by mouth Daily.    Cortez Corbett MD       Social History     Socioeconomic History    Marital status:    Tobacco Use    Smoking status: Never    Smokeless tobacco: Never   Substance and Sexual Activity    Alcohol use: No    Drug use: No    Sexual activity: Defer       Objective   Vital Signs:   /78   Pulse 73   Ht 165.1 cm (65\")   Wt 81.2 kg (179 lb)   SpO2 96% Comment: RA  BMI 29.79 kg/m²     Physical Exam  Eyes:      Comments: Glasses   Cardiovascular:      Rate and Rhythm: Normal rate and regular rhythm.      Heart sounds: No murmur heard.  Pulmonary:      Effort: Pulmonary effort is normal.      Breath sounds: Normal breath sounds.   Musculoskeletal:      Right lower leg: No edema.      Left lower leg: No edema.   Neurological:      Mental Status: She is alert and oriented to person, place, and time.        Result Review :    PFT Values          3/21/2023    15:30   Pre Drug PFT Results      FEV1 98   FEF 25-75% 108   FEV1/FVC 71     My interpretation of the PFT : none    Results for orders placed in visit on 03/21/23    Pulmonary Function Test    Narrative  Pulmonary Function Test  Performed by: Emelina Avery, RRT  Authorized by: Olivia Quiroz APRN    Pre Drug % Predicted  FVC: 103%  FEV1: 98%  FEF 25-75%: 108%  FEV1/FVC: " 71%    Interpretation  Spirometry  Spirometry shows normal results. Post-bronchodilator the ratio shows normal results.  Review of FVL curve  Patient's effort is normal.      Results for orders placed in visit on 03/15/22    Pulmonary Function Test    Narrative  Pulmonary Function Test  Performed by: Emelina Avery, RRT  Authorized by: Olivia Quiroz APRN    Pre Drug % Predicted  FVC: 106%  FEV1: 102%  FEF 25-75%: 107%  FEV1/FVC: 72%    Interpretation  Spirometry  Spirometry shows normal results.  Review of FVL curve  Patient's effort is normal. There is a flattening of the inspiratory curve, suggesting variable extrathoracic obstruction.  Overall comments: Patient has known vocal cord dysfunction      Results for orders placed in visit on 08/01/19    Pulmonary Function Test      My interpretation of imaging:  none    My interpretation of labs: none      Assessment and Plan {CC Problem List  Visit Diagnosis  ROS  Review (Popup)  Health Maintenance  Quality  BestPractice  Medications  SmartSets  SnapShot Encounters  Media : 23}    Diagnoses and all orders for this visit:    1. Mild intermittent asthma without complication (Primary)  Comments:  Stable on no therapy.  PFTs not warranted at her age    2. Diffuse large B-cell lymphoma of lymph nodes of head  Comments:  Last follow-up stable with Dr. Osborn    3. Complete paralysis of vocal cord  Comments:  Seems to be better today.  Using inhaler therapy as needed as result    4. Paresthesia and pain of right extremity  Comments:  Encouraged to reach out to her PCP.  May be a cervical spine issue.  She has good range of motion and good strong .          Body mass index is 29.79 kg/m².    GLENDA Greenfield  4/15/2024  10:38 CDT    Follow Up   Return in about 1 year (around 4/15/2025).    Patient was given instructions and counseling regarding her condition or for health maintenance advice. Please see specific information pulled into the AVS  if appropriate.

## 2024-04-15 ENCOUNTER — OFFICE VISIT (OUTPATIENT)
Dept: PULMONOLOGY | Facility: CLINIC | Age: 89
End: 2024-04-15
Payer: MEDICARE

## 2024-04-15 VITALS
BODY MASS INDEX: 29.82 KG/M2 | WEIGHT: 179 LBS | SYSTOLIC BLOOD PRESSURE: 122 MMHG | HEIGHT: 65 IN | HEART RATE: 73 BPM | DIASTOLIC BLOOD PRESSURE: 78 MMHG | OXYGEN SATURATION: 96 %

## 2024-04-15 DIAGNOSIS — C83.31 DIFFUSE LARGE B-CELL LYMPHOMA OF LYMPH NODES OF HEAD: Chronic | ICD-10-CM

## 2024-04-15 DIAGNOSIS — R20.2 PARESTHESIA AND PAIN OF RIGHT EXTREMITY: ICD-10-CM

## 2024-04-15 DIAGNOSIS — M79.609 PARESTHESIA AND PAIN OF RIGHT EXTREMITY: ICD-10-CM

## 2024-04-15 DIAGNOSIS — J45.20 MILD INTERMITTENT ASTHMA WITHOUT COMPLICATION: Primary | Chronic | ICD-10-CM

## 2024-04-15 DIAGNOSIS — J38.00 COMPLETE PARALYSIS OF VOCAL CORD: Chronic | ICD-10-CM

## 2024-04-15 PROCEDURE — 99213 OFFICE O/P EST LOW 20 MIN: CPT | Performed by: NURSE PRACTITIONER

## 2024-04-15 PROCEDURE — 1160F RVW MEDS BY RX/DR IN RCRD: CPT | Performed by: NURSE PRACTITIONER

## 2024-04-15 PROCEDURE — 1159F MED LIST DOCD IN RCRD: CPT | Performed by: NURSE PRACTITIONER

## 2024-05-15 ENCOUNTER — TELEPHONE (OUTPATIENT)
Dept: HEMATOLOGY | Age: 89
End: 2024-05-15

## 2024-05-16 DIAGNOSIS — C83.31 DIFFUSE LARGE B-CELL LYMPHOMA, LYMPH NODES OF HEAD, FACE, AND NECK (HCC): Primary | ICD-10-CM

## 2024-05-16 NOTE — PROGRESS NOTES
PROGRESS NOTE  Patient:  Yeni Paez  YOB: 1934  Date of Service: 5/20/2024  MRN: [id number]    Primary Care Physician: Ahsan Macias MD    Chief Complaint   Patient presents with    Cancer     B-Cell Lymphoma  DCIS ( with right mastectomy)     Results     MRI at [facility] on 2/5/2024.     Patient Seen, Chart, Consults notes, Labs, Radiology studies reviewed.    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    Right simple mastectomy for DCIS by Dr. Nayely Valencia on  9/23/2004  Tumor screening and health maintenance    Yeni received CHOP-R x 6 cycles given 10/24/2018 - 2/20/2019 as well as 11 doses of prophylactic intrathecal methotrexate 11/14/2018 - 2/20/2019.    Yeni declined consolidative XRT to the right face/orbits/brain.    Her appetite is good and weight stable, 175 pounds.  She denies headache, visual changes, sinus pain etc.  She continues to have neuropathy more so to the bottom of her feet, Neuropathy grade 1.  ECOG grade 0/1.    Yeni has no B symptoms.    Yeni presents today for continued monitoring with absolutely no new complaints, feeling remarkably well.    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 About 45 years ago she underwent a thyroidectomy followed subsequently by a vocal cord medialization procedure by Dr. Diane in [facility].    Several years ago she had right maxillary sinus

## 2024-05-20 ENCOUNTER — HOSPITAL ENCOUNTER (OUTPATIENT)
Dept: INFUSION THERAPY | Age: 89
Discharge: HOME | End: 2024-05-20
Payer: MEDICARE

## 2024-05-20 ENCOUNTER — OFFICE VISIT (OUTPATIENT)
Dept: HEMATOLOGY | Age: 89
End: 2024-05-20
Payer: MEDICARE

## 2024-05-20 VITALS
TEMPERATURE: 97.9 F | HEIGHT: 68 IN | DIASTOLIC BLOOD PRESSURE: 84 MMHG | WEIGHT: 178.5 LBS | SYSTOLIC BLOOD PRESSURE: 130 MMHG | HEART RATE: 75 BPM | OXYGEN SATURATION: 97 % | BODY MASS INDEX: 27.05 KG/M2

## 2024-05-20 DIAGNOSIS — Z45.2 ENCOUNTER FOR CENTRAL LINE CARE: Primary | ICD-10-CM

## 2024-05-20 DIAGNOSIS — C83.31 DIFFUSE LARGE B-CELL LYMPHOMA, LYMPH NODES OF HEAD, FACE, AND NECK (HCC): ICD-10-CM

## 2024-05-20 DIAGNOSIS — Z12.31 ENCOUNTER FOR SCREENING MAMMOGRAM FOR MALIGNANT NEOPLASM OF BREAST: ICD-10-CM

## 2024-05-20 DIAGNOSIS — C83.31 DIFFUSE LARGE B-CELL LYMPHOMA, LYMPH NODES OF HEAD, FACE, AND NECK (HCC): Primary | ICD-10-CM

## 2024-05-20 LAB
ALBUMIN SERPL-MCNC: 4.4 G/DL (ref 3.5–5.2)
ALP SERPL-CCNC: 64 U/L (ref 35–104)
ALT SERPL-CCNC: 21 U/L (ref 9–52)
ANION GAP SERPL CALCULATED.3IONS-SCNC: 12 MMOL/L (ref 7–19)
AST SERPL-CCNC: 25 U/L (ref 14–36)
BASOPHILS # BLD: 0.05 K/UL (ref 0.01–0.08)
BASOPHILS NFR BLD: 0.7 % (ref 0.1–1.2)
BILIRUB SERPL-MCNC: 0.8 MG/DL (ref 0.2–1.3)
BUN SERPL-MCNC: 26 MG/DL (ref 7–17)
CALCIUM SERPL-MCNC: 9.3 MG/DL (ref 8.4–10.2)
CHLORIDE SERPL-SCNC: 102 MMOL/L (ref 98–111)
CO2 SERPL-SCNC: 23 MMOL/L (ref 22–29)
CREAT SERPL-MCNC: 1 MG/DL (ref 0.5–1)
EOSINOPHIL # BLD: 0.16 K/UL (ref 0.04–0.54)
EOSINOPHIL NFR BLD: 2.3 % (ref 0.7–7)
ERYTHROCYTE [DISTWIDTH] IN BLOOD BY AUTOMATED COUNT: 14.5 % (ref 11.7–14.4)
GLOBULIN: 2.5 G/DL
GLUCOSE SERPL-MCNC: 90 MG/DL (ref 74–106)
HCT VFR BLD AUTO: 42.4 % (ref 34.1–44.9)
HGB BLD-MCNC: 13.9 G/DL (ref 11.2–15.7)
LDH SERPL-CCNC: 203 U/L (ref 120–246)
LYMPHOCYTES # BLD: 2.06 K/UL (ref 1.18–3.74)
LYMPHOCYTES NFR BLD: 30 % (ref 19.3–53.1)
MCH RBC QN AUTO: 28.9 PG (ref 25.6–32.2)
MCHC RBC AUTO-ENTMCNC: 32.8 G/DL (ref 32.3–35.5)
MCV RBC AUTO: 88.1 FL (ref 79.4–94.8)
MONOCYTES # BLD: 0.75 K/UL (ref 0.24–0.82)
MONOCYTES NFR BLD: 10.9 % (ref 4.7–12.5)
NEUTROPHILS # BLD: 3.8 K/UL (ref 1.56–6.13)
NEUTS SEG NFR BLD: 55.5 % (ref 34–71.1)
PLATELET # BLD AUTO: 194 K/UL (ref 182–369)
PMV BLD AUTO: 10.3 FL (ref 7.4–10.4)
POTASSIUM SERPL-SCNC: 4.5 MMOL/L (ref 3.5–5.1)
PROT SERPL-MCNC: 6.9 G/DL (ref 6.3–8.2)
RBC # BLD AUTO: 4.81 M/UL (ref 3.93–5.22)
SODIUM SERPL-SCNC: 137 MMOL/L (ref 137–145)
WBC # BLD AUTO: 6.86 K/UL (ref 3.98–10.04)

## 2024-05-20 PROCEDURE — 83615 LACTATE (LD) (LDH) ENZYME: CPT

## 2024-05-20 PROCEDURE — 99214 OFFICE O/P EST MOD 30 MIN: CPT | Performed by: INTERNAL MEDICINE

## 2024-05-20 PROCEDURE — G8417 CALC BMI ABV UP PARAM F/U: HCPCS | Performed by: INTERNAL MEDICINE

## 2024-05-20 PROCEDURE — 99212 OFFICE O/P EST SF 10 MIN: CPT

## 2024-05-20 PROCEDURE — 80053 COMPREHEN METABOLIC PANEL: CPT

## 2024-05-20 PROCEDURE — G8427 DOCREV CUR MEDS BY ELIG CLIN: HCPCS | Performed by: INTERNAL MEDICINE

## 2024-05-20 PROCEDURE — 36591 DRAW BLOOD OFF VENOUS DEVICE: CPT

## 2024-05-20 PROCEDURE — 85025 COMPLETE CBC W/AUTO DIFF WBC: CPT

## 2024-05-20 PROCEDURE — 1123F ACP DISCUSS/DSCN MKR DOCD: CPT | Performed by: INTERNAL MEDICINE

## 2024-05-20 PROCEDURE — 1090F PRES/ABSN URINE INCON ASSESS: CPT | Performed by: INTERNAL MEDICINE

## 2024-05-20 PROCEDURE — 1036F TOBACCO NON-USER: CPT | Performed by: INTERNAL MEDICINE

## 2024-05-20 PROCEDURE — 2580000003 HC RX 258: Performed by: INTERNAL MEDICINE

## 2024-05-20 PROCEDURE — 6360000002 HC RX W HCPCS: Performed by: INTERNAL MEDICINE

## 2024-05-20 PROCEDURE — 36415 COLL VENOUS BLD VENIPUNCTURE: CPT

## 2024-05-20 RX ORDER — SODIUM CHLORIDE 0.9 % (FLUSH) 0.9 %
20 SYRINGE (ML) INJECTION PRN
OUTPATIENT
Start: 2024-05-20

## 2024-05-20 RX ORDER — WATER 10 ML/10ML
2.2 INJECTION INTRAMUSCULAR; INTRAVENOUS; SUBCUTANEOUS ONCE
OUTPATIENT
Start: 2024-05-20 | End: 2024-05-20

## 2024-05-20 RX ORDER — SODIUM CHLORIDE 0.9 % (FLUSH) 0.9 %
20 SYRINGE (ML) INJECTION PRN
Status: DISCONTINUED | OUTPATIENT
Start: 2024-05-20 | End: 2024-05-21 | Stop reason: HOSPADM

## 2024-05-20 RX ORDER — SODIUM CHLORIDE 0.9 % (FLUSH) 0.9 %
10 SYRINGE (ML) INJECTION PRN
OUTPATIENT
Start: 2024-05-20

## 2024-05-20 RX ORDER — HEPARIN 100 UNIT/ML
500 SYRINGE INTRAVENOUS PRN
Status: DISCONTINUED | OUTPATIENT
Start: 2024-05-20 | End: 2024-05-21 | Stop reason: HOSPADM

## 2024-05-20 RX ORDER — HEPARIN 100 UNIT/ML
500 SYRINGE INTRAVENOUS PRN
OUTPATIENT
Start: 2024-05-20

## 2024-05-20 RX ADMIN — HEPARIN 500 UNITS: 100 SYRINGE at 10:20

## 2024-05-20 RX ADMIN — SODIUM CHLORIDE, PRESERVATIVE FREE 20 ML: 5 INJECTION INTRAVENOUS at 10:20

## 2024-07-31 DIAGNOSIS — C83.31 DIFFUSE LARGE B-CELL LYMPHOMA, LYMPH NODES OF HEAD, FACE, AND NECK (HCC): ICD-10-CM

## 2024-09-18 ENCOUNTER — TELEPHONE (OUTPATIENT)
Dept: HEMATOLOGY | Age: 89
End: 2024-09-18

## 2024-09-20 NOTE — PROGRESS NOTES
PROGRESS NOTE  Patient:  Yeni Paez  YOB: 1934  Date of Service: 10/16/2024  MRN: [id number]    Primary Care Physician: Ahsan Macias MD    Chief Complaint   Patient presents with    Follow-up     DLBCL     Patient Seen, Chart, Consults notes, Labs, Radiology studies reviewed.    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    Right simple mastectomy for DCIS by Dr. Nayely Valencia on  9/23/2004  Tumor screening and health maintenance    Yeni received CHOP-R x 6 cycles given 10/24/2018 - 2/20/2019 as well as 11 doses of prophylactic intrathecal methotrexate 11/14/2018 - 2/20/2019.    Yeni declined consolidative XRT to the right face/orbits/brain.    Her appetite is good and weight stable, 175 pounds.  She denies headache, visual changes, sinus pain etc.  She continues to have neuropathy more so to the bottom of her feet, Neuropathy grade 1.  ECOG grade 0/1.    Yeni has no B symptoms.    Yeni presents today, 10/16/2024, accompanied by her  for continued monitoring with absolutely no new complaints, feeling remarkably well.  Yeni states that if she and her  make it until her next follow-up visit in February, they will have been  73 years.        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 About 45 years ago she underwent a thyroidectomy followed subsequently by a vocal cord

## 2024-10-14 ENCOUNTER — TELEPHONE (OUTPATIENT)
Dept: HEMATOLOGY | Age: 89
End: 2024-10-14

## 2024-10-14 NOTE — TELEPHONE ENCOUNTER
I called patient and reminded patient of their appt on 10/16/24 and patient confirmed they would be here. I also let patient know that we have moved into our new cancer facility and asked patient if they were aware of where we were now located, and patient voiced understanding of our new location. Patient knows not to arrive early and that we will get labs at the time of the follow up appointment and not the lab appointment time.

## 2024-10-16 ENCOUNTER — OFFICE VISIT (OUTPATIENT)
Dept: HEMATOLOGY | Age: 89
End: 2024-10-16
Payer: MEDICARE

## 2024-10-16 ENCOUNTER — TRANSCRIBE ORDERS (OUTPATIENT)
Dept: ADMINISTRATIVE | Facility: HOSPITAL | Age: 89
End: 2024-10-16
Payer: MEDICARE

## 2024-10-16 ENCOUNTER — HOSPITAL ENCOUNTER (OUTPATIENT)
Dept: INFUSION THERAPY | Age: 89
Discharge: HOME | End: 2024-10-16
Payer: MEDICARE

## 2024-10-16 VITALS
HEART RATE: 72 BPM | HEIGHT: 68 IN | WEIGHT: 174.5 LBS | SYSTOLIC BLOOD PRESSURE: 132 MMHG | BODY MASS INDEX: 26.45 KG/M2 | DIASTOLIC BLOOD PRESSURE: 68 MMHG | TEMPERATURE: 97.2 F | OXYGEN SATURATION: 98 %

## 2024-10-16 DIAGNOSIS — Z45.2 ENCOUNTER FOR CENTRAL LINE CARE: Primary | ICD-10-CM

## 2024-10-16 DIAGNOSIS — Z86.000 HISTORY OF DUCTAL CARCINOMA IN SITU (DCIS) OF BREAST: ICD-10-CM

## 2024-10-16 DIAGNOSIS — C83.31 DIFFUSE LARGE B-CELL LYMPHOMA, LYMPH NODES OF HEAD, FACE, AND NECK (HCC): ICD-10-CM

## 2024-10-16 DIAGNOSIS — C83.31 DIFFUSE LARGE B-CELL LYMPHOMA, LYMPH NODES OF HEAD, FACE, AND NECK (HCC): Primary | ICD-10-CM

## 2024-10-16 DIAGNOSIS — C83.31 DIFFUSE LARGE B-CELL LYMPHOMA, LYMPH NODES OF HEAD, FACE, AND NECK: Primary | ICD-10-CM

## 2024-10-16 LAB
ALBUMIN SERPL-MCNC: 4 G/DL (ref 3.5–5.2)
ALP SERPL-CCNC: 55 U/L (ref 35–104)
ALT SERPL-CCNC: 17 U/L (ref 5–33)
ANION GAP SERPL CALCULATED.3IONS-SCNC: 8 MMOL/L (ref 7–19)
AST SERPL-CCNC: 23 U/L (ref 5–32)
BASOPHILS # BLD: 0.05 K/UL (ref 0.01–0.08)
BASOPHILS NFR BLD: 0.7 % (ref 0.1–1.2)
BILIRUB SERPL-MCNC: 0.5 MG/DL (ref 0–1.2)
BUN SERPL-MCNC: 28 MG/DL (ref 8–23)
CALCIUM SERPL-MCNC: 9.5 MG/DL (ref 8.2–9.6)
CHLORIDE SERPL-SCNC: 103 MMOL/L (ref 98–107)
CO2 SERPL-SCNC: 28 MMOL/L (ref 22–29)
CREAT SERPL-MCNC: 1.1 MG/DL (ref 0.5–0.9)
EOSINOPHIL # BLD: 0.07 K/UL (ref 0.04–0.54)
EOSINOPHIL NFR BLD: 1 % (ref 0.7–7)
ERYTHROCYTE [DISTWIDTH] IN BLOOD BY AUTOMATED COUNT: 14.1 % (ref 11.7–14.4)
GLUCOSE SERPL-MCNC: 89 MG/DL (ref 70–99)
HCT VFR BLD AUTO: 43.1 % (ref 34.1–44.9)
HGB BLD-MCNC: 14.1 G/DL (ref 11.2–15.7)
LDH SERPL-CCNC: 207 U/L (ref 135–214)
LYMPHOCYTES # BLD: 1.73 K/UL (ref 1.18–3.74)
LYMPHOCYTES NFR BLD: 24.9 % (ref 19.3–53.1)
MCH RBC QN AUTO: 28.8 PG (ref 25.6–32.2)
MCHC RBC AUTO-ENTMCNC: 32.7 G/DL (ref 32.3–35.5)
MCV RBC AUTO: 88.1 FL (ref 79.4–94.8)
MONOCYTES # BLD: 0.82 K/UL (ref 0.24–0.82)
MONOCYTES NFR BLD: 11.8 % (ref 4.7–12.5)
NEUTROPHILS # BLD: 4.25 K/UL (ref 1.56–6.13)
NEUTS SEG NFR BLD: 61 % (ref 34–71.1)
PLATELET # BLD AUTO: 195 K/UL (ref 182–369)
PMV BLD AUTO: 10.4 FL (ref 7.4–10.4)
POTASSIUM SERPL-SCNC: 4.2 MMOL/L (ref 3.5–5.1)
PROT SERPL-MCNC: 7.1 G/DL (ref 6.4–8.3)
RBC # BLD AUTO: 4.89 M/UL (ref 3.93–5.22)
SODIUM SERPL-SCNC: 139 MMOL/L (ref 136–145)
WBC # BLD AUTO: 6.96 K/UL (ref 3.98–10.04)

## 2024-10-16 PROCEDURE — 85025 COMPLETE CBC W/AUTO DIFF WBC: CPT

## 2024-10-16 PROCEDURE — 80053 COMPREHEN METABOLIC PANEL: CPT

## 2024-10-16 PROCEDURE — 99213 OFFICE O/P EST LOW 20 MIN: CPT

## 2024-10-16 PROCEDURE — G8484 FLU IMMUNIZE NO ADMIN: HCPCS | Performed by: INTERNAL MEDICINE

## 2024-10-16 PROCEDURE — 83615 LACTATE (LD) (LDH) ENZYME: CPT

## 2024-10-16 PROCEDURE — G8417 CALC BMI ABV UP PARAM F/U: HCPCS | Performed by: INTERNAL MEDICINE

## 2024-10-16 PROCEDURE — 36415 COLL VENOUS BLD VENIPUNCTURE: CPT

## 2024-10-16 PROCEDURE — 6360000002 HC RX W HCPCS: Performed by: INTERNAL MEDICINE

## 2024-10-16 PROCEDURE — G8427 DOCREV CUR MEDS BY ELIG CLIN: HCPCS | Performed by: INTERNAL MEDICINE

## 2024-10-16 PROCEDURE — 99213 OFFICE O/P EST LOW 20 MIN: CPT | Performed by: INTERNAL MEDICINE

## 2024-10-16 PROCEDURE — 1123F ACP DISCUSS/DSCN MKR DOCD: CPT | Performed by: INTERNAL MEDICINE

## 2024-10-16 PROCEDURE — 1036F TOBACCO NON-USER: CPT | Performed by: INTERNAL MEDICINE

## 2024-10-16 PROCEDURE — 96523 IRRIG DRUG DELIVERY DEVICE: CPT

## 2024-10-16 PROCEDURE — 2580000003 HC RX 258: Performed by: INTERNAL MEDICINE

## 2024-10-16 PROCEDURE — 1090F PRES/ABSN URINE INCON ASSESS: CPT | Performed by: INTERNAL MEDICINE

## 2024-10-16 RX ORDER — HEPARIN 100 UNIT/ML
500 SYRINGE INTRAVENOUS PRN
OUTPATIENT
Start: 2024-10-16

## 2024-10-16 RX ORDER — SODIUM CHLORIDE 0.9 % (FLUSH) 0.9 %
10 SYRINGE (ML) INJECTION PRN
OUTPATIENT
Start: 2024-10-16

## 2024-10-16 RX ORDER — SODIUM CHLORIDE 0.9 % (FLUSH) 0.9 %
20 SYRINGE (ML) INJECTION PRN
OUTPATIENT
Start: 2024-10-16

## 2024-10-16 RX ORDER — SODIUM CHLORIDE 0.9 % (FLUSH) 0.9 %
10 SYRINGE (ML) INJECTION PRN
Status: DISCONTINUED | OUTPATIENT
Start: 2024-10-16 | End: 2024-10-17 | Stop reason: HOSPADM

## 2024-10-16 RX ORDER — HEPARIN 100 UNIT/ML
500 SYRINGE INTRAVENOUS PRN
Status: DISCONTINUED | OUTPATIENT
Start: 2024-10-16 | End: 2024-10-17 | Stop reason: HOSPADM

## 2024-10-16 RX ORDER — WATER 10 ML/10ML
2.2 INJECTION INTRAMUSCULAR; INTRAVENOUS; SUBCUTANEOUS ONCE
OUTPATIENT
Start: 2024-10-16 | End: 2024-10-16

## 2024-10-16 RX ADMIN — SODIUM CHLORIDE, PRESERVATIVE FREE 10 ML: 5 INJECTION INTRAVENOUS at 10:34

## 2024-10-16 RX ADMIN — HEPARIN 500 UNITS: 100 SYRINGE at 10:34

## 2025-02-03 DIAGNOSIS — C83.31 DIFFUSE LARGE B-CELL LYMPHOMA, LYMPH NODES OF HEAD, FACE, AND NECK (HCC): ICD-10-CM

## 2025-02-03 NOTE — TELEPHONE ENCOUNTER
Pt left a message stating she needs a refill on a prescription from Dr. Hsieh, drug not specified. Call returned to patient with no answer, unable to leave voicemail.

## 2025-02-14 ENCOUNTER — TELEPHONE (OUTPATIENT)
Dept: HEMATOLOGY | Age: 89
End: 2025-02-14

## 2025-02-14 NOTE — TELEPHONE ENCOUNTER
Called Patient and reminded patient of their appointment on 02/19/2025 and patient confirmed they would be here. Reminded patient to just come at appointment time, and to not come at the lab appointment time. Reminded patient that we will not check them in any more than 30 minutes before appointment time.  We have now moved to the [facility] [place] that is located between our old office and the ER at the [facility]. Letting the Pt know that our front entrance faces the  [employer] [place]. Reminded pt to eat well and be well hydrated for their labs.

## 2025-02-18 NOTE — PROGRESS NOTES
right pterygopalatine fossa, appearing stable   Diffusion signal abnormality in this region has normalized, suggesting positive lymphoma treatment response.   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    The remaining question of whether Yeni would benefit from consolidative XRT to the right face/orbit/brain, given the high risk characteristics of her lymphoma, was addressed at her XRT consultation with Dr. Elliott Feliz on 5/8/2019.  Concerns persist over the fact that she has developed some degree of leukoencephalopathy.   Fortunately, clinically she is improving.  The downside of XRT is possible further leukoencephalopathy as well as concerns over damage to the right eye, which is her good eye.    Yeni's daughter stated that she was very pleased with the appointment with Dr. Feliz who explained all of the benefits and possible downside problems with XRT.  His recommendation was for 3600 cGy over 18 treatment fractions for consolidation.  Yeni opted against consolidation XRT.     MRI of the brain, right orbit, face and neck with and without contrast at [facility] 9/4/2019 showed stable posttreatment changes.  No suspicious mass.     CT chest with contrast on 9/18/2019 documented no nodules or mass lesion. Calcified granuloma are present stable and unchanged. No acute cardiopulmonary process.     MRI orbit, face, neck with and without contrast on 3/18/2020 at [facility] documented:  Stable to mildly improved appearance of probable denervation edema and mild enhancement involving the muscles of mastication on the right, likely related to postradiation changes. There is no mass identified to indicate recurrent neoplasm.  Mild chronic sinusitis involving the ethmoid sinuses and base of the right maxillary sinus.    MRI brain with and without

## 2025-02-24 ENCOUNTER — TELEPHONE (OUTPATIENT)
Dept: HEMATOLOGY | Age: 89
End: 2025-02-24

## 2025-02-27 ENCOUNTER — HOSPITAL ENCOUNTER (OUTPATIENT)
Dept: INFUSION THERAPY | Age: 89
Discharge: HOME | End: 2025-02-27
Payer: MEDICARE

## 2025-02-27 ENCOUNTER — TRANSCRIBE ORDERS (OUTPATIENT)
Dept: ADMINISTRATIVE | Facility: HOSPITAL | Age: OVER 89
End: 2025-02-27
Payer: MEDICARE

## 2025-02-27 ENCOUNTER — OFFICE VISIT (OUTPATIENT)
Dept: HEMATOLOGY | Age: 89
End: 2025-02-27
Payer: MEDICARE

## 2025-02-27 VITALS
HEIGHT: 67 IN | BODY MASS INDEX: 26.48 KG/M2 | OXYGEN SATURATION: 97 % | DIASTOLIC BLOOD PRESSURE: 91 MMHG | TEMPERATURE: 97.9 F | WEIGHT: 168.7 LBS | HEART RATE: 78 BPM | SYSTOLIC BLOOD PRESSURE: 160 MMHG | RESPIRATION RATE: 18 BRPM

## 2025-02-27 DIAGNOSIS — D05.11 INTRADUCTAL CARCINOMA IN SITU OF RIGHT BREAST: Primary | ICD-10-CM

## 2025-02-27 DIAGNOSIS — Z12.31 ENCOUNTER FOR SCREENING MAMMOGRAM FOR BREAST CANCER: ICD-10-CM

## 2025-02-27 DIAGNOSIS — C83.31 DIFFUSE LARGE B-CELL LYMPHOMA, LYMPH NODES OF HEAD, FACE, AND NECK (HCC): ICD-10-CM

## 2025-02-27 DIAGNOSIS — Z45.2 ENCOUNTER FOR CENTRAL LINE CARE: Primary | ICD-10-CM

## 2025-02-27 DIAGNOSIS — C83.31 DIFFUSE LARGE B-CELL LYMPHOMA, LYMPH NODES OF HEAD, FACE, AND NECK: ICD-10-CM

## 2025-02-27 DIAGNOSIS — Z12.31 ENCOUNTER FOR SCREENING MAMMOGRAM FOR MALIGNANT NEOPLASM OF BREAST: Primary | ICD-10-CM

## 2025-02-27 LAB
ALBUMIN SERPL-MCNC: 3.9 G/DL (ref 3.5–5.2)
ALP SERPL-CCNC: 56 U/L (ref 35–104)
ALT SERPL-CCNC: 14 U/L (ref 5–33)
ANION GAP SERPL CALCULATED.3IONS-SCNC: 11 MMOL/L (ref 7–19)
AST SERPL-CCNC: 23 U/L (ref 5–32)
BASOPHILS # BLD: 0.03 K/UL (ref 0–0.2)
BASOPHILS NFR BLD: 0.5 % (ref 0–1)
BILIRUB SERPL-MCNC: 0.5 MG/DL (ref 0–1.2)
BUN SERPL-MCNC: 25 MG/DL (ref 8–23)
CALCIUM SERPL-MCNC: 9.3 MG/DL (ref 8.2–9.6)
CHLORIDE SERPL-SCNC: 102 MMOL/L (ref 98–107)
CO2 SERPL-SCNC: 25 MMOL/L (ref 22–29)
CREAT SERPL-MCNC: 0.9 MG/DL (ref 0.5–0.9)
EOSINOPHIL # BLD: 0.06 K/UL (ref 0–0.6)
EOSINOPHIL NFR BLD: 0.9 % (ref 0–5)
ERYTHROCYTE [DISTWIDTH] IN BLOOD BY AUTOMATED COUNT: 14.4 % (ref 11.5–14.5)
GLUCOSE SERPL-MCNC: 88 MG/DL (ref 70–99)
HCT VFR BLD AUTO: 42 % (ref 37–47)
HGB BLD-MCNC: 14 G/DL (ref 12–16)
LDH SERPL-CCNC: 180 U/L (ref 135–214)
LYMPHOCYTES # BLD: 1.71 K/UL (ref 1.1–4.5)
LYMPHOCYTES NFR BLD: 26.1 % (ref 20–40)
MCH RBC QN AUTO: 28.9 PG (ref 27–31)
MCHC RBC AUTO-ENTMCNC: 33.3 G/DL (ref 33–37)
MCV RBC AUTO: 86.8 FL (ref 81–99)
MONOCYTES # BLD: 0.76 K/UL (ref 0–0.9)
MONOCYTES NFR BLD: 11.6 % (ref 1–10)
NEUTROPHILS # BLD: 3.95 K/UL (ref 1.5–7.5)
NEUTS SEG NFR BLD: 60.4 % (ref 50–65)
PLATELET # BLD AUTO: 185 K/UL (ref 130–400)
PMV BLD AUTO: 10.4 FL (ref 9.4–12.3)
POTASSIUM SERPL-SCNC: 4.3 MMOL/L (ref 3.5–5.1)
PROT SERPL-MCNC: 7 G/DL (ref 6.4–8.3)
RBC # BLD AUTO: 4.84 M/UL (ref 4.2–5.4)
SODIUM SERPL-SCNC: 138 MMOL/L (ref 136–145)
WBC # BLD AUTO: 6.54 K/UL (ref 4.8–10.8)

## 2025-02-27 PROCEDURE — G8417 CALC BMI ABV UP PARAM F/U: HCPCS | Performed by: INTERNAL MEDICINE

## 2025-02-27 PROCEDURE — 1090F PRES/ABSN URINE INCON ASSESS: CPT | Performed by: INTERNAL MEDICINE

## 2025-02-27 PROCEDURE — 1159F MED LIST DOCD IN RCRD: CPT | Performed by: INTERNAL MEDICINE

## 2025-02-27 PROCEDURE — 96523 IRRIG DRUG DELIVERY DEVICE: CPT

## 2025-02-27 PROCEDURE — 36415 COLL VENOUS BLD VENIPUNCTURE: CPT

## 2025-02-27 PROCEDURE — 2500000003 HC RX 250 WO HCPCS: Performed by: INTERNAL MEDICINE

## 2025-02-27 PROCEDURE — 99213 OFFICE O/P EST LOW 20 MIN: CPT

## 2025-02-27 PROCEDURE — 1036F TOBACCO NON-USER: CPT | Performed by: INTERNAL MEDICINE

## 2025-02-27 PROCEDURE — 1123F ACP DISCUSS/DSCN MKR DOCD: CPT | Performed by: INTERNAL MEDICINE

## 2025-02-27 PROCEDURE — G8427 DOCREV CUR MEDS BY ELIG CLIN: HCPCS | Performed by: INTERNAL MEDICINE

## 2025-02-27 PROCEDURE — 1126F AMNT PAIN NOTED NONE PRSNT: CPT | Performed by: INTERNAL MEDICINE

## 2025-02-27 PROCEDURE — 99214 OFFICE O/P EST MOD 30 MIN: CPT | Performed by: INTERNAL MEDICINE

## 2025-02-27 PROCEDURE — 80053 COMPREHEN METABOLIC PANEL: CPT

## 2025-02-27 PROCEDURE — 83615 LACTATE (LD) (LDH) ENZYME: CPT

## 2025-02-27 PROCEDURE — 6360000002 HC RX W HCPCS: Performed by: INTERNAL MEDICINE

## 2025-02-27 PROCEDURE — 85025 COMPLETE CBC W/AUTO DIFF WBC: CPT

## 2025-02-27 RX ORDER — SODIUM CHLORIDE 0.9 % (FLUSH) 0.9 %
10 SYRINGE (ML) INJECTION PRN
Status: DISCONTINUED | OUTPATIENT
Start: 2025-02-27 | End: 2025-02-28 | Stop reason: HOSPADM

## 2025-02-27 RX ORDER — SODIUM CHLORIDE 0.9 % (FLUSH) 0.9 %
20 SYRINGE (ML) INJECTION PRN
OUTPATIENT
Start: 2025-02-27

## 2025-02-27 RX ORDER — SODIUM CHLORIDE 0.9 % (FLUSH) 0.9 %
10 SYRINGE (ML) INJECTION PRN
OUTPATIENT
Start: 2025-02-27

## 2025-02-27 RX ORDER — HEPARIN 100 UNIT/ML
500 SYRINGE INTRAVENOUS PRN
OUTPATIENT
Start: 2025-02-27

## 2025-02-27 RX ORDER — HEPARIN 100 UNIT/ML
500 SYRINGE INTRAVENOUS PRN
Status: DISCONTINUED | OUTPATIENT
Start: 2025-02-27 | End: 2025-02-28 | Stop reason: HOSPADM

## 2025-02-27 RX ORDER — WATER 10 ML/10ML
2.2 INJECTION INTRAMUSCULAR; INTRAVENOUS; SUBCUTANEOUS ONCE
OUTPATIENT
Start: 2025-02-27 | End: 2025-02-27

## 2025-02-27 RX ADMIN — HEPARIN 500 UNITS: 100 SYRINGE at 12:15

## 2025-02-27 RX ADMIN — SODIUM CHLORIDE, PRESERVATIVE FREE 10 ML: 5 INJECTION INTRAVENOUS at 12:15

## 2025-04-03 NOTE — PROGRESS NOTES
"Chief Complaint  Asthma    Subjective    History of Present Illness {  Problem List  Visit Diagnosis   Encounters  Notes  Medications  Labs  Result Review Imaging  Media: 23}    Kami Villanueva presents to [facility]IC[facility] for:    History of Present Illness  Management of her asthma.  She is a never smoker.  She is followed by Dr. 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  She indicates her most recent checkup went well.      She has occasional shortness of breath and coughing.  She used to be on Arnuity.  She does not take it routinely due to hoarseness and problems with her vocal cords.  She is out of it.  She has albuterol which she is using on occasion.  She does not believe it works as well.         Current Outpatient Medications:     acetaminophen-codeine (TYLENOL with CODEINE #3) 300-30 MG per tablet, acetaminophen 300 mg-codeine 30 mg tablet, Disp: , Rfl:     levothyroxine (SYNTHROID, LEVOTHROID) 100 MCG tablet, Take 1 tablet by mouth Daily., Disp: , Rfl:     Multiple Vitamin tablet, Take 1 tablet by mouth., Disp: , Rfl:     Albuterol-Budesonide (Airsupra) 90-80 MCG/ACT aerosol, Inhale 2 puffs Every 4 (Four) Hours As Needed (SOA or cough) for up to 14 days., Disp: 10.7 g, Rfl: 0    Social History     Socioeconomic History    Marital status:    Tobacco Use    Smoking status: Never    Smokeless tobacco: Never   Substance and Sexual Activity    Alcohol use: No    Drug use: No    Sexual activity: Defer       Objective   Vital Signs:   /70   Pulse 80   Ht 170.2 cm (67\")   Wt 75.8 kg (167 lb)   SpO2 97% Comment: RA  BMI 26.16 kg/m²     Physical Exam  Eyes:      Comments: glasses   Cardiovascular:      Rate and Rhythm: Normal rate and regular rhythm.      Heart sounds: No murmur heard.  Pulmonary:      Effort: Pulmonary effort is normal.      Breath sounds: Normal breath sounds.   Musculoskeletal:      " Right lower leg: No edema.      Left lower leg: No edema.   Neurological:      Mental Status: She is alert and oriented to person, place, and time.        Result Review :      My interpretation of the PFT : none    Results for orders placed in visit on 03/21/23    Pulmonary Function Test    Narrative  Pulmonary Function Test  Performed by: Emelina Avery, DOMINGUEZ  Authorized by: Olivia Quiroz APRN    Pre Drug % Predicted  FVC: 103%  FEV1: 98%  FEF 25-75%: 108%  FEV1/FVC: 71%    Interpretation  Spirometry  Spirometry shows normal results. Post-bronchodilator the ratio shows normal results.  Review of FVL curve  Patient's effort is normal.      Results for orders placed in visit on 03/15/22    Pulmonary Function Test    Narrative  Pulmonary Function Test  Performed by: Emelina Avery RRT  Authorized by: Olivia Quiroz APRN    Pre Drug % Predicted  FVC: 106%  FEV1: 102%  FEF 25-75%: 107%  FEV1/FVC: 72%    Interpretation  Spirometry  Spirometry shows normal results.  Review of FVL curve  Patient's effort is normal. There is a flattening of the inspiratory curve, suggesting variable extrathoracic obstruction.  Overall comments: Patient has known vocal cord dysfunction      Results for orders placed in visit on 08/01/19    Pulmonary Function Test      My interpretation of imaging:  none    My interpretation of labs: none      Assessment and Plan {CC Problem List  Visit Diagnosis  ROS  Review (Popup)  [facility] Maintenance  Quality  BestPractice  Medications  SmartSets  SnapShot Encounters  Media : 23}    Diagnoses and all orders for this visit:    1. Mild intermittent asthma without complication (Primary)  Comments:  Not interested in ongoing PFTs at this point.  Use albuterol as needed.  Add back Arnuity if worse  Orders:  -     Albuterol-Budesonide (Airsupra) 90-80 MCG/ACT aerosol; Inhale 2 puffs Every 4 (Four) Hours As Needed (SOA or cough) for up to 14 days.  Dispense: 10.7 g; Refill:  0    2. Diffuse large B-cell lymphoma of lymph nodes of head  Comments:  Stable keep follow-up with oncology    3. Complete paralysis of vocal cord  Comments:  Avoiding regular use of inhaled corticosteroid due to this issue.  Airsupra given to use on an as-needed basis.        Body mass index is 26.16 kg/m².    Olivia Quiroz, GLENDA  4/16/2025  12:03 CDT    Follow Up   Return in about 1 year (around 4/16/2026) for FVL.    Patient was given instructions and counseling regarding her condition or for health maintenance advice. Please see specific information pulled into the AVS if appropriate.

## 2025-04-16 ENCOUNTER — OFFICE VISIT (OUTPATIENT)
Dept: PULMONOLOGY | Facility: CLINIC | Age: OVER 89
End: 2025-04-16
Payer: MEDICARE

## 2025-04-16 VITALS
HEART RATE: 80 BPM | BODY MASS INDEX: 26.21 KG/M2 | SYSTOLIC BLOOD PRESSURE: 124 MMHG | OXYGEN SATURATION: 97 % | DIASTOLIC BLOOD PRESSURE: 70 MMHG | WEIGHT: 167 LBS | HEIGHT: 67 IN

## 2025-04-16 DIAGNOSIS — C83.31 DIFFUSE LARGE B-CELL LYMPHOMA OF LYMPH NODES OF HEAD: Chronic | ICD-10-CM

## 2025-04-16 DIAGNOSIS — J45.20 MILD INTERMITTENT ASTHMA WITHOUT COMPLICATION: Primary | Chronic | ICD-10-CM

## 2025-04-16 DIAGNOSIS — J38.00 COMPLETE PARALYSIS OF VOCAL CORD: Chronic | ICD-10-CM

## 2025-04-16 PROCEDURE — 99213 OFFICE O/P EST LOW 20 MIN: CPT | Performed by: NURSE PRACTITIONER

## 2025-04-16 RX ORDER — ALBUTEROL SULFATE AND BUDESONIDE 90; 80 UG/1; UG/1
2 AEROSOL, METERED RESPIRATORY (INHALATION) EVERY 4 HOURS PRN
Qty: 10.7 G | Refills: 0 | COMMUNITY
Start: 2025-04-16 | End: 2025-04-30

## 2025-06-23 ENCOUNTER — TELEPHONE (OUTPATIENT)
Dept: HEMATOLOGY | Age: 89
End: 2025-06-23

## 2025-06-23 NOTE — TELEPHONE ENCOUNTER
I called patient and reminded patient of their appt on 06/26/2025 and patient informed me they needed to reschedule their appointment to another date. I have let the  know of this as well as the provider and nurses.

## 2025-08-06 ENCOUNTER — TELEPHONE (OUTPATIENT)
Dept: HEMATOLOGY | Age: 89
End: 2025-08-06

## 2025-08-11 ENCOUNTER — TELEPHONE (OUTPATIENT)
Dept: HEMATOLOGY | Age: 89
End: 2025-08-11

## 2025-08-11 ENCOUNTER — OFFICE VISIT (OUTPATIENT)
Dept: HEMATOLOGY | Age: 89
End: 2025-08-11
Payer: MEDICARE

## 2025-08-11 ENCOUNTER — TRANSCRIBE ORDERS (OUTPATIENT)
Dept: ADMINISTRATIVE | Facility: HOSPITAL | Age: OVER 89
End: 2025-08-11
Payer: MEDICARE

## 2025-08-11 ENCOUNTER — HOSPITAL ENCOUNTER (OUTPATIENT)
Dept: INFUSION THERAPY | Age: 89
Discharge: HOME | End: 2025-08-11
Payer: MEDICARE

## 2025-08-11 VITALS
WEIGHT: 160.7 LBS | DIASTOLIC BLOOD PRESSURE: 94 MMHG | RESPIRATION RATE: 18 BRPM | TEMPERATURE: 97.9 F | HEART RATE: 83 BPM | SYSTOLIC BLOOD PRESSURE: 132 MMHG | OXYGEN SATURATION: 100 % | HEIGHT: 67 IN | BODY MASS INDEX: 25.22 KG/M2

## 2025-08-11 DIAGNOSIS — Z45.2 ENCOUNTER FOR CENTRAL LINE CARE: ICD-10-CM

## 2025-08-11 DIAGNOSIS — Z86.000 HISTORY OF DUCTAL CARCINOMA IN SITU (DCIS) OF BREAST: ICD-10-CM

## 2025-08-11 DIAGNOSIS — Z12.31 VISIT FOR SCREENING MAMMOGRAM: ICD-10-CM

## 2025-08-11 DIAGNOSIS — Z45.2 ENCOUNTER FOR CENTRAL LINE CARE: Primary | ICD-10-CM

## 2025-08-11 DIAGNOSIS — C83.31 DIFFUSE LARGE B-CELL LYMPHOMA, LYMPH NODES OF HEAD, FACE, AND NECK (HCC): ICD-10-CM

## 2025-08-11 DIAGNOSIS — Z12.31 ENCOUNTER FOR SCREENING MAMMOGRAM FOR BREAST CANCER: ICD-10-CM

## 2025-08-11 DIAGNOSIS — C83.31: Primary | ICD-10-CM

## 2025-08-11 LAB
ALBUMIN SERPL-MCNC: 4 G/DL (ref 3.5–5.2)
ALP SERPL-CCNC: 59 U/L (ref 35–104)
ALT SERPL-CCNC: 16 U/L (ref 5–33)
ANION GAP SERPL CALCULATED.3IONS-SCNC: 13 MMOL/L (ref 7–19)
AST SERPL-CCNC: 26 U/L (ref 5–32)
BASOPHILS # BLD: 0.03 K/UL (ref 0–0.2)
BASOPHILS NFR BLD: 0.4 % (ref 0–1)
BILIRUB SERPL-MCNC: 0.5 MG/DL (ref 0–1.2)
BUN SERPL-MCNC: 22 MG/DL (ref 8–23)
CALCIUM SERPL-MCNC: 9.4 MG/DL (ref 8.2–9.6)
CHLORIDE SERPL-SCNC: 104 MMOL/L (ref 98–107)
CO2 SERPL-SCNC: 24 MMOL/L (ref 22–29)
CREAT SERPL-MCNC: 1.1 MG/DL (ref 0.5–0.9)
EOSINOPHIL # BLD: 0.06 K/UL (ref 0–0.6)
EOSINOPHIL NFR BLD: 0.9 % (ref 0–5)
ERYTHROCYTE [DISTWIDTH] IN BLOOD BY AUTOMATED COUNT: 14.1 % (ref 11.5–14.5)
GLUCOSE SERPL-MCNC: 95 MG/DL (ref 70–99)
HCT VFR BLD AUTO: 44.1 % (ref 37–47)
HGB BLD-MCNC: 14.2 G/DL (ref 12–16)
LDH SERPL-CCNC: 197 U/L (ref 135–214)
LYMPHOCYTES # BLD: 1.97 K/UL (ref 1.1–4.5)
LYMPHOCYTES NFR BLD: 28.6 % (ref 20–40)
MCH RBC QN AUTO: 28.1 PG (ref 27–31)
MCHC RBC AUTO-ENTMCNC: 32.2 G/DL (ref 33–37)
MCV RBC AUTO: 87.3 FL (ref 81–99)
MONOCYTES # BLD: 0.68 K/UL (ref 0–0.9)
MONOCYTES NFR BLD: 9.9 % (ref 1–10)
NEUTROPHILS # BLD: 4.13 K/UL (ref 1.5–7.5)
NEUTS SEG NFR BLD: 59.9 % (ref 50–65)
PLATELET # BLD AUTO: 190 K/UL (ref 130–400)
PMV BLD AUTO: 10.4 FL (ref 9.4–12.3)
POTASSIUM SERPL-SCNC: 4.2 MMOL/L (ref 3.5–5.1)
PROT SERPL-MCNC: 7 G/DL (ref 6.4–8.3)
RBC # BLD AUTO: 5.05 M/UL (ref 4.2–5.4)
SODIUM SERPL-SCNC: 141 MMOL/L (ref 136–145)
WBC # BLD AUTO: 6.89 K/UL (ref 4.8–10.8)

## 2025-08-11 PROCEDURE — 1090F PRES/ABSN URINE INCON ASSESS: CPT | Performed by: INTERNAL MEDICINE

## 2025-08-11 PROCEDURE — 99213 OFFICE O/P EST LOW 20 MIN: CPT

## 2025-08-11 PROCEDURE — 36415 COLL VENOUS BLD VENIPUNCTURE: CPT

## 2025-08-11 PROCEDURE — 80053 COMPREHEN METABOLIC PANEL: CPT

## 2025-08-11 PROCEDURE — 6360000002 HC RX W HCPCS: Performed by: INTERNAL MEDICINE

## 2025-08-11 PROCEDURE — G8427 DOCREV CUR MEDS BY ELIG CLIN: HCPCS | Performed by: INTERNAL MEDICINE

## 2025-08-11 PROCEDURE — 85025 COMPLETE CBC W/AUTO DIFF WBC: CPT

## 2025-08-11 PROCEDURE — 2500000003 HC RX 250 WO HCPCS: Performed by: INTERNAL MEDICINE

## 2025-08-11 PROCEDURE — 1159F MED LIST DOCD IN RCRD: CPT | Performed by: INTERNAL MEDICINE

## 2025-08-11 PROCEDURE — 83615 LACTATE (LD) (LDH) ENZYME: CPT

## 2025-08-11 PROCEDURE — 1123F ACP DISCUSS/DSCN MKR DOCD: CPT | Performed by: INTERNAL MEDICINE

## 2025-08-11 PROCEDURE — G8417 CALC BMI ABV UP PARAM F/U: HCPCS | Performed by: INTERNAL MEDICINE

## 2025-08-11 PROCEDURE — 36591 DRAW BLOOD OFF VENOUS DEVICE: CPT

## 2025-08-11 PROCEDURE — 99214 OFFICE O/P EST MOD 30 MIN: CPT | Performed by: INTERNAL MEDICINE

## 2025-08-11 PROCEDURE — 1036F TOBACCO NON-USER: CPT | Performed by: INTERNAL MEDICINE

## 2025-08-11 RX ORDER — SODIUM CHLORIDE 0.9 % (FLUSH) 0.9 %
10 SYRINGE (ML) INJECTION PRN
Status: DISCONTINUED | OUTPATIENT
Start: 2025-08-11 | End: 2025-08-12 | Stop reason: HOSPADM

## 2025-08-11 RX ORDER — SODIUM CHLORIDE 0.9 % (FLUSH) 0.9 %
20 SYRINGE (ML) INJECTION PRN
OUTPATIENT
Start: 2025-08-11

## 2025-08-11 RX ORDER — WATER 10 ML/10ML
2.2 INJECTION INTRAMUSCULAR; INTRAVENOUS; SUBCUTANEOUS ONCE
OUTPATIENT
Start: 2025-08-11 | End: 2025-08-11

## 2025-08-11 RX ORDER — SODIUM CHLORIDE 0.9 % (FLUSH) 0.9 %
10 SYRINGE (ML) INJECTION PRN
OUTPATIENT
Start: 2025-08-11

## 2025-08-11 RX ORDER — HEPARIN 100 UNIT/ML
500 SYRINGE INTRAVENOUS PRN
OUTPATIENT
Start: 2025-08-11

## 2025-08-11 RX ORDER — HEPARIN 100 UNIT/ML
500 SYRINGE INTRAVENOUS PRN
Status: DISCONTINUED | OUTPATIENT
Start: 2025-08-11 | End: 2025-08-12 | Stop reason: HOSPADM

## 2025-08-11 RX ADMIN — HEPARIN 500 UNITS: 100 SYRINGE at 11:03

## 2025-08-11 RX ADMIN — SODIUM CHLORIDE, PRESERVATIVE FREE 10 ML: 5 INJECTION INTRAVENOUS at 11:03

## (undated) DEVICE — GLV SURG BIOGEL LTX PF 6 1/2

## (undated) DEVICE — PATIENT TRACKER 9734887XOM NON-INVASIVE

## (undated) DEVICE — PLATE ES AD W 9FT CRD 2

## (undated) DEVICE — PK TURNOVER RM ADV

## (undated) DEVICE — SUTURE VCRL SZ 4-0 L18IN ABSRB UD L19MM PS-2 3/8 CIR PRIM J496H

## (undated) DEVICE — 3M™ STERI-STRIP™ REINFORCED ADHESIVE SKIN CLOSURES, R1542, 1/4 IN X 1-1/2 IN (6 MM X 38 MM), 6 STRIPS/ENVELOPE: Brand: 3M™ STERI-STRIP™

## (undated) DEVICE — SKIN AFFIX SURG ADHESIVE 72/CS 0.55ML: Brand: MEDLINE

## (undated) DEVICE — TUBING, SUCTION, 1/4" X 12', STRAIGHT: Brand: MEDLINE

## (undated) DEVICE — NDL SPINE 25G 31/2IN BLU

## (undated) DEVICE — ANTIBACTERIAL UNDYED BRAIDED (POLYGLACTIN 910), SYNTHETIC ABSORBABLE SUTURE: Brand: COATED VICRYL

## (undated) DEVICE — STY PRE CALIB DISP

## (undated) DEVICE — SYR SLP TP 10ML DISP

## (undated) DEVICE — GLV SURG BIOGEL LTX PF 8

## (undated) DEVICE — SYR LUERLOK 20CC

## (undated) DEVICE — ELECTRD BLD EDGE/INSUL1P 2.4X5.1MM STRL

## (undated) DEVICE — TRY PREP SCRB VAG PVP

## (undated) DEVICE — CHLORAPREP 26ML ORANGE

## (undated) DEVICE — INSTR TRACKER 9733533 EM ENT

## (undated) DEVICE — MAJOR BSIN SETUP PK

## (undated) DEVICE — PK ENT HD AND NK 30

## (undated) DEVICE — AIRLIFE™ NASAL OXYGEN CANNULA CURVED, NONFLARED TIP, WITH 7' FEET (2.1 M) CRUSH RESISTANT TUBING, OVER-THE-EAR STYLE: Brand: AIRLIFE™

## (undated) DEVICE — SUT GUT PLN 4/0 P3 18IN 1644G

## (undated) DEVICE — ELECTROSURGICAL PENCIL BUTTON SWITCH NON COATED BLADE ELECTRODE 10 FT (3 M) CORD HOLSTER: Brand: MEGADYNE

## (undated) DEVICE — DEFOGGER!" ANTI FOG KIT: Brand: DEROYAL

## (undated) DEVICE — SPHR MARKR LOCATION CI SYS REFL 3PK 30BX

## (undated) DEVICE — SUTURE VCRL SZ 3-0 L27IN ABSRB UD L26MM SH 1/2 CIR J416H

## (undated) DEVICE — PK CRANI 30

## (undated) DEVICE — THREE QUARTER SHEET: Brand: CONVERTORS

## (undated) DEVICE — CONN FLX BREATHE CIRCT

## (undated) DEVICE — SPONGE,NEURO,0.5"X3",XR,STRL,LF,10/PK: Brand: MEDLINE

## (undated) DEVICE — SUTURE VCRL SZ 2-0 L27IN ABSRB UD L26MM SH 1/2 CIR J417H

## (undated) DEVICE — CONTAINER,SPECIMEN,OR STERILE,4OZ: Brand: MEDLINE

## (undated) DEVICE — C-ARM: Brand: UNBRANDED

## (undated) DEVICE — SUT SILK 2/0 SUTUPAK TIES 24IN SA75H

## (undated) DEVICE — STANDARD SURGICAL GOWN, L: Brand: CONVERTORS

## (undated) DEVICE — 6.0MM ACORN

## (undated) DEVICE — SPONGE GZ W4XL4IN RAYON POLY FILL CVR W/ NONWOVEN FAB

## (undated) DEVICE — STERILE LATEX POWDER FREE SURGICAL GLOVES WITH HYDROGEL COATING: Brand: PROTEXIS

## (undated) DEVICE — WIPE MEROCEL 3.625X3IN

## (undated) DEVICE — Device: Brand: IQ SYSTEM

## (undated) DEVICE — TUBING 1895522 5PK STRAIGHTSHOT TO XPS: Brand: STRAIGHTSHOT®

## (undated) DEVICE — PROXIMATE RH ROTATING HEAD SKIN STAPLERS (35 WIDE) CONTAINS 35 STAINLESS STEEL STAPLES: Brand: PROXIMATE

## (undated) DEVICE — GLV SURG TRIUMPH GREEN W/ALOE PF LTX 8 STRL

## (undated) DEVICE — SUT GUT PLN 4/0 SC1 18IN 1824H

## (undated) DEVICE — ADHESIVE LIQ MASTISOL ST

## (undated) DEVICE — BLADE 1884012EM RAD12 4MM M4 ROTATE ROHS: Brand: FUSION®

## (undated) DEVICE — 9165 UNIVERSAL PATIENT PLATE: Brand: 3M™

## (undated) DEVICE — GLV SURG BIOGEL M LTX PF 7 1/2

## (undated) DEVICE — 3M™ TEGADERM™ TRANSPARENT FILM DRESSING FRAME STYLE, 1626, 4 IN X 4-3/4 IN (10 CM X 12 CM), 50/CT 4CT/CASE: Brand: 3M™ TEGADERM™